# Patient Record
Sex: FEMALE | Race: WHITE | Employment: OTHER | URBAN - METROPOLITAN AREA
[De-identification: names, ages, dates, MRNs, and addresses within clinical notes are randomized per-mention and may not be internally consistent; named-entity substitution may affect disease eponyms.]

---

## 2020-10-06 ENCOUNTER — APPOINTMENT (OUTPATIENT)
Dept: GENERAL RADIOLOGY | Age: 70
DRG: 045 | End: 2020-10-06
Payer: MEDICAID

## 2020-10-06 ENCOUNTER — APPOINTMENT (OUTPATIENT)
Dept: CT IMAGING | Age: 70
DRG: 045 | End: 2020-10-06
Payer: MEDICAID

## 2020-10-06 ENCOUNTER — HOSPITAL ENCOUNTER (INPATIENT)
Age: 70
LOS: 2 days | Discharge: NURSING, REHAB OR LONG-TERM CARE | DRG: 045 | End: 2020-10-08
Attending: EMERGENCY MEDICINE | Admitting: STUDENT IN AN ORGANIZED HEALTH CARE EDUCATION/TRAINING PROGRAM
Payer: MEDICAID

## 2020-10-06 PROBLEM — I16.0 HYPERTENSIVE URGENCY: Status: ACTIVE | Noted: 2020-10-06

## 2020-10-06 PROBLEM — E87.6 HYPOKALEMIA: Status: ACTIVE | Noted: 2020-10-06

## 2020-10-06 PROBLEM — R29.810 FACIAL DROOP: Status: ACTIVE | Noted: 2020-10-06

## 2020-10-06 PROBLEM — R53.1 ACUTE RIGHT-SIDED WEAKNESS: Status: ACTIVE | Noted: 2020-10-06

## 2020-10-06 PROBLEM — G45.9 TIA (TRANSIENT ISCHEMIC ATTACK): Status: ACTIVE | Noted: 2020-10-06

## 2020-10-06 PROBLEM — E66.01 CLASS 3 SEVERE OBESITY DUE TO EXCESS CALORIES WITH BODY MASS INDEX (BMI) OF 45.0 TO 49.9 IN ADULT (HCC): Status: ACTIVE | Noted: 2020-10-06

## 2020-10-06 PROBLEM — R73.9 HYPERGLYCEMIA: Status: ACTIVE | Noted: 2020-10-06

## 2020-10-06 PROBLEM — I10 HYPERTENSION, UNCONTROLLED: Status: ACTIVE | Noted: 2020-10-06

## 2020-10-06 LAB
ALBUMIN SERPL-MCNC: 4.2 GM/DL (ref 3.4–5)
ALP BLD-CCNC: 112 IU/L (ref 40–128)
ALT SERPL-CCNC: 28 U/L (ref 10–40)
ANION GAP SERPL CALCULATED.3IONS-SCNC: 16 MMOL/L (ref 4–16)
AST SERPL-CCNC: 26 IU/L (ref 15–37)
BACTERIA: ABNORMAL /HPF
BASE EXCESS MIXED: 0.8 (ref 0–2.3)
BASOPHILS ABSOLUTE: 0.1 K/CU MM
BASOPHILS RELATIVE PERCENT: 1.1 % (ref 0–1)
BETA-HYDROXYBUTYRATE: 3.3 MG/DL (ref 0–3)
BILIRUB SERPL-MCNC: 1.1 MG/DL (ref 0–1)
BILIRUBIN URINE: NEGATIVE MG/DL
BLOOD, URINE: ABNORMAL
BUN BLDV-MCNC: 11 MG/DL (ref 6–23)
CALCIUM SERPL-MCNC: 9.5 MG/DL (ref 8.3–10.6)
CHLORIDE BLD-SCNC: 93 MMOL/L (ref 99–110)
CHP ED QC CHECK: YES
CLARITY: ABNORMAL
CO2: 23 MMOL/L (ref 21–32)
COLOR: YELLOW
COMMENT: ABNORMAL
CREAT SERPL-MCNC: 0.9 MG/DL (ref 0.6–1.1)
DIFFERENTIAL TYPE: ABNORMAL
EOSINOPHILS ABSOLUTE: 0.2 K/CU MM
EOSINOPHILS RELATIVE PERCENT: 2.7 % (ref 0–3)
GFR AFRICAN AMERICAN: >60 ML/MIN/1.73M2
GFR NON-AFRICAN AMERICAN: >60 ML/MIN/1.73M2
GLUCOSE BLD-MCNC: 340 MG/DL (ref 70–99)
GLUCOSE BLD-MCNC: 420 MG/DL
GLUCOSE BLD-MCNC: 420 MG/DL (ref 70–99)
GLUCOSE BLD-MCNC: 467 MG/DL (ref 70–99)
GLUCOSE, URINE: >500 MG/DL
HCO3 VENOUS: 26 MMOL/L (ref 19–25)
HCT VFR BLD CALC: 49.7 % (ref 37–47)
HEMOGLOBIN: 16.7 GM/DL (ref 12.5–16)
IMMATURE NEUTROPHIL %: 0.3 % (ref 0–0.43)
INR BLD: 1.19 INDEX
KETONES, URINE: NEGATIVE MG/DL
LEUKOCYTE ESTERASE, URINE: ABNORMAL
LYMPHOCYTES ABSOLUTE: 1.7 K/CU MM
LYMPHOCYTES RELATIVE PERCENT: 23.1 % (ref 24–44)
MAGNESIUM: 1.7 MG/DL (ref 1.8–2.4)
MCH RBC QN AUTO: 31.1 PG (ref 27–31)
MCHC RBC AUTO-ENTMCNC: 33.6 % (ref 32–36)
MCV RBC AUTO: 92.6 FL (ref 78–100)
MONOCYTES ABSOLUTE: 0.7 K/CU MM
MONOCYTES RELATIVE PERCENT: 9.2 % (ref 0–4)
MUCUS: ABNORMAL HPF
NITRITE URINE, QUANTITATIVE: NEGATIVE
NUCLEATED RBC %: 0 %
O2 SAT, VEN: 80 % (ref 50–70)
PCO2, VEN: 43 MMHG (ref 38–52)
PDW BLD-RTO: 13.2 % (ref 11.7–14.9)
PH VENOUS: 7.39 (ref 7.32–7.42)
PH, URINE: 6 (ref 5–8)
PLATELET # BLD: 211 K/CU MM (ref 140–440)
PMV BLD AUTO: 10.5 FL (ref 7.5–11.1)
PO2, VEN: 43 MMHG (ref 28–48)
POTASSIUM SERPL-SCNC: 3.2 MMOL/L (ref 3.5–5.1)
PROTEIN UA: 100 MG/DL
PROTHROMBIN TIME: 14.4 SECONDS (ref 11.7–14.5)
RBC # BLD: 5.37 M/CU MM (ref 4.2–5.4)
RBC URINE: 123 /HPF (ref 0–6)
SEGMENTED NEUTROPHILS ABSOLUTE COUNT: 4.6 K/CU MM
SEGMENTED NEUTROPHILS RELATIVE PERCENT: 63.6 % (ref 36–66)
SODIUM BLD-SCNC: 132 MMOL/L (ref 135–145)
SPECIFIC GRAVITY UA: 1.04 (ref 1–1.03)
SQUAMOUS EPITHELIAL: <1 /HPF
TOTAL IMMATURE NEUTOROPHIL: 0.02 K/CU MM
TOTAL NUCLEATED RBC: 0 K/CU MM
TOTAL PROTEIN: 7.7 GM/DL (ref 6.4–8.2)
TRICHOMONAS: ABNORMAL /HPF
TROPONIN T: <0.01 NG/ML
UROBILINOGEN, URINE: 1 MG/DL (ref 0.2–1)
WBC # BLD: 7.3 K/CU MM (ref 4–10.5)
WBC UA: 18 /HPF (ref 0–5)

## 2020-10-06 PROCEDURE — 83735 ASSAY OF MAGNESIUM: CPT

## 2020-10-06 PROCEDURE — 6370000000 HC RX 637 (ALT 250 FOR IP): Performed by: EMERGENCY MEDICINE

## 2020-10-06 PROCEDURE — 82962 GLUCOSE BLOOD TEST: CPT

## 2020-10-06 PROCEDURE — 6360000002 HC RX W HCPCS: Performed by: NURSE PRACTITIONER

## 2020-10-06 PROCEDURE — 2700000000 HC OXYGEN THERAPY PER DAY

## 2020-10-06 PROCEDURE — 85025 COMPLETE CBC W/AUTO DIFF WBC: CPT

## 2020-10-06 PROCEDURE — 6360000004 HC RX CONTRAST MEDICATION: Performed by: EMERGENCY MEDICINE

## 2020-10-06 PROCEDURE — 2580000003 HC RX 258: Performed by: EMERGENCY MEDICINE

## 2020-10-06 PROCEDURE — 82805 BLOOD GASES W/O2 SATURATION: CPT

## 2020-10-06 PROCEDURE — 2500000003 HC RX 250 WO HCPCS: Performed by: EMERGENCY MEDICINE

## 2020-10-06 PROCEDURE — 85610 PROTHROMBIN TIME: CPT

## 2020-10-06 PROCEDURE — 94761 N-INVAS EAR/PLS OXIMETRY MLT: CPT

## 2020-10-06 PROCEDURE — 36415 COLL VENOUS BLD VENIPUNCTURE: CPT

## 2020-10-06 PROCEDURE — 84484 ASSAY OF TROPONIN QUANT: CPT

## 2020-10-06 PROCEDURE — 83036 HEMOGLOBIN GLYCOSYLATED A1C: CPT

## 2020-10-06 PROCEDURE — 82010 KETONE BODYS QUAN: CPT

## 2020-10-06 PROCEDURE — 80053 COMPREHEN METABOLIC PANEL: CPT

## 2020-10-06 PROCEDURE — 2580000003 HC RX 258: Performed by: NURSE PRACTITIONER

## 2020-10-06 PROCEDURE — 93005 ELECTROCARDIOGRAM TRACING: CPT | Performed by: EMERGENCY MEDICINE

## 2020-10-06 PROCEDURE — 71045 X-RAY EXAM CHEST 1 VIEW: CPT

## 2020-10-06 PROCEDURE — 70496 CT ANGIOGRAPHY HEAD: CPT

## 2020-10-06 PROCEDURE — 2140000000 HC CCU INTERMEDIATE R&B

## 2020-10-06 PROCEDURE — 70450 CT HEAD/BRAIN W/O DYE: CPT

## 2020-10-06 PROCEDURE — 81001 URINALYSIS AUTO W/SCOPE: CPT

## 2020-10-06 PROCEDURE — 6370000000 HC RX 637 (ALT 250 FOR IP): Performed by: NURSE PRACTITIONER

## 2020-10-06 PROCEDURE — 99291 CRITICAL CARE FIRST HOUR: CPT

## 2020-10-06 RX ORDER — DEXTROSE MONOHYDRATE 25 G/50ML
12.5 INJECTION, SOLUTION INTRAVENOUS PRN
Status: DISCONTINUED | OUTPATIENT
Start: 2020-10-06 | End: 2020-10-08 | Stop reason: HOSPADM

## 2020-10-06 RX ORDER — NICOTINE POLACRILEX 4 MG
15 LOZENGE BUCCAL PRN
Status: DISCONTINUED | OUTPATIENT
Start: 2020-10-06 | End: 2020-10-08 | Stop reason: HOSPADM

## 2020-10-06 RX ORDER — 0.9 % SODIUM CHLORIDE 0.9 %
1000 INTRAVENOUS SOLUTION INTRAVENOUS ONCE
Status: COMPLETED | OUTPATIENT
Start: 2020-10-06 | End: 2020-10-06

## 2020-10-06 RX ORDER — OXYBUTYNIN CHLORIDE 5 MG/1
5 TABLET ORAL 3 TIMES DAILY
Status: DISCONTINUED | OUTPATIENT
Start: 2020-10-06 | End: 2020-10-08 | Stop reason: HOSPADM

## 2020-10-06 RX ORDER — ASPIRIN 81 MG/1
81 TABLET ORAL DAILY
Status: DISCONTINUED | OUTPATIENT
Start: 2020-10-07 | End: 2020-10-08 | Stop reason: HOSPADM

## 2020-10-06 RX ORDER — POTASSIUM CHLORIDE 7.45 MG/ML
10 INJECTION INTRAVENOUS PRN
Status: DISCONTINUED | OUTPATIENT
Start: 2020-10-06 | End: 2020-10-08 | Stop reason: HOSPADM

## 2020-10-06 RX ORDER — SODIUM CHLORIDE 0.9 % (FLUSH) 0.9 %
10 SYRINGE (ML) INJECTION
Status: COMPLETED | OUTPATIENT
Start: 2020-10-06 | End: 2020-10-06

## 2020-10-06 RX ORDER — LABETALOL HYDROCHLORIDE 5 MG/ML
10 INJECTION, SOLUTION INTRAVENOUS EVERY 10 MIN PRN
Status: DISCONTINUED | OUTPATIENT
Start: 2020-10-06 | End: 2020-10-08 | Stop reason: HOSPADM

## 2020-10-06 RX ORDER — MAGNESIUM SULFATE 1 G/100ML
1 INJECTION INTRAVENOUS ONCE
Status: COMPLETED | OUTPATIENT
Start: 2020-10-06 | End: 2020-10-07

## 2020-10-06 RX ORDER — POTASSIUM CHLORIDE 20 MEQ/1
40 TABLET, EXTENDED RELEASE ORAL PRN
Status: DISCONTINUED | OUTPATIENT
Start: 2020-10-06 | End: 2020-10-08 | Stop reason: HOSPADM

## 2020-10-06 RX ORDER — ATORVASTATIN CALCIUM 10 MG/1
10 TABLET, FILM COATED ORAL DAILY
Status: DISCONTINUED | OUTPATIENT
Start: 2020-10-07 | End: 2020-10-08 | Stop reason: HOSPADM

## 2020-10-06 RX ORDER — PANTOPRAZOLE SODIUM 40 MG/1
40 TABLET, DELAYED RELEASE ORAL
Status: DISCONTINUED | OUTPATIENT
Start: 2020-10-07 | End: 2020-10-08 | Stop reason: HOSPADM

## 2020-10-06 RX ORDER — POLYETHYLENE GLYCOL 3350 17 G/17G
17 POWDER, FOR SOLUTION ORAL DAILY PRN
Status: DISCONTINUED | OUTPATIENT
Start: 2020-10-06 | End: 2020-10-08 | Stop reason: HOSPADM

## 2020-10-06 RX ORDER — METOPROLOL TARTRATE 50 MG/1
50 TABLET, FILM COATED ORAL 2 TIMES DAILY
Status: DISCONTINUED | OUTPATIENT
Start: 2020-10-06 | End: 2020-10-08 | Stop reason: HOSPADM

## 2020-10-06 RX ORDER — SODIUM CHLORIDE 0.9 % (FLUSH) 0.9 %
10 SYRINGE (ML) INJECTION EVERY 12 HOURS SCHEDULED
Status: DISCONTINUED | OUTPATIENT
Start: 2020-10-06 | End: 2020-10-08 | Stop reason: HOSPADM

## 2020-10-06 RX ORDER — DEXTROSE MONOHYDRATE 50 MG/ML
100 INJECTION, SOLUTION INTRAVENOUS PRN
Status: DISCONTINUED | OUTPATIENT
Start: 2020-10-06 | End: 2020-10-08 | Stop reason: HOSPADM

## 2020-10-06 RX ORDER — PROMETHAZINE HYDROCHLORIDE 25 MG/1
12.5 TABLET ORAL EVERY 6 HOURS PRN
Status: DISCONTINUED | OUTPATIENT
Start: 2020-10-06 | End: 2020-10-08 | Stop reason: HOSPADM

## 2020-10-06 RX ORDER — SODIUM CHLORIDE 0.9 % (FLUSH) 0.9 %
10 SYRINGE (ML) INJECTION PRN
Status: DISCONTINUED | OUTPATIENT
Start: 2020-10-06 | End: 2020-10-08 | Stop reason: HOSPADM

## 2020-10-06 RX ORDER — ASPIRIN 300 MG/1
300 SUPPOSITORY RECTAL DAILY
Status: DISCONTINUED | OUTPATIENT
Start: 2020-10-07 | End: 2020-10-08 | Stop reason: HOSPADM

## 2020-10-06 RX ORDER — LISINOPRIL AND HYDROCHLOROTHIAZIDE 12.5; 1 MG/1; MG/1
2 TABLET ORAL DAILY
Status: DISCONTINUED | OUTPATIENT
Start: 2020-10-07 | End: 2020-10-08 | Stop reason: HOSPADM

## 2020-10-06 RX ORDER — ONDANSETRON 2 MG/ML
4 INJECTION INTRAMUSCULAR; INTRAVENOUS EVERY 6 HOURS PRN
Status: DISCONTINUED | OUTPATIENT
Start: 2020-10-06 | End: 2020-10-08 | Stop reason: HOSPADM

## 2020-10-06 RX ORDER — POTASSIUM CHLORIDE 20 MEQ/1
20 TABLET, EXTENDED RELEASE ORAL 2 TIMES DAILY
Status: DISCONTINUED | OUTPATIENT
Start: 2020-10-06 | End: 2020-10-08 | Stop reason: HOSPADM

## 2020-10-06 RX ORDER — METOPROLOL TARTRATE 50 MG/1
50 TABLET, FILM COATED ORAL ONCE
Status: COMPLETED | OUTPATIENT
Start: 2020-10-06 | End: 2020-10-06

## 2020-10-06 RX ORDER — LISINOPRIL AND HYDROCHLOROTHIAZIDE 20; 12.5 MG/1; MG/1
1 TABLET ORAL ONCE
Status: COMPLETED | OUTPATIENT
Start: 2020-10-06 | End: 2020-10-06

## 2020-10-06 RX ADMIN — DEXTROSE MONOHYDRATE 5 MG/HR: 50 INJECTION, SOLUTION INTRAVENOUS at 21:24

## 2020-10-06 RX ADMIN — METOPROLOL TARTRATE 5 MG: 1 INJECTION, SOLUTION INTRAVENOUS at 18:38

## 2020-10-06 RX ADMIN — LISINOPRIL AND HYDROCHLOROTHIAZIDE 1 TABLET: 12.5; 2 TABLET ORAL at 17:20

## 2020-10-06 RX ADMIN — SODIUM CHLORIDE 1000 ML: 9 INJECTION, SOLUTION INTRAVENOUS at 18:22

## 2020-10-06 RX ADMIN — IOPAMIDOL 75 ML: 755 INJECTION, SOLUTION INTRAVENOUS at 16:26

## 2020-10-06 RX ADMIN — SODIUM CHLORIDE, PRESERVATIVE FREE 10 ML: 5 INJECTION INTRAVENOUS at 22:41

## 2020-10-06 RX ADMIN — MAGNESIUM SULFATE HEPTAHYDRATE 1 G: 1 INJECTION, SOLUTION INTRAVENOUS at 22:41

## 2020-10-06 RX ADMIN — OXYBUTYNIN CHLORIDE 5 MG: 5 TABLET ORAL at 22:40

## 2020-10-06 RX ADMIN — SODIUM CHLORIDE, PRESERVATIVE FREE 10 ML: 5 INJECTION INTRAVENOUS at 16:31

## 2020-10-06 RX ADMIN — METOPROLOL TARTRATE 50 MG: 50 TABLET, FILM COATED ORAL at 17:00

## 2020-10-06 RX ADMIN — METOPROLOL TARTRATE 50 MG: 50 TABLET, FILM COATED ORAL at 22:40

## 2020-10-06 RX ADMIN — POTASSIUM CHLORIDE 20 MEQ: 1500 TABLET, EXTENDED RELEASE ORAL at 22:40

## 2020-10-06 ASSESSMENT — PAIN SCALES - GENERAL: PAINLEVEL_OUTOF10: 0

## 2020-10-06 NOTE — ED TRIAGE NOTES
Pt presented the to the ED via [facility] with stroke like sx with R sided weakness, R facial droop, and R eye gaze since 0400. Pt reports she fell approx 0200 and denies anticoagulants. Pt reports she hit her head. Medics report glucose of 514 and hypertensive. Pt is alert, oriented to self and , skin is pwd, RR e/u.

## 2020-10-06 NOTE — ED NOTES
Bed: H-01  Expected date:   Expected time:   Means of arrival:   Comments:  Stroke like kami Mims 89, RN  10/06/20 [id number]

## 2020-10-06 NOTE — ED NOTES
Bed: ED-16  Expected date:   Expected time:   Means of arrival:   Comments:  89 Myriam Turcios RN  10/06/20 8605

## 2020-10-06 NOTE — ED PROVIDER NOTES
Triage Chief Complaint:   Extremity Weakness (R arm and leg, left sided facial droop)    [place]:  Sinan Adames is a 79 y.o. female that presents to the emergency department stating around 2 AM she felt \"different\". States felt vision was different, dizziness and had a fall when she try to get up to go to the bathroom. States she felt normal before this. Patient denies any prior stroke or TIA. Denies being on any blood thinner. Denies any headache. Denies any tingling or numbness to her face arms or legs. Denies any weakness or dropping anything with her extremities. Denies any chest pain or pressure or shortness of breath. . Patient's last known well is reported as 2am, per patient . Patient has some mild loss of fluency and seems confused. She denies any chest pain or pressure. No shortness of breath. No fevers or chills. No urinary symptoms. Per chart review has a history of arthritis, asthma, hypertension, hyperlipidemia. States she has not taken any of her medications in the last day or so. ROS:  At least 10 systems reviewed and otherwise acutely negative except as in the [address] Past Medical History:   Diagnosis Date    Arthritis     Asthma     Hyperlipidemia     Hypertension      Past Surgical History:   Procedure Laterality Date    BLADDER SUSPENSION      COLONOSCOPY  3/23/15    polyps, int hem    DILATION AND CURETTAGE OF UTERUS      HYSTERECTOMY       No family history on file.   Social History     Socioeconomic History    Marital status: Single     Spouse name: Not on file    Number of children: Not on file    Years of education: Not on file    Highest education level: Not on file   Occupational History    Not on file   Social Needs    Financial resource strain: Not on file    Food insecurity     Worry: Not on file     Inability: Not on file    Transportation needs     Medical: Not on file     Non-medical: Not on file   Tobacco Use    Smoking status: Current Every Day Allergies    Nursing Notes Reviewed    Physical Exam:  ED Triage Vitals   Enc Vitals Group      BP       Pulse       Resp       Temp       Temp src       SpO2       Weight       Height       Head Circumference       Peak Flow       Pain Score       Pain Loc       Pain Edu? Excl. in [address]? GENERAL APPEARANCE: Awake and alert. Cooperative. Patient appears to have some possible mild loss of fluency and confusion. HEAD: Normocephalic. Atraumatic. EYES: EOM's grossly intact. Sclera anicteric. Patient with both eyes deviated to the right. Cannot move them past midline. ENT: Mucous membranes are moist. Tolerates saliva. No trismus. NECK: Supple. No meningismus. Trachea midline. HEART: RRR. Radial pulses 2+. LUNGS: Respirations unlabored. CTAB  ABDOMEN: Soft. Non-tender. No guarding or rebound. Strength is equal and normal in all 4 extremities. She states sensation is intact. EXTREMITIES: No acute deformities. SKIN: Warm and dry.   NEUROLOGICAL:   Sarai Kirby's NIH Stroke Scale at 6:08 PM is:  Level of Consciousness:  0 - alert; keenly responsive    LOC Questions:  0 - answers both questions correctly    LOC Commands:  0 - performs both tasks correctly    Best Gaze:  2 - forced deviation, or total gaze paresis not overcome by oculocephalic maneuver    Visual Fields:  1 - partial hemianopia    Facial Palsy:  1 - minor paralysis (flattened nasolabial fold, asymmetric on smiling)    Motor-Arm-Left:  0 - no drift, limb holds 90 (or 45) degrees for full 10 seconds    Motor-Leg-Left:  0 - no drift; leg holds 30 degree position for full 5 seconds    Motor-Arm-Right:  0 - no drift, limb holds 90 (or 45) degrees for full 10 seconds    Motor-Leg-Right:  0 - no drift; leg holds 30 degree position for full 5 seconds    Limb Ataxia:  0 - absent    Sensory:  1 - mild to moderate sensory loss; patient feels pinprick is less sharp or is dull on the affected side; there is a loss of superficial pain with pinprick 8.3 - 10.6 MG/DL    Alb 4.2 3.4 - 5.0 GM/DL    Total Protein 7.7 6.4 - 8.2 GM/DL    Total Bilirubin 1.1 (H) 0.0 - 1.0 MG/DL    ALT 28 10 - 40 U/L    AST 26 15 - 37 IU/L    Alkaline Phosphatase 112 40 - 128 IU/L    GFR Non-African American >60 >60 mL/min/1.73m2    GFR African American >60 >60 mL/min/1.73m2    Anion Gap 16 4 - 16   Troponin   Result Value Ref Range    Troponin T <0.010 <0.01 NG/ML   Protime-INR   Result Value Ref Range    Protime 14.4 11.7 - 14.5 SECONDS    INR 1.19 INDEX   Beta-Hydroxybutyrate   Result Value Ref Range    Beta-Hydroxybutyrate 3.3 (H) 0.0 - 3.0 MG/DL   Blood Gas, Venous   Result Value Ref Range    pH, [place] 7.39 7.32 - 7.42    pCO2, Devon 43 38 - 52 mmHG    pO2, [place] 43 28 - 48 mmHG    Base Exc, Mixed . 8 0 - 2.3    HCO3, Venous 26.0 (H) 19 - 25 MMOL/L    O2 Sat, [place] 80.0 (H) 50 - 70 %    Comment VBG    Magnesium   Result Value Ref Range    Magnesium 1.7 (L) 1.8 - 2.4 mg/dl   POCT Glucose   Result Value Ref Range    Glucose 420 mg/dL    QC OK? yES    POCT Glucose   Result Value Ref Range    POC Glucose 420 (H) 70 - 99 MG/DL   EKG 12 Lead   Result Value Ref Range    Ventricular Rate 110 BPM    Atrial Rate 90 BPM    QRS Duration 86 ms    Q-T Interval 296 ms    QTc Calculation (Bazett) 400 ms    R Axis -16 degrees    T Axis 94 degrees    Diagnosis       Undetermined rhythm  ST & T wave abnormality, consider inferior ischemia  Abnormal ECG  No previous ECGs available          Radiographs (if obtained):  [] The following radiograph was interpreted by myself in the absence of a radiologist:  [x] Radiologist's Report Reviewed:     XR CHEST PORTABLE (Final result)   Result time 10/06/20 16:50:57   Final result by Davon Manley MD (10/06/20 16:50:57)                 Impression:     No definite acute abnormality.  Hazy appearance to the left heart margin but   this may be due to positioning there is no evidence for effusion.              Narrative:     EXAMINATION:   ONE XRAY VIEW OF THE CHEST 10/6/2020 4:34 pm     COMPARISON:   None. HISTORY:   ORDERING SYSTEM PROVIDED HISTORY: CVA   TECHNOLOGIST PROVIDED HISTORY:   Reason for exam:->CVA   Reason for Exam: cva   Acuity: Unknown   Type of Exam: Unknown     FINDINGS:   Cardiac silhouette with the normal limits.  Costophrenic angles sharp   prominence interstitial markings in the lung bases are hazy appearance of the   right lung margin.  This may be due to inadequate positioning                      CTA HEAD NECK W CONTRAST (Final result)   Result time 10/06/20 17:07:52   Final result by Sanjuana Liu MD (10/06/20 17:07:52)                 Impression:     1. Motion limited evaluation. 2. No large vessel occlusion of the intracranial arteries. 3. Age-indeterminate occlusion of the proximal V2 segment left vertebral   artery with retrograde reconstitution of the V4 segment supplying the left   posterior inferior cerebellar artery. 4. Bilateral proximal internal carotid artery stenosis measuring 75% on the   right and 50% on the left. 5. Severe C5-6 spinal canal stenosis secondary to a posterior disc osteophyte   complex. Narrative:     EXAMINATION:   CTA OF THE HEAD AND NECK WITH CONTRAST 10/6/2020 4:25 pm:     TECHNIQUE:   CTA of the head and neck was performed with the administration of intravenous   contrast. Multiplanar reformatted images are provided for review.  MIP images   are provided for review. Stenosis of the internal carotid arteries measured   using NASCET criteria. Dose modulation, iterative reconstruction, and/or   weight based adjustment of the mA/kV was utilized to reduce the radiation   dose to as low as reasonably achievable. COMPARISON:   None.      HISTORY:   ORDERING SYSTEM PROVIDED HISTORY: viison changes, off balance, left facial   droop since 2am   TECHNOLOGIST PROVIDED HISTORY:   Reason for exam:->viison changes, off balance, left facial droop since 2am   Reason for Exam: viison changes, off balance, left facial droop since 2am   Acuity: Acute   Type of Exam: Initial   Additional signs and symptoms: NONE   Relevant Medical/Surgical History: 75ML ISOVUE 370     FINDINGS:     CTA NECK:     Motion limited evaluation. AORTIC ARCH/ARCH VESSELS: No dissection or arterial injury.  No significant   stenosis of the brachiocephalic or subclavian arteries. CAROTID ARTERIES: The common carotid arteries are normal in caliber. Atherosclerotic plaque involving the carotid bulbs causes 75% stenosis of the   proximal right and 50% stenosis of the proximal left internal carotid   arteries.  The external carotid arteries are patent.  The distal cervical   internal carotid arteries are not well evaluated due to motion artifact. VERTEBRAL ARTERIES: There is occlusion of the proximal V2 segment left   vertebral artery with no definite contrast in the remainder of the V2 and   proximal V3 segments.  The right vertebral artery appears normal in caliber. SOFT TISSUES: The lung apices are clear.  No cervical or superior mediastinal   lymphadenopathy.  The larynx and pharynx are unremarkable.  No acute   abnormality of the salivary and thyroid glands. BONES: There is no acute fracture or suspect osseous lesion.  There is severe   spinal canal stenosis at C5-6 secondary to a posterior disc osteophyte   complex. CTA HEAD:     ANTERIOR CIRCULATION: Mild calcification of the cavernous internal carotid   arteries without significant stenosis.  Anterior and middle cerebral arteries   appear normal in caliber.  The right anterior cerebral artery A1 segment is   hypoplastic.  No aneurysm. POSTERIOR CIRCULATION: No stenosis of the vertebral, basilar, or posterior   cerebral arteries the left vertebral artery fills in retrograde fashion from   the right vertebral artery supplying the left posterior-inferior cerebellar   artery. OTHER: No dural venous sinus thrombosis on this non-dedicated study. BRAIN: No mass effect or midline shift. No extra-axial fluid collection. The   gray-white differentiation is maintained.                       CT HEAD WO CONTRAST (Final result)   Result time 10/06/20 16:13:42   Final result by Jo Sierra MD (10/06/20 16:13:42)                 Impression:     No acute intracranial abnormality. Narrative:     EXAMINATION:   CT OF THE HEAD WITHOUT CONTRAST  10/6/2020 3:56 pm     TECHNIQUE:   CT of the head was performed without the administration of intravenous   contrast. Dose modulation, iterative reconstruction, and/or weight based   adjustment of the mA/kV was utilized to reduce the radiation dose to as low   as reasonably achievable. COMPARISON:   None. HISTORY:   ORDERING SYSTEM PROVIDED HISTORY: states dizziness, felt off balance, vision   changes since 2am , left facial droop? TECHNOLOGIST PROVIDED HISTORY:   Reason for exam:->states dizziness, felt off balance, vision changes since   2am , left facial droop? Has a \"code stroke\" or \"stroke alert\" been called? ->Yes   Reason for Exam: states dizziness, felt off balance, vision changes since 2am   , left facial droop? Acuity: Acute   Type of Exam: Initial   Additional signs and symptoms: NONE   Relevant Medical/Surgical History: NONE     FINDINGS:   BRAIN/VENTRICLES: There is no acute intracranial hemorrhage, mass effect or   midline shift.  No abnormal extra-axial fluid collection.  The gray-white   differentiation is maintained without evidence of an acute infarct.  There is   no evidence of hydrocephalus. ORBITS: The visualized portion of the orbits demonstrate no acute abnormality. SINUSES: The visualized paranasal sinuses and mastoid air cells demonstrate   no acute abnormality. SOFT TISSUES/SKULL:  No acute abnormality of the visualized skull or soft   tissues.                    EKG (if obtained): (All EKG's are interpreted by myself in the absence of a cardiologist)  The Ekg Apixiban (Eliquis®) within 48 hours**     Dabigatran (Pradaxa®) within 72 hours**     Enoxaparin (Lovenox®) within 24 hours**     Fondaparinux (Arixtra®) within 72 hours**     Rivaroxaban (Xarelto®) within 24 hours**     **For patients with normal renal function. Activity may be significantly   prolonged in the elderly or patients with renal dysfunction    *Remains thrombolytic eligible if BP/BG corrected while in treatment window      MDM: Patient is hypertensive. Tachycardic. Accu-Chek was over 500 with EMS. Patient denies that she is a diabetic. Patient's Accu-Chek here in the ER was 420. NIH of 6. CBC shows a white count of 7.3. Hemoglobin of 16.7. No left shift. CMP shows a glucose of 467. Normal CO2. Normal anion gap. Patient states she is not a known diabetic. . Troponin normal.  INR is 1. 19. Magnesium 1.7. Venous blood gas is normal with a venous pH of 7.39. Beta hydroxybutyrate is 3.3. No DKA. Hemoglobin a1c pending. CT head shows no acute findings. CTA head neck shows no large vessel occlusion. She does have some proximal internal carotid artery stenosis measuring 75% on the right and 50% on the left. She does have C5-C6 spinal canal stenosis secondary to a disc osteophyte. Does have what appears to be an occlusion of the proximal V2 segment of the left vertebral artery with retro-greater reconstitution of the V4 segment supplying the left posterior inferior cerebellar artery. Because patient does not have a large vessel occlusion, she does not need to be transferred to [facility]. Patient will be kept at [facility] for further eval and neurology. UA pending. CRITICAL CARE NOTE:  There was a high probability of clinically significant life-threatening deterioration of the patient's condition requiring my urgent intervention due to altered mental status, concern for stroke, hypertension, hyperglycemia.   Stroke alert, neurology eval with [facility], re-eval, chart review, admit was performed to address this. Total critical care time is at least  35 minutes. This includes vital sign monitoring, pulse oximetry monitoring, telemetry monitoring, clinical response to the IV medications, reviewing the nursing notes, consultation time, dictation/documentation time, and interpretation of the lab work. This time excludes time spent performing procedures and separately billable procedures and family discussion time. Clinical Impression:  1. Altered mental status, unspecified altered mental status type    2. Hyperglycemia    3. Essential hypertension    4. Facial droop    5.  Paresthesia      (Please note that portions of this note may have been completed with a voice recognition program. Efforts were made to edit the dictations but occasionally words are mis-transcribed.)    MD Carissa Deutsch MD  10/06/20 7854

## 2020-10-06 NOTE — PROGRESS NOTES
Medication History  [facility]    Patient Name: Connie Yung 1950     Medication history has been completed by: Mariah Castleman, CPhT    Source(s) of information: patient     Primary Care Physician: Jayro Paniagua MD     Pharmacy:     Allergies as of 10/06/2020    (No Known Allergies)        Prior to Admission medications    Medication Sig Start Date End Date Taking? Authorizing Provider     Medications removed from list (include reason, ex. noncompliance, medication cost, therapy complete etc.):   Complete medication list flagged for removal d/t patient states she does not these medications. Comments:  Complete medication list flagged for removal d/t patient states she does not these medications. Patient states she takes a OTC pain pill and a sleeping pill. Patient could not specify OTC's.     To my knowledge the above medication history is accurate as of 10/6/2020 5:26 PM.   Mariah Castleman, CPhT   10/6/2020 5:26 PM

## 2020-10-06 NOTE — H&P
History and Physical  [facility]hal[facility] Pollock [facility] [place]   Internal Medicine Hospitalist      Name:  Sinan Adames /Age/Sex: 1950  (69 y.o. female)   MRN & CSN:  [id number] & [id number] Admission Date/Time: 10/6/2020  3:44 PM   Location:  ED16/ED-16 PCP: Shane Thompson MD       Hospital Day: 1      Supervising Physician: Dr. Luz Kelly     Chief Complaint: Extremity Weakness (R arm and leg, left sided facial droop)     Assessment and Plan: Sinan Adames is a 79 y.o. morbidly obese female who presents with TIA (transient ischemic attack)      TIA, r/o CVA - symptoms: right-sided weakness, Facial droop, and confusion, Stroke alert called in ED.         - some symptoms appears resolved in ED         - denied h/o stroke or TIA           - NIH assessment-total score 3         - CT head/CXR results no acute abnormalities, initial trop negative, denied chest pain.          - CTA No large vessel occlusion of the intracranial arteries         - admit, telemetry monitoring         - consult Neurology for further evaluation and treatment         - NIHSS/Neuro, NV checks         - on ASA, Lipitor         - PT/OT eval and treat         - pending Echo, MRI Brain w/o contrast         - check lab works in [facility] Hypertensive crisis, 2/2 noncompliance to treatment  -  -200s in ED.         - allow permissive hypertension while r/o stroke          - prn Labetalol for SBP >220/DBP>120         - on home BP meds: Prizide, Lopressor         - closely monitor BP trends     Hypokalemia (3.2), hypomagnesemia (1.7), hyponatremia (132)        - bolus IVF given in ED         - on K sliding scale protocol        - Mg sulfate 1 g, once        - cont home dose Klor-con        - replete and re-check      Hyperglycemia - denied h/o DM, serum glucose 420         - Sliding scale         - hypoglycemia protocol         - monitor accucheck         - pending HgbA1C     Class 3 severe obesity due to excess calories with BMI of 45.0 to 49.9 in adult - current BMI 45.53         - Health maintenance: exercise, lifestyle modification, and reduced caloric intake            Chronic Illnesses:          - asthma - no exacerbation. - arthritis         - hyperlipidemia         - vitamin D deficiency         - chronic back pain      Current diagnosis and plan of management discussed with the patient at the time of admission in lay language who agree to the above plan and disposition of admission for further care. All concerns and questions addressed. Patient assessment and plan in conjunction with supervising physician - Dr. Kit Amado Effective Now    DVT Prophylaxis [x] Lovenox, []  Heparin, [] SCDs, [] Ambulation  [] Long term AC   GI Prophylaxis [x] PPI,  [] H2 Blocker,  [] Carafate,  [] Diet,  [] No GI prophylaxis, N/A: patient is not under significant medical stress, non-ICU or is receiving a diet/tube feeds   Code Status  Full CODE   Disposition Patient requires continued admission due to TIA (transient ischemic attack). Discharge Plan: Patient plans to return home upon discharge. MDM [] Low, [x] Moderate,[]  High  Patient's risk as above due to:      [x] One or more chronic illnesses with mild exacerbation progression      [] Two or more stable chronic illnesses      [] Undiagnosed new problem with uncertain prognosis      [] Elective major surgery      []Prescription drug management     History of Present Illness:     Principal Problem: TIA (transient ischemic attack)  Amaya Conde is a 79 y.o. morbidly obese female with past medical history of obesity, asthma, arthritis, hyperlipidemia, hypertension, vitamin D deficiency, and chronic back pain presented to the ED with complaints of right arm and leg weakness and left sided facial droop. Stroke alert called in ED. Patient has history of stroke or TIA, heart disease, and diabetes.  Patient reports that she felt different today with vision changes, dizziness, and had a fall when she tried to get up to go to the bathroom. Denied hitting his head or head injury. Denies headache, lightheadedness, and loss of consciousness. Denied being on any blood thinner. ED reports that patient had some mild loss of fluency and seems confused. But on examination, patient is awake, alert, oriented, and with clear speech. Patient denies chest pain, palpitation, fever, chills or diaphoresis, cough, and SOB or difficulty breathing. Denies any other symptoms including abdominal pain, nausea, vomiting, changes in bowels, dysuria, hematuria, and frequency or urgency. Full code confirmed. Upon interview, the patient provided the history as above. ED Course: Discussed case with ED physician prior to admission. ROS: Ten point ROS reviewed and negative, unless as noted above per HPI. Objective:   No intake or output data in the 24 hours ending 10/06/20 1920     Vitals: Temp/Oral 98.8  Vitals:    10/06/20 1816 10/06/20 1835 10/06/20 1900 10/06/20 1915   BP: (!) 173/136 (!) 184/117 (!) 175/116 (!) 175/106   Pulse: 112 103 92 86   Resp: 20 21 17 17   Temp:       TempSrc:       SpO2: 94% 92% 94% 95%   Weight:       Height:         Physical Exam: 10/06/20    GEN  -Awake, alert, appearing morbidly obese female, sitting upright in bed, cooperative, able to give adequate history. Appears given age. EYES -Pupils are equally round. No vision changes. No scleral erythema, discharge, or conjunctivitis. HENT -MM are moist. Oral pharynx without exudates, no evidence of thrush. NECK -Supple, no apparent thyromegaly or masses. RESP -LS CTA equal bilat, no wheezes, rales or rhonchi. Symmetric chest movement. No respiratory distress noted. C/V  -S1/S2 auscultated. RRR without appreciable M/R/G. No JVD or carotid bruits. Peripheral pulses equal bilaterally and palpable. Peripheral pulses equal bilaterally and palpable. No peripheral edema.    GI  -central obesity, abdomen is soft, round, non-distended, no significant tenderness. No masses or guarding. + BS in all quadrants. Rectal exam deferred.   -Bond catheter is not present. LYMPH  -No palpable cervical lymphadenopathy and no hepatosplenomegaly. No petechiae or ecchymoses. MS  -B/L extremities moderate muscles strength. Full movements. No gross joint deformities. No swelling, intact sensation symmetrical.   SKIN  -Normal coloration, warm, dry. No open wounds or ulcers. NEURO  -CN 2-12 appear grossly intact, normal speech, no lateralizing weakness. 1a  Level of consciousness: 0=alert; keenly responsive   1b. LOC questions:  0=Performs both tasks correctly   1c. LOC commands: 0=Performs both tasks correctly   2. Best Gaze: 1=Partial gaze palsy; gaze is abnormal in one or both eyes, but forced deviation or total gaze paresis is not present. 3. Visual: 1=partial hemianopia   4. Facial Palsy: 1=minor paralysis (flattened nasolabial fold, asymmetric on smiling)   5a. Motor left arm: 0=No drift, limb holds 90 (or 45) degrees for full 10 seconds   5b. Motor right arm: 0=No drift, limb holds 90 (or 45) degrees for full 10 seconds   6a. motor left le=No drift, limb holds 90 (or 45) degrees for full 10 seconds   6b  Motor right le=No drift, limb holds 90 (or 45) degrees for full 10 seconds   7. Limb Ataxia: 0=Absent   8. Sensory: 0=Normal; no sensory loss   9. Best Language:  0=No aphasia, normal   10. Dysarthria: 0=Normal   11. Extinction and Inattention: 0=No abnormality   12. Distal motor function: 0=Normal     Total:  3     PSYC  -Awake, alert, oriented x 4. Appropriate affect. Past Medical History:      Past Medical History:   Diagnosis Date    Arthritis     Asthma     Hyperlipidemia     Hypertension      Past Surgery History:  Patient  has a past surgical history that includes Hysterectomy; bladder suspension; Dilation and curettage of uterus; and Colonoscopy (3/23/15).   Social History:    FAM HX: Assessed: family history includes No Known Problems in her father and mother. Soc HX:   Social History     Socioeconomic History    Marital status: Single     Spouse name: Not on file    Number of children: Not on file    Years of education: Not on file    Highest education level: Not on file   Occupational History    Not on file   Social Needs    Financial resource strain: Not on file    Food insecurity     Worry: Not on file     Inability: Not on file    Transportation needs     Medical: Not on file     Non-medical: Not on file   Tobacco Use    Smoking status: Current Every Day Smoker     Packs/day: 1.00     Years: 20.00     Pack years: 20.00     Types: Cigarettes    Smokeless tobacco: Never Used   Substance and Sexual Activity    Alcohol use: No     Alcohol/week: 0.0 standard drinks    Drug use: No    Sexual activity: Not on file   Lifestyle    Physical activity     Days per week: Not on file     Minutes per session: Not on file    Stress: Not on file   Relationships    Social connections     Talks on phone: Not on file     Gets together: Not on file     Attends Gnosticist service: Not on file     Active member of club or organization: Not on file     Attends meetings of clubs or organizations: Not on file     Relationship status: Not on file    Intimate partner violence     Fear of current or ex partner: Not on file     Emotionally abused: Not on file     Physically abused: Not on file     Forced sexual activity: Not on file   Other Topics Concern    Not on file   Social History Narrative    Not on file     TOBACCO:   reports that she has been smoking cigarettes. She has a 20.00 pack-year smoking history. She has never used smokeless tobacco.  ETOH:   reports no history of alcohol use. Drugs:  reports no history of drug use.     Allergies: No Known Allergies  Medications:   Medications:    sodium chloride  1,000 mL Intravenous Once      Infusions:   PRN Meds:    Prior to Admission Meds:  Prior to Admission medications    Medication Sig Start Date End Date Taking? Authorizing Provider   oxybutynin (DITROPAN) 5 MG tablet Take 1 tablet by mouth 3 times daily 1/25/16   Luis Parish MD   metoprolol (LOPRESSOR) 50 MG tablet Take 1 tablet by mouth 2 times daily 1/25/16   Luis Parish MD   atorvastatin (LIPITOR) 10 MG tablet Take 1 tablet by mouth daily 1/25/16   Luis Parish MD   lisinopril-hydrochlorothiazide (PRINZIDE;ZESTORETIC) 20-25 MG per tablet Take 1 tablet by mouth daily 1/25/16   Luis Parish MD   ibuprofen (ADVIL;MOTRIN) 800 MG tablet Take 1 tablet by mouth every 8 hours as needed for Pain 1/25/16   Luis Parish MD   potassium chloride SA (K-DUR;KLOR-CON M) 20 MEQ tablet Take 1 tablet by mouth 2 times daily for 14 days. 6/16/14 6/30/14  Luis Parish MD   oxybutynin (DITROPAN) 5 MG tablet Take 1 tablet by mouth 3 times daily. 7/1/13 11/15/13  Luis Parish MD     Data:     Laboratory this visit:  Reviewed  Recent Labs     10/06/20  1549   WBC 7.3   HGB 16.7*   HCT 49.7*         Recent Labs     10/06/20  1549   *   K 3.2*   CL 93*   CO2 23   BUN 11   CREATININE 0.9     Recent Labs     10/06/20  1549   AST 26   ALT 28   BILITOT 1.1*   ALKPHOS 112     Recent Labs     10/06/20  1549   INR 1.19     No results for input(s): CKTOTAL, CKMB, CKMBINDEX in the last 72 hours. Invalid input(s): Brooke Smoker input(s): PRO-BNP    Radiology this visit:  Reviewed. Ct Head Wo Contrast    Result Date: 10/6/2020  EXAMINATION: CT OF THE HEAD WITHOUT CONTRAST  10/6/2020 3:56 pm TECHNIQUE: CT of the head was performed without the administration of intravenous contrast. Dose modulation, iterative reconstruction, and/or weight based adjustment of the mA/kV was utilized to reduce the radiation dose to as low as reasonably achievable. COMPARISON: None. HISTORY: ORDERING SYSTEM PROVIDED HISTORY: states dizziness, felt off balance, vision changes since 2am , left facial droop?  TECHNOLOGIST carotid arteries measured using NASCET criteria. Dose modulation, iterative reconstruction, and/or weight based adjustment of the mA/kV was utilized to reduce the radiation dose to as low as reasonably achievable. COMPARISON: None. HISTORY: ORDERING SYSTEM PROVIDED HISTORY: viison changes, off balance, left facial droop since 2am TECHNOLOGIST PROVIDED HISTORY: Reason for exam:->viison changes, off balance, left facial droop since 2am Reason for Exam: viison changes, off balance, left facial droop since 2am Acuity: Acute Type of Exam: Initial Additional signs and symptoms: NONE Relevant Medical/Surgical History: 75ML ISOVUE 370 FINDINGS: CTA NECK: Motion limited evaluation. AORTIC ARCH/ARCH VESSELS: No dissection or arterial injury. No significant stenosis of the brachiocephalic or subclavian arteries. CAROTID ARTERIES: The common carotid arteries are normal in caliber. Atherosclerotic plaque involving the carotid bulbs causes 75% stenosis of the proximal right and 50% stenosis of the proximal left internal carotid arteries. The external carotid arteries are patent. The distal cervical internal carotid arteries are not well evaluated due to motion artifact. VERTEBRAL ARTERIES: There is occlusion of the proximal V2 segment left vertebral artery with no definite contrast in the remainder of the V2 and proximal V3 segments. The right vertebral artery appears normal in caliber. SOFT TISSUES: The lung apices are clear. No cervical or superior mediastinal lymphadenopathy. The larynx and pharynx are unremarkable. No acute abnormality of the salivary and thyroid glands. BONES: There is no acute fracture or suspect osseous lesion. There is severe spinal canal stenosis at C5-6 secondary to a posterior disc osteophyte complex. CTA HEAD: ANTERIOR CIRCULATION: Mild calcification of the cavernous internal carotid arteries without significant stenosis. Anterior and middle cerebral arteries appear normal in caliber.   The right anterior cerebral artery A1 segment is hypoplastic. No aneurysm. POSTERIOR CIRCULATION: No stenosis of the vertebral, basilar, or posterior cerebral arteries the left vertebral artery fills in retrograde fashion from the right vertebral artery supplying the left posterior-inferior cerebellar artery. OTHER: No dural venous sinus thrombosis on this non-dedicated study. BRAIN: No mass effect or midline shift. No extra-axial fluid collection. The gray-white differentiation is maintained. 1. Motion limited evaluation. 2. No large vessel occlusion of the intracranial arteries. 3. Age-indeterminate occlusion of the proximal V2 segment left vertebral artery with retrograde reconstitution of the V4 segment supplying the left posterior inferior cerebellar artery. 4. Bilateral proximal internal carotid artery stenosis measuring 75% on the right and 50% on the left. 5. Severe C5-6 spinal canal stenosis secondary to a posterior disc osteophyte complex.      EKG this visit:   EKG: Undetermined rhythm, rate 90   ST & T wave abnormality, consider inferior ischemia   Abnormal ECG   No previous ECGs available    Current Treatment Team:  Treatment Team: Attending Provider: Raf De La Torre MD; Registered Nurse: Ban Santos RN; Registered Nurse: Rohit Schaefer RN; Consulting Physician: MD Katerina Rangel APRN-BC   [facility]  10/6/2020 7:20 PM      Electronically signed by MELANI Samuel CNP on 10/6/2020 at 7:20 PM

## 2020-10-07 ENCOUNTER — APPOINTMENT (OUTPATIENT)
Dept: MRI IMAGING | Age: 70
DRG: 045 | End: 2020-10-07
Payer: MEDICAID

## 2020-10-07 LAB
ANION GAP SERPL CALCULATED.3IONS-SCNC: 10 MMOL/L (ref 4–16)
BUN BLDV-MCNC: 10 MG/DL (ref 6–23)
CALCIUM SERPL-MCNC: 9.3 MG/DL (ref 8.3–10.6)
CHLORIDE BLD-SCNC: 100 MMOL/L (ref 99–110)
CHOLESTEROL: 189 MG/DL
CO2: 26 MMOL/L (ref 21–32)
CREAT SERPL-MCNC: 1 MG/DL (ref 0.6–1.1)
GFR AFRICAN AMERICAN: >60 ML/MIN/1.73M2
GFR NON-AFRICAN AMERICAN: 55 ML/MIN/1.73M2
GLUCOSE BLD-MCNC: 277 MG/DL (ref 70–99)
GLUCOSE BLD-MCNC: 304 MG/DL (ref 70–99)
GLUCOSE BLD-MCNC: 322 MG/DL (ref 70–99)
GLUCOSE BLD-MCNC: 339 MG/DL (ref 70–99)
GLUCOSE BLD-MCNC: 344 MG/DL (ref 70–99)
HCT VFR BLD CALC: 46 % (ref 37–47)
HDLC SERPL-MCNC: 37 MG/DL
HEMOGLOBIN: 15.5 GM/DL (ref 12.5–16)
LDL CHOLESTEROL DIRECT: 135 MG/DL
LV EF: 53 %
LVEF MODALITY: NORMAL
MAGNESIUM: 2 MG/DL (ref 1.8–2.4)
MCH RBC QN AUTO: 30.8 PG (ref 27–31)
MCHC RBC AUTO-ENTMCNC: 33.7 % (ref 32–36)
MCV RBC AUTO: 91.5 FL (ref 78–100)
PDW BLD-RTO: 13.3 % (ref 11.7–14.9)
PLATELET # BLD: 195 K/CU MM (ref 140–440)
PMV BLD AUTO: 10.4 FL (ref 7.5–11.1)
POTASSIUM SERPL-SCNC: 3.4 MMOL/L (ref 3.5–5.1)
RBC # BLD: 5.03 M/CU MM (ref 4.2–5.4)
SODIUM BLD-SCNC: 136 MMOL/L (ref 135–145)
TRIGL SERPL-MCNC: 179 MG/DL
WBC # BLD: 6.7 K/CU MM (ref 4–10.5)

## 2020-10-07 PROCEDURE — 80048 BASIC METABOLIC PNL TOTAL CA: CPT

## 2020-10-07 PROCEDURE — 97166 OT EVAL MOD COMPLEX 45 MIN: CPT

## 2020-10-07 PROCEDURE — 80061 LIPID PANEL: CPT

## 2020-10-07 PROCEDURE — 93306 TTE W/DOPPLER COMPLETE: CPT

## 2020-10-07 PROCEDURE — 93010 ELECTROCARDIOGRAM REPORT: CPT | Performed by: INTERNAL MEDICINE

## 2020-10-07 PROCEDURE — 97162 PT EVAL MOD COMPLEX 30 MIN: CPT

## 2020-10-07 PROCEDURE — 82962 GLUCOSE BLOOD TEST: CPT

## 2020-10-07 PROCEDURE — 2580000003 HC RX 258: Performed by: NURSE PRACTITIONER

## 2020-10-07 PROCEDURE — 92610 EVALUATE SWALLOWING FUNCTION: CPT | Performed by: SPEECH-LANGUAGE PATHOLOGIST

## 2020-10-07 PROCEDURE — 6360000002 HC RX W HCPCS: Performed by: NURSE PRACTITIONER

## 2020-10-07 PROCEDURE — 1200000000 HC SEMI PRIVATE

## 2020-10-07 PROCEDURE — 36415 COLL VENOUS BLD VENIPUNCTURE: CPT

## 2020-10-07 PROCEDURE — 97530 THERAPEUTIC ACTIVITIES: CPT

## 2020-10-07 PROCEDURE — 97116 GAIT TRAINING THERAPY: CPT

## 2020-10-07 PROCEDURE — 85027 COMPLETE CBC AUTOMATED: CPT

## 2020-10-07 PROCEDURE — 70551 MRI BRAIN STEM W/O DYE: CPT

## 2020-10-07 PROCEDURE — 99222 1ST HOSP IP/OBS MODERATE 55: CPT | Performed by: NURSE PRACTITIONER

## 2020-10-07 PROCEDURE — 6370000000 HC RX 637 (ALT 250 FOR IP): Performed by: NURSE PRACTITIONER

## 2020-10-07 PROCEDURE — 83036 HEMOGLOBIN GLYCOSYLATED A1C: CPT

## 2020-10-07 PROCEDURE — 6360000004 HC RX CONTRAST MEDICATION

## 2020-10-07 PROCEDURE — 83735 ASSAY OF MAGNESIUM: CPT

## 2020-10-07 PROCEDURE — 83721 ASSAY OF BLOOD LIPOPROTEIN: CPT

## 2020-10-07 RX ORDER — CLOPIDOGREL BISULFATE 75 MG/1
300 TABLET ORAL ONCE
Status: COMPLETED | OUTPATIENT
Start: 2020-10-07 | End: 2020-10-07

## 2020-10-07 RX ORDER — CLOPIDOGREL BISULFATE 75 MG/1
75 TABLET ORAL DAILY
Status: DISCONTINUED | OUTPATIENT
Start: 2020-10-08 | End: 2020-10-08 | Stop reason: HOSPADM

## 2020-10-07 RX ADMIN — ATORVASTATIN CALCIUM 10 MG: 10 TABLET, FILM COATED ORAL at 08:50

## 2020-10-07 RX ADMIN — OXYBUTYNIN CHLORIDE 5 MG: 5 TABLET ORAL at 08:49

## 2020-10-07 RX ADMIN — ENOXAPARIN SODIUM 40 MG: 40 INJECTION SUBCUTANEOUS at 08:50

## 2020-10-07 RX ADMIN — METOPROLOL TARTRATE 50 MG: 50 TABLET, FILM COATED ORAL at 08:49

## 2020-10-07 RX ADMIN — METOPROLOL TARTRATE 50 MG: 50 TABLET, FILM COATED ORAL at 21:48

## 2020-10-07 RX ADMIN — POTASSIUM CHLORIDE 20 MEQ: 1500 TABLET, EXTENDED RELEASE ORAL at 21:48

## 2020-10-07 RX ADMIN — OXYBUTYNIN CHLORIDE 5 MG: 5 TABLET ORAL at 16:08

## 2020-10-07 RX ADMIN — PERFLUTREN 3 ML: 6.52 INJECTION, SUSPENSION INTRAVENOUS at 08:59

## 2020-10-07 RX ADMIN — POTASSIUM CHLORIDE 20 MEQ: 1500 TABLET, EXTENDED RELEASE ORAL at 08:50

## 2020-10-07 RX ADMIN — PANTOPRAZOLE SODIUM 40 MG: 40 TABLET, DELAYED RELEASE ORAL at 08:50

## 2020-10-07 RX ADMIN — CLOPIDOGREL BISULFATE 300 MG: 75 TABLET ORAL at 16:08

## 2020-10-07 RX ADMIN — LISINOPRIL AND HYDROCHLOROTHIAZIDE 2 TABLET: 12.5; 1 TABLET ORAL at 08:49

## 2020-10-07 RX ADMIN — ASPIRIN 81 MG: 81 TABLET, COATED ORAL at 08:49

## 2020-10-07 RX ADMIN — OXYBUTYNIN CHLORIDE 5 MG: 5 TABLET ORAL at 21:48

## 2020-10-07 RX ADMIN — POTASSIUM CHLORIDE 40 MEQ: 1500 TABLET, EXTENDED RELEASE ORAL at 09:05

## 2020-10-07 RX ADMIN — SODIUM CHLORIDE, PRESERVATIVE FREE 10 ML: 5 INJECTION INTRAVENOUS at 21:49

## 2020-10-07 ASSESSMENT — PAIN SCALES - GENERAL
PAINLEVEL_OUTOF10: 0

## 2020-10-07 NOTE — PLAN OF CARE
Problem: Falls - Risk of:  Goal: Will remain free from falls  Description: Will remain free from falls  10/7/2020 1042 by Skinny Beebe  Outcome: Ongoing  10/7/2020 0929 by Emeka Sheppard RN  Outcome: Ongoing  10/7/2020 0928 by Emeka Sheppard RN  Outcome: Ongoing  Goal: Absence of physical injury  Description: Absence of physical injury  10/7/2020 1042 by Skinny Beebe  Outcome: Ongoing  10/7/2020 0929 by Emeka Sheppard RN  Outcome: Ongoing  10/7/2020 0928 by Emeka Sheppard RN  Outcome: Ongoing     Problem: Skin Integrity:  Goal: Will show no infection signs and symptoms  Description: Will show no infection signs and symptoms  10/7/2020 1042 by Skinny Beebe  Outcome: Ongoing  10/7/2020 0929 by Emeka Sheppard RN  Outcome: Ongoing  10/7/2020 0928 by Emeka Sheppard RN  Outcome: Ongoing  Goal: Absence of new skin breakdown  Description: Absence of new skin breakdown  10/7/2020 1042 by Skinny Beebe  Outcome: Ongoing  10/7/2020 0929 by Emeka Sheppard RN  Outcome: Ongoing  10/7/2020 0928 by Emeka Sheppard RN  Outcome: Ongoing     Problem: HEMODYNAMIC STATUS  Goal: Patient has stable vital signs and fluid balance  10/7/2020 1042 by Skinny Beebe  Outcome: Ongoing  10/7/2020 0929 by Emeka Sheppard RN  Outcome: Ongoing     Problem: ACTIVITY INTOLERANCE/IMPAIRED MOBILITY  Goal: Mobility/activity is maintained at optimum level for patient  10/7/2020 1042 by Skinny Beebe  Outcome: Ongoing  10/7/2020 0929 by Emeka Sheppard RN  Outcome: Ongoing     Problem: COMMUNICATION IMPAIRMENT  Goal: Ability to express needs and understand communication  10/7/2020 1042 by Skinny Beebe  Outcome: Ongoing  10/7/2020 0929 by Emeka Sheppard RN  Outcome: Ongoing

## 2020-10-07 NOTE — PROGRESS NOTES
[facility]    Hospitalist Progress Note      Name:  Kevon Castro /Age/Sex: 1950  (69 y.o. female)   MRN & CSN:  [id number] & [id number] Admission Date/Time: 10/6/2020  3:44 PM   Location:  [address] [place] [address] PCP: Sirena Allen MD         Hospital Day: 2    Assessment and Plan: Kevon Castro is a 79 y.o.  female  who presents with strokelike symptoms    Acute Stroke   With Abducens nerve palsy    Presented with falls and facial asymmetry   ECHO unremarkable, , A1c pending   MRI brain reported with acute infarct involving the dorsal ian  PT/OT - maintain fall precautions   Continue with ASA, Lipitor, started on Plavix  Neurology consulted    Hypertensive emergency    Likely from noncompliance to treatment  Resumed home Lopressor, lisinopril-hydrochlorothiazide    DM type II  -on insulin sliding scale, add Lantus, check A1c in a.m. Chronic conditions addressed    Hyperlipidemia  Morbid obesity -BMI 45.5  Vitamin D deficiency    Diet DIET GENERAL; Carb Control: 4 carb choices (60 gms)/meal; Low Sodium (2 GM)   DVT Prophylaxis [] Lovenox, []  Heparin, [] SCDs, []No VTE prophylaxis, patient ambulating   GI Prophylaxis [] PPI, [] H2 Blocker, [] No GI prophylaxis, patient is receiving diet/Tube Feeds   Code Status Full Code   Disposition Patient requires continued admission due to stroke   MDM [] Low, [] Moderate,[]  High     History of Present Illness: Subjective     Patient Seen & Examined at the bedside     Patient is resting in her recliner with no distress while on room air  States that she has been falling repeatedly at home, she feels that she has facial asymmetry but no weakness of any of her extremities    Ten point ROS reviewed negative, unless as noted above    Objective:        Intake/Output Summary (Last 24 hours) at 10/7/2020 1514  Last data filed at 10/7/2020 0546  Gross per 24 hour   Intake --   Output 400 ml   Net -400 ml      Vitals:   Vitals:    10/07/20 1130   BP: (!) 157/83   Pulse: 60 Resp: 18   Temp: 99.5 °F (37.5 °C)   SpO2:      Physical Exam:    GEN Awake female, resting in bed in no apparent distress. Appears given age. HENT poorly maintained dentures  RESP Clear to auscultation, no wheezes, rales or rhonchi. CARDIO/VASC -S1/S2 auscultated. Regular rate without appreciable murmurs, rubs, or gallops. Peripheral pulses equal bilaterally and palpable. No peripheral edema. GI Abdomen is soft without significant tenderness, masses, or guarding. Bowel sounds are normoactive. Rectal exam deferred.    NEURO right eye strabismus, facial asymmetry/left fold absent, speech is normal and no other neurologic impediments observed    Medications:   Medications:    potassium chloride  20 mEq Oral BID    oxybutynin  5 mg Oral TID    metoprolol tartrate  50 mg Oral BID    atorvastatin  10 mg Oral Daily    lisinopril-hydroCHLOROthiazide  2 tablet Oral Daily    insulin lispro  0-12 Units Subcutaneous TID WC    insulin lispro  0-6 Units Subcutaneous Nightly    sodium chloride flush  10 mL Intravenous 2 times per day    enoxaparin  40 mg Subcutaneous Daily    aspirin  81 mg Oral Daily    Or    aspirin  300 mg Rectal Daily    pantoprazole  40 mg Oral QAM AC      Infusions:    dextrose      niCARdipine 2.5 mg/hr (10/06/20 0344)     PRN Meds: glucose, 15 g, PRN  dextrose, 12.5 g, PRN  glucagon (rDNA), 1 mg, PRN  dextrose, 100 mL/hr, PRN  sodium chloride flush, 10 mL, PRN  polyethylene glycol, 17 g, Daily PRN  promethazine, 12.5 mg, Q6H PRN    Or  ondansetron, 4 mg, Q6H PRN  labetalol, 10 mg, Q10 Min PRN  potassium chloride, 40 mEq, PRN    Or  potassium alternative oral replacement, 40 mEq, PRN    Or  potassium chloride, 10 mEq, PRN          Electronically signed by Susan Nieto MD on 10/7/2020 at 3:14 PM

## 2020-10-07 NOTE — CARE COORDINATION
LSW attempted to speak with pt abuts discharge plans. Pt was not in room at time of visit. LSW will try again later.

## 2020-10-07 NOTE — PROGRESS NOTES
Physical Therapy  [facility] ACUTE CARE PHYSICAL THERAPY EVALUATION  Sarai Kirby, 1950, 3129/3129-A, 10/7/2020    History  [place]:  The primary encounter diagnosis was Altered mental status, unspecified altered mental status type. Diagnoses of Hyperglycemia, Essential hypertension, Facial droop, and Paresthesia were also pertinent to this visit. Patient  has a past medical history of Arthritis, Asthma, Hyperlipidemia, and Hypertension. Patient  has a past surgical history that includes Hysterectomy; bladder suspension; Dilation and curettage of uterus; and Colonoscopy (3/23/15). Subjective:  Patient states: \"Where am I? \"   Pain:  Pain in right thigh with WB activity. Did not quantify   Communication with other providers: co-eval with Reymundo DEMARCO   Restrictions: general precautions, falls, chair alarm, portable tele     Home Setup/Prior level of function  Social/Functional History  Lives With: Significant other  Type of Home: [address]: (Lives on 3rd floor. Utilizes elevator.)  Home Access: Stairs to enter with rails  Bathroom Shower/Tub: Tub/Shower unit(Pt reports she takes baths)  Bathroom Toilet: Handicap height  Bathroom Equipment: Grab bars in shower, Grab bars around toilet  Bathroom Accessibility: Accessible  Home Equipment: Rolling walker  ADL Assistance: [address]: Independent  Homemaking Responsibilities: Yes  Ambulation Assistance: Independent  Transfer Assistance: Independent  Active : No    Examination of body systems (includes body structures/functions, activity/participation limitations):  · Observation: Supine in bed upon arrival. Agreeable to work with therapy   · Vision:  Right eye with resting right lateral gaze and partially closed compared to left. Able to move to midline, up, and down but not past midline to the left.  Left eye with resting midline gaze with ability to move to the right, up, and down but not past midline to the left. WFL acuity. Dec peripheral vision more on the left compared to right. Relied on significant head turns to see objects due to dec tracking and field cuts   · Hearing:  [facility]   · Cognition: Oriented to person and time. Disoriented to place. Dec STM needing redirected and re-oriented often. Dec safety awareness, insight, command following, sequencing, and judgement. See OT/SLP note for further evaluation. Musculoskeletal  · ROM R/L:  WFL BLEs    · Strength R/L:  BLEs grossly 4+/5. Dec command following. Minimal strength deficits observed in function and endurance. · /81 sitting in recliner at end of session   · Neuro:  Visual impairments along with dec coordination to UEs with finger to nose and LEs observed during ambulation. Left facial droop. Intact sensation to BUE/LEs     Mobility/treatment:   · Rolling L/R:  NT   · Supine to sit:  Frantz at trunk with pt pulling from therapist hand/bed rail to assist self. · Transfers:   · Sit to stand: Frantz from EOB (2x) to RW. Cues for sequencing UEs from bed to RW but pt stated she \"has to pull from the walker\"   · Stand to sit: Frantz for eccentric control to recliner and EOB. Impulsive when she sits without reaching back or controlling knee/hip flexion. · Step pivot: Frantz for steadying and device management. Constant cues and assistance for sequencing full pivot turn prior to sitting. Visual impairments hindered getting her hips aligned with the seat   · Sitting balance:  SBA static with and without UE support, Frantz light dynamic without UE support. Posterior lean. Able to detect she was losing her balance but unable to self correct   · Standing balance:  Frantz at RW. Slightly retropulsive upon standing. Improved with time and minimal assist/tactile cues for fwd weight shift over LILLIAN. Tolerated ~30 seconds on first trial + 1 minute on second. · Gait: ~5ft with RW Frantz for steadying and device management. Inconsistent step width and length.  Inc time to advance

## 2020-10-07 NOTE — CONSULTS
Neurology Service Consult Note  [facility]  Patient Name: Noris Galeano  : 1950        Subjective:   Reason for consult: \"I fell several times yesterday\"  79 y.o. right-handed female with past medical history of arthritis, asthma, hyperlipidemia and hypertension resented to [facility] with complaints of falls 1 day prior to exam.  Patient reports over the weekend, 2 to 3 days prior to this exam patient started experiencing balance disturbance, states me that she was very unsteady, states she had to hold onto things around her, felt that she was weak in her right leg, states that she suffered from a fall in the morning, laid on the ground hit her head she has a right forehead abrasion from this, she states throughout the day she try to get up and do other activities like walk to the bathroom and she cannot make it without falling. She did not appreciate any lightheadedness during this. She did state that she felt again dizzy but it was an off balance, she felt as if she was moving not things around her moving rapidly. She appreciates a change in her vision and difficulty focusing. Patient is a little confused on her past medical history, she tells me that she has a history of stroke, she tells me that she believes she has suffered from a new stroke, she tells me that she has had her change in vision, and change in her ocular motor muscles in the past and this is not new, however I see no documentation from thorough chart review on this, and she tells me that she has not on aspirin 81 mg at home. She also tells me that she has the left facial droop and she states that that is new. She denies any difficulty speaking or any slurred speech. She states that she has not been dizzy prior to this exam.  He denies type 2 diabetes for me. She denies any chest pain or shortness of breath for me at the bedside.   She denies any nausea vomiting diarrhea, lightheadedness or room spinning dizziness that she sitting in the bed. No family at the bedside. Past Medical History:   Diagnosis Date    Arthritis     Asthma     Hyperlipidemia     Hypertension     :   Past Surgical History:   Procedure Laterality Date    BLADDER SUSPENSION      COLONOSCOPY  3/23/15    polyps, int hem    DILATION AND CURETTAGE OF UTERUS      HYSTERECTOMY       Medications:  Scheduled Meds:   perflutren lipid microspheres        potassium chloride  20 mEq Oral BID    oxybutynin  5 mg Oral TID    metoprolol tartrate  50 mg Oral BID    atorvastatin  10 mg Oral Daily    lisinopril-hydroCHLOROthiazide  2 tablet Oral Daily    insulin lispro  0-12 Units Subcutaneous TID WC    insulin lispro  0-6 Units Subcutaneous Nightly    sodium chloride flush  10 mL Intravenous 2 times per day    enoxaparin  40 mg Subcutaneous Daily    aspirin  81 mg Oral Daily    Or    aspirin  300 mg Rectal Daily    pantoprazole  40 mg Oral QAM AC     Continuous Infusions:   dextrose      niCARdipine 2.5 mg/hr (10/06/20 2234)     PRN Meds:.glucose, dextrose, glucagon (rDNA), dextrose, sodium chloride flush, polyethylene glycol, promethazine **OR** ondansetron, labetalol, potassium chloride **OR** potassium alternative oral replacement **OR** potassium chloride    No Known Allergies  Social History     Socioeconomic History    Marital status: Single     Spouse name: Not on file    Number of children: Not on file    Years of education: Not on file    Highest education level: Not on file   Occupational History    Not on file   Social Needs    Financial resource strain: Not on file    Food insecurity     Worry: Not on file     Inability: Not on file    Transportation needs     Medical: Not on file     Non-medical: Not on file   Tobacco Use    Smoking status: Current Every Day Smoker     Packs/day: 1.00     Years: 20.00     Pack years: 20.00     Types: Cigarettes    Smokeless tobacco: Never Used   Substance and Sexual Activity    Alcohol use:  No Alcohol/week: 0.0 standard drinks    Drug use: No    Sexual activity: Not on file   Lifestyle    Physical activity     Days per week: Not on file     Minutes per session: Not on file    Stress: Not on file   Relationships    Social connections     Talks on phone: Not on file     Gets together: Not on file     Attends [employer] service: Not on file     Active member of club or organization: Not on file     Attends meetings of clubs or organizations: Not on file     Relationship status: Not on file    Intimate partner violence     Fear of current or ex partner: Not on file     Emotionally abused: Not on file     Physically abused: Not on file     Forced sexual activity: Not on file   Other Topics Concern    Not on file   Social History Narrative    Not on file      Family History   Problem Relation Age of Onset    No Known Problems Mother     No Known Problems Father        Review of Symptoms:    14-point system review completed. All of which are negative except as mentioned above. Physical Exam:           Gen: A&O x 4, NAD, cooperative, obese  HEENT: NC/AT, PERRL, mmm, no carotid bruits, neck supple, no meningeal signs; Fundoscopic: no disc edema appreciated  Heart: Regular  Lungs: No distress  Ext: no edema, no calf tenderness b/l  Psych: normal mood and affect  Skin: no rashes or lesions    NEUROLOGIC EXAM:    Mental Status: A&O to self, location, month and year, NAD, speech clear, language fluent, repetition and naming intact, follows commands appropriately    Cranial Nerve Exam:   CN II-XII: Pupils equal reactive bilaterally to light, she has an OLINDA where the left eye is fixed midline the right eye deviates to the right lateral, sensation V1-V3 intact b/l, muscles of facial expression symmetric; hearing intact to conversational tone, palate elevates symmetrically, shoulder elevation symmetric and tongue protrudes midline with movement side to side.     Motor Exam:       She has antigravity x4 I do see some mild weakness in the right lower extremity    Deep Tendon Reflexes: 1/4 biceps, triceps, brachioradialis, trace: Patellar, and achilles b/l; flexor plantar responses b/l    Sensation: Intact light touch/temp UE's/LE's b/l--> all decreased in stocking distribution BLE    Coordination/Cerebellum:       Tremors--none      Rapidly alternating movements: no dysdiadochokinesia b/l                Heel-to-Shin: no dysmetria b/l      Finger-to-Nose: no dysmetria b/l    Gait and stance:      Gait: deferred for safety during my exam       LABS:     Recent Labs     10/06/20  1549 10/06/20  1600 10/07/20  0436   WBC 7.3  --  6.7   *  --  136   K 3.2*  --  3.4*   CL 93*  --  100   CO2 23  --  26   BUN 11  --  10   CREATININE 0.9  --  1.0   GLUCOSE 467* 420 322*   INR 1.19  --   --      Results for Art Kauffman (MRN [id number]) as of 10/7/2020 12:37   Ref. Range 10/7/2020 04:36   Cholesterol Latest Ref Range: <200 MG/   HDL Cholesterol Latest Ref Range: >40 MG/DL 37 (L)   LDL Direct Latest Ref Range: <100 MG/ (H)   Triglycerides Latest Ref Range: <150 MG/ (H)     Results for Art Kauffman (MRN [id number]) as of 10/7/2020 12:37   Ref. Range 5/9/2016 08:37   Hemoglobin A1C Latest Ref Range: 4.2 - 6.3 % 6.4 (H)       IMAGING:      CT Head non acute    CTA head and neck:  1. Motion limited evaluation. 2. No large vessel occlusion of the intracranial arteries. 3. Age-indeterminate occlusion of the proximal V2 segment left vertebral    artery with retrograde reconstitution of the V4 segment supplying the left    posterior inferior cerebellar artery. 4. Bilateral proximal internal carotid artery stenosis measuring 75% on the    right and 50% on the left.     5. Severe C5-6 spinal canal stenosis secondary to a posterior disc osteophyte    complex.             MRI brain pending   ECHO pending       ASSESSMENT/PLAN:     3 79year old female with acute multiple falls, reported right leg weakness and visual disturbance, will evaluate for new acute stroke v. Remote infarction superimposed on hx of HLD, HTN, and DM2. PN on exam. OLINDA on exam. Plan of care as follows:  1. Neuro Exam:  1. OLINDA   2. RLE weakness   3. Left nasolabial fold paralysis   2. Neurodiagnostics:  1. MRI brain pending  2. CTA head and neck shows vascular concern such as bilateral carotid stenosis   3. CT head non acute  4. ECHO pending   3. Medications:  1. ASA 81mg daily  2. Statin daily   4. PT/OT/ST:  1. Per their recommendations   5. Follow up:  1. Pending completion of neurodiagnostics          Thank you for allowing us to participate in the care of your patient. If there are any questions regarding evaluation please feel free to contact us. MELANI Cedeno - CNP, 10/7/2020    Attending Note:  I have rounded on this patient with Mary Mcgarry CNP. I have reviewed the chart and we have discussed this case in detail. The patient was seen and examined by myself. Pertinent labs and imaging have been personally reviewed. Our findings and impressions were discussed with the patient. I concur with the Nurse Practioner's assessment and plan.     Trenna Shone, DO 10/8/2020 1:38 PM

## 2020-10-07 NOTE — CONSULTS
[facility] THERAPY EVALUATION    Sarai Kirby, 1950, 3129/3129-A, 10/7/2020    Discharge Recommendation: Inpatient Rehabilitation      History:  [place]:  The primary encounter diagnosis was Altered mental status, unspecified altered mental status type. Diagnoses of Hyperglycemia, Essential hypertension, Facial droop, and Paresthesia were also pertinent to this visit. Subjective:  Patient states: \"I woke up and cannot seem to find my cat! \"  Pain: Pt reports pain in LLE while ambulating. Communication with other providers: PT, RN  Restrictions: General Precautions, Fall Risk    Home Setup/Prior level of function:  Social/Functional History  Lives With: Spouse  Type of Home: Apartment  Home Layout: (Lives on 3rd floor. Utilizes elevator.)  Home Access: Stairs to enter with rails  Bathroom Shower/Tub: Tub/Shower unit(Pt reports she takes baths)  Bathroom Toilet: Handicap height  Bathroom Equipment: Grab bars in shower, Grab bars around toilet  Bathroom Accessibility: Accessible  Home Equipment: Rolling walker  ADL Assistance: [facility]: Independent  Homemaking Responsibilities: Yes  Ambulation Assistance: Independent  Transfer Assistance: Independent  Active : No    Examination:  · Observation: Supine in bed upon arrival. Pt appears to have facial droop. · Vision: Impaired  · Pt demo decreased ability to track L through midline. · Pt turns head entirely to L to see objects on L side. · Likely L visual field cut. · Pt closed R eye to assist with focus. · Pt R eye appears less open than L eye. · Pt demo decreased accuracy with finger to nose assessment on B hands. · Hearing: WFL  · Vitals: /81 (112) in seated. Body Systems and functions:  · ROM: WFL   · Strength: 4/5 BUE shoulder flexion with symmetrical  strength. · Sensation: WFL  · Tone: Normal  · Coordination: Decreased coordination finger to nose assessment.  Pt able to oppose digits on B hands, however very slow and deliberate. · Perception: WNL    Activities of Daily Living (ADLs):  · Feeding: SBA (anticipate assist to locate utensils/food items d/t decreased vision). · Grooming: Jayleen (anticipate pt may need assist with setup d/t visual deficits and assist for balancing if completed in standing). · UB bathing: SBA  · LB bathing: CGA-Jayleen (recommend pt complete in seated d/t decreased balance and dizziness). · UB dressing: SBA  · LB dressing: CGA-Jayleen (pt sat EOB and donned socks with increased time and Jayleen for balancing in seated, VC to continue through task). · Toileting: ModA (anticipate assist with toileting transfers, LB clothing manipulation, and thorough chapin care while in standing). Cognitive and Psychosocial Functioning:  · Overall cognitive status: Pt was oriented to self and time. Intermittently disoriented to location and questioned where her cat was throughout session. Pt unable to identify why she was in the hospital and does not recall fall leading up to admission. · Affect: Normal     Balance:   · Sitting: SBA-Jayleen (pt demo intermittent posterior lean, sitting balance ranged from SBA-Jayleen). · Standing: CGA (stood with RW, impulsively sat after approx 20 seconds, demo fair + balance with RW). Functional Mobility:  · Bed Mobility: Jayleen (supine to seated bed mobility with assist for trunk support and to pull self up utilizing BUE). · Transfers: Jayleen (STS from EOB with RW, VC for sequencing throughout. Performed x2. Impulsively sat at EOB during trial one). · Ambulation: Jayleen (pt took approx 6 steps towards chair with RW, slow pace throughout and slightly decreased initiation of LLE. Pt reports pain in LLE while ambulating). AM-PAC 6 click short form for inpatient daily activity:   How much help from another person does the patient currently need. ..  Unable  Dep A Lot  Max A A Lot   Mod A A Little  Min A A Little   CGA  SBA None Mod I  Indep  Sup   1. Putting on and taking off regular lower body clothing? [] 1    [] 2   [] 2   [x] 3   [] 3   [] 4      2. Bathing (including washing, rinsing, drying)? [] 1   [] 2   [] 2 [x] 3 [] 3 [] 4   3. Toileting, which includes using toilet, bedpan, or urinal? [] 1    [] 2   [x] 2   [] 3   [] 3   [] 4     4. Putting on and taking off regular upper body clothing? [] 1   [] 2   [] 2   [] 3   [x] 3    [] 4      5. Taking care of personal grooming such as brushing teeth? [] 1   [] 2    [] 2 [x] 3    [] 3   [] 4      6. Eating meals? [] 1   [] 2   [] 2   [] 3   [x] 3   [] 4      Raw Score:  17     [24=0% impaired(CH), 23=1-19%(CI), 20-22=20-39%(CJ), 15-19=40-59%(CK), 10-14=60-79%(CL), 7-9=80-99%(CM), 6=100%(CN)]     Treatment:  Therapeutic Activity Training:   Therapeutic activity training was instructed today. Cues were given for safety, sequence, UE/LE placement, awareness, and balance. Activities performed today included bed mobility training, sup-sit, sit-stand, ambulation. Safety Measures: Gait belt used, Left in chair, Alarm in place    Assessment:  Assessment  Performance deficits / Impairments: Decreased functional mobility , Decreased safe awareness, Decreased balance, Decreased high-level IADLs, Decreased ADL status, Decreased strength, Decreased cognition, Decreased posture, Decreased vision/visual deficit, Decreased fine motor control  Treatment Diagnosis: TIA  Prognosis: Good  Decision Making: Medium Complexity  REQUIRES OT FOLLOW UP: Yes  Discharge Recommendations: [facility]     Pt is a 79year old F admitted for TIA. Pt reports that prior to admission she was independent in ADLs, IADLs, and functional mobility. This date, pt demo confusion, as well as visual impairment, decreased strength, balance, and activity tolerance impacting ADL status. Pt is currently functioning below baseline and would benefit from skilled OT services at [facility].      Plan:  Plan  Times per week: 3+  Times per day: Daily      Goals:  1. Pt will complete all aspects of bed mobility for EOB/OOB ADLs with CGA. 2. Pt will complete UB/LB bathing with SBA. 3. Pt will complete all aspects of LB dressing with SBA. 4. Pt will complete all functional transfers to and from bed, chair, toilet, shower chair with SBA and RW.  5. Pt will ambulate HH distance to bathroom for toileting with CGA and RW. 6. Pt will complete all aspects of toileting task with CGA. 7. Pt will complete oral hygiene/grooming routine in standing at sink with SBA demo good dynamic standing balance for approx 8 minutes. 8. Pt will complete ther ex/ther act with focus on UB strengthening. Time:   Time in: 1044  Time out: 1120  Timed treatment minutes: 24  Total time: 36      Electronically signed by:       Cleophas Lanes, OTR/L, [address] [place] [phone]

## 2020-10-07 NOTE — ED NOTES
Assisted Cipriano Wallace RN with full linen change, pt BM. Depends changed. Pt in gown. Pulled up in bed and in a position of comfort. No further needs voiced.       Scott Washington RN  10/06/20 2023

## 2020-10-07 NOTE — PROGRESS NOTES
None (Room air)  Communication Observation: Functional  Follows Directions: Simple  Dentition: Some missing teeth  Patient Positioning: Upright in chair  Baseline Vocal Quality: Normal  Volitional Cough: Strong  Volitional Swallow: (intact)  Prior Dysphagia History: denies  Consistencies Administered: Reg solid; Thin - straw; Thin - cup;Dysphagia Pureed (Dysphagia I)     Vision/Hearing  Vision  Vision: Impaired  Hearing  Hearing: Within functional limits    Oral Motor Deficits  Oral/Motor  Oral Motor: Exceptions to [facility]  Labial Symmetry: Abnormal symmetry left  Labial Strength: Reduced  Labial Coordination: Reduced  Lingual Coordination: Reduced    Oral Phase Dysfunction  Oral Phase  Oral Phase: Exceptions  Oral Phase Dysfunction  Impaired Mastication: Reg Solid  Spillage Left: Thin - cup; Thin - straw     Indicators of Pharyngeal Phase Dysfunction   Pharyngeal Phase  Pharyngeal Phase: WNL    Prognosis  Prognosis  Prognosis for safe diet advancement: good  Barriers to reach goals: cognitive deficits  Individuals consulted  Consulted and agree with results and recommendations: Patient;RN    Education  Patient Education: results and recommendations  Patient Education Response: Verbalizes understanding;Needs reinforcement  Safety Devices in place: Yes  Type of devices: Left in chair; All fall risk precautions in place       Therapy Time  SLP Individual Minutes  Time In: 1130  Time Out: 1200  Minutes: Adela Betancourt[address] [place] [address]  10/7/2020 12:02 PM

## 2020-10-07 NOTE — CARE COORDINATION
Spoke with patient and her daughter Linneaaleksandra Hernandez in room regarding discharge planning . Patient is from home with her boyfriend Katerina Jeff and reported using a walker for mobility. Patient stated that she normally is able to perform her ADL's independently. Patient has PCP and insurance to assist with RX coverage. CM discussed OT eval today recommending ARU. CM answered questions for patient and her daughter regarding ARU. PT eval pending . Linnea Hernandez stated that they will discuss ARU and will need f/u from Case Management for decision on discharge plan .

## 2020-10-08 ENCOUNTER — HOSPITAL ENCOUNTER (INPATIENT)
Age: 70
LOS: 13 days | Discharge: HOME HEALTH | DRG: 045 | End: 2020-10-21
Attending: PHYSICAL MEDICINE & REHABILITATION | Admitting: PHYSICAL MEDICINE & REHABILITATION
Payer: MEDICAID

## 2020-10-08 VITALS
WEIGHT: 257 LBS | BODY MASS INDEX: 45.54 KG/M2 | OXYGEN SATURATION: 97 % | RESPIRATION RATE: 21 BRPM | HEART RATE: 64 BPM | TEMPERATURE: 98.6 F | DIASTOLIC BLOOD PRESSURE: 73 MMHG | HEIGHT: 63 IN | SYSTOLIC BLOOD PRESSURE: 139 MMHG

## 2020-10-08 PROBLEM — I63.9 ACUTE CVA (CEREBROVASCULAR ACCIDENT) (HCC): Status: ACTIVE | Noted: 2020-10-08

## 2020-10-08 LAB
GLUCOSE BLD-MCNC: 256 MG/DL (ref 70–99)
GLUCOSE BLD-MCNC: 260 MG/DL (ref 70–99)
GLUCOSE BLD-MCNC: 328 MG/DL (ref 70–99)
GLUCOSE BLD-MCNC: 347 MG/DL (ref 70–99)
SARS-COV-2, NAAT: NOT DETECTED
SOURCE: NORMAL

## 2020-10-08 PROCEDURE — 6370000000 HC RX 637 (ALT 250 FOR IP): Performed by: INTERNAL MEDICINE

## 2020-10-08 PROCEDURE — 99233 SBSQ HOSP IP/OBS HIGH 50: CPT | Performed by: NURSE PRACTITIONER

## 2020-10-08 PROCEDURE — 6370000000 HC RX 637 (ALT 250 FOR IP): Performed by: NURSE PRACTITIONER

## 2020-10-08 PROCEDURE — 97535 SELF CARE MNGMENT TRAINING: CPT

## 2020-10-08 PROCEDURE — 2580000003 HC RX 258: Performed by: NURSE PRACTITIONER

## 2020-10-08 PROCEDURE — 97530 THERAPEUTIC ACTIVITIES: CPT

## 2020-10-08 PROCEDURE — 99223 1ST HOSP IP/OBS HIGH 75: CPT | Performed by: PHYSICAL MEDICINE & REHABILITATION

## 2020-10-08 PROCEDURE — 97116 GAIT TRAINING THERAPY: CPT

## 2020-10-08 PROCEDURE — 2580000003 HC RX 258: Performed by: INTERNAL MEDICINE

## 2020-10-08 PROCEDURE — 82962 GLUCOSE BLOOD TEST: CPT

## 2020-10-08 PROCEDURE — 6360000002 HC RX W HCPCS: Performed by: NURSE PRACTITIONER

## 2020-10-08 PROCEDURE — U0002 COVID-19 LAB TEST NON-CDC: HCPCS

## 2020-10-08 PROCEDURE — 1280000000 HC REHAB R&B

## 2020-10-08 RX ORDER — METOPROLOL TARTRATE 50 MG/1
50 TABLET, FILM COATED ORAL 2 TIMES DAILY
Status: CANCELLED | OUTPATIENT
Start: 2020-10-08

## 2020-10-08 RX ORDER — OXYBUTYNIN CHLORIDE 5 MG/1
5 TABLET ORAL 3 TIMES DAILY
Status: CANCELLED | OUTPATIENT
Start: 2020-10-08

## 2020-10-08 RX ORDER — LISINOPRIL AND HYDROCHLOROTHIAZIDE 12.5; 1 MG/1; MG/1
2 TABLET ORAL DAILY
Status: CANCELLED | OUTPATIENT
Start: 2020-10-09

## 2020-10-08 RX ORDER — LABETALOL 20 MG/4 ML (5 MG/ML) INTRAVENOUS SYRINGE
10 EVERY 10 MIN PRN
Status: DISCONTINUED | OUTPATIENT
Start: 2020-10-08 | End: 2020-10-08

## 2020-10-08 RX ORDER — NICOTINE POLACRILEX 4 MG
15 LOZENGE BUCCAL PRN
Status: CANCELLED | OUTPATIENT
Start: 2020-10-08

## 2020-10-08 RX ORDER — LABETALOL 20 MG/4 ML (5 MG/ML) INTRAVENOUS SYRINGE
10 EVERY 10 MIN PRN
Status: CANCELLED | OUTPATIENT
Start: 2020-10-08

## 2020-10-08 RX ORDER — ONDANSETRON 2 MG/ML
4 INJECTION INTRAMUSCULAR; INTRAVENOUS EVERY 6 HOURS PRN
Status: CANCELLED | OUTPATIENT
Start: 2020-10-08

## 2020-10-08 RX ORDER — DEXTROSE MONOHYDRATE 50 MG/ML
100 INJECTION, SOLUTION INTRAVENOUS PRN
Status: DISCONTINUED | OUTPATIENT
Start: 2020-10-08 | End: 2020-10-21 | Stop reason: HOSPADM

## 2020-10-08 RX ORDER — POTASSIUM CHLORIDE 20 MEQ/1
20 TABLET, EXTENDED RELEASE ORAL 2 TIMES DAILY
Status: CANCELLED | OUTPATIENT
Start: 2020-10-08

## 2020-10-08 RX ORDER — POLYETHYLENE GLYCOL 3350 17 G/17G
17 POWDER, FOR SOLUTION ORAL DAILY PRN
Status: DISCONTINUED | OUTPATIENT
Start: 2020-10-08 | End: 2020-10-21 | Stop reason: HOSPADM

## 2020-10-08 RX ORDER — ONDANSETRON 2 MG/ML
4 INJECTION INTRAMUSCULAR; INTRAVENOUS EVERY 6 HOURS PRN
Status: DISCONTINUED | OUTPATIENT
Start: 2020-10-08 | End: 2020-10-20

## 2020-10-08 RX ORDER — POTASSIUM CHLORIDE 20 MEQ/1
40 TABLET, EXTENDED RELEASE ORAL PRN
Status: DISCONTINUED | OUTPATIENT
Start: 2020-10-08 | End: 2020-10-08

## 2020-10-08 RX ORDER — SODIUM CHLORIDE 0.9 % (FLUSH) 0.9 %
10 SYRINGE (ML) INJECTION EVERY 12 HOURS SCHEDULED
Status: CANCELLED | OUTPATIENT
Start: 2020-10-08

## 2020-10-08 RX ORDER — OXYBUTYNIN CHLORIDE 5 MG/1
5 TABLET ORAL 3 TIMES DAILY
Status: DISCONTINUED | OUTPATIENT
Start: 2020-10-08 | End: 2020-10-21 | Stop reason: HOSPADM

## 2020-10-08 RX ORDER — POLYETHYLENE GLYCOL 3350 17 G/17G
17 POWDER, FOR SOLUTION ORAL DAILY PRN
Status: DISCONTINUED | OUTPATIENT
Start: 2020-10-08 | End: 2020-10-08

## 2020-10-08 RX ORDER — SODIUM CHLORIDE 0.9 % (FLUSH) 0.9 %
10 SYRINGE (ML) INJECTION EVERY 12 HOURS SCHEDULED
Status: DISCONTINUED | OUTPATIENT
Start: 2020-10-08 | End: 2020-10-21 | Stop reason: HOSPADM

## 2020-10-08 RX ORDER — SODIUM CHLORIDE 0.9 % (FLUSH) 0.9 %
10 SYRINGE (ML) INJECTION PRN
Status: CANCELLED | OUTPATIENT
Start: 2020-10-08

## 2020-10-08 RX ORDER — POTASSIUM CHLORIDE 20 MEQ/1
20 TABLET, EXTENDED RELEASE ORAL 2 TIMES DAILY
Status: DISCONTINUED | OUTPATIENT
Start: 2020-10-08 | End: 2020-10-20

## 2020-10-08 RX ORDER — NICOTINE POLACRILEX 4 MG
15 LOZENGE BUCCAL PRN
Status: DISCONTINUED | OUTPATIENT
Start: 2020-10-08 | End: 2020-10-21 | Stop reason: HOSPADM

## 2020-10-08 RX ORDER — PANTOPRAZOLE SODIUM 40 MG/1
40 TABLET, DELAYED RELEASE ORAL
Status: DISCONTINUED | OUTPATIENT
Start: 2020-10-09 | End: 2020-10-21 | Stop reason: HOSPADM

## 2020-10-08 RX ORDER — SODIUM CHLORIDE 0.9 % (FLUSH) 0.9 %
10 SYRINGE (ML) INJECTION PRN
Status: DISCONTINUED | OUTPATIENT
Start: 2020-10-08 | End: 2020-10-21 | Stop reason: HOSPADM

## 2020-10-08 RX ORDER — DEXTROSE MONOHYDRATE 25 G/50ML
12.5 INJECTION, SOLUTION INTRAVENOUS PRN
Status: DISCONTINUED | OUTPATIENT
Start: 2020-10-08 | End: 2020-10-21 | Stop reason: HOSPADM

## 2020-10-08 RX ORDER — METOPROLOL TARTRATE 50 MG/1
50 TABLET, FILM COATED ORAL 2 TIMES DAILY
Status: DISCONTINUED | OUTPATIENT
Start: 2020-10-08 | End: 2020-10-21 | Stop reason: HOSPADM

## 2020-10-08 RX ORDER — ATORVASTATIN CALCIUM 10 MG/1
10 TABLET, FILM COATED ORAL DAILY
Status: CANCELLED | OUTPATIENT
Start: 2020-10-09

## 2020-10-08 RX ORDER — POLYETHYLENE GLYCOL 3350 17 G/17G
17 POWDER, FOR SOLUTION ORAL DAILY PRN
Status: CANCELLED | OUTPATIENT
Start: 2020-10-08

## 2020-10-08 RX ORDER — DEXTROSE MONOHYDRATE 50 MG/ML
100 INJECTION, SOLUTION INTRAVENOUS PRN
Status: CANCELLED | OUTPATIENT
Start: 2020-10-08

## 2020-10-08 RX ORDER — PROMETHAZINE HYDROCHLORIDE 25 MG/1
12.5 TABLET ORAL EVERY 6 HOURS PRN
Status: DISCONTINUED | OUTPATIENT
Start: 2020-10-08 | End: 2020-10-20

## 2020-10-08 RX ORDER — CLOPIDOGREL BISULFATE 75 MG/1
75 TABLET ORAL DAILY
Status: DISCONTINUED | OUTPATIENT
Start: 2020-10-09 | End: 2020-10-21 | Stop reason: HOSPADM

## 2020-10-08 RX ORDER — LISINOPRIL AND HYDROCHLOROTHIAZIDE 12.5; 1 MG/1; MG/1
2 TABLET ORAL DAILY
Status: DISCONTINUED | OUTPATIENT
Start: 2020-10-09 | End: 2020-10-20

## 2020-10-08 RX ORDER — PROMETHAZINE HYDROCHLORIDE 25 MG/1
12.5 TABLET ORAL EVERY 6 HOURS PRN
Status: CANCELLED | OUTPATIENT
Start: 2020-10-08

## 2020-10-08 RX ORDER — ACETAMINOPHEN 325 MG/1
650 TABLET ORAL EVERY 4 HOURS PRN
Status: DISCONTINUED | OUTPATIENT
Start: 2020-10-08 | End: 2020-10-21 | Stop reason: HOSPADM

## 2020-10-08 RX ORDER — PANTOPRAZOLE SODIUM 40 MG/1
40 TABLET, DELAYED RELEASE ORAL
Status: CANCELLED | OUTPATIENT
Start: 2020-10-09

## 2020-10-08 RX ORDER — POTASSIUM CHLORIDE 7.45 MG/ML
10 INJECTION INTRAVENOUS PRN
Status: CANCELLED | OUTPATIENT
Start: 2020-10-08

## 2020-10-08 RX ORDER — DEXTROSE MONOHYDRATE 25 G/50ML
12.5 INJECTION, SOLUTION INTRAVENOUS PRN
Status: CANCELLED | OUTPATIENT
Start: 2020-10-08

## 2020-10-08 RX ORDER — ATORVASTATIN CALCIUM 10 MG/1
10 TABLET, FILM COATED ORAL DAILY
Status: DISCONTINUED | OUTPATIENT
Start: 2020-10-09 | End: 2020-10-21 | Stop reason: HOSPADM

## 2020-10-08 RX ORDER — POTASSIUM CHLORIDE 7.45 MG/ML
10 INJECTION INTRAVENOUS PRN
Status: DISCONTINUED | OUTPATIENT
Start: 2020-10-08 | End: 2020-10-08

## 2020-10-08 RX ORDER — CLOPIDOGREL BISULFATE 75 MG/1
75 TABLET ORAL DAILY
Status: CANCELLED | OUTPATIENT
Start: 2020-10-09 | End: 2020-11-08

## 2020-10-08 RX ORDER — POTASSIUM CHLORIDE 20 MEQ/1
40 TABLET, EXTENDED RELEASE ORAL PRN
Status: CANCELLED | OUTPATIENT
Start: 2020-10-08

## 2020-10-08 RX ADMIN — CLOPIDOGREL BISULFATE 75 MG: 75 TABLET ORAL at 10:47

## 2020-10-08 RX ADMIN — OXYBUTYNIN CHLORIDE 5 MG: 5 TABLET ORAL at 21:56

## 2020-10-08 RX ADMIN — POTASSIUM CHLORIDE 20 MEQ: 1500 TABLET, EXTENDED RELEASE ORAL at 10:47

## 2020-10-08 RX ADMIN — METOPROLOL TARTRATE 50 MG: 50 TABLET, FILM COATED ORAL at 21:56

## 2020-10-08 RX ADMIN — OXYBUTYNIN CHLORIDE 5 MG: 5 TABLET ORAL at 10:47

## 2020-10-08 RX ADMIN — POTASSIUM CHLORIDE 20 MEQ: 20 TABLET, EXTENDED RELEASE ORAL at 21:56

## 2020-10-08 RX ADMIN — ATORVASTATIN CALCIUM 10 MG: 10 TABLET, FILM COATED ORAL at 10:47

## 2020-10-08 RX ADMIN — METOPROLOL TARTRATE 50 MG: 50 TABLET, FILM COATED ORAL at 10:47

## 2020-10-08 RX ADMIN — ASPIRIN 81 MG: 81 TABLET, COATED ORAL at 10:47

## 2020-10-08 RX ADMIN — SODIUM CHLORIDE, PRESERVATIVE FREE 10 ML: 5 INJECTION INTRAVENOUS at 10:47

## 2020-10-08 RX ADMIN — LISINOPRIL AND HYDROCHLOROTHIAZIDE 2 TABLET: 12.5; 1 TABLET ORAL at 10:47

## 2020-10-08 RX ADMIN — OXYBUTYNIN CHLORIDE 5 MG: 5 TABLET ORAL at 13:25

## 2020-10-08 RX ADMIN — ENOXAPARIN SODIUM 40 MG: 40 INJECTION SUBCUTANEOUS at 10:47

## 2020-10-08 RX ADMIN — PANTOPRAZOLE SODIUM 40 MG: 40 TABLET, DELAYED RELEASE ORAL at 08:18

## 2020-10-08 RX ADMIN — SODIUM CHLORIDE, PRESERVATIVE FREE 10 ML: 5 INJECTION INTRAVENOUS at 22:00

## 2020-10-08 ASSESSMENT — PAIN SCALES - GENERAL
PAINLEVEL_OUTOF10: 0

## 2020-10-08 NOTE — PLAN OF CARE
Problem: Falls - Risk of:  Goal: Will remain free from falls  Description: Will remain free from falls  Outcome: Completed  Goal: Absence of physical injury  Description: Absence of physical injury  Outcome: Completed     Problem: Skin Integrity:  Goal: Will show no infection signs and symptoms  Description: Will show no infection signs and symptoms  Outcome: Completed  Goal: Absence of new skin breakdown  Description: Absence of new skin breakdown  Outcome: Completed     Problem: HEMODYNAMIC STATUS  Goal: Patient has stable vital signs and fluid balance  Outcome: Completed     Problem: ACTIVITY INTOLERANCE/IMPAIRED MOBILITY  Goal: Mobility/activity is maintained at optimum level for patient  Outcome: Completed     Problem: COMMUNICATION IMPAIRMENT  Goal: Ability to express needs and understand communication  Outcome: Completed

## 2020-10-08 NOTE — PROGRESS NOTES
Neurology Service Progress Note   [facility]  Patient Name: Jeff Rivers  : 1950        Subjective:   Patient seen and examined  No acute changes overnight  Denies CP and SOB  Examined the OLINDA and there is right lateral nystagmus  We reviewed her MRI   We reviewed medications moving forward and stroke prevention including risk factors   Answered all questions  No fever, neck or back pain  Hemodynamically stable   No family at the bedside     Past Medical History:   Diagnosis Date    Arthritis     Asthma     Hyperlipidemia     Hypertension     :   Past Surgical History:   Procedure Laterality Date    BLADDER SUSPENSION      COLONOSCOPY  3/23/15    polyps, int hem    DILATION AND CURETTAGE OF UTERUS      HYSTERECTOMY       Medications:  Scheduled Meds:   clopidogrel  75 mg Oral Daily    potassium chloride  20 mEq Oral BID    oxybutynin  5 mg Oral TID    metoprolol tartrate  50 mg Oral BID    atorvastatin  10 mg Oral Daily    lisinopril-hydroCHLOROthiazide  2 tablet Oral Daily    insulin lispro  0-12 Units Subcutaneous TID WC    insulin lispro  0-6 Units Subcutaneous Nightly    sodium chloride flush  10 mL Intravenous 2 times per day    enoxaparin  40 mg Subcutaneous Daily    aspirin  81 mg Oral Daily    Or    aspirin  300 mg Rectal Daily    pantoprazole  40 mg Oral QAM AC     Continuous Infusions:   dextrose      niCARdipine 2.5 mg/hr (10/06/20 2239)     PRN Meds:.glucose, dextrose, glucagon (rDNA), dextrose, sodium chloride flush, polyethylene glycol, promethazine **OR** ondansetron, labetalol, potassium chloride **OR** potassium alternative oral replacement **OR** potassium chloride    No Known Allergies  Social History     Socioeconomic History    Marital status: Single     Spouse name: Not on file    Number of children: Not on file    Years of education: Not on file    Highest education level: Not on file   Occupational History    Not on file   Social Needs    Financial resource reactive bilaterally to light, she has an OLINDA where the left eye is fixed midline the right eye deviates to the right lateral with a lateral beating nystagmus, sensation V1-V3 intact b/l, muscles of facial expression symmetric; hearing intact to conversational tone, palate elevates symmetrically, shoulder elevation symmetric and tongue protrudes midline with movement side to side. Motor Exam:       She has antigravity x4 I do see some mild weakness in the right lower extremity    Deep Tendon Reflexes: 1/4 biceps, triceps, brachioradialis, trace: Patellar, and achilles b/l; flexor plantar responses b/l    Sensation: Intact light touch/temp UE's/LE's b/l--> all decreased in stocking distribution BLE    Coordination/Cerebellum:       Tremors--none    Gait and stance:      Gait: deferred for safety during my exam       LABS:     Recent Labs     10/06/20  1549 10/06/20  1600 10/07/20  0436   WBC 7.3  --  6.7   *  --  136   K 3.2*  --  3.4*   CL 93*  --  100   CO2 23  --  26   BUN 11  --  10   CREATININE 0.9  --  1.0   GLUCOSE 467* 420 322*   INR 1.19  --   --      Results for Sisi García (MRN [id number]) as of 10/7/2020 12:37   Ref. Range 10/7/2020 04:36   Cholesterol Latest Ref Range: <200 MG/   HDL Cholesterol Latest Ref Range: >40 MG/DL 37 (L)   LDL Direct Latest Ref Range: <100 MG/ (H)   Triglycerides Latest Ref Range: <150 MG/ (H)     Results for Sisi García (MRN [id number]) as of 10/7/2020 12:37   Ref. Range 5/9/2016 08:37   Hemoglobin A1C Latest Ref Range: 4.2 - 6.3 % 6.4 (H)       IMAGING:      CT Head non acute    CTA head and neck:  1. Motion limited evaluation. 2. No large vessel occlusion of the intracranial arteries. 3. Age-indeterminate occlusion of the proximal V2 segment left vertebral    artery with retrograde reconstitution of the V4 segment supplying the left    posterior inferior cerebellar artery.     4. Bilateral proximal internal carotid artery stenosis measuring 75% on the    right and 50% on the left. 5. Severe C5-6 spinal canal stenosis secondary to a posterior disc osteophyte    complex.             MRI brain:  1. Extensive patient motion limits evaluation. 2. There is an acute infarct involving the dorsal ian.  No mass effect or    midline shift. 3. Partial loss of the normal signal void in the proximal left V4 segment,    which can be seen with slow flow versus occlusion. 4. Otherwise, no acute intracranial abnormality. 5. Global parenchymal volume loss with chronic microvascular ischemic changes. These results were sent to the [facility] [address] ([address] [place] [address]) on    10/7/2020 at 3:26 pm to be communicated to the referring/covering health care    provider/office. ECHO pending   Conclusions      Summary   Technically difficult examination; definity contrast used. Left ventricular systolic function is normal.   Ejection fraction is visually estimated at 50-55%. No significant valvular disease noted. No evidence of any pericardial effusion. ASSESSMENT/PLAN:     3 79year old female with acute multiple falls, reported right leg weakness and visual disturbance secondary to acute L-pontine infarction superimposed on hx of HLD, HTN, and DM2. PN on exam. OLINDA on exam. Plan of care as follows:  1. Neuro Exam:  1. OLINDA   2. RLE weakness   3. Left nasolabial fold paralysis   2. Neurodiagnostics:  1. MRI brain as above   2. CTA head and neck shows vascular concern such as bilateral carotid stenosis   3. CT head non acute  4. ECHO pending   3. Medications:  1. ASA 81mg daily  2. Plavix 75mg x30 days then d/c   3. Statin daily   4. PT/OT/ST:  1. Per their recommendations   5. Follow up:  1. Carotid stenosis needs evaluated by CT surgery outpatient   2. Our office in 4-6 weeks         Thank you for allowing us to participate in the care of your patient. If there are any questions regarding evaluation please feel free to contact us. Myesha Oorzco, APRN - CNP, 10/8/2020

## 2020-10-08 NOTE — PLAN OF CARE
ARU Interdisciplinary Plan of Care [facility] Dr. Deshpande S Francis   [address], [address]  [phone]  Fax: [phone]    : 1950  Acct #: [de-identified]  MRN: [id number]   PHYSICIAN:  Bryce Paula MD  Primary Active Problems:   Active Hospital Problems    Diagnosis Date Noted    Dysarthria due to acute stroke ([facility] [address] [I63.9, R47.1]     Dysphagia due to recent cerebral infarction [I69.391]     Uncontrolled type 2 diabetes mellitus with hyperglycemia ([facility] [address] [E11.65]     Morbid obesity with BMI of 45.0-49.9, adult (HCC) [E66.01, Z68.42]     Acute CVA (cerebrovascular accident) ([facility] [address] [facility] [I63.9] 10/08/2020    Hemiparesis of right dominant side as late effect of cerebral infarction [facility]) [I69.351] 10/06/2020    Uncontrolled hypertension [I10] 10/06/2020       Rehabilitation Diagnosis:     CVA (cerebral vascular accident) ([facility] [address] [I63.9]  Acute CVA (cerebrovascular accident) ([facility] [address] [I63.9]       ADMIT DATE:10/8/2020   CARE PLAN     NURSING:  Sarai Kirby while on this unit will:      Bowel and Bladder   [x] Be continent of bowel and bladder      [x] Have an adequate number of bowel movements   [x] Urinate with no urinary retention >300ml in bladder   [] Bladder Scan: (details)   [] Complete bladder protocol with mckoy removal   [] Initiate Bladder Program to toilet every ___ hours   [] Initiate Bowel Program to toilet every ___hours   [] Bladder training    [] Bowel training  Pulmonary   [x] Maintain O2 SATs at 92% or greater  Pain Management   [x] Have pain managed while on ARU        [x] Be pain free by discharge    [x] Medication Management and Education  Maintenance of Skin Integrity/Wound Management   [x] Have no skin breakdown while on ARU   [x] Have improved skin integrity via wound measurements   [x] Have no signs/symptoms of infection via infection protection and monitoring at the          wound site  Fall Prevention   [x] Be free from injury during hospitalization via fall prevention measures     [] Disease management and Education  Precautions   [] Weight Bearing Precautions   [] Swallowing Precautions   [] Monitoring of Risks of Complications   [x] DVT Prophylaxis    [x] Fluid/electrolyte/Nutrition Management    [x] Complete education with patient/family with understanding demonstrated for          in-room safety with transfers to bed, toilet, wheelchair, shower as well as                bathroom activities and hygiene. [x] Adjustment   [x] Other:   Nursing interventions may include bowel/bladder training, education for medical assistive devices, medication education, O2 saturation management, energy conservation, stress management techniques, fall prevention, alarms protocol, seating and positioning, skin/wound care, pressure relief instruction,dressing changes,  infection protection, DVT prophylaxis, and/or assistance with in room safety with transfers to bed, toilet, wheelchair, shower as well as bathroom activities and hygiene. Patient/caregiver education for:   [] Disease/sustained injury/management      [] Medication Use   [] Surgical intervention   [] Safety/Precautions   [] Body mechanics and or joint protection   [] Health maintenance         PHYSICAL THERAPY:  Goals:                  Short term goals  Time Frame for Short term goals: 10 tx days:  Short term goal 1: Pt will complete rolling L/R and sup<->sit Ind. Short term goal 2: Pt will complete OOB transfers using RW Mod Ind. Short term goal 3: Pt will ambulate 150 ft using RW with SBA-Sup. Short term goal 4: Pt will ascend/descend 4-5 steps using bilat rails with SBA-Sup. Short term goal 5: Pt will ascend/descend curb step and ambulate over uneven surfaces using RW with SBA-Sup. Short term goal 6: Pt will complete object retrieval in standing with 1UE support using adaptive equipment prn Mod Ind.                These goals were reviewed with this patient at the time of assessment and Sandra Pay is in agreement. Plan of Care: Pt to be seen 5 days per week for a minimum of 60 minutes for 10 days. Current Treatment Recommendations: Strengthening, Functional Mobility Training, Home Exercise Program, Equipment Evaluation, Education, & procurement, Transfer Training, Gait Training, Safety Education & Training, Balance Training, Endurance Training, Stair training, Patient/Caregiver Education & Training, Cognitive/Perceptual Training community reintegration,animal assisted therapy, and concurrent/group therapy. OCCUPATIONAL THERAPY:  Goals:             Short term goals  Time Frame for Short term goals: STG=LTG :  Long term goals  Time Frame for Long term goals : 10-12 days or until d/c  Long term goal 1: Pt will increase BUE coordination/fine motor skills in order to complete feeding/grooming/oral care task c MOD I by d/c  Long term goal 2: Pt will complete total body bathing c MOD I by d/c  Long term goal 3: Pt will complete UB dressing c MOD I and retrieve items from closet by d/c  Long term goal 4: Pt will complete LB dressing c MOD I and retrieve items from closet by d/c  Long term goal 5: Pt will don/doff footwear c MOD I by d/c  Long term goals 6: Pt will complete toileting c MOD I by d/c  Long term goal 7: Pt will complete functional transfer (toilet, tub, shower) c MOD I by d/c. Long term goal 8: Pt will perform therex/therax to facilitate increased strength/ax tolerance (c emphasis on dynamic standing balance/tolerance >10 mins) c MOD I in order to complete ADLs. :    These goals were reviewed with this patient at the time of assessment and Sandra Pay is in agreement    Plan of Care:  Pt to be seen 5 days per week for a minimum of 60 minutes for 10-12 days.           Plan  Times per day: Daily  Current Treatment Recommendations: Neuromuscular Re-education         cognitive training, home management, energy conservation training, community reintegration, splint fabrication, patient/caregiver education and training, animal assisted therapy, and concurrent and/or group therapy. SPEECH THERAPY: (If ordered)  Plan of Care and Goals:   LTG                         Short-term Goals  Timeframe for Short-term Goals: 3xs weekly for 2 weeks  Goal 1: Pt will recall 3 items following a 3-5 min delay when provided with min cues  Goal 2: Pt will recall learned safety protocols during a functional task with 90% accuracy  Goal 3: Pt will complete medication and finance mgmt tasks for independence at home                      Timeframe for Short-term Goals: 3xs weekly for 2 weeks     LTG:                           Treatments may include speech/language/communication therapy, cognitive training, animal assisted therapy, group therapy, education, and/or dysphagia therapy based on the above goals. Co-treats where appropriate with PT or OT to facilitate patient goals in functional tasks. These goals were reviewed with this patient at the time of assessment and Osorio Greenberg is in agreement. CASE MANAGEMENT:  Goals:   Assist patient/family with discharge planning, patient/family counseling, assistance in obtaining recommended equipment and other services, and coordination with insurance during ARU stay. Patient Goals: Return to maximum level of independent function.       Nutrition goal:Patient will tolerate carb controlled diet with meal intake at least 75%        PT IRF-KRISTYN scores and goals for initial assessment:   Roll Left and Right  Assistance Needed: Supervision or touching assistance  Comment: required SBA, no use of bed features  CARE Score: 4  Discharge Goal: Independent  Sit to Lying  Assistance Needed: Supervision or touching assistance  Comment: required SBA, no use of bed features  CARE Score: 4  Discharge Goal: Independent  Sit to Stand  Assistance Needed: Supervision or touching assistance  Comment: required CGA using RW  CARE Score: 4  Discharge Goal: Independent  Chair/Bed-to-Chair Transfer  Assistance Needed: Supervision or touching assistance  Comment: required CGA  CARE Score: 4  Discharge Goal: Independent  Toilet Transfer  Assistance Needed: Supervision or touching assistance  Comment: pt did on/off with supervision  CARE Score: 4  Discharge Goal: Independent  Car Transfer  Assistance Needed: Supervision or touching assistance  Comment: required CGA  CARE Score: 4  Discharge Goal: Independent  Walk 10 Feet? Walk 10 Feet?: Yes  1 Step  1 Step?: Yes  Picking Up Object  Assistance Needed: Partial/moderate assistance  Comment: pt required Min A with LUE support on RW (task completed without use of reacher)  CARE Score: 3  Discharge Goal: Independent  Wheelchair Ability  Uses a Wheelchair and/or Scooter?: No               1 Step (Curb)  Comment: pt declined to attempt due to increased fatigue and fear of falling; stated \"Can we just do this tomorrow. When I'm tired I don't do well. \"  Reason if not Attempted: Not attempted due to medical condition or safety concerns  CARE Score: 88  Discharge Goal: Supervision or touching assistance   4 Steps  Assistance Needed: Partial/moderate assistance  Comment: required Min A using bilat rails with step-to pattern (reported increased fatigue upon ascent/descent of 5 steps)  CARE Score: 3  Discharge Goal: Supervision or touching assistance   12 Steps  Reason if not Attempted: Not applicable  CARE Score: 9  Discharge Goal: Not Applicable    OT IRF-KRISTYN scores and goals for initial assessment:    Eating  Assistance Needed: Setup or clean-up assistance  CARE Score: 5  Discharge Goal: Independent  Oral Hygiene  Assistance Needed: Partial/moderate assistance  Comment: Pt demo poor standing balance and required Min A to maintain upright posture.   CARE Score: 3  Discharge Goal: Independent  Toileting Hygiene  Assistance Needed: Supervision or touching assistance  Comment: pt did own hygiene  CARE Score: 4  Discharge Goal: Independent  Shower/Bathe Self  Assistance Needed: Partial/moderate assistance  CARE Score: 3  Discharge Goal: Independent  Upper Body Dressing  Assistance Needed: Setup or clean-up assistance  CARE Score: 5  Discharge Goal: Independent  Lower Body Dressing  Assistance Needed: Partial/moderate assistance  Comment: Min A to maintain upright posture. CARE Score: 3  Discharge Goal: Independent  Putting On/Taking Off Footwear  Assistance Needed: Setup or clean-up assistance  CARE Score: 5  Discharge Goal: Independent    Activities Prior to Admit:   Homemaking Responsibilities: Yes  Active : No  Mode of Transportation: Car  Occupation: On disability  Leisure & Hobbies: Pt enjoys staying home to watch tv         Intensity of [address] will be seen a minimum of 3 hours of therapy per day/a minimum of 5 out of 7 days per week. [] In this rare instance due to the nature of this patient's medical involvement, this patient will be seen 15 hours per week (900 minutes within a 7 day period). Treatments may include therapeutic exercises, gait training, neuromuscular re-ed, transfer training, community reintegration, bed mobility, w/c mobility and training, self care, home mgmt, cognitive training, energy conservation,dysphagia tx, speech/language/communication therapy, group therapy, and patient/family education. In addition, dietician/nutritionist may monitor calorie count as well as intake and collaboratively work with SLP on dietary upgrades. Neuropsychology/Psychology may evaluate and provide necessary support. Group therapy as appropriate to facilitate improved endurance, STR, COORD, function, safety, transfers, awareness and insight into deficits, problem solving, memory, and social interaction and engagement.     Medical issues being managed closely and that require 24 hour availability of a physician:   [] Swallowing Precautions

## 2020-10-08 NOTE — CARE COORDINATION
ARU pre-cert pending with [facility] at this time. Will continue to follow for determination. 1340:  Received approval for admission to ARU. Spoke with patient and notified her of approval to ARU. Explained again to patient the required 3 hours of therapy a day. Also explained the average length of stay is 11 days, could be longer or shorter depending on recommendations of therapy and Dr. Barbara Abreu. Patient expresses her understanding and states she's still agreeable to admit to ARU. Patient requested her daughter be contacted Angel Haney). Maryellen Johnson of approval to ARU. Per Josefina Rodriguez the plan is for patient to discharge home with her from ARU. Dong Nuñez Notified Dr. Burke Mustafa of approval and asked for an order for a Rapid COVID test.  Per Dr. Burke Mustafa order placed and patient is medically ready for discharge to ARU today. Left VM for Raheem Aguilar. Patient meets criteria and is approved to come to ARU. Patient able to admit once negative COVID noted and after ARU Medical Director and  sign the pre-admission screen (PAS).

## 2020-10-08 NOTE — PROGRESS NOTES
Occupational Therapy  Occupational Therapy Treatment Note    Date:  10/8/2020   Room:  3129/3129-    Hanane Trivedi :   1950   MRN: [id number] Admission Date: 10/6/2020     Diagnosis:  The primary encounter diagnosis was Altered mental status, unspecified altered mental status type. Diagnoses of Hyperglycemia, Essential hypertension, Facial droop, and Paresthesia were also pertinent to this visit. Restrictions/Precautions:                      Communication with other providers:  PT.    Subjective:  Patient states:  \"I am going downstairs for rehab soon\"  Pain:   Location, Type, Intensity (0/10 to 10/10):  No c/o    Objective:    Orientation: WFL  Observation: Pt received in bed. Laying \"upside down\" in bed  Objective Measures:  VSS    Treatment, including education:  Therapeutic Activity Training:   Therapeutic activity training was instructed today. Cues were given for safety, sequence, UE/LE placement, visual cues, and balance. Activities performed today included bed mobility training, sup-sit, sit-stand, SPT. Bed mobility:  Supine to sit: ModA for trunk lift and sequencing    Transfers:   Sit to stand: Jayleen from EOB and low commode c touching and verbal cues for hand placement  Stand to sit: Jayleen to low commode and chair c touching and verbal cues for hand placement    Standing; up to Jayleen while sinkside for hand wash    Gait: Jayleen for steadying c RW to/from bathroom    Self Care Training:   Self care training was performed today. Cues were given for safety, sequence, UE/LE placement, visual cues, and balance. Activities performed today included dressing, toileting, hand hygiene, and grooming.     Grooming: steadying assistance (Jayleen) and touching cues for item location (soap, faucet)    Dressing: manages socks in seated via \"figure 4\" ; needs steadying assistance for depends hike    Toileting:manages pericare in seated, steadying assistance for while hiking brief (Jayleen)     Assessment /

## 2020-10-08 NOTE — DISCHARGE SUMMARY
Kofi, [address] [facility] Medication Instructions WEB:[id number]    Printed on:10/08/20 2758   Medication Information                      atorvastatin (LIPITOR) 10 MG tablet  Take 1 tablet by mouth daily             ibuprofen (ADVIL;MOTRIN) 800 MG tablet  Take 1 tablet by mouth every 8 hours as needed for Pain             lisinopril-hydrochlorothiazide (PRINZIDE;ZESTORETIC) 20-25 MG per tablet  Take 1 tablet by mouth daily             metoprolol (LOPRESSOR) 50 MG tablet  Take 1 tablet by mouth 2 times daily             oxybutynin (DITROPAN) 5 MG tablet  Take 1 tablet by mouth 3 times daily             potassium chloride SA (K-DUR;KLOR-CON M) 20 MEQ tablet  Take 1 tablet by mouth 2 times daily for 14 days. Subjective _patient is resting in bed with no distress-sleeping the opposite way in the bed > feet toward the head of the bed -patient stated she just wanted to sleep this way  Has some blurring of vision, facial asymmetry but no weakness or tingling or numbness of any of her extremities    Objective Findings at Discharge:   /77   Pulse 66   Temp 98 °F (36.7 °C) (Oral)   Resp 20   Ht 5' 3\" (1.6 m)   Wt 257 lb (116.6 kg)   SpO2 96%   BMI 45.53 kg/m²            PHYSICAL EXAM   GEN Awake female, resting in bed in no apparent distress. Appears given age. Right eye strabismus  RESP Clear to auscultation, no wheezes, rales or rhonchi. CARDIO/VAS - S1/S2 auscultated. Regular rate without appreciable murmurs, rubs, or gallops. Peripheral pulses equal bilaterally and palpable. No peripheral edema. GI Abdomen is soft without significant tenderness, masses, or guarding. Bowel sounds are normoactive  MSK No gross joint deformities. Spontaneous movement of all extremities  SKIN Normal coloration, warm, dry.   NEURO right eye strabismus, facial asymmetry/left foods absent, speech is normal and no other neurologic impediments observed    BMP/CBC  Recent Labs     10/06/20  9841 10/07/20  0436   * 136   K 3.2* 3.4*   CL 93* 100   CO2 23 26   BUN 11 10   CREATININE 0.9 1.0   WBC 7.3 6.7   HCT 49.7* 46.0    195         Discharge Time of 36 minutes    Electronically signed by Jesus Gray MD on 10/8/2020 at 2:40 PM

## 2020-10-08 NOTE — PROGRESS NOTES
Physical Therapy    Physical Therapy Treatment Note  Name: Judy Mcclelland MRN: [id number] :   1950   Date:  10/8/2020   Admission Date: 10/6/2020 Room:  [address] [place] [address]   Restrictions/Precautions:        general precautions, falls, chair alarm, portable tele   Communication with other providers:  OT   Subjective:  Patient states:  \"I'm worn out from just that little bit\"   Pain:   Location, Type, Intensity (0/10 to 10/10): Denies   Objective:    Observation:    Supine in bed (bkwds with head at foot of the bed). Pleasant and agreeable to work with therapy. Oriented to person, place, time. Still presenting with visual impairments using increased head movements to compensate . Slightly impulsive. Treatment, including education/measures:  Supine to sit: modA for trunk boost. Difficulty sequencing as well as visually seeing the bed rail to pull from. Multiple VCs   Scooting: SBA fwd to EOB  Sitting balance: at EOB and toilet static and dynamic while managing chapin care and donning socks SBA for safety (inc time due to visual impairments)  Sit to stand: CGA from EOB, Frantz from low toilet seat. Hand over hand guidance for use of grab bar in the bathroom for greater stability. Tendency to pull from RW  Stand to sit: Frantz to recliner and low toilet. Cues for hand sequencing back to seat surface to inc support/control with descend as well as improve hip alignment on the seat  Step pivot: Frantz for steadying with additional assistance sequencing RW during pivot turn. Cues for sequencing full turn prior to initiating sit (2x)  Ambulation: 10ft x 2 with RW CGA to Frantz for steadying. Dec pace, difficulty sequencing lateral weight shift, slow advancement of limb during swing, downward gaze, fwd posture with walker outside LILLIAN. Multiple cues for body positioning to improve support on walker  Standing balance: CGA to Frantz at sink performing hand hygiene tasks.  Inc time to manage the faucet and soap dispenser due to visual impairments. Slight LOB posterior and laterally to the right. Educated pt on POC, role of PT, discharge plan, DME use   Assessment / Impression:    Patient's tolerance of treatment: good   Adverse Reaction: no   Significant change in status and impact:  no  Barriers to improvement: activity tolerance,strength ,balance, visual impairments, coordination, cognition, safety awareness   Plan for Next Session:    Activity tolerance ,strength, balance, gait, transfers, bed mobility   Time in:  1410  Time out:  1436  Timed treatment minutes:  26  Total treatment time:  26    Previously filed items:  Social/Functional History  Lives With: Significant other  Type of Home: Apartment  Home Layout: (Lives on 3rd floor.  Utilizes elevator.)  Home Access: Stairs to enter with rails  Bathroom Shower/Tub: Tub/Shower unit(Pt reports she takes baths)  Bathroom Toilet: Handicap height  Bathroom Equipment: Grab bars in shower, Grab bars around toilet  Bathroom Accessibility: Accessible  Home Equipment: Rolling walker  ADL Assistance: [address] [place] [address] Avenue: Independent  Homemaking Responsibilities: Yes  Ambulation Assistance: Independent  Transfer Assistance: Independent  Active : No  Short term goals  Time Frame for Short term goals: 1 week  Short term goal 1: Pt will perform sit><supine SBA  Short term goal 2: Pt will transfer to bed/recliner CGA  Short term goal 3: Pt will ambulate 50ft with RW CGA  Short term goal 4: Pt will perform light dynamic standing activity with single UE support CGA x 3 minutes  Short term goal 5: Pt will perform dynamic sitting activity, no UE support SBA       Electronically signed by:    MAHNAZ [id number]  10/8/2020, 4:31 PM

## 2020-10-09 LAB
GLUCOSE BLD-MCNC: 273 MG/DL (ref 70–99)
GLUCOSE BLD-MCNC: 315 MG/DL (ref 70–99)
GLUCOSE BLD-MCNC: 332 MG/DL (ref 70–99)
GLUCOSE BLD-MCNC: 353 MG/DL (ref 70–99)

## 2020-10-09 PROCEDURE — 97163 PT EVAL HIGH COMPLEX 45 MIN: CPT

## 2020-10-09 PROCEDURE — 97116 GAIT TRAINING THERAPY: CPT

## 2020-10-09 PROCEDURE — 6360000002 HC RX W HCPCS: Performed by: INTERNAL MEDICINE

## 2020-10-09 PROCEDURE — 6370000000 HC RX 637 (ALT 250 FOR IP): Performed by: INTERNAL MEDICINE

## 2020-10-09 PROCEDURE — 97535 SELF CARE MNGMENT TRAINING: CPT

## 2020-10-09 PROCEDURE — 94150 VITAL CAPACITY TEST: CPT

## 2020-10-09 PROCEDURE — 99232 SBSQ HOSP IP/OBS MODERATE 35: CPT | Performed by: PHYSICAL MEDICINE & REHABILITATION

## 2020-10-09 PROCEDURE — 1280000000 HC REHAB R&B

## 2020-10-09 PROCEDURE — 94761 N-INVAS EAR/PLS OXIMETRY MLT: CPT

## 2020-10-09 PROCEDURE — 82962 GLUCOSE BLOOD TEST: CPT

## 2020-10-09 PROCEDURE — 97166 OT EVAL MOD COMPLEX 45 MIN: CPT

## 2020-10-09 PROCEDURE — 97530 THERAPEUTIC ACTIVITIES: CPT

## 2020-10-09 RX ADMIN — LISINOPRIL AND HYDROCHLOROTHIAZIDE 2 TABLET: 12.5; 1 TABLET ORAL at 10:55

## 2020-10-09 RX ADMIN — OXYBUTYNIN CHLORIDE 5 MG: 5 TABLET ORAL at 21:33

## 2020-10-09 RX ADMIN — ENOXAPARIN SODIUM 30 MG: 30 INJECTION SUBCUTANEOUS at 10:55

## 2020-10-09 RX ADMIN — ATORVASTATIN CALCIUM 10 MG: 10 TABLET, FILM COATED ORAL at 21:33

## 2020-10-09 RX ADMIN — OXYBUTYNIN CHLORIDE 5 MG: 5 TABLET ORAL at 10:55

## 2020-10-09 RX ADMIN — INSULIN LISPRO 6 UNITS: 100 INJECTION, SOLUTION INTRAVENOUS; SUBCUTANEOUS at 09:04

## 2020-10-09 RX ADMIN — INSULIN LISPRO 8 UNITS: 100 INJECTION, SOLUTION INTRAVENOUS; SUBCUTANEOUS at 12:19

## 2020-10-09 RX ADMIN — PANTOPRAZOLE SODIUM 40 MG: 40 TABLET, DELAYED RELEASE ORAL at 06:30

## 2020-10-09 RX ADMIN — METOPROLOL TARTRATE 50 MG: 50 TABLET, FILM COATED ORAL at 10:55

## 2020-10-09 RX ADMIN — POTASSIUM CHLORIDE 20 MEQ: 20 TABLET, EXTENDED RELEASE ORAL at 10:55

## 2020-10-09 RX ADMIN — CLOPIDOGREL BISULFATE 75 MG: 75 TABLET ORAL at 10:55

## 2020-10-09 RX ADMIN — INSULIN LISPRO 8 UNITS: 100 INJECTION, SOLUTION INTRAVENOUS; SUBCUTANEOUS at 18:09

## 2020-10-09 RX ADMIN — POTASSIUM CHLORIDE 20 MEQ: 20 TABLET, EXTENDED RELEASE ORAL at 21:33

## 2020-10-09 RX ADMIN — METOPROLOL TARTRATE 50 MG: 50 TABLET, FILM COATED ORAL at 21:33

## 2020-10-09 RX ADMIN — OXYBUTYNIN CHLORIDE 5 MG: 5 TABLET ORAL at 15:12

## 2020-10-09 ASSESSMENT — PAIN SCALES - GENERAL
PAINLEVEL_OUTOF10: 0
PAINLEVEL_OUTOF10: 0

## 2020-10-09 NOTE — PROGRESS NOTES
Physical Therapy    [facility] ARU PHYSICAL THERAPY EVALUATION    Chart Review:  Past Medical History:   Diagnosis Date    Arthritis     [facility] [address]     Diabetes mellitus ([facility] [address] [facility]     Hyperlipidemia     Hypertension      Past Surgical History:   Procedure Laterality Date    BLADDER SUSPENSION      COLONOSCOPY  3/23/15    polyps, int hem    DILATION AND CURETTAGE OF UTERUS      HYSTERECTOMY       Fall History: 5x times or more this year with some requiring hospitalization but no significant injuries  Social History:  Social/Functional History  Lives With: Significant other(Duane)  Type of Home: Apartment  Home Layout: (3rd floor c elevator)  Home Access: Level entry  Bathroom Shower/Tub: Tub/Shower unit  Bathroom Toilet: Handicap height  Bathroom Equipment: Grab bars in shower, Grab bars around toilet  Bathroom Accessibility: Walker accessible  Home Equipment: Rolling walker  ADL Assistance: Independent  Homemaking Assistance: Independent  Homemaking Responsibilities: Yes  Meal Prep Responsibility: No  Laundry Responsibility: No  Cleaning Responsibility: No  Bill Paying/Finance Responsibility: Primary(Pt shares responsibility c significant other)  Shopping Responsibility: No  Dependent Care Responsibility: Secondary(Pt has cat)  Health Care Management: Primary  Ambulation Assistance: Independent(Mod Ind using RW (household distances usually at baseline))  Transfer Assistance: Independent  Active : No  Patient's  Info: Pt's significant other Duane  Mode of Transportation: Car  Occupation: On disability  Leisure & Hobbies: Pt enjoys staying home to watch tv  Additional Comments: Pt reports, falling 5x times or more this year with some requiring hospitalization but no significant injuries; sleeps in regular, flat bed; R hand dominant    Restrictions:  Restrictions/Precautions  Restrictions/Precautions: General Precautions, Fall Risk(R eye deficit (double vision/decreased ability to focus), mild RLE weakness)       Subjective: Pt seated in recliner, alert and cooperative.     Pain Level: 0/10       Objective:  Orientation  Overall Orientation Status: Within Functional Limits  Orientation Level: Oriented X4        Vision  Vision: Impaired  Vision Exceptions: Wears glasses for reading(acute vision change reported by pt (decreased focus on R eye and with diplopia))  Hearing  Hearing: Within functional limits    ROM:      AROM RLE (degrees)  RLE AROM: WFL     AROM LLE (degrees)  LLE AROM : WFL                 Strength:    Strength RLE  Comment: grossly 4/5  Strength LLE  Comment: grossly 5/5              Bed Mobility:   Lying to Sitting on Side of Bed  Assistance Needed: Supervision or touching assistance  Comment: required SBA, no use of bed features  CARE Score: 4  Discharge Goal: Independent  Roll Left and Right  Assistance Needed: Supervision or touching assistance  Comment: required SBA, no use of bed features  CARE Score: 4  Discharge Goal: Independent  Sit to Lying  Assistance Needed: Supervision or touching assistance  Comment: required SBA, no use of bed features  CARE Score: 4  Discharge Goal: Independent    Transfers:    Sit to Stand  Assistance Needed: Supervision or touching assistance  Comment: required CGA using RW  CARE Score: 4  Discharge Goal: Independent  Chair/Bed-to-Chair Transfer  Assistance Needed: Supervision or touching assistance  Comment: required CGA  CARE Score: 4  Discharge Goal: Independent  Toilet Transfer  Assistance Needed: Partial/moderate assistance  Comment: required Min A  CARE Score: 3  Car Transfer  Assistance Needed: Supervision or touching assistance  Comment: required CGA  CARE Score: 4  Discharge Goal: Independent    Ambulation:   Device used PTA: RW   Walking Ability  Does the Patient Walk?: Yes     Walk 10 Feet  Assistance Needed: Supervision or touching assistance  Comment: required CGA using RW  CARE Score: 4  Discharge Ability  Uses a Wheelchair and/or Scooter?: No                Balance:        Object: Picking Up Object  Assistance Needed: Partial/moderate assistance  Comment: pt required Min A with LUE support on RW (task completed without use of reacher)  CARE Score: 3  Discharge Goal: Independent    Assessment:   The patient is a 79year old female admitted onto ARU after hospitalization for vision change, fall at home, and dizziness. Pt with Hx of HTN and DM. Imaging revealed acute infarct involving the dorsal ian. Pt with Hx of multiple falls even prior to this hospitalization indicating that her body tends to fall usually to the R side for no reason but confirmed Hx of vertigo. Pt was able to verbalize her needs and was able to follow simple instructions. Pt expressed vision deficit on the R eye since CVA and will have to learn compensatory strategies if impairment persists to be able to safely navigate pathway during ambulation. Pt is expected to make progress towards established PT goals provided her neurologic status remains stable and based on her current physical deficits, ability to follow commands, pre-existing skills related to use of walker, and motivation to regain independence.      Body structures, Functions, Activity limitations: Decreased functional mobility , Decreased strength, Decreased endurance, Decreased vision/visual deficit, Decreased posture, Decreased coordination, Decreased balance, Decreased cognition     Prognosis: Good  Decision Making: High Complexity  Clinical Presentation: unstable/unpredictable characteristics      Patient education:   ARU schedule, ARU expectations for participation, plan of care  Treatment Initiated:  Functional mobility training, gait training, patient education  Barriers to Improvement: fatigue considering pt's sedentary lifestyle, vision deficit   Discharge Recommendations:  [facility] PT   Equipment Recommendations:  Has RW    Goals:  Patient Goals   Patient goals : to go home  Short term goals  Time Frame for Short term goals: 10 tx days:  Short term goal 1: Pt will complete rolling L/R and sup<->sit Ind. Short term goal 2: Pt will complete OOB transfers using RW Mod Ind. Short term goal 3: Pt will ambulate 150 ft using RW with SBA-Sup. Short term goal 4: Pt will ascend/descend 4-5 steps using bilat rails with SBA-Sup. Short term goal 5: Pt will ascend/descend curb step and ambulate over uneven surfaces using RW with SBA-Sup. Short term goal 6: Pt will complete object retrieval in standing with 1UE support using adaptive equipment prn Mod Ind.      Plan:    REQUIRES PT FOLLOW UP: Yes  Pt will be seen at least 60 minutes per day for a minimum of 5 days per week, plus group therapy as appropriate  Plan  Times per day: Daily  Current Treatment Recommendations: Strengthening, Functional Mobility Training, Home Exercise Program, Equipment Evaluation, Education, & procurement, Transfer Training, Gait Training, Safety Education & Training, Balance Training, Endurance Training, Stair training, Patient/Caregiver Education & Training, Cognitive/Perceptual Training    PT Individual Minutes  Time In: 5545  Time Out: 0820  Minutes: 90        Timed Code Treatment Minutes: 75 Minutes    Number of Minutes/Billable Intervention  PT Evaluation 15   Gait Training 45   Therapeutic Exercise    Neuro Re-Ed    Therapeutic Activity 30   Wheelchair Propulsion    Group    Other:    TOTAL 90       Electronically signed by:    Kadeem Nolan PT   10/9/2020, 15:44

## 2020-10-09 NOTE — PROGRESS NOTES
Patient skin assessment was done by myself and RN Abdirahman Upton. Pt has scattered bruising, excoriation under bilateral breast and groin, and blanchable redness on her coccyx.

## 2020-10-09 NOTE — H&P
Noris Galeano    : 1950  Acct #: [de-identified]  MRN: [id number]              History and physical      Admitting diagnosis: Acute CVA (dorsal left ian)    Comorbid diagnoses impacting rehabilitation: Right hemiparesis, gait disturbance, essential hypertension, uncontrolled diabetes with hyperglycemia, morbid obesity (BMI 45.5), disconjugate gaze. Chief complaint: \"My eyes are not right\". History of present illness: Patient is a 68-year-old right-hand-dominant female with a history of hypertension, diabetes and morbid obesity who developed sudden onset of double vision and right arm and leg weakness in the early morning hours of 10/6/2020. Around 2 AM she suffered a fall when trying to get out of bed because she could not control her right leg. Later that morning when symptoms persisted and she had difficulty seeing she came to our ED. The patient demonstrated left facial droop and right arm and leg weakness at that time. Brain imaging showed a lesion in the dorsal ian (ischemia). No evidence of aspiration or infection was noted. There is no history of trauma or seizure activity. She was started on antiplatelet therapy and had speech therapy evaluate her swallowing. She has had persistent distortion of vision, limb weakness and poor coordination and is unsafe trying her own toileting, hygiene and ambulation. She is not safe to return home and requires inpatient rehabilitation at this time. Review of systems: Trouble seeing, mildly slurred speech and clumsiness. No chills, cough or incontinence. No pain. She denied having any contact with any infectious persons recently. The remainder of their review of systems was negative except as mentioned in the history of present illness. Social History: The patient stays with a significant other in a level entry 1 floor apartment on the third floor (elevator access).   They have a tub shower combination for bathing and handicapped high commode. There are grab bars in the bathroom. She typically ambulates without assistive device but has access to a rolling walker. She is typically independent with her own medication ministration, personal hygiene and light home upkeep. She takes care of a cat. Her significant other does the driving. She reports that she quit smoking about 4 years ago. Her smoking use included cigarettes. She started smoking about 50 years ago. She has a 20.00 pack-year smoking history. She has never used smokeless tobacco. She reports that she does not drink alcohol or use drugs. Prior (baseline) level of function: Independent with mobility and self-care. Current level of function: 25% physical assistance needed for mobility and self-care. Allergies:  Patient has no known allergies.     Past Medical History:   Past Medical History:   Diagnosis Date    Arthritis     [facility]bral [facility] occlusion [facility] ([facility] [address]     Diabetes mellitus ([facility] [address]     Hyperlipidemia     Hypertension         Past Surgical History:     Past Surgical History:   Procedure Laterality Date    BLADDER SUSPENSION      COLONOSCOPY  3/23/15    polyps, int hem    DILATION AND CURETTAGE OF UTERUS      HYSTERECTOMY         Current Medications:     Current Facility-Administered Medications:     [START ON 10/9/2020] enoxaparin (LOVENOX) injection 30 mg, 30 mg, Subcutaneous, Daily, Kaylene Arevalo MD    promethazine (PHENERGAN) tablet 12.5 mg, 12.5 mg, Oral, Q6H PRN **OR** ondansetron (ZOFRAN) injection 4 mg, 4 mg, Intravenous, Q6H PRN, Kaylene Arevalo MD    sodium chloride flush 0.9 % injection 10 mL, 10 mL, Intravenous, 2 times per day, Kaylene Arevalo MD, 10 mL at 10/08/20 2200    sodium chloride flush 0.9 % injection 10 mL, 10 mL, Intravenous, PRN, Lillie Mac, MD Zannie Cowden  [START ON 10/9/2020] atorvastatin (LIPITOR) tablet 10 mg, 10 mg, Oral, Daily, Lillie Mac, MD Zannie Cowden  [START ON 10/9/2020] clopidogrel (PLAVIX) tablet 75 mg, 75 mg, Oral, Daily, Jalen Ashford MD    dextrose 5 % solution, 100 mL/hr, Intravenous, PRN, Jalen Ashford MD    dextrose 50 % IV solution, 12.5 g, Intravenous, PRN, Jalen Ashford MD    glucagon (rDNA) injection 1 mg, 1 mg, Intramuscular, PRN, Jalen Ashford MD    glucose (GLUTOSE) 40 % oral gel 15 g, 15 g, Oral, PRN, Jalen Ashford MD  Denisse Gtz  [START ON 10/9/2020] insulin lispro (HUMALOG) injection vial 0-12 Units, 0-12 Units, Subcutaneous, TID WC, Jose Rafael Bal MD    insulin lispro (HUMALOG) injection vial 0-6 Units, 0-6 Units, Subcutaneous, Nightly, Jalen Ashford MD, 3 Units at 10/08/20 2159    [START ON 10/9/2020] lisinopril-hydroCHLOROthiazide (PRINZIDE;ZESTORETIC) 10-12.5 MG per tablet 2 tablet, 2 tablet, Oral, Daily, Jalen Ashford MD    metoprolol tartrate (LOPRESSOR) tablet 50 mg, 50 mg, Oral, BID, Jalen Ashford MD, 50 mg at 10/08/20 2156    oxybutynin (DITROPAN) tablet 5 mg, 5 mg, Oral, TID, Jalen Ashford MD, 5 mg at 10/08/20 2156    [START ON 10/9/2020] pantoprazole (PROTONIX) tablet 40 mg, 40 mg, Oral, QAM AC, Jose Rafael Bal MD    potassium chloride (KLOR-CON M) extended release tablet 20 mEq, 20 mEq, Oral, BID, Jalen Ashford MD, 20 mEq at 10/08/20 2156    acetaminophen (TYLENOL) tablet 650 mg, 650 mg, Oral, Q4H PRN, LIZBET Cherry MD    polyethylene glycol (GLYCOLAX) packet 17 g, 17 g, Oral, Daily PRN, LIZBET Cherry MD    Family History:   Family History   Problem Relation Age of Onset    No Known Problems Mother     No Known Problems Father        Exam:    Blood pressure 137/69, pulse 69, temperature 99.6 °F (37.6 °C), temperature source Oral, resp. rate 17, height 5' 2\" (1.575 m), weight 239 lb (108.4 kg), SpO2 90 %. General: Patient was observed sitting up in a bedside chair. She was alert and talkative. In good spirits. Follows one-step commands. HEENT: Left facial droop. Right eye would not track to the right. No signs of trauma to her head or neck.   No adenopathy or JVD. Cervical spine rotation was free through about 70 degrees bilaterally. Pulmonary: Slightly labored under her girth. No rales or rhonchi. Distant breath sounds. Cardiac: Regular rate and rhythm. Abdomen: Patient's abdomen was soft and nondistended. Bowel sounds were present throughout. There was no rebound, guarding or masses noted. Spinal exam: Skin overlying her spine was pink and moist but intact. Lumbar lordosis is exaggerated. Truncal rotation was compromised by her generalized weakness. Upper extremities: The patient has weaker  on the right than the left but no clonus, tremor or atrophy. Sensation is blunted in the fingertips bilaterally. No deep tendon reflexes. No subluxation. Poor coordination of the right upper limb. Lower extremities: 4+/5 ankle dorsiflexion strength and with strength testing across the right knee. No significant peripheral edema. Heels clear. No signs of DVT. Wide base of support during standing. Sitting balance was fair. Standing balance was poor. Lab Results   Component Value Date    WBC 6.7 10/07/2020    HGB 15.5 10/07/2020    HCT 46.0 10/07/2020    MCV 91.5 10/07/2020     10/07/2020     Lab Results   Component Value Date    INR 1.19 10/06/2020    PROTIME 14.4 10/06/2020     Lab Results   Component Value Date    CREATININE 1.0 10/07/2020    BUN 10 10/07/2020     10/07/2020    K 3.4 (L) 10/07/2020     10/07/2020    CO2 26 10/07/2020     Lab Results   Component Value Date    ALT 28 10/06/2020    AST 26 10/06/2020    ALKPHOS 112 10/06/2020    BILITOT 1.1 (H) 10/06/2020         Impression: 75-year-old female with hypertension, diabetes and morbid obesity who suffered a pontine infarction yielding left facial droop, right-sided weakness and distorted gait. Strengths for the patient: Independent habits prior to admission, accessible home and supportive significant other.     Limitations/barriers for the patient: Her obesity, her distorted vision and her diabetes. Recommendation: Acute inpatient rehabilitation with occupational and physical therapy 180 minutes 5 out of every 7 days. Will address basic and  advancing mobility with self-care instruction and adaptive equipment training. Caregiver education will be offered. Expected length of stay  prior to a supervised level of function for discharge home with a walker and [facility] OT/PT is 2 weeks. Additional recommendation:    1. Acute pontine infarction with gait disturbance: The patient requires daily occupational physical therapy with speech-language pathology. She requires antiplatelet therapy with Plavix and a statin (Lipitor). You must maximize blood sugar and blood pressure control. She requires pulmonary hygiene, nutritional support and bowel and bladder retraining. Visual exercises to try to reestablish a full visual field. DVT prophylaxis. 2. DVT prophylaxis: Lovenox 30 mg subcu daily. I must monitor her hemoglobin and platelet count periodically while on this medication. Weightbearing activities are pursued daily. GI prophylaxis offered. 3. Uncontrolled diabetes with hyperglycemia (type II): Patient requires a Humalog sliding scale with each meal and blood sugars are checked at mealtime and bedtime. Does not appear she was taking any medications for blood sugar previously at home. Her hemoglobin A1c from 10/7/2020 is still pending. 4. Hypertension: Patient requires lisinopril, hydrochlorothiazide and Lopressor for blood pressure regulation. Target systolic blood pressure is 120-140. Vital signs are checked at rest and with activity. I personally performed a history and physical on this patient within 24 hours of admission to the rehab unit. I have reviewed the preadmission screening and concur with its findings without change.  A detailed plan of care will be established by hospital day 4 and I attest the patient is appropriate for inpatient rehabilitation at this time. I have compared the patient's current functional status noted during my history and physical with that of the preadmission screen and I have found no significant differences.

## 2020-10-09 NOTE — PROGRESS NOTES
to person however demo delay when prompted for time and situation.)        Vision  Vision: Impaired  Vision Exceptions: Wears glasses for reading  Vision - Basic Assessment  Prior Vision: Wears glasses only for reading  Visual History: No significant visual history  Patient Visual Report: Blurring of vision when changing focal distance, Unable to keep objects in focus, Diplopia  Visual Field Cut: Left(Pt demo difficulty crossing midline, appears to present with L visual field cut, however will cont to assess when possible.)  Oculo Motor Control: Impaired  Impairments: ROM, Scanning, Tracking  Hearing  Hearing: Within functional limits    ROM:      LUE AROM (degrees)  LUE AROM : WFL     Left Hand AROM (degrees)  Left Hand AROM: WFL     RUE AROM (degrees)  RUE AROM : WFL     Right Hand AROM (degrees)  Right Hand AROM: WFL    Strength:    LUE Strength  Gross LUE Strength: Exceptions to [facility] [place]  L Hand General: 4+/5  LUE Strength Comment: Decreased BUE strength 4+/5  RUE Strength  Gross RUE Strength: Exceptions to [facility] [place]  R Hand General: 4+/5  RUE Strength Comment: Decreased BUE strength 4+/5    Quality of Movement: Tone RUE  RUE Tone: Normotonic  Tone LUE  LUE Tone: Normotonic  Coordination  Movements Are Fluid And Coordinated: No  Coordination and Movement description: Fine motor impairments       Sensation:    Sensation  Overall Sensation Status: WFL(Pt denies)     ADLs:  Eating: Eating  Assistance Needed: Setup or clean-up assistance  CARE Score: 5  Discharge Goal: Independent       Oral Hygiene: Oral Hygiene  Assistance Needed: Partial/moderate assistance  Comment: Pt demo poor standing balance and required Min A to maintain upright posture.   CARE Score: 3  Discharge Goal: Independent    UB/LB Bathing: Shower/Bathe Self  Assistance Needed: Partial/moderate assistance  CARE Score: 3  Discharge Goal: Independent    UB Dressing: Upper Body Dressing  Assistance Needed: Setup or clean-up assistance  CARE Score: 5  Discharge Decreased ADL status, Decreased strength, Decreased cognition, Decreased vision/visual deficit, Decreased fine motor control, Decreased coordination    The patient is a 79year old female admitted onto ARU after hospitalization for for TIA. Pt reports prior to admission pt was independent in ADLs, IADLs, and functional mobility. Today pt demo decreased BUE strength, coordination, visual deficits, decreased balance, and functional mobility, requiring Min A-Mod A for all ADLs. Pt is expected to make progress towards established OT goals based on current physical deficits, Ind PLOF, and motivation to regain independence. The QI, MMT, and ROM standardized assessments were used this date to determine the above performance deficits, which compromise pt's ability to safely complete ADLs/IADLs/mobility. Pt will benefit from ARU OT services to increase functional performance, safety, and achieve the highest level of independence with ADLs. Decision Making: Medium Complexity  Clinical Presentation:  Evolving c changing characteristics  History:  See \"Social/Functional\" sections for complete Occupational Profile. Pt's co-morbidities include DM, hypertension,  which all impact function and ability to progress through plan of care. Assistance/Modification:  Use of DME, providing verbal cues, providing physical assistance for mobility and ADL's, providing set-up of supplies during ADL's  Patient education:   ARU procotol, Role of O.T., O.T. plan of care,   []   Patient goal was established and reviewed in Rehabtracker with patient and/or family this date. Treatment Initiated:  Patient education, ADL re-training,   Barriers to Improvement:  Visual deficits  REQUIRES OT FOLLOW UP: Yes  Discharge Recommendations:  Anticipating home c family and C OT  Equipment Recommendations:  TBD    Goals:  Patient Goals   Patient goals : Pt reports, \"I want to get back home to my cat and my man and be strong\".   Short term goals  Time Frame for Short term goals: STG=LTG  Long term goals  Time Frame for Long term goals : 10-12 days or until d/c  Long term goal 1: Pt will increase BUE coordination/fine motor skills in order to complete feeding/grooming/oral care task c MOD I by d/c  Long term goal 2: Pt will complete total body bathing c MOD I by d/c  Long term goal 3: Pt will complete UB dressing c MOD I and retrieve items from closet by d/c  Long term goal 4: Pt will complete LB dressing c MOD I and retrieve items from closet by d/c  Long term goal 5: Pt will don/doff footwear c MOD I by d/c  Long term goals 6: Pt will complete toileting c MOD I by d/c  Long term goal 7: Pt will complete functional transfer (toilet, tub, shower) c MOD I by d/c. Long term goal 8: Pt will perform therex/therax to facilitate increased strength/ax tolerance (c emphasis on dynamic standing balance/tolerance >10 mins) c MOD I in order to complete ADLs.     Plan:    Pt will be seen at least 60 minutes per day for a minimum of 5 days per week, plus group therapy as appropriate  Current Treatment Recommendations: Neuromuscular Re-education    OT Individual Minutes  Time In: 0845  Time Out: 1020  Minutes: 95                Number of Minutes/Billable Intervention  OT Evaluation 30   Therapeutic Exercise    ADL Self-care 65   Neuro Re-Ed    Therapeutic Activity    Group    Other:    TOTAL 95     Electronically signed by:    PRICE Ritchie/CHAO  License # [id number]    10/9/2020, 11:04 AM

## 2020-10-09 NOTE — PROGRESS NOTES
Comprehensive Nutrition Assessment    Type and Reason for Visit:  Initial, Consult(diet ed dysphagia)    Nutrition Recommendations/Plan:   Continue current diet-carb controlled with low sodium     Nutrition Assessment:  Rehab admit with hx falls, CVA. Able to tolerate general diet consistency per recent speeach therapy eval. Remains on carb controlled low sodium diet. Patient reports good appetite, likes meals and is aware of diet. Low nutrition risk at this time, will continue to reinforce diet during stay. Malnutrition Assessment:  Malnutrition Status:  Insufficient data(limited wt hx and po data but eatign well at this time)    Context:  Acute Illness       Estimated Daily Nutrient Needs:  Energy (kcal):  5781-8839; Weight Used for Energy Requirements:  Current     Protein (g):  60-70 (1.2-1.4 g/kg); Weight Used for Protein Requirements:  Ideal        Fluid (ml/day):  1800 (1ml/farhan);  Weight Used for Fluid Requirements:  Current      Nutrition Related Findings:  sitting up eating lunch, missing some teeth but able to eat meal, elevated glucose on admit with new HbA1c pending      Wounds:  (red excoriated)       Current Nutrition Therapies:    DIET GENERAL; Carb Control: 4 carb choices (60 gms)/meal; Low Sodium (2 GM)    Anthropometric Measures:  · Height: 5' 2\" (157.5 cm)  · Current Body Weight: 238 lb 15.7 oz (108.4 kg)   · Admission Body Weight: 238 lb 15.7 oz (108.4 kg)    · Usual Body Weight: (limited wt hx, 300# in 2016)     · Ideal Body Weight: 110 lbs; % Ideal Body Weight 217.3 %   · BMI: 43.7  · Adjusted Body Weight:  ; No Adjustment   · BMI Categories: Obese Class 3 (BMI 40.0 or greater)       Nutrition Diagnosis:   · Altered nutrition-related lab values related to endocrine dysfuntion as evidenced by lab values      Nutrition Interventions:   Food and/or Nutrient Delivery:  Continue Current Diet  Nutrition Education/Counseling:  Education initiated   Coordination of Nutrition Care:  Continued Inpatient Monitoring    Goals:  Patient will tolerate carb controlled diet with meal intake at least 75%       Nutrition Monitoring and Evaluation:   Food/Nutrient Intake Outcomes:  Food and Nutrient Intake, Diet Advancement/Tolerance  Physical Signs/Symptoms Outcomes:  Weight, Skin, Biochemical Data, Chewing or Swallowing     Discharge Planning:    Continue current diet     Electronically signed by Jun Hodgson RD, LD on 10/9/20 at 1:39 PM EDT    Contact: [phone]

## 2020-10-10 LAB
GLUCOSE BLD-MCNC: 221 MG/DL (ref 70–99)
GLUCOSE BLD-MCNC: 298 MG/DL (ref 70–99)
GLUCOSE BLD-MCNC: 302 MG/DL (ref 70–99)
GLUCOSE BLD-MCNC: 321 MG/DL (ref 70–99)

## 2020-10-10 PROCEDURE — 97535 SELF CARE MNGMENT TRAINING: CPT

## 2020-10-10 PROCEDURE — 82962 GLUCOSE BLOOD TEST: CPT

## 2020-10-10 PROCEDURE — 94664 DEMO&/EVAL PT USE INHALER: CPT

## 2020-10-10 PROCEDURE — 6370000000 HC RX 637 (ALT 250 FOR IP): Performed by: PHYSICAL MEDICINE & REHABILITATION

## 2020-10-10 PROCEDURE — 6370000000 HC RX 637 (ALT 250 FOR IP): Performed by: INTERNAL MEDICINE

## 2020-10-10 PROCEDURE — 6360000002 HC RX W HCPCS: Performed by: INTERNAL MEDICINE

## 2020-10-10 PROCEDURE — 97530 THERAPEUTIC ACTIVITIES: CPT

## 2020-10-10 PROCEDURE — 94150 VITAL CAPACITY TEST: CPT

## 2020-10-10 PROCEDURE — 97110 THERAPEUTIC EXERCISES: CPT

## 2020-10-10 PROCEDURE — 94761 N-INVAS EAR/PLS OXIMETRY MLT: CPT

## 2020-10-10 PROCEDURE — 1280000000 HC REHAB R&B

## 2020-10-10 PROCEDURE — 2580000003 HC RX 258: Performed by: INTERNAL MEDICINE

## 2020-10-10 PROCEDURE — 97116 GAIT TRAINING THERAPY: CPT

## 2020-10-10 PROCEDURE — 97112 NEUROMUSCULAR REEDUCATION: CPT

## 2020-10-10 PROCEDURE — 92523 SPEECH SOUND LANG COMPREHEN: CPT

## 2020-10-10 RX ADMIN — ACETAMINOPHEN 650 MG: 325 TABLET ORAL at 09:12

## 2020-10-10 RX ADMIN — POTASSIUM CHLORIDE 20 MEQ: 20 TABLET, EXTENDED RELEASE ORAL at 21:32

## 2020-10-10 RX ADMIN — ATORVASTATIN CALCIUM 10 MG: 10 TABLET, FILM COATED ORAL at 21:32

## 2020-10-10 RX ADMIN — ACETAMINOPHEN 650 MG: 325 TABLET ORAL at 17:24

## 2020-10-10 RX ADMIN — METOPROLOL TARTRATE 50 MG: 50 TABLET, FILM COATED ORAL at 09:12

## 2020-10-10 RX ADMIN — PANTOPRAZOLE SODIUM 40 MG: 40 TABLET, DELAYED RELEASE ORAL at 05:43

## 2020-10-10 RX ADMIN — OXYBUTYNIN CHLORIDE 5 MG: 5 TABLET ORAL at 21:32

## 2020-10-10 RX ADMIN — INSULIN LISPRO 6 UNITS: 100 INJECTION, SOLUTION INTRAVENOUS; SUBCUTANEOUS at 17:24

## 2020-10-10 RX ADMIN — METOPROLOL TARTRATE 50 MG: 50 TABLET, FILM COATED ORAL at 21:32

## 2020-10-10 RX ADMIN — INSULIN LISPRO 8 UNITS: 100 INJECTION, SOLUTION INTRAVENOUS; SUBCUTANEOUS at 09:15

## 2020-10-10 RX ADMIN — CLOPIDOGREL BISULFATE 75 MG: 75 TABLET ORAL at 09:12

## 2020-10-10 RX ADMIN — INSULIN LISPRO 8 UNITS: 100 INJECTION, SOLUTION INTRAVENOUS; SUBCUTANEOUS at 12:50

## 2020-10-10 RX ADMIN — OXYBUTYNIN CHLORIDE 5 MG: 5 TABLET ORAL at 09:12

## 2020-10-10 RX ADMIN — OXYBUTYNIN CHLORIDE 5 MG: 5 TABLET ORAL at 14:17

## 2020-10-10 RX ADMIN — ENOXAPARIN SODIUM 30 MG: 30 INJECTION SUBCUTANEOUS at 09:12

## 2020-10-10 RX ADMIN — SODIUM CHLORIDE, PRESERVATIVE FREE 10 ML: 5 INJECTION INTRAVENOUS at 21:33

## 2020-10-10 RX ADMIN — POTASSIUM CHLORIDE 20 MEQ: 20 TABLET, EXTENDED RELEASE ORAL at 09:12

## 2020-10-10 RX ADMIN — LISINOPRIL AND HYDROCHLOROTHIAZIDE 2 TABLET: 12.5; 1 TABLET ORAL at 09:12

## 2020-10-10 ASSESSMENT — PAIN DESCRIPTION - PROGRESSION
CLINICAL_PROGRESSION: GRADUALLY WORSENING

## 2020-10-10 ASSESSMENT — PAIN DESCRIPTION - ONSET: ONSET: ON-GOING

## 2020-10-10 ASSESSMENT — PAIN DESCRIPTION - DESCRIPTORS: DESCRIPTORS: CONSTANT;ACHING

## 2020-10-10 ASSESSMENT — PAIN DESCRIPTION - LOCATION: LOCATION: MOUTH;JAW

## 2020-10-10 ASSESSMENT — PAIN DESCRIPTION - ORIENTATION: ORIENTATION: RIGHT;LOWER

## 2020-10-10 ASSESSMENT — PAIN SCALES - GENERAL
PAINLEVEL_OUTOF10: 9
PAINLEVEL_OUTOF10: 0
PAINLEVEL_OUTOF10: 2

## 2020-10-10 ASSESSMENT — PAIN DESCRIPTION - FREQUENCY: FREQUENCY: CONTINUOUS

## 2020-10-10 ASSESSMENT — PAIN - FUNCTIONAL ASSESSMENT: PAIN_FUNCTIONAL_ASSESSMENT: PREVENTS OR INTERFERES SOME ACTIVE ACTIVITIES AND ADLS

## 2020-10-10 ASSESSMENT — PAIN DESCRIPTION - PAIN TYPE: TYPE: ACUTE PAIN

## 2020-10-10 NOTE — PROGRESS NOTES
Physical Therapy  [x] daily progress note       [] discharge       Patient Name:  Jacob Dukes   :  1950 MRN: [id number]  Room:  [address] [place] [address] Date of Admission: 10/8/2020  Rehabilitation Diagnosis:   CVA (cerebral vascular accident) [facility]) [I63.9]  Acute CVA (cerebrovascular accident) ([facility] [address] [I63.9]       Date 10/10/2020       Day of ARU Week:  3   Time IN/OUT 5421-1909   Individual Tx Minutes 75   Group Tx Minutes    Co-Treat Minutes    Concurrent Tx Minutes    TOTAL Tx Time Mins 75   Variance Time    Variance Time []   Refusal due to:     []   Medical hold/reason:    []   Illness   []   Off Unit for test/procedure  []   Extra time needed to complete task  []   Therapeutic need  []   Other (specify):   Restrictions Restrictions/Precautions  Restrictions/Precautions: General Precautions, Fall Risk(R eye deficit (double vision/decreased ability to focus), mild RLE weakness)      Communication with other providers: [x]   OK to see per nursing:     []   Spoke with team member regarding:      Subjective observations and cognitive status: Pt sitting in recliner agreeable to therapy. Pain level/location:    denies/10       Location:    Discharge recommendations  Anticipated discharge date:  TBD  Destination: []home alone   []home alone with assist PRN     [x] home w/ family      [] Continuous supervision  []SNF    [] Assisted living     [] Other:   Continued therapy: []HHC PT  []OUTPATIENT  PT   [] No Further PT  Equipment needs: TBD     Bed Mobility:           [x]   Pt received out of bed     Transfers:    Sit--> Stand:  CGA-SBA  Stand --> Sit:   CGA-SBA  Chair-->Bed/Bed --> Chair:   CGA-SBA  Toilet Transfer (if applicable): SBA  Other:    Assistive device required for transfer:   FWW    Gait:    Distance:  76', 107', 100', 15'  Assistance:  Min A  Device:  FWW  Gait Quality:  Inconsistent gait speed with mod cues to stay within AD and forward gaze.        Additional Therapeutic activities/exercises completed this date:     [x]   Nu-step:  Time:   15 min     Level: 3      []   Rebounder:    []  Seated     []  Standing        [x]   Balance training: Standing at sink and toilet for hygiene with SBA. Standing reaching ipslaeral and contralateral with throwing bean bags with SBA-CGA with single UE support         []   Postural training    []   Supine ther ex (reps/sets):     []   Seated ther ex (reps/sets):     []   Standing ther ex (reps/sets):     []   Picking up object from floor (standing):                   []   Reacher used   []   Other:   []   Other:    Comments:      Patient/Caregiver Education and Training:   [x]   Bed Mobility/Transfer technique/safety  [x]   Gait technique/sequencing  [x]   Proper use of assistive device  []   Advanced mobility safety and technique  []   Reinforced patient's precautions/mobility while maintaining precautions  [x]   Postural awareness  []   Family training  []   Progress was updated and reviewed in Rehabtracker with patient and/or family this date.     Treatment Plan for Next Session: advance gait uneven surfaces/outdoor, stairs      Assessment:      Treatment/Activity Tolerance:   [x] Tolerated treatment with no adverse effects    [] Patient limited by fatigue  [] Patient limited by pain   [] Patient limited by medical complications:    [] Adverse reaction to Tx:   [] Significant change in status    Safety:       []  bed alarm set    [x]  chair alarm set    []  Pt refused alarms                []  Telesitter activated      [x]  Gait belt used during tx session      []other:         Number of Minutes/Billable Intervention  Gait Training 30   Therapeutic Exercise 15   Neuro Re-Ed 15   Therapeutic Activity 15   Wheelchair Propulsion    Group    Other:    TOTAL 75         Social History  Social/Functional History  Lives With: Significant other(Duane)  Type of Home: Apartment  Home Layout: (3rd floor c elevator)  Home Access: Level entry  Bathroom Shower/Tub: Tub/Shower unit  [place] appropriate for 60 minutes.   Treatment to include Current Treatment Recommendations: Strengthening, Functional Mobility Training, Home Exercise Program, Equipment Evaluation, Education, & procurement, Transfer Training, Gait Training, Safety Education & Training, Balance Training, Endurance Training, Stair training, Patient/Caregiver Education & Training, Cognitive/Perceptual Training    Electronically signed by   Hilario Marquez, MADY [id number]  10/10/2020, 2:40 PM

## 2020-10-10 NOTE — PROGRESS NOTES
Physical Rehabilitation: OCCUPATIONAL THERAPY     [x] daily progress note       [] discharge       Patient Name:  Mechelle Alex   :  1950 MRN: [id number]  Room:  [address] [place] [address] Date of Admission: 10/8/2020  Rehabilitation Diagnosis:   CVA (cerebral vascular accident) [facility]) [I63.9]  Acute CVA (cerebrovascular accident) ([facility] [address] [I63.9]       Date 10/10/2020       Day of ARU Week:  3   Time IN/OUT 1030 - 1145   Individual Tx Minutes 76   Group Tx Minutes x   Co-Treat Minutes x   Concurrent Tx Minutes x   TOTAL Tx Time Mins 75   Variance Time x   Variance Time []   Refusal due to:     []   Medical hold/reason:    []   Illness   []   Off Unit for test/procedure  []   Extra time needed to complete task  []   Therapeutic need  []   Other (specify):   Restrictions Restrictions/Precautions  Restrictions/Precautions: General Precautions, Fall Risk(R eye deficit (double vision/decreased ability to focus), mild RLE weakness)      Communication with other providers: [x]   OK to see per nursing:     []   Spoke with team member regarding:      Subjective observations and cognitive status: Pt impulsive at times requiring moderate verbal cuing to lock breaks before sit to stand transfers    Pain level/location:   0 /10       Location:    Discharge recommendations  Anticipated discharge date:  TBD  Destination: []home alone   []home alone w assist prn [] home w/ family    [] Continuous supervision       []SNF            [] Assisted living     [] Other:   Continued therapy: []HHC OT  []OUTPATIENT  OT   [] No Further OT  Equipment needs: TBD   (HIT F2 to transition between stars)      Grooming:   Min A  Oral Hygiene:  Min A while standing for occasional upright posture      Bathing:   Min A for BLE and thoroughness       Dressing:      Upper Body Dressing:  CGA  Lower Body Dressing:  Min A for upright posture in stance           Bed Mobility:           []   Pt received out of bed   Rolling R/L:  SBA  Supine --> Sit: SBA    Transfers:    Sit--> Stand:   CGA - Max (Constant) verbal cuing to lock breaks before standing with multiple sit to stand transfers   Stand --> Sit:   CGA    Assistive device required for transfer:   RW      Functional Mobility:    Assistance:  SUP ~ 20 feet in room  Device:   []   Rolling Walker     []   Standard Chente Raymond [x]   Wheelchair        []   [place]       []   4-Wheeled Chente Raymond         []   Cardiac Chente Raymond       []   Other:          Additional Therapeutic activities/exercises completed this date:     [x]   ADL Training   []   Balance/Postural training     []   Bed/Transfer Training   []   Endurance Training   []   Neuromuscular Re-ed   []   Nu-step:  Time:        Level:         #Steps:       []   Rebounder:    []  Seated     []  Standing        []   Supine Ther Ex (reps/sets):     []   Seated Ther Ex (reps/sets):     []   Standing Ther Ex (reps/sets):     []   Other:      Comments:      Patient/Caregiver Education and Training:   []   [employer] Equipment Use  []   Bed Mobility/Transfer Technique/Safety  [x]   Energy Conservation Tips  []   Family training  []   Postural Awareness  []   Safety During Functional Activities  []   Reinforced Patient's Precautions   []   Progress was updated and reviewed in Rehabtracker with patient and/or family this  date.      Treatment Plan for Next Session: Continue POC          Treatment/Activity Tolerance:   [x] Tolerated treatment with no adverse effects    [] Patient limited by fatigue  [] Patient limited by pain   [] Patient limited by medical complications:    [] Adverse reaction to Tx:   [] Significant change in status    Safety:       []  bed alarm set    [x]  chair alarm set    []  Pt refused alarms                []  Telesitter activated      []  Gait belt used during tx session      []other:       Number of Minutes/Billable Intervention  Therapeutic Exercise    ADL Self-care 75   Neuro Re-Ed    Therapeutic Activity    Group    Other:    TOTAL 75       Social History  Social/Functional History  Lives With: Significant other(Duane)  Type of Home: Apartment  Home Layout: (3rd floor c elevator)  Home Access: Level entry  Bathroom Shower/Tub: Tub/Shower unit  Bathroom Toilet: Handicap height  Bathroom Equipment: Grab bars in shower, Grab bars around toilet  Bathroom Accessibility: Walker accessible  Home Equipment: Rolling walker  ADL Assistance: Independent  Homemaking Assistance: Independent  Homemaking Responsibilities: Yes  Meal Prep Responsibility: No  Laundry Responsibility: No  Cleaning Responsibility: No  Bill Paying/Finance Responsibility: Primary(Pt shares responsibility c significant other)  Shopping Responsibility: No  Dependent Care Responsibility: Secondary(Pt has cat)  Health Care Management: Primary  Ambulation Assistance: Independent(Mod Ind using RW (household distances usually at baseline))  Transfer Assistance: Independent  Active : No  Patient's  Info: Pt's significant other Duane  Mode of Transportation: Car  Occupation: On disability  Leisure & Hobbies: Pt enjoys staying home to watch tv  Additional Comments: Pt reports, falling 5x times or more this year with some requiring hospitalization but no significant injuries; sleeps in regular, flat bed; R hand dominant    Objective                                                                                    Goals:  (Update in navigator)  Short term goals  Time Frame for Short term goals: STG=LTG:  Long term goals  Time Frame for Long term goals : 10-12 days or until d/c  Long term goal 1: Pt will increase BUE coordination/fine motor skills in order to complete feeding/grooming/oral care task c MOD I by d/c  Long term goal 2: Pt will complete total body bathing c MOD I by d/c  Long term goal 3: Pt will complete UB dressing c MOD I and retrieve items from closet by d/c  Long term goal 4: Pt will complete LB dressing c MOD I and retrieve items from closet by d/c  Long term goal 5: Pt will don/doff footwear c MOD I by d/c  Long term goals 6: Pt will complete toileting c MOD I by d/c  Long term goal 7: Pt will complete functional transfer (toilet, tub, shower) c MOD I by d/c. Long term goal 8: Pt will perform therex/therax to facilitate increased strength/ax tolerance (c emphasis on dynamic standing balance/tolerance >10 mins) c MOD I in order to complete ADLs. :        Plan of Care                                                                              Times per week: 5 days per week for a minimum of 60 minutes/day plus group as appropriate for 60 minutes.   Treatment to include Plan  Times per day: Daily  Current Treatment Recommendations: Neuromuscular Re-education    Electronically signed by   Salty Braden  10/10/2020, 10:38 AM

## 2020-10-10 NOTE — PROGRESS NOTES
Pablito Berrios    : 1950  Acct #: [de-identified]  MRN: [id number]              PM&R Progress Note      Admitting diagnosis:  Acute CVA (dorsal left ian)     Comorbid diagnoses impacting rehabilitation: Right hemiparesis, gait disturbance, essential hypertension, uncontrolled diabetes with hyperglycemia, morbid obesity (BMI 45.5), disconjugate gaze.     Chief complaint: Did not sleep well. No new chills or cough. Prior (baseline) level of function: Independent. Current level of function:         Current  IRF-KRISTYN and Goals:      Prior Functioning: Everyday Activities  Self Care: Independent  Indoor Mobility (Ambulation): Independent  Stairs: Needed some help  Functional Cognition: Needed some help  Prior Device Use: Walker    Eating  Assistance Needed: Setup or clean-up assistance  CARE Score: 5  Discharge Goal: Independent  Oral Hygiene  Assistance Needed: Partial/moderate assistance  Comment: Pt demo poor standing balance and required Min A to maintain upright posture. CARE Score: 3  Discharge Goal: Independent  Toileting Hygiene  Assistance Needed: Partial/moderate assistance  Comment: required Min A  CARE Score: 3  Discharge Goal: Independent  Shower/Bathe Self  Assistance Needed: Partial/moderate assistance  CARE Score: 3  Discharge Goal: Independent  Upper Body Dressing  Assistance Needed: Setup or clean-up assistance  CARE Score: 5  Discharge Goal: Independent  Lower Body Dressing  Assistance Needed: Partial/moderate assistance  Comment: Min A to maintain upright posture.   CARE Score: 3  Discharge Goal: Independent  Putting On/Taking Off Footwear  Assistance Needed: Setup or clean-up assistance  CARE Score: 5  Discharge Goal: Independent    Roll Left and Right  Assistance Needed: Supervision or touching assistance  Comment: required SBA, no use of bed features  CARE Score: 4  Discharge Goal: Independent  Sit to Lying  Assistance Needed: Supervision or touching assistance  Comment: required SBA, 10/07/2020     10/07/2020     Lab Results   Component Value Date    INR 1.19 10/06/2020    PROTIME 14.4 10/06/2020     Lab Results   Component Value Date    CREATININE 1.0 10/07/2020    BUN 10 10/07/2020     10/07/2020    K 3.4 (L) 10/07/2020     10/07/2020    CO2 26 10/07/2020     Lab Results   Component Value Date    ALT 28 10/06/2020    AST 26 10/06/2020    ALKPHOS 112 10/06/2020    BILITOT 1.1 (H) 10/06/2020       Expected length of stay  prior to a supervised level of function for discharge home with a walker and C OT/PT is 2 weeks. Recommendations:    1. Acute pontine infarction with gait disturbance: Developing the routine for her daily occupational physical therapy with speech-language pathology. Treating her with antiplatelet therapy with Plavix and a statin (Lipitor). Working to control her blood sugar and blood pressure. Tolerating the pulmonary hygiene, nutritional support and bowel and bladder retraining. Visual exercises to try to reestablish a full visual field. DVT prophylaxis. Addressing pivot transfers today. 2. DVT prophylaxis: Lovenox 30 mg subcu daily. I must monitor her hemoglobin and platelet count periodically while on this medication. Weightbearing activities are pursued daily. GI prophylaxis offered. No signs of acute blood loss. 3. Uncontrolled diabetes with hyperglycemia (type II): Patient requires a Humalog sliding scale with each meal and blood sugars are checked at mealtime and bedtime. Does not appear she was taking any medications for blood sugar previously at home. Her hemoglobin A1c from 10/7/2020 is still pending. 4. Hypertension: Patient requires lisinopril, hydrochlorothiazide and Lopressor for blood pressure regulation. Target systolic blood pressure is 120-140. Vital signs are checked at rest and with activity.

## 2020-10-10 NOTE — PROGRESS NOTES
FOR COMMUNITY REINTEGRATION    MILD DEMENTIA 26-34   IADL DEFICITS; CLEARLY OBJECTIVE EVIDENCE OF MEMORY AND OTHER COGNITIVE DECLINE; MED MGMT AND COMMUNITY REINTEGRATION SUPPORT NEEDED    MODERATE TO   SEVERE DEMENTIA 0-25   MODERATE (UPPER END OF RANGE)--PERVASIVE FX DEFICITS (IADLS) BUT ADLS GENERALLY INTACT   MARKED DEFICITS IN MEMORY AND EXECUTIVE FX; BEHAVIORAL AND PSYCH SYMPTOMS ARE COMMON      Dementia Impairment Scale:  Mild Cognitive Impairment  38  Mild Dementia  28  Moderate Dementia  18      BCAT score for Sarai Kirby: 32/50 indicating moderate cognitive impairment/ mild dementia. Strengths:    Ability to put names to objects  Ability to concentrate and focus  Determine how objects are similar to one another  Understand and express speech  Understand visual processes and relationships  Weaknesses:    \"Command and control\" cognitive activities  Awareness of self, time, place, and situation  Ability to immediately recall a word list of 4 items: banana/justice/Felicita/bridge. Pt able to complete immediate recall 3/4 and delayed recall 2/4  Recall previously presented words over a time delay and recall elements of a story and previously presented pictures/story  Story specifics included:  Alonso Borders / borrowed / $9 / from her brother / Nathaly Quiroz / last week. / She couldn't pay him back / because she bought /a delicious / ice cream cone / at the circus instead. Pts immediate response was 3/11 and delayed response: 3/11  Strategies that improved performance included:  Repetition, practical application, spaced retrieval and choice cues    Swallowing was screened- pt accepted trials of thin liquids by cup/straw sips and regular solid. She demonstrated prolonged mastication with adequate oral clearance. Pharyngeal swallow appeared intact characterized by timely swallow initiation and 0 overt s/s of aspiration with all PO intake.        Recommend pt receive direct speech therapy to target memory strategies and recall in functional tasks to improve overall safety. DATE ONSET: this admission     Date of Eval: 10/10/2020   Evaluating Therapist: SHAGGY Mcrae    Pain:  Pain Assessment  Pain Assessment: 0-10  Pain Level: 9  Patient's Stated Pain Goal: No pain  Pain Type: Acute pain  Pain Location: Mouth, Jaw  Pain Orientation: Right, Lower  Pain Descriptors: Constant, Aching  Pain Frequency: Continuous  Pain Onset: On-going  Clinical Progression: Gradually worsening  Functional Pain Assessment: Prevents or interferes some active activities and ADLs(States it's difficult to eat)    Assessment:  Cognitive Diagnosis: moderate cognitive deficits        Recommendations:  Requires SLP Intervention: Yes  Duration/Frequency of Treatment: 3xs weekly/ 2 weeks  D/C Recommendations: To be determined       Plan:   Goals:  Short-term Goals  Timeframe for Short-term Goals: 3xs weekly for 2 weeks  Goal 1: Pt will recall 3 items following a 3-5 min delay when provided with min cues  Goal 2: Pt will recall learned safety protocols during a functional task with 90% accuracy  Goal 3: Pt will complete medication and finance mgmt tasks for independence at home   Patient/family involved in developing goals and treatment plan:  Sarai Kirby     Subjective:    General  Chart Reviewed: Yes  Patient assessed for rehabilitation services?: Yes  Family / Caregiver Present: No  Subjective  Subjective: Pt was pleasant and cooprative throughout assessment     Vision  Vision: Impaired  Hearing  Hearing: Within functional limits           Objective:     Oral/Motor  Oral Motor: Exceptions to [facility]  Labial Symmetry: Abnormal symmetry right  Labial Strength: Reduced  Labial Coordination: Reduced  Lingual Coordination: Reduced              Expression  Primary Mode of Expression: Verbal    Verbal Expression  Verbal Expression: Within functional limits    Written Expression  Written Expression: Within Functional Limits    Motor Speech  Motor Speech: Exceptions to [facility]  Intelligibility: Mild    Pragmatics/Social Functioning  Pragmatics: Within functional limits    Cognition:      Orientation  Overall Orientation Status: Impaired  Orientation Level: Oriented X4  Attention  Attention: Exceptions to [facility]  Alternating Attention: Mild  Divided Attention: Mild  Memory  Memory: Exceptions to [facility]  Long-term Memory: Mild  Short-term Memory: Mild  Working Memory: Mild  Problem Solving  Problem Solving: Exceptions to [facility]  Complex Functional Tasks: Moderate  Managing Finances: To be assessed in therapy  Performing Discharge Planning: To be assessed in therapy  Simple Functional Tasks: Moderate  Verbal Reasoning Skills: Mild  Numeric Reasoning  Numeric Reasoning: Exceptions to [facility]   Calculations: Moderate  Money Management: Moderate  Time: Moderate  Abstract Reasoning  Abstract Reasoning: Exceptions to [facility]  Convergent Thinking: Mild  Safety/Judgement  Safety/Judgement: Exceptions to [facility]  Complex Functional Tasks: Mild  Insight: To be assessed in therapy    Additional Assessments:             Prognosis:  Speech Therapy Prognosis  Prognosis: Fair  Prognosis Considerations: Previous Level of Function  Individuals consulted  Consulted and agree with results and recommendations: Patient    Education:  Patient Education: results and recommendations  Patient Education Response: Verbalizes understanding;Needs reinforcement  Safety Devices in place: Yes  Type of devices: Bed alarm in place; Left in bed    Therapy Time:   Individual Concurrent Group Co-treatment   Time In 0850         Time Out 0920         Minutes [address] MS, CCC-SLP, 10/10/2020

## 2020-10-11 PROBLEM — I69.351 HEMIPARESIS OF RIGHT DOMINANT SIDE AS LATE EFFECT OF CEREBRAL INFARCTION (HCC): Status: ACTIVE | Noted: 2020-10-06

## 2020-10-11 LAB
GLUCOSE BLD-MCNC: 267 MG/DL (ref 70–99)
GLUCOSE BLD-MCNC: 273 MG/DL (ref 70–99)
GLUCOSE BLD-MCNC: 294 MG/DL (ref 70–99)
GLUCOSE BLD-MCNC: 296 MG/DL (ref 70–99)

## 2020-10-11 PROCEDURE — 82962 GLUCOSE BLOOD TEST: CPT

## 2020-10-11 PROCEDURE — 6360000002 HC RX W HCPCS: Performed by: INTERNAL MEDICINE

## 2020-10-11 PROCEDURE — 94150 VITAL CAPACITY TEST: CPT

## 2020-10-11 PROCEDURE — 6370000000 HC RX 637 (ALT 250 FOR IP): Performed by: INTERNAL MEDICINE

## 2020-10-11 PROCEDURE — 6370000000 HC RX 637 (ALT 250 FOR IP): Performed by: PHYSICAL MEDICINE & REHABILITATION

## 2020-10-11 PROCEDURE — 94761 N-INVAS EAR/PLS OXIMETRY MLT: CPT

## 2020-10-11 PROCEDURE — 1280000000 HC REHAB R&B

## 2020-10-11 RX ADMIN — OXYBUTYNIN CHLORIDE 5 MG: 5 TABLET ORAL at 13:47

## 2020-10-11 RX ADMIN — POTASSIUM CHLORIDE 20 MEQ: 20 TABLET, EXTENDED RELEASE ORAL at 08:25

## 2020-10-11 RX ADMIN — METOPROLOL TARTRATE 50 MG: 50 TABLET, FILM COATED ORAL at 08:25

## 2020-10-11 RX ADMIN — ACETAMINOPHEN 650 MG: 325 TABLET ORAL at 17:01

## 2020-10-11 RX ADMIN — INSULIN LISPRO 6 UNITS: 100 INJECTION, SOLUTION INTRAVENOUS; SUBCUTANEOUS at 08:25

## 2020-10-11 RX ADMIN — INSULIN LISPRO 6 UNITS: 100 INJECTION, SOLUTION INTRAVENOUS; SUBCUTANEOUS at 17:01

## 2020-10-11 RX ADMIN — ENOXAPARIN SODIUM 30 MG: 30 INJECTION SUBCUTANEOUS at 08:25

## 2020-10-11 RX ADMIN — METOPROLOL TARTRATE 50 MG: 50 TABLET, FILM COATED ORAL at 20:50

## 2020-10-11 RX ADMIN — POTASSIUM CHLORIDE 20 MEQ: 20 TABLET, EXTENDED RELEASE ORAL at 20:50

## 2020-10-11 RX ADMIN — PANTOPRAZOLE SODIUM 40 MG: 40 TABLET, DELAYED RELEASE ORAL at 05:19

## 2020-10-11 RX ADMIN — LISINOPRIL AND HYDROCHLOROTHIAZIDE 2 TABLET: 12.5; 1 TABLET ORAL at 08:25

## 2020-10-11 RX ADMIN — OXYBUTYNIN CHLORIDE 5 MG: 5 TABLET ORAL at 08:25

## 2020-10-11 RX ADMIN — CLOPIDOGREL BISULFATE 75 MG: 75 TABLET ORAL at 08:25

## 2020-10-11 RX ADMIN — OXYBUTYNIN CHLORIDE 5 MG: 5 TABLET ORAL at 20:50

## 2020-10-11 RX ADMIN — INSULIN LISPRO 6 UNITS: 100 INJECTION, SOLUTION INTRAVENOUS; SUBCUTANEOUS at 12:25

## 2020-10-11 RX ADMIN — ACETAMINOPHEN 650 MG: 325 TABLET ORAL at 12:30

## 2020-10-11 RX ADMIN — ATORVASTATIN CALCIUM 10 MG: 10 TABLET, FILM COATED ORAL at 20:50

## 2020-10-11 ASSESSMENT — PAIN SCALES - GENERAL
PAINLEVEL_OUTOF10: 0
PAINLEVEL_OUTOF10: 3
PAINLEVEL_OUTOF10: 3
PAINLEVEL_OUTOF10: 0

## 2020-10-11 ASSESSMENT — PAIN DESCRIPTION - PROGRESSION
CLINICAL_PROGRESSION: GRADUALLY WORSENING

## 2020-10-11 NOTE — PLAN OF CARE
Problem: Falls - Risk of:  Goal: Will remain free from falls  Description: Will remain free from falls  10/11/2020 0023 by Yousif Macias RN  Outcome: Ongoing  10/10/2020 1208 by Kaylene Clark LPN  Outcome: Ongoing  Goal: Absence of physical injury  Description: Absence of physical injury  10/11/2020 0023 by Yousif Macias RN  Outcome: Ongoing  10/10/2020 1208 by Kaylene Clark LPN  Outcome: Ongoing     Problem: Skin Integrity:  Goal: Will show no infection signs and symptoms  Description: Will show no infection signs and symptoms  10/11/2020 0023 by Yousif Macias RN  Outcome: Ongoing  10/10/2020 1208 by Kaylene Clark LPN  Outcome: Ongoing  Goal: Absence of new skin breakdown  Description: Absence of new skin breakdown  10/11/2020 0023 by Yousif Macias RN  Outcome: Ongoing  10/10/2020 1208 by Kaylene Clark LPN  Outcome: Ongoing     Problem: HEMODYNAMIC STATUS  Goal: Patient has stable vital signs and fluid balance  Outcome: Ongoing     Problem: ACTIVITY INTOLERANCE/IMPAIRED MOBILITY  Goal: Mobility/activity is maintained at optimum level for patient  Outcome: Ongoing     Problem: COMMUNICATION IMPAIRMENT  Goal: Ability to express needs and understand communication  Outcome: Ongoing     Problem: Infection:  Goal: Will remain free from infection  Description: Will remain free from infection  Outcome: Ongoing     Problem: Safety:  Goal: Free from accidental physical injury  Description: Free from accidental physical injury  Outcome: Ongoing  Goal: Free from intentional harm  Description: Free from intentional harm  Outcome: Ongoing     Problem: Daily Care:  Goal: Daily care needs are met  Description: Daily care needs are met  Outcome: Ongoing     Problem: Pain:  Goal: Patient's pain/discomfort is manageable  Description: Patient's pain/discomfort is manageable  Outcome: Ongoing     Problem: Skin Integrity:  Goal: Skin integrity will stabilize  Description: Skin integrity will stabilize  Outcome: Ongoing     Problem: Discharge Planning:  Goal: Patients continuum of care needs are met  Description: Patients continuum of care needs are met  Outcome: Ongoing

## 2020-10-11 NOTE — PATIENT CARE CONFERENCE
ACUTE REHAB TEAM CONFERENCE SUMMARY   [address] [facility]    NAME: Denisse Alonso  : 1950 ADMIT DATE: 10/8/2020    Rehab Admitting Dx:CVA (cerebral vascular accident) [facility]) [I63.9]  Acute CVA (cerebrovascular accident) ([facility] [address] [I63.9]  Patient Comorbid Conditions: Active Hospital Problems    Diagnosis Date Noted    Dysarthria due to acute stroke ([facility] [address] [I63.9, R47.1]     Dysphagia due to recent cerebral infarction [I69.391]     Uncontrolled type 2 diabetes mellitus with hyperglycemia ([facility] [address] [E11.65]     Morbid obesity with BMI of 45.0-49.9, adult (HCC) [E66.01, Z68.42]     Acute CVA (cerebrovascular accident) ([facility] [address] [I63.9] 10/08/2020    Hemiparesis of right dominant side as late effect of cerebral infarction [facility]) [I69.351] 10/06/2020    Uncontrolled hypertension [I10] 10/06/2020     Date: 10/13/2020    CASE MANAGEMENT  Current issues/needs regarding patient and family discharge status:   Patient plans d/c apartment with supportive significant other. Valarie MARTINEZ    PHYSICAL THERAPY   Short term goals  Time Frame for Short term goals: 10 tx days:  Short term goal 1: Pt will complete rolling L/R and sup<->sit Ind. SUP->SIT MOD I; ROLLING AND SIT->SUP SBA ON PT EVAL   Short term goal 2: Pt will complete OOB transfers using RW Mod Ind. SIT-STAND SBA WITH RW  Short term goal 3: Pt will ambulate 150 ft using RW with SBA-Sup. 80 FT, RW, CGA-SBA  Short term goal 4: Pt will ascend/descend 4-5 steps using bilat rails with SBA-Sup. 5, RAILS, MIN A ON PT EVAL   Short term goal 5: Pt will ascend/descend curb step and ambulate over uneven surfaces using RW with SBA-Sup. CURB, MIN A; CARPET CGA USING RW  Short term goal 6: Pt will complete object retrieval in standing with 1UE support using adaptive equipment prn Mod Ind. Impairments/deficits, barriers:     Body structures, Functions, Activity limitations: Decreased functional mobility , Decreased strength, Decreased endurance, Decreased vision/visual deficit, Decreased posture, Decreased coordination, Decreased balance, Decreased cognition     Prognosis: Good  Decision Making: High Complexity  Clinical Presentation: unstable/unpredictable characteristics  Equipment needed at discharge: has RW       PT IRF-KRISTYN scores and goals since initial assessment:   Roll Left and Right  Assistance Needed: Supervision or touching assistance  Comment: required SBA, no use of bed features  CARE Score: 4  Discharge Goal: Independent  Sit to Lying  Assistance Needed: Supervision or touching assistance  Comment: required SBA, no use of bed features  CARE Score: 4  Discharge Goal: Independent  Lying to Sitting on Side of Bed  Assistance Needed: Supervision or touching assistance  Comment: required SBA, no use of bed features  CARE Score: 4  Discharge Goal: Independent  Sit to Stand  Assistance Needed: Supervision or touching assistance  Comment: required CGA using RW  CARE Score: 4  Discharge Goal: Independent  Chair/Bed-to-Chair Transfer  Assistance Needed: Supervision or touching assistance  Comment: required CGA  CARE Score: 4  Discharge Goal: Independent  Toilet Transfer  Assistance Needed: Supervision or touching assistance  Comment: pt did on/off with supervision  CARE Score: 4  Discharge Goal: Independent  Car Transfer  Assistance Needed: Supervision or touching assistance  Comment: required CGA  CARE Score: 4  Discharge Goal: Independent  Walk 10 Feet? Walk 10 Feet?: Yes  1 Step  1 Step?: Yes  Picking Up Object  Assistance Needed: Partial/moderate assistance  Comment: pt required Min A with LUE support on RW (task completed without use of reacher)  CARE Score: 3  Discharge Goal: Independent  Wheelchair Ability  Uses a Wheelchair and/or Scooter?: No              1 Step (Curb)  Comment: pt declined to attempt due to increased fatigue and fear of falling; stated \"Can we just do this tomorrow. When I'm tired I don't do well. \"  Reason if not Attempted: Not attempted due to medical condition or safety concerns  CARE Score: 88  Discharge Goal: Supervision or touching assistance   4 Steps  Assistance Needed: Partial/moderate assistance  Comment: required Min A using bilat rails with step-to pattern (reported increased fatigue upon ascent/descent of 5 steps)  CARE Score: 3  Discharge Goal: Supervision or touching assistance   12 Steps  Reason if not Attempted: Not applicable  CARE Score: 9  Discharge Goal: Not Applicable    Fall Risk: []  Yes  []  No    OCCUPATIONAL THERAPY   Short term goals  Time Frame for Short term goals: STG=LTG :   Long term goals  Time Frame for Long term goals : 10-12 days or until d/c  Long term goal 1: Pt will increase BUE coordination/fine motor skills in order to complete feeding/grooming/oral care task c MOD I by d/c MOD I  Long term goal 2: Pt will complete total body bathing c MOD I by d/c SBA  Long term goal 3: Pt will complete UB dressing c MOD I and retrieve items from closet by d/c SETUP  Long term goal 4: Pt will complete LB dressing c MOD I and retrieve items from closet by d/c  SBA  Long term goal 5: Pt will don/doff footwear c MOD I by d/c SETUP  Long term goals 6: Pt will complete toileting c MOD I by d/c CGA  Long term goal 7: Pt will complete functional transfer (toilet, tub, shower) c MOD I by d/c. SBA  Long term goal 8: Pt will perform therex/therax to facilitate increased strength/ax tolerance (c emphasis on dynamic standing balance/tolerance >10 mins) c MOD I in order to complete ADLs. : ONGOING                                      OT IRF-KRISTYN scores and goals since initial assessment:    Eating  Assistance Needed: Setup or clean-up assistance  CARE Score: 5  Discharge Goal: Independent  Oral Hygiene  Assistance Needed: Partial/moderate assistance  Comment: Pt demo poor standing balance and required Min A to maintain upright posture.   CARE Score: 3  Discharge Goal: Independent  Toileting Hygiene  Assistance Needed: Supervision or touching assistance  Comment: pt did own hygiene  CARE Score: 4  Discharge Goal: Independent  Shower/Bathe Self  Assistance Needed: Partial/moderate assistance  CARE Score: 3  Discharge Goal: Independent  Upper Body Dressing  Assistance Needed: Setup or clean-up assistance  CARE Score: 5  Discharge Goal: Independent  Lower Body Dressing  Assistance Needed: Partial/moderate assistance  Comment: Min A to maintain upright posture. CARE Score: 3  Discharge Goal: Independent  Putting On/Taking Off Footwear  Assistance Needed: Setup or clean-up assistance  CARE Score: 5  Discharge Goal: Independent      Impairments/deficits, barriers:  Fatigue, L visual field cut  Assessment  Performance deficits / Impairments: Decreased functional mobility , Decreased safe awareness, Decreased balance, Decreased high-level IADLs, Decreased ADL status, Decreased strength, Decreased cognition, Decreased vision/visual deficit, Decreased fine motor control, Decreased coordination  Decision Making: Medium Complexity  REQUIRES OT FOLLOW UP: Yes  Equipment needed at discharge:TTB      COGNITIVE FUNCTION/SPEECH THERAPY (AS INDICATED)  LTG                         Short-term Goals  Timeframe for Short-term Goals: 3xs weekly for 2 weeks  Goal 1: Pt will recall 3 items following a 3-5 min delay when provided with min cues Partially met, continue with ongoing cues needed. Goal 2: Pt will recall learned safety protocols during a functional task with 90% accuracy Partially met,continue with ongoing cues needed. Needs structure. Goal 3: Pt will complete medication and finance mgmt tasks for independence at home  Boyfriend manages finances. Pt manages her own meds and would benefit from medication assist due to vision. Unable to see print. Pt with cognitive memory deficits. BCAT score for Sarai Kirby: 32/50 indicating moderate cognitive impairment/ mild dementia.                       Ongoing impairments or deficits or barriers: mod cog impairments/mild dementia  Areas where progress has been made:   Strategies that improve performance:     Nursing Current Medical Status:   [x] Is continent of bowel and bladder     [] Is incontinent of bowel and bladder    [x] Has had an adequate number of bowel movements   [x] Urinates with no urinary retention >300ml in bladder   [] Targeting bladder protocol with mckoy removal   [x] Maintaining O2 SATs at 92% or greater   [x] Has pain managed while on ARU         [x] Has had no skin breakdown while on ARU   [] Has improved skin integrity via wound measurements   [] Has no signs/symptoms of infection at the wound site   [] Pressure wounds Stage/Location:    [] Arrived on unit with pressure wound  [x] Has been free from injury during hospitalization   [] Has experienced a fall during hospitalization  [x] Ongoing education with patient/family with understanding demonstrated for:  [] Receives IV Fluids  [] Other:        NUTRITION  Weight: 239 lb (108.4 kg) / Body mass index is 43.71 kg/m². Current diet: DIET GENERAL; Carb Control: 4 carb choices (60 gms)/meal; Low Sodium (2 GM)  Intake: % at meals, usually content      Medical improvements/barriers:  Diabetes fluctuating    Team goals for next treatment period/Intervention for current barriers:   [x] Pt will increase activity tolerance for daily tasks. [] Pt will improve bed mobility with reduced assist.  [x] Pt will improve safety in fx tasks with reduced cues/assist  [x] Pt will improve transfers with reduced assist  [x] Pt will improve toileting with reduced assist  [x] Pt will improve ADL's with use of adaptive equipment with reduced assist  [] Pt will improve pain mgmt for maximum participation in tx program  [] Pt will improve communication to get basic needs met on unit  [] Pt will improve swallowing for safe diet advancement with use of strategies  []  Plan for discharge to home.      Patient Strengths: Motivated, Cooperative and Pleasant    Justification for Continued Stay  Based on my medical assessment of the patient and review of information from the interdisciplinary team as part of this weekly team conference, the patient continues to meet the following criteria for IRF level of care:   The patient requires active and ongoing intervention of multiple therapy disciplines   The patient requires and intensive rehabilitation therapy program   The patient requires continued physician supervision by a rehabilitation physician   The patient requires 24 hours rehab nursing care   The patient requires an intensive and coordinated interdisciplinary team approach to the delivery of rehabilitative care. Assessment/Plan   [x]  The patient is making good progression towards their long term goals and is actively participating in and has a reasonable expectation to continue to benefit from the intensive rehabilitation therapy program   []  The estimated discharge date has been changed from initial team conference due to:   []  The estimated discharge destination has been changed from initial team conference due to:         Ongoing tx following discharge: [x]HHC OT PT  NSG  []OUTPATIENT     [] No Further Treatment     [] Family/Caregiver Training  []  Transitional Living Arrangement   [] Home Assessment (date  )     [] Family Conference   []  Therapeutic Pass       []  Other: (specify)    Estimated Discharge Date: 10/21/2020    Estimated Discharge Destination: []home alone   []home alone with assist prn  []Continuous supervision [x]Return home with spouse/family   [] Assisted living  []SNF     Team members participating in today's conference.     [x] Miles Cortez, Medical Director  [x] Blesisng Loera,   [x] Sierra Mahan, Nurse Mgr     [x]  Liz Torres, PT  []  Alayna Ernst, PT   [x] Negro Estrella, OT  [] Rachel Goldstein, OT   [x] Magdalena Mejia, SLP     [x]  Soledad Rodriguez, KATELYN   [] Lesley العراقي,

## 2020-10-12 LAB
GLUCOSE BLD-MCNC: 245 MG/DL (ref 70–99)
GLUCOSE BLD-MCNC: 303 MG/DL (ref 70–99)
GLUCOSE BLD-MCNC: 337 MG/DL (ref 70–99)
GLUCOSE BLD-MCNC: 387 MG/DL (ref 70–99)

## 2020-10-12 PROCEDURE — 1280000000 HC REHAB R&B

## 2020-10-12 PROCEDURE — 6370000000 HC RX 637 (ALT 250 FOR IP): Performed by: INTERNAL MEDICINE

## 2020-10-12 PROCEDURE — 6370000000 HC RX 637 (ALT 250 FOR IP): Performed by: PHYSICAL MEDICINE & REHABILITATION

## 2020-10-12 PROCEDURE — 94150 VITAL CAPACITY TEST: CPT

## 2020-10-12 PROCEDURE — 97530 THERAPEUTIC ACTIVITIES: CPT

## 2020-10-12 PROCEDURE — 94761 N-INVAS EAR/PLS OXIMETRY MLT: CPT

## 2020-10-12 PROCEDURE — 97535 SELF CARE MNGMENT TRAINING: CPT

## 2020-10-12 PROCEDURE — 82962 GLUCOSE BLOOD TEST: CPT

## 2020-10-12 PROCEDURE — 97110 THERAPEUTIC EXERCISES: CPT

## 2020-10-12 PROCEDURE — 99232 SBSQ HOSP IP/OBS MODERATE 35: CPT | Performed by: PHYSICAL MEDICINE & REHABILITATION

## 2020-10-12 PROCEDURE — 97116 GAIT TRAINING THERAPY: CPT

## 2020-10-12 PROCEDURE — 6360000002 HC RX W HCPCS: Performed by: INTERNAL MEDICINE

## 2020-10-12 RX ORDER — INSULIN GLARGINE 100 [IU]/ML
10 INJECTION, SOLUTION SUBCUTANEOUS NIGHTLY
Status: DISCONTINUED | OUTPATIENT
Start: 2020-10-12 | End: 2020-10-13

## 2020-10-12 RX ADMIN — ENOXAPARIN SODIUM 30 MG: 30 INJECTION SUBCUTANEOUS at 08:44

## 2020-10-12 RX ADMIN — PANTOPRAZOLE SODIUM 40 MG: 40 TABLET, DELAYED RELEASE ORAL at 05:18

## 2020-10-12 RX ADMIN — POTASSIUM CHLORIDE 20 MEQ: 20 TABLET, EXTENDED RELEASE ORAL at 08:44

## 2020-10-12 RX ADMIN — ATORVASTATIN CALCIUM 10 MG: 10 TABLET, FILM COATED ORAL at 20:33

## 2020-10-12 RX ADMIN — INSULIN LISPRO 10 UNITS: 100 INJECTION, SOLUTION INTRAVENOUS; SUBCUTANEOUS at 17:35

## 2020-10-12 RX ADMIN — INSULIN LISPRO 4 UNITS: 100 INJECTION, SOLUTION INTRAVENOUS; SUBCUTANEOUS at 20:29

## 2020-10-12 RX ADMIN — OXYBUTYNIN CHLORIDE 5 MG: 5 TABLET ORAL at 08:44

## 2020-10-12 RX ADMIN — POTASSIUM CHLORIDE 20 MEQ: 20 TABLET, EXTENDED RELEASE ORAL at 20:34

## 2020-10-12 RX ADMIN — CLOPIDOGREL BISULFATE 75 MG: 75 TABLET ORAL at 08:44

## 2020-10-12 RX ADMIN — METOPROLOL TARTRATE 50 MG: 50 TABLET, FILM COATED ORAL at 20:38

## 2020-10-12 RX ADMIN — INSULIN LISPRO 8 UNITS: 100 INJECTION, SOLUTION INTRAVENOUS; SUBCUTANEOUS at 12:12

## 2020-10-12 RX ADMIN — LISINOPRIL AND HYDROCHLOROTHIAZIDE 2 TABLET: 12.5; 1 TABLET ORAL at 08:45

## 2020-10-12 RX ADMIN — INSULIN LISPRO 4 UNITS: 100 INJECTION, SOLUTION INTRAVENOUS; SUBCUTANEOUS at 08:46

## 2020-10-12 RX ADMIN — METOPROLOL TARTRATE 50 MG: 50 TABLET, FILM COATED ORAL at 08:45

## 2020-10-12 RX ADMIN — OXYBUTYNIN CHLORIDE 5 MG: 5 TABLET ORAL at 20:33

## 2020-10-12 RX ADMIN — OXYBUTYNIN CHLORIDE 5 MG: 5 TABLET ORAL at 15:48

## 2020-10-12 RX ADMIN — INSULIN GLARGINE 10 UNITS: 100 INJECTION, SOLUTION SUBCUTANEOUS at 20:29

## 2020-10-12 ASSESSMENT — PAIN SCALES - GENERAL
PAINLEVEL_OUTOF10: 0
PAINLEVEL_OUTOF10: 0

## 2020-10-12 NOTE — PROGRESS NOTES
Physical Therapy    [x] daily progress note       [] discharge       Patient Name:  Pablito Berrios   :  1950 MRN: [id number]  Room:  [address] [place] [address] Date of Admission: 10/8/2020  Rehabilitation Diagnosis:   CVA (cerebral vascular accident) [facility]) [I63.9]  Acute CVA (cerebrovascular accident) ([facility] [address] [I63.9]       Date 10/12/2020       Day of ARU Week:  5   Time IN/OUT 1525/1630   Individual Tx Minutes 72   Group Tx Minutes    Co-Treat Minutes    Concurrent Tx Minutes    TOTAL Tx Time Mins 65   Variance Time +5   Variance Time []   Refusal due to:     []   Medical hold/reason:    []   Illness   []   Off Unit for test/procedure  [x]   Extra time needed to complete task  []   Therapeutic need  []   Other (specify):   Restrictions Restrictions/Precautions  Restrictions/Precautions: General Precautions, Fall Risk(R eye deficit (double vision/decreased ability to focus), mild RLE weakness)      Interdisciplinary communication [x]   Cleared for therapy per nursing     []   RN notified about issues during session  []   RN updated on pt performance  []   Spoke with   []   Spoke with OT  []   Spoke with MD  []   Other:    Subjective observations and cognitive status: Pt seated in recliner, talking to her sister on the phone and required redirection to fully participate and focus in PT activities.      Pain level/location: None reported at rest and with activities    Discharge recommendations  Anticipated discharge date:  TBD  Destination: []home alone   []home alone with assist PRN     [x] home w/ family      [] Continuous supervision  []SNF    [] Assisted living     [] Other:  Continued therapy: [x]HHC PT  []OUTPATIENT  PT   [] No Further PT  []SNF PT  Caregiver training recommended: [x]Yes  [] No   Equipment needs: has RW      Bed Mobility:           [x]   Pt received out of bed     Transfers:    Sit--> Stand:  CGA (initiates self-correction on proper hand placement to effectively shift weight forward)   Stand --> Sit:   CGA (inconsistent hand placement requiring verbal prompts)  Toilet Transfer (if applicable): CGA using grab bars    Assistive device required for transfer:   RW    Gait:    Distance:  15 ft with 2 turns + 10 ft with turns x 2 trials (all submax)  Assistance:  Min A  Device:  RW  Gait Quality:  Step-to, forward bent posture with decreased forward gaze, slow tello   Pt tends to manage RW like a SW requiring VCs on AD management for energy conservation. Curb   Height:  4\" x 1 trial on ascent/descent   Assistance:  Min A  Device:  RW  Pt unable to attempt more trials due to dizziness     Uneven Surfaces:       Assistance:   CGA  Device:    RW  Surfaces Completed:   []  Green mat with bean bags beneath      []  Throw rugs       []  Ramp       []  Outdoor pavements        []  Grass             []  Loose gravel        [x]  Other:  carpet (22 ft total distance)       Additional Therapeutic activities/exercises completed this date:     []   Nu-step:  Time:        Level:         #Steps:       []   Rebounder:    []  Seated     []  Standing        [x]   Balance training: hand washing activity completed in unsupported stance with CGA         []   Postural training    []   Supine ther ex (reps/sets):     [x]   Seated ther ex (reps/sets): resisted R PF, DF, knee flexion with blue t-band x 20 reps, LAQ x 5 sec hold and R hip flexion with 3# ankle weight x 10 reps x 2 sets     []   Standing ther ex (reps/sets):     []   Picking up object from floor (standing):                   []   Reacher used   [x]   Other:  Toileting activity completed with CGA    []   Other:    Comments:      Patient/Caregiver Education and Training:   []   Role of PT  []   Education about Dx  []   Use of call light for assist   []   HEP provided and explained   [x]   Treatment plan reviewed  []   Home safety  []   Wheelchair mobility/management   []   Body mechanics  []   Bed Mobility/Transfer technique  []   Gait technique/sequencing  [] Proper use of assistive device/adaptive equipment  [x]   Advanced mobility safety and technique  []   Reinforced patient's precautions/mobility while maintaining precautions  []   Postural awareness  []   Family/caregiver training  []   Progress was updated and reviewed in Rehabtracker with patient and/or family this date. []   Other:    Treatment Plan for Next Session: increase ambulation distance, advanced gait training       Assessment: Pt declined gait training in the hallway following completion of advanced gait training due to increased fatigue. She continues to manage the RW like a SW during gait training over level surface that it is uncertain if this is a compensatory strategy she is performing for her vision deficit versus habit. Pt required sequential and visual cues to safely complete advanced gait training today.      Treatment/Activity Tolerance:   [] Tolerated treatment with no adverse effects    [x] Patient limited by fatigue  [] Patient limited by pain   [] Patient limited by medical complications:    [] Adverse reaction to Tx:   [] Significant change in status    Barrier/s to progress/learning:   []   None  []   Cognition  []   Hearing deficit  []   Pre-morbid mental/psychological status   []   Motivation   []   Communication  []   Anxiety  []   Vision deficit  []   Attention  [x]   Other: intermittent dizziness, easily distracted       Safety:       []  bed alarm set    [x]  chair alarm set    []  Pt refused alarms                []  Telesitter activated      [x]  Gait belt used during tx session      []other:         Number of Minutes/Billable Intervention  Gait Training 25   Therapeutic Exercise 20   Neuro Re-Ed    Therapeutic Activity 20   Wheelchair Propulsion    Group    Other:    TOTAL 65         Social History  Social/Functional History  Lives With: Significant other(Duane)  Type of Home: Apartment  Home Layout: (3rd floor c elevator)  Home Access: Level entry  Bathroom Shower/Tub: Tub/Shower unit  Bathroom Toilet: Handicap height  Bathroom Equipment: Grab bars in shower, Grab bars around toilet  Bathroom Accessibility: Walker accessible  Home Equipment: Rolling walker  ADL Assistance: Independent  Homemaking Assistance: Independent  Homemaking Responsibilities: Yes  Meal Prep Responsibility: No  Laundry Responsibility: No  Cleaning Responsibility: No  Bill Paying/Finance Responsibility: Primary(Pt shares responsibility c significant other)  Shopping Responsibility: No  Dependent Care Responsibility: Secondary(Pt has cat)  Health Care Management: Primary  Ambulation Assistance: Independent(Mod Ind using RW (household distances usually at baseline))  Transfer Assistance: Independent  Active : No  Patient's  Info: Pt's significant other Duane  Mode of Transportation: Car  Occupation: On disability  Leisure & Hobbies: Pt enjoys staying home to watch tv  Additional Comments: Pt reports, falling 5x times or more this year with some requiring hospitalization but no significant injuries; sleeps in regular, flat bed; R hand dominant    Objective                                                                                    Goals:  (Update in navigator)  Short term goals  Time Frame for Short term goals: 10 tx days:  Short term goal 1: Pt will complete rolling L/R and sup<->sit Ind. Short term goal 2: Pt will complete OOB transfers using RW Mod Ind. Short term goal 3: Pt will ambulate 150 ft using RW with SBA-Sup. Short term goal 4: Pt will ascend/descend 4-5 steps using bilat rails with SBA-Sup. Short term goal 5: Pt will ascend/descend curb step and ambulate over uneven surfaces using RW with SBA-Sup.   Short term goal 6: Pt will complete object retrieval in standing with 1UE support using adaptive equipment prn Mod Ind.:   :        Plan of Care                                                                              Times per week: 5 days per week for a minimum of 60 minutes/day plus group as appropriate for 60 minutes.   Treatment to include Current Treatment Recommendations: Strengthening, Functional Mobility Training, Home Exercise Program, Equipment Evaluation, Education, & procurement, Transfer Training, Gait Training, Safety Education & Training, Balance Training, Endurance Training, Stair training, Patient/Caregiver Education & Training, Cognitive/Perceptual Training    Electronically signed by   Carlos Eduardo Almonte, PT   10/12/2020, 3:22 PM

## 2020-10-12 NOTE — PROGRESS NOTES
Occupational Therapy  Physical Rehabilitation: OCCUPATIONAL THERAPY     [x] daily progress note       [] discharge       Patient Name:  Judith Pelayo   :  1950 MRN: [id number]  Room:  [address] [place] [address] Date of Admission: 10/8/2020  Rehabilitation Diagnosis:   CVA (cerebral vascular accident) [facility]) [I63.9]  Acute CVA (cerebrovascular accident) ([facility] [address] [I63.9]       Date 10/12/2020       Day of ARU Week:  5   Time IN/OUT 5350-1730+[id number]   Individual Tx Minutes 60+55   Group Tx Minutes    Co-Treat Minutes    Concurrent Tx Minutes    TOTAL Tx Time Mins 115   Variance Time -5   Variance Time [x]   Refusal due to: fatigue    []   Medical hold/reason:    []   Illness   []   Off Unit for test/procedure  []   Extra time needed to complete task  []   Therapeutic need  []   Other (specify):   Restrictions Restrictions/Precautions  Restrictions/Precautions: General Precautions, Fall Risk(R eye deficit (double vision/decreased ability to focus), mild RLE weakness)      Communication with other providers: [x]   OK to see per nursing:     []   Spoke with team member regarding:      Subjective observations and cognitive status: Pt in supine position and sleeping upon arrival. Pt awoken and agreeable to OT session. Pt reports, \"My man still hasn't come and visited me yet. I don't know why. \" Pt requested to perform ADL session this morning and OTR prepared required items for bathing at the sink.     Pain level/location:    /10       Location:    Discharge recommendations  Anticipated discharge date:  TBD  Destination: []home alone   []home alone w assist prn [x] home w/ family    [] Continuous supervision       []SNF            [] Assisted living     [] Other:   Continued therapy: [x]HHC OT  []OUTPATIENT  OT   [] No Further OT  Equipment needs: Judith Pelayo requires the assistance of a tub transfer bench to successfully and safely complete bathing activities necessary due to reduced balance, endurance, need for ADL assist, and LE weakness and reduced ROM. These tasks cannot be safely completed without this device and would place Sarai Kirby at greater risk of falls. (HIT F2 to transition between stars)    Eating/Feeding:     MOD I per pt report. Extremity Used:        [x]    R UE []    L UE         Dentures: []   N/A    []    Partial []    Full  Adaptive Equipment Used:         Grooming:   MOD I  Oral Hygiene:  MOD I       Bathing:   Pt completed sink-side washing c SBA    Dressing:      Upper Body Dressing:  setup  Lower Body Dressing:  SBA  Footwear: setup    Toileting:  [facility]       Toilet Transfers:   CGA  Device Used:    [x]   Standard Toilet         [x]   Grab Bars           []  Bedside Commode       []   Elevated Toilet          []   Other:        Bed Mobility:           [x]   Pt received out of bed   Supine --> Sit:  SBA  Sit --> Supine:  SBA    Transfers:    Sit--> Stand:  SBA  Stand --> Sit:   CGA  Stand-Pivot:   CGA  Other:    Assistive device required for transfer:   FWW      Functional Mobility:    Assistance:  Pt ambulated to/from bedroom to bathroom. Device:   [x]   Rolling Walker     []   Standard Radha Bile []   Wheelchair        []   [employer]       []   4-Wheeled Radha Bile         []   Cardiac Radha Bile       []   Other:        Additional Therapeutic activities/exercises completed this date:     [x]   ADL Training   []   Balance/Postural training     []   Bed/Transfer Training   []   Endurance Training   []   Neuromuscular Re-ed   []   Nu-step:  Time:        Level:         #Steps:       []   Rebounder:    []  Seated     []  Standing        []   Supine Ther Ex (reps/sets):     [x]   Seated Ther Ex (reps/sets):  Pt completed card matching/identification to increase visual scanning and problem solving skills. Pt completed task seated in w/c with modifications on card activity to decrease dizziness and overstimulation of the eyes.    [x]   Standing Ther Ex (reps/sets):  P.M. session: pt tolerated 1.18 min, 1.04 min, and .53 seconds of dynamic standing in order to complete card matching/sorting ax to increase visual scanning to L side. Pt required visual cues/anchoring to assist with visual scanning in order to fully scan board and attend to L side. Pt demo poor occular motor skills in order to scan to L environment. Pt requires compensation strategy of turning head to scan environment. [x]   Other:      Comments:      Patient/Caregiver Education and Training:   []   [employer] Equipment Use  []   Bed Mobility/Transfer Technique/Safety  []   Energy Conservation Tips  []   Family training  []   Postural Awareness  []   Safety During Functional Activities  []   Reinforced Patient's Precautions   []   Progress was updated and reviewed in Rehabtracker with patient and/or family this         date. Treatment Plan for Next Session: Increase dynamic standing balance. Assessment:  Pt is progressing well towards goals. Pt reports improved vision and no longer c/o diplopia however continues to presents c L visual field cut. Pt requires compensation strategies to fully scan environment. Pt continues to demo increased motivation for independence which shows good potential to continue therapy sessions. P.M. session: Pt demo decreased activity tolerance during dynamic standing activity requiring immediate rest breaks. Pt c/o dizziness when visually scanning card sorting board this session, and continues to demo poor visual scanning to L side.       Treatment/Activity Tolerance:   [x] Tolerated treatment with no adverse effects    [] Patient limited by fatigue  [] Patient limited by pain   [] Patient limited by medical complications:    [] Adverse reaction to Tx:   [] Significant change in status    Safety:       []  bed alarm set    [x]  chair alarm set    []  Pt refused alarms                []  Telesitter activated      [x]  Gait belt used during tx session      []other:       Number of Minutes/Billable Intervention  Therapeutic Exercise ADL Self-care 60   Neuro Re-Ed    Therapeutic Activity 55   Group    Other:    TOTAL 115       Social History  Social/Functional History  Lives With: Significant other(Duane)  Type of Home: Apartment  Home Layout: (3rd floor c elevator)  Home Access: Level entry  Bathroom Shower/Tub: Tub/Shower unit  Bathroom Toilet: Handicap height  Bathroom Equipment: Grab bars in shower, Grab bars around toilet  Bathroom Accessibility: Walker accessible  Home Equipment: Rolling walker  ADL Assistance: Independent  Homemaking Assistance: Independent  Homemaking Responsibilities: Yes  Meal Prep Responsibility: No  Laundry Responsibility: No  Cleaning Responsibility: No  Bill Paying/Finance Responsibility: Primary(Pt shares responsibility c significant other)  Shopping Responsibility: No  Dependent Care Responsibility: Secondary(Pt has cat)  Health Care Management: Primary  Ambulation Assistance: Independent(Mod Ind using RW (household distances usually at baseline))  Transfer Assistance: Independent  Active : No  Patient's  Info: Pt's significant other Duane  Mode of Transportation: Car  Occupation: On disability  Leisure & Hobbies: Pt enjoys staying home to watch tv  Additional Comments: Pt reports, falling 5x times or more this year with some requiring hospitalization but no significant injuries; sleeps in regular, flat bed; R hand dominant    Objective                                                                                    Goals:  (Update in navigator)  Short term goals  Time Frame for Short term goals: STG=LTG:  Long term goals  Time Frame for Long term goals : 10-12 days or until d/c  Long term goal 1: Pt will increase BUE coordination/fine motor skills in order to complete feeding/grooming/oral care task c MOD I by d/c  Long term goal 2: Pt will complete total body bathing c MOD I by d/c  Long term goal 3: Pt will complete UB dressing c MOD I and retrieve items from closet by d/c  Long term goal 4: Pt

## 2020-10-13 LAB
GLUCOSE BLD-MCNC: 234 MG/DL (ref 70–99)
GLUCOSE BLD-MCNC: 251 MG/DL (ref 70–99)
GLUCOSE BLD-MCNC: 254 MG/DL (ref 70–99)
GLUCOSE BLD-MCNC: 351 MG/DL (ref 70–99)

## 2020-10-13 PROCEDURE — 97530 THERAPEUTIC ACTIVITIES: CPT

## 2020-10-13 PROCEDURE — 97110 THERAPEUTIC EXERCISES: CPT

## 2020-10-13 PROCEDURE — 6360000002 HC RX W HCPCS: Performed by: INTERNAL MEDICINE

## 2020-10-13 PROCEDURE — 6370000000 HC RX 637 (ALT 250 FOR IP): Performed by: INTERNAL MEDICINE

## 2020-10-13 PROCEDURE — 97130 THER IVNTJ EA ADDL 15 MIN: CPT

## 2020-10-13 PROCEDURE — 82962 GLUCOSE BLOOD TEST: CPT

## 2020-10-13 PROCEDURE — 97116 GAIT TRAINING THERAPY: CPT

## 2020-10-13 PROCEDURE — 97129 THER IVNTJ 1ST 15 MIN: CPT

## 2020-10-13 PROCEDURE — 94761 N-INVAS EAR/PLS OXIMETRY MLT: CPT

## 2020-10-13 PROCEDURE — 99233 SBSQ HOSP IP/OBS HIGH 50: CPT | Performed by: PHYSICAL MEDICINE & REHABILITATION

## 2020-10-13 PROCEDURE — 6370000000 HC RX 637 (ALT 250 FOR IP): Performed by: PHYSICAL MEDICINE & REHABILITATION

## 2020-10-13 PROCEDURE — 94150 VITAL CAPACITY TEST: CPT

## 2020-10-13 PROCEDURE — 1280000000 HC REHAB R&B

## 2020-10-13 RX ORDER — INSULIN GLARGINE 100 [IU]/ML
20 INJECTION, SOLUTION SUBCUTANEOUS NIGHTLY
Status: DISCONTINUED | OUTPATIENT
Start: 2020-10-13 | End: 2020-10-14

## 2020-10-13 RX ADMIN — OXYBUTYNIN CHLORIDE 5 MG: 5 TABLET ORAL at 17:56

## 2020-10-13 RX ADMIN — CLOPIDOGREL BISULFATE 75 MG: 75 TABLET ORAL at 09:26

## 2020-10-13 RX ADMIN — ATORVASTATIN CALCIUM 10 MG: 10 TABLET, FILM COATED ORAL at 22:05

## 2020-10-13 RX ADMIN — INSULIN LISPRO 6 UNITS: 100 INJECTION, SOLUTION INTRAVENOUS; SUBCUTANEOUS at 13:15

## 2020-10-13 RX ADMIN — INSULIN LISPRO 6 UNITS: 100 INJECTION, SOLUTION INTRAVENOUS; SUBCUTANEOUS at 09:26

## 2020-10-13 RX ADMIN — POTASSIUM CHLORIDE 20 MEQ: 20 TABLET, EXTENDED RELEASE ORAL at 09:26

## 2020-10-13 RX ADMIN — PANTOPRAZOLE SODIUM 40 MG: 40 TABLET, DELAYED RELEASE ORAL at 05:45

## 2020-10-13 RX ADMIN — METOPROLOL TARTRATE 50 MG: 50 TABLET, FILM COATED ORAL at 22:05

## 2020-10-13 RX ADMIN — POTASSIUM CHLORIDE 20 MEQ: 20 TABLET, EXTENDED RELEASE ORAL at 22:05

## 2020-10-13 RX ADMIN — INSULIN GLARGINE 20 UNITS: 100 INJECTION, SOLUTION SUBCUTANEOUS at 22:10

## 2020-10-13 RX ADMIN — OXYBUTYNIN CHLORIDE 5 MG: 5 TABLET ORAL at 09:26

## 2020-10-13 RX ADMIN — INSULIN LISPRO 4 UNITS: 100 INJECTION, SOLUTION INTRAVENOUS; SUBCUTANEOUS at 22:09

## 2020-10-13 RX ADMIN — INSULIN LISPRO 6 UNITS: 100 INJECTION, SOLUTION INTRAVENOUS; SUBCUTANEOUS at 17:57

## 2020-10-13 RX ADMIN — LISINOPRIL AND HYDROCHLOROTHIAZIDE 2 TABLET: 12.5; 1 TABLET ORAL at 09:27

## 2020-10-13 RX ADMIN — METOPROLOL TARTRATE 50 MG: 50 TABLET, FILM COATED ORAL at 09:26

## 2020-10-13 RX ADMIN — OXYBUTYNIN CHLORIDE 5 MG: 5 TABLET ORAL at 22:05

## 2020-10-13 RX ADMIN — ENOXAPARIN SODIUM 30 MG: 30 INJECTION SUBCUTANEOUS at 09:27

## 2020-10-13 ASSESSMENT — PAIN SCALES - GENERAL: PAINLEVEL_OUTOF10: 0

## 2020-10-13 NOTE — FLOWSHEET NOTE
[x] daily progress note       [] discharge       Patient Name:  Leslie Chauhan   :  1950 MRN: [id number]  Room:  [address] [place], [place] [address] Date of Admission: 10/8/2020  Rehabilitation Diagnosis:   CVA (cerebral vascular accident) [facility]) [I63.9]  Acute CVA (cerebrovascular accident) ([facility] [address] [I63.9]       Date 10/13/2020       Day of ARU Week:  6   Time IN/-940   Individual Tx Minutes 60   TOTAL Tx Time Mins 60   Restrictions Restrictions/Precautions  Restrictions/Precautions: General Precautions, Fall Risk(R eye deficit (double vision/decreased ability to focus), mild RLE weakness)      Communication with other providers: [x]   OK to see per nursing:     [x]   Spoke with RN Alicia Police) that patient reports having a canker sore. Subjective observations and cognitive status: Pt seen in semi-ortiz's position in bed at beginning of treatment. Agreeable to therapy. Pt requested to wash up first prior to leaving the room. Pt did require redirection at times to stay on task. Pain level/location:   unrated (canker sore)         Discharge recommendations  Anticipated discharge date:  TBD  Destination: []?home alone   []?home alone with assist PRN     [x]? home w/ family      []? Continuous supervision  []? SNF    []? Assisted living     []? Other:  Continued therapy: [x]? [facility]  []? OUTPATIENT  PT   []? No Further PT  []? SNF PT  Caregiver training recommended: [x]? Yes  []? No   Equipment needs: has RW      Bed Mobility:             Scooting: Mod I with bed rail   Lying --> Sit:  Mod I with bed rail     Transfers:    Sit--> Stand:  SBA  Stand --> Sit:  SBA  Stand pivot transfers: SBA   Toilet Transfer: SBA   Assistive device required for transfer:  RW  Pt requires max vcs to lock w/c and for proper hand placement. Gait:    Distance:  15'+10'+80'   Assistance:  CGA-SBA  Device:  RW  Gait Quality:  Reciprocal pattern; pt kept RW on ground the entire time; however, required max vcs to stay closer to walker.  Pt presented with unsteadiness when not close to walker, but had no LOB. Additional Therapeutic activities/exercises completed this date:     []   Nu-step:  Time:        Level:         #Steps:       []   Rebounder:    []  Seated     []  Standing        [x]   Balance training: pt stood at sink and performed cleaning of chapin-area, buttocks, and BLEs with SBA for balance. Pt stood and pulled pants up with SBA for balance. []   Postural training    []   Supine ther ex (reps/sets):     [x]   Seated ther ex (reps/sets): LAQs x 20 reps each LE for strengthening. []   Standing ther ex (reps/sets):     []   Picking up object from floor (standing):                   []   Reacher used   [x]   Other: Trunk strengthening: pt performed dynamic reaching while performing seated ADLs (Bathing and dressing). []   Other:    Patient/Caregiver Education and Training:   [x]   Bed Mobility/Transfer technique/safety  [x]   Gait technique/sequencing  [x]   Proper use of assistive device  []   Advanced mobility safety and technique  []   Reinforced patient's precautions/mobility while maintaining precautions  []   Postural awareness  []   Family training  [x]   Progress was updated in Rehabtracker this date. Treatment Plan for Next Session: gait; transfers; advanced gait; stair training; exercises     Assessment:    Treatment/Activity Tolerance:   [x] Tolerated treatment with no adverse effects    [] Patient limited by fatigue  [] Patient limited by pain   [] Patient limited by medical complications:    [] Adverse reaction to Tx:   [] Significant change in status    Safety:       []  bed alarm set    [x]  chair alarm set    []  Pt refused alarms                []  Telesitter activated      [x]  Gait belt used during tx session      [x]other: pt left up in recliner with call light at end of treatment.           Number of Minutes/Billable Intervention  Gait Training 15   Therapeutic Exercise    Neuro Re-Ed    Therapeutic Activity 45 Wheelchair Propulsion    Group    Other:    TOTAL 60         Social History  Social/Functional History  Lives With: Significant other(Duane)  Type of Home: Apartment  Home Layout: (3rd floor c elevator)  Home Access: Level entry  Bathroom Shower/Tub: Tub/Shower unit  Bathroom Toilet: Handicap height  Bathroom Equipment: Grab bars in shower, Grab bars around toilet  Bathroom Accessibility: Walker accessible  Home Equipment: Rolling walker  ADL Assistance: Independent  Homemaking Assistance: Independent  Homemaking Responsibilities: Yes  Meal Prep Responsibility: No  Laundry Responsibility: No  Cleaning Responsibility: No  Bill Paying/Finance Responsibility: Primary(Pt shares responsibility c significant other)  Shopping Responsibility: No  Dependent Care Responsibility: Secondary(Pt has cat)  Health Care Management: Primary  Ambulation Assistance: Independent(Mod Ind using RW (household distances usually at baseline))  Transfer Assistance: Independent  Active : No  Patient's  Info: Pt's significant other Duane  Mode of Transportation: Car  Occupation: On disability  Leisure & Hobbies: Pt enjoys staying home to watch tv  Additional Comments: Pt reports, falling 5x times or more this year with some requiring hospitalization but no significant injuries; sleeps in regular, flat bed; R hand dominant    Objective                                                                                    Goals:  (Update in navigator)  Short term goals  Time Frame for Short term goals: 10 tx days:  Short term goal 1: Pt will complete rolling L/R and sup<->sit Ind. Short term goal 2: Pt will complete OOB transfers using RW Mod Ind. Short term goal 3: Pt will ambulate 150 ft using RW with SBA-Sup. Short term goal 4: Pt will ascend/descend 4-5 steps using bilat rails with SBA-Sup. Short term goal 5: Pt will ascend/descend curb step and ambulate over uneven surfaces using RW with SBA-Sup.   Short term goal 6: Pt will

## 2020-10-13 NOTE — PROGRESS NOTES
[facility] UNIT  SPEECH/LANGUAGE PATHOLOGY      [x] Daily           [] Discharge    Patient: Kaleb Farrell      :1950  LNJ:[id number]  Rehab Dx/Hx: CVA (cerebral vascular accident) ([facility] [address] [I63.9]  Acute CVA (cerebrovascular accident) ([facility] [address] [I63.9]   No Known Allergies  Precautions:  Restrictions/Precautions: General Precautions, Fall Risk(R eye deficit (double vision/decreased ability to focus), mild RLE weakness)          Home Situation/IADL:   Social/Functional History  Lives With: Significant other(Duane)  Type of Home: Apartment  Home Layout: (3rd floor c elevator)  Home Access: Level entry  Bathroom Shower/Tub: Tub/Shower unit  Bathroom Toilet: Handicap height  Bathroom Equipment: Grab bars in shower, Grab bars around toilet  Bathroom Accessibility: Walker accessible  Home Equipment: Rolling walker  ADL Assistance: Independent  Homemaking Assistance: Independent  Homemaking Responsibilities: Yes  Meal Prep Responsibility: No  Laundry Responsibility: No  Cleaning Responsibility: No  Bill Paying/Finance Responsibility: Primary(Pt shares responsibility c significant other)  Shopping Responsibility: No  Dependent Care Responsibility: Secondary(Pt has cat)  Health Care Management: Primary  Ambulation Assistance: Independent(Mod Ind using RW (household distances usually at baseline))  Transfer Assistance: Independent  Active : No  Patient's  Info: Pt's significant other Duane  Mode of Transportation: Car  Occupation: On disability  Leisure & Hobbies: Pt enjoys staying home to watch tv  Additional Comments: Pt reports, falling 5x times or more this year with some requiring hospitalization but no significant injuries; sleeps in regular, flat bed; R hand dominant      Date of Admit: 10/8/2020  Room #: 1013/1013-A     ST Number of Minutes/Billable Intervention  Cog/Memory Deficits  30   Aphasia/Language     Dysarthria/Speech     Apraxia/Speech Dysphagia/Swallowing     Group     Other    TOTAL Minutes Billed  30    Variance          Date: 10/13/2020  Day of ARU Week:  6       SLP Individual Minutes  Time In: 1130  Time Out: 1200  Minutes: 30     Variance/Reason:  [] Refusal due to   [] Medical hold/reason  [] Illness   [] Off Unit for test/procedure  [] Extra time needed to complete task  [] Other (specify)    Activity completed: Pt seen for cognition for med mgmt tasks and safety review. Pain: denies  Current Diet: DIET GENERAL; Carb Control: 4 carb choices (60 gms)/meal; Low Sodium (2 GM)  Subjective: alert and cooperative; motivated      Goals and POC: Co-treats where appropriate with PT or OT to facilitate patient goals in functional tasks. LTG                           Short-term Goals  Timeframe for Short-term Goals: 3xs weekly for 2 weeks  Goal 1: Pt will recall 3 items following a 3-5 min delay when provided with min cues Min cues for recall of 3 items within completed tasks with 50% acc  Goal 2: Pt will recall learned safety protocols during a functional task with 90% accuracy Mod cues for verbal review 50% acc  Goal 3: Pt will complete medication and finance mgmt tasks for independence at home  Medication management tasks were completed with pt asked to read and interpret simulated pill bottles. Pt was asked to assign times for meds and give rationale. Pt was also asked to recall names on med bottles which were of famous actors/singers. Interpretation of 8 bottles was 4/8 with difficulty reading. When this therapist read bottles pt 50% acc with decision making for specified time frame. Pt would benefit from having a pill box set up post discharge due to visual deficits and cognition. S/o manages finances so goal discharged. Recall of names 3/8. Reviewed personal meds with pt familiar with a few but new ones after review showed no carryover for rationale (ex: diabetes, blood pressure, cholesterol, blood thinner). Alarm placed: []bed [x]chair   []other:   []LIZBET activated        Barriers to progress:   [] Fatigue        [x] Cognitive Deficits   [x] Memory Deficits   [] Reduced Attn   [] Self Limiting Behaviors    [] Reduced insight/awareness     [x] Visual Deficits   [] Premorbid Conditions  [] Impulsivity     [] Other      Education/Interventions used this date:     [x]   Progress was updated and reviewed in Rehabtracker with patient and/or family this         date. [x] Attending Care Conference for pt this date. See Team Patient Care Conference Note for updates.     Interventions used this date:  [] Speech/Language Treatment    [] Instruction in HEP    Group   [] Dysphagia Treatment   [x] Cognitive Skill Alberto    Other:         Assessment / Impression                                                          Treatment/Activity Tolerance:   [x] Tolerated Treatment well:     [] Patient limited by fatigue/pain:       [] Patient limited by medical complications:    [] Adverse Reaction to Tx:   [] Significant change in status:      Electronically Signed by  Gama Louis MA, CCC-SLP    10/13/2020  4:25 PM

## 2020-10-13 NOTE — PROGRESS NOTES
Kyla Cabot    : 1950  Acct #: [de-identified]  MRN: [id number]              PM&R Progress Note      Admitting diagnosis:  Acute CVA (dorsal left ian)     Comorbid diagnoses impacting rehabilitation: Right hemiparesis, gait disturbance, essential hypertension, uncontrolled diabetes with hyperglycemia, morbid obesity (BMI 45.5), disconjugate gaze. Chief complaint: Tired after morning therapies. Fair appetite. Prior (baseline) level of function: Independent. Current level of function:         Current  IRF-KRISTYN and Goals:   Hearing, Speech, and Vision  Expression of Ideas and Wants: Without difficulty  Understanding Verbal and Non-Verbal Content: Understands  Prior Functioning: Everyday Activities  Self Care: Independent  Indoor Mobility (Ambulation): Independent  Stairs: Needed some help  Functional Cognition: Needed some help  Prior Device Use: Walker    Eating  Assistance Needed: Setup or clean-up assistance  CARE Score: 5  Discharge Goal: Independent  Oral Hygiene  Assistance Needed: Partial/moderate assistance  Comment: Pt demo poor standing balance and required Min A to maintain upright posture. CARE Score: 3  Discharge Goal: Independent  Toileting Hygiene  Assistance Needed: Supervision or touching assistance  Comment: pt did own hygiene  CARE Score: 4  Discharge Goal: Independent  Shower/Bathe Self  Assistance Needed: Partial/moderate assistance  CARE Score: 3  Discharge Goal: Independent  Upper Body Dressing  Assistance Needed: Setup or clean-up assistance  CARE Score: 5  Discharge Goal: Independent  Lower Body Dressing  Assistance Needed: Partial/moderate assistance  Comment: Min A to maintain upright posture.   CARE Score: 3  Discharge Goal: Independent  Putting On/Taking Off Footwear  Assistance Needed: Setup or clean-up assistance  CARE Score: 5  Discharge Goal: Independent    Roll Left and Right  Assistance Needed: Supervision or touching assistance  Comment: required SBA, no use of bed features  CARE Score: 4  Discharge Goal: Independent  Sit to Lying  Assistance Needed: Supervision or touching assistance  Comment: required SBA, no use of bed features  CARE Score: 4  Discharge Goal: Independent  Sit to Stand  Assistance Needed: Supervision or touching assistance  Comment: required CGA using RW  CARE Score: 4  Discharge Goal: Independent  Chair/Bed-to-Chair Transfer  Assistance Needed: Supervision or touching assistance  Comment: required CGA  CARE Score: 4  Discharge Goal: Independent  Toilet Transfer  Assistance Needed: Supervision or touching assistance  Comment: pt did on/off with supervision  CARE Score: 4  Discharge Goal: Independent  Car Transfer  Assistance Needed: Supervision or touching assistance  Comment: required CGA  CARE Score: 4  Discharge Goal: Independent   Walk 10 Feet? Walk 10 Feet?: Yes  1 Step  1 Step?: Yes  Picking Up Object  Assistance Needed: Partial/moderate assistance  Comment: pt required Min A with LUE support on RW (task completed without use of reacher)  CARE Score: 3  Discharge Goal: Independent  Wheelchair Ability  Uses a Wheelchair and/or Scooter?: No                  I      Exam:    Blood pressure 137/71, pulse 72, temperature 97.4 °F (36.3 °C), temperature source Oral, resp. rate 18, height 5' 2\" (1.575 m), weight 239 lb (108.4 kg), SpO2 92 %. General: Up in a bedside chair with legs elevated. In no distress. Poor attention. HEENT: MMM. Neck supple. Mild dysarthria. Pulmonary: Shallow but clearing. Cardiac: RRR. Abdomen: Patient's abdomen is soft and nondistended. Bowel sounds were present throughout. There was no rebound, guarding or masses noted. Upper extremities: No new bruising or swelling. Lower extremities: No signs of DVT. Give way with MMT. Sitting balance was fair. Standing balance was poor.     Lab Results   Component Value Date    WBC 6.7 10/07/2020    HGB 15.5 10/07/2020    HCT 46.0 10/07/2020    MCV 91.5 10/07/2020     10/07/2020     Lab Results   Component Value Date    INR 1.19 10/06/2020    PROTIME 14.4 10/06/2020     Lab Results   Component Value Date    CREATININE 1.0 10/07/2020    BUN 10 10/07/2020     10/07/2020    K 3.4 (L) 10/07/2020     10/07/2020    CO2 26 10/07/2020     Lab Results   Component Value Date    ALT 28 10/06/2020    AST 26 10/06/2020    ALKPHOS 112 10/06/2020    BILITOT 1.1 (H) 10/06/2020       Expected length of stay  prior to a supervised level of function for discharge home with a walker and C OT/PT is 2 weeks. Recommendations:    1. Acute pontine infarction with gait disturbance:  Verbal cues required to get her to engage in the daily occupational physical therapy with speech-language pathology.     She seems to tolerate the antiplatelet therapy with Plavix and a statin (Lipitor).    Working to control her blood sugar and blood pressure. Tolerating the pulmonary hygiene, nutritional support and bowel and bladder retraining.  Visual exercises to try to reestablish a full visual field.  DVT prophylaxis. Addressing pivot transfers and wheelchair propulsion today. 2. DVT prophylaxis: Lovenox 30 mg subcu daily.  I must monitor her hemoglobin and platelet count periodically while on this medication.  Weightbearing activities are going to be gradual to improve daily.  GI prophylaxis offered. No new bruising or swelling. 3. Uncontrolled diabetes with hyperglycemia (type II): Patient requires a Humalog sliding scale with each meal and blood sugars are checked at mealtime and bedtime.  Does not appear she was taking any medications for blood sugar previously at home.  Her hemoglobin A1c from 10/7/2020 is still pending.   4. Hypertension: Patient requires lisinopril, hydrochlorothiazide and Lopressor for blood pressure regulation.  Target systolic blood pressure is 120-140.  Vital signs are checked at rest and with activity.

## 2020-10-13 NOTE — PROGRESS NOTES
Occupational Therapy  Physical Rehabilitation: OCCUPATIONAL THERAPY     [x] daily progress note       [] discharge       Patient Name:  Azucena Rand   :  1950 MRN: [id number]  Room:  [address] [place] [address] Date of Admission: 10/8/2020  Rehabilitation Diagnosis:   CVA (cerebral vascular accident) [facility]) [I63.9]  Acute CVA (cerebrovascular accident) ([facility] [address] [I63.9]       Date 10/13/2020       Day of ARU Week:  6   Time IN/OUT [id number]   Individual Tx Minutes 40+50   Group Tx Minutes    Co-Treat Minutes    Concurrent Tx Minutes    TOTAL Tx Time Mins 90   Variance Time    Variance Time []   Refusal due to:     []   Medical hold/reason:    []   Illness   []   Off Unit for test/procedure  []   Extra time needed to complete task  []   Therapeutic need  []   Other (specify):   Restrictions Restrictions/Precautions: General Precautions, Fall Risk(R eye deficit (double vision/decreased ability to focus), mild RLE weakness)         Communication with other providers:  [x]   OK to see per nursing:     []   Spoke with team member regarding:      Subjective observations and cognitive status: Pt seated in bedside chair upon arrival. Pt pleasant and cooperative, agreeable to OT session this morning and afternoon. Pain level/location:    /10       Location: denied   Discharge recommendations  Anticipated discharge date:  10/21  Destination: []home alone   []home alone w assist prn   [x] home w/ family    [] Continuous supervision       []SNF    [] Assisted living     [] Other:   Continued therapy: [x]HHC OT  []OUTPATIENT  OT   [] No Further OT  Equipment needs: Azucena Rand requires the assistance of a tub transfer bench to successfully and safely complete bathing activities necessary due to reduced balance, endurance, need for ADL assist, and LE weakness and reduced ROM. These tasks cannot be safely completed without this device and would place Sarai Kirby at greater risk of falls.          Bed Mobility: [x]   Pt received out of bed     Transfers:    Sit--> Stand:  SBA  Stand --> Sit:   SBA  Stand-Pivot:     Other:    Assistive device required for transfer:   FWW      Functional Mobility:    Assistance:  Pt completed w/c propulsion training to increase BUE strengthening as well as attention to environment to address visual deficits. Pt demo poor BUE coordination and often ran into walls and objects on L side requiring v/c for redirection. Pt propelled ~164 ft   Pt ambulated ~91 ft c FWW and w/c follow for safety    Device:   []   Rolling Walker     []   Standard Ronnald Real [x]   Wheelchair        []   [employer]       []   4-Wheeled Ronnald Real         []   Cardiac Walker       []   Other:      Additional Therapeutic activities/exercises completed this date:     []   ADL Training   [x]   Balance/Postural training     []   Bed/Transfer Training   []   Endurance Training   []   Neuromuscular Re-ed   []   Nu-step:  Time:        Level:         #Steps:       []   Rebounder:    []  Seated     []  Standing        []   Supine Ther Ex (reps/sets):     [x]   Seated Ther Ex (reps/sets):  Pt completed 10 reps/3 sets shld flexion exercises 90* c 3# dowel blessing. 10 reps/3 sets overhead shld flexion exercises  3# dowel blessing c2 RB Pt instructed to maintain upright posture. [x]   Standing Ther Ex (reps/sets):  Pt tolerated 4 mins 17 sec of dynamic standing activity. Pt completed clothespin ax in standing to increase standing tolerance and  strength. [x]   Other:eye hand coordination and visual tracking: Pt completed card and dice matching activity to increase visual scanning and problem solving skills. Pt demo organizing, retrieving, and returning cards c 4 errors when identifying cards near L lower quadrant. Pt requires v/c to fully scan table. Pt completed Frisbee toss in seated position and maintaining upright posture while  4/5, 4/5, 5/5 c target directly in front of pt. Attempt 2: target R side 5/5, 3/5, 4/5.  Attempt 3: target on L side 2/5, 4/5, 4/5     Comments:      Patient/Caregiver Education and Training:   []   [employer] Equipment Use  []   Bed Mobility/Transfer Technique/Safety  []   Energy Conservation Tips  []   Family training  []   Postural Awareness  []   Safety During Functional Activities  []   Reinforced Patient's Precautions   []   Progress was updated and reviewed in Rehabtracker with patient and/or family this         date. Treatment Plan for Next Session: increase dynamic standing tolerance, visual scanning/increase occular motor movements/strengthening, and BUE strengthening. Assessment:  Pt continues to demo poor visually scanning of environment, resulting in unsafe functional mobility. Pt requires v/c and tactile cues to attend to L side. Pt demo poor visual scanning past midline when scanning to L and poor occular motor control. Pt denies vision deficits, however reports \"my eyes get tired\".         Treatment/Activity Tolerance:   [x] Tolerated treatment with no adverse effects    [] Patient limited by fatigue  [] Patient limited by pain   [] Patient limited by medical complications:    [] Adverse reaction to Tx:   [] Significant change in status    Safety:       []  bed alarm set    [x]  chair alarm set    []  Pt refused alarms                []  Telesitter activated      [x]  Gait belt used during tx session      []other:       Number of Minutes/Billable Intervention  Therapeutic Exercise 50   ADL Self-care    Neuro Re-Ed    Therapeutic Activity 40   Group    Other:    TOTAL 90       Social History  Social/Functional History  Lives With: Significant other(Duaen)  Type of Home: Apartment  Home Layout: (3rd floor c elevator)  Home Access: Level entry  Bathroom Shower/Tub: Tub/Shower unit  Bathroom Toilet: Handicap height  Bathroom Equipment: Grab bars in shower, Grab bars around toilet  Bathroom Accessibility: Walker accessible  Home Equipment: Rolling walker  ADL Assistance: [address] [place] [address] Avenue: Times per week: 5 days per week for a minimum of 60 minutes/day plus group as appropriate for 60 minutes.   Treatment to include Plan  Times per day: Daily  Current Treatment Recommendations: Neuromuscular Re-education    Electronically signed by   NISHANT Morejon OTR/L  License # [id number]    10/13/2020, 2:57 PM

## 2020-10-13 NOTE — PROGRESS NOTES
Patient reviewed at today's care conference. Patient plans d/c home with significant other. Patient's performance is inconsistent. [de-identified]' RW cga. Transfers SBA. 5 stairs min A, ADLs sba-cga. Visual deficits. Patient will need med mgt support per SLP. TTB and caregiver training recommended. Venancio Campos pt/ot/RN for med mgt & ongoing diabetic mgt. Discharge 10/21. Possible that patient is illiterate per OT & SLP observations. Case mgt met with patient in room. Satisfied with 10/21 discharge. Whiteboard updated. Patient will need a RW as hers is borrowed. Agreeable to TTB if covered by insurance. Agreeable to Venancio Campos.

## 2020-10-14 LAB
GLUCOSE BLD-MCNC: 154 MG/DL (ref 70–99)
GLUCOSE BLD-MCNC: 233 MG/DL (ref 70–99)
GLUCOSE BLD-MCNC: 329 MG/DL (ref 70–99)
GLUCOSE BLD-MCNC: 347 MG/DL (ref 70–99)

## 2020-10-14 PROCEDURE — 94761 N-INVAS EAR/PLS OXIMETRY MLT: CPT

## 2020-10-14 PROCEDURE — 97130 THER IVNTJ EA ADDL 15 MIN: CPT

## 2020-10-14 PROCEDURE — 99232 SBSQ HOSP IP/OBS MODERATE 35: CPT | Performed by: PHYSICAL MEDICINE & REHABILITATION

## 2020-10-14 PROCEDURE — 6370000000 HC RX 637 (ALT 250 FOR IP): Performed by: PHYSICAL MEDICINE & REHABILITATION

## 2020-10-14 PROCEDURE — 97110 THERAPEUTIC EXERCISES: CPT

## 2020-10-14 PROCEDURE — 97116 GAIT TRAINING THERAPY: CPT

## 2020-10-14 PROCEDURE — 82962 GLUCOSE BLOOD TEST: CPT

## 2020-10-14 PROCEDURE — 97129 THER IVNTJ 1ST 15 MIN: CPT

## 2020-10-14 PROCEDURE — 6360000002 HC RX W HCPCS: Performed by: INTERNAL MEDICINE

## 2020-10-14 PROCEDURE — 97530 THERAPEUTIC ACTIVITIES: CPT

## 2020-10-14 PROCEDURE — 6370000000 HC RX 637 (ALT 250 FOR IP): Performed by: INTERNAL MEDICINE

## 2020-10-14 PROCEDURE — 1280000000 HC REHAB R&B

## 2020-10-14 PROCEDURE — 94150 VITAL CAPACITY TEST: CPT

## 2020-10-14 RX ORDER — INSULIN GLARGINE 100 [IU]/ML
30 INJECTION, SOLUTION SUBCUTANEOUS NIGHTLY
Status: DISCONTINUED | OUTPATIENT
Start: 2020-10-14 | End: 2020-10-15

## 2020-10-14 RX ADMIN — POTASSIUM CHLORIDE 20 MEQ: 20 TABLET, EXTENDED RELEASE ORAL at 10:26

## 2020-10-14 RX ADMIN — METOPROLOL TARTRATE 50 MG: 50 TABLET, FILM COATED ORAL at 21:41

## 2020-10-14 RX ADMIN — ENOXAPARIN SODIUM 30 MG: 30 INJECTION SUBCUTANEOUS at 10:27

## 2020-10-14 RX ADMIN — POTASSIUM CHLORIDE 20 MEQ: 20 TABLET, EXTENDED RELEASE ORAL at 21:40

## 2020-10-14 RX ADMIN — OXYBUTYNIN CHLORIDE 5 MG: 5 TABLET ORAL at 21:40

## 2020-10-14 RX ADMIN — OXYBUTYNIN CHLORIDE 5 MG: 5 TABLET ORAL at 10:26

## 2020-10-14 RX ADMIN — INSULIN LISPRO 4 UNITS: 100 INJECTION, SOLUTION INTRAVENOUS; SUBCUTANEOUS at 08:02

## 2020-10-14 RX ADMIN — LISINOPRIL AND HYDROCHLOROTHIAZIDE 2 TABLET: 12.5; 1 TABLET ORAL at 10:25

## 2020-10-14 RX ADMIN — INSULIN LISPRO 1 UNITS: 100 INJECTION, SOLUTION INTRAVENOUS; SUBCUTANEOUS at 21:41

## 2020-10-14 RX ADMIN — METOPROLOL TARTRATE 50 MG: 50 TABLET, FILM COATED ORAL at 10:27

## 2020-10-14 RX ADMIN — ATORVASTATIN CALCIUM 10 MG: 10 TABLET, FILM COATED ORAL at 21:40

## 2020-10-14 RX ADMIN — CLOPIDOGREL BISULFATE 75 MG: 75 TABLET ORAL at 10:26

## 2020-10-14 RX ADMIN — INSULIN LISPRO 8 UNITS: 100 INJECTION, SOLUTION INTRAVENOUS; SUBCUTANEOUS at 12:33

## 2020-10-14 RX ADMIN — PANTOPRAZOLE SODIUM 40 MG: 40 TABLET, DELAYED RELEASE ORAL at 05:41

## 2020-10-14 RX ADMIN — INSULIN GLARGINE 30 UNITS: 100 INJECTION, SOLUTION SUBCUTANEOUS at 21:43

## 2020-10-14 RX ADMIN — OXYBUTYNIN CHLORIDE 5 MG: 5 TABLET ORAL at 14:22

## 2020-10-14 RX ADMIN — INSULIN LISPRO 8 UNITS: 100 INJECTION, SOLUTION INTRAVENOUS; SUBCUTANEOUS at 18:15

## 2020-10-14 ASSESSMENT — PAIN SCALES - GENERAL
PAINLEVEL_OUTOF10: 0
PAINLEVEL_OUTOF10: 0

## 2020-10-14 NOTE — PROGRESS NOTES
SBA   Stand --> Sit:     Stand-Pivot: Additional Therapeutic activities/exercises completed this date:     []   ADL Training   []   Balance/Postural training     []   Bed/Transfer Training   []   Endurance Training   []   Neuromuscular Re-ed   []   Nu-step:  Time:        Level:         #Steps:       []   Rebounder:    []  Seated     []  Standing        []   Supine Ther Ex (reps/sets):     [x]   Seated Ther Ex (reps/sets):   Patient instructed in red theraband and 2# dowel all planes and all joints seated edge of recliner to increase BUE strength and endurance and core strength and endurance for facilitation of ADL tasks and mobility tasks    []   Standing Ther Ex (reps/sets):     [x]   Other: patient instructed in visual scanning activity with playing cards, beads, and visual scanning room for objects with safety education dilemmas for fall precautions       Comments:      Patient/Caregiver Education and Training:   []   Adaptive Equipment Use  [x]   Bed Mobility/Transfer Technique/Safety  [x]   Energy Conservation Tips  []   Family training  [x]   Postural Awareness  [x]   Safety During Functional Activities  []   Reinforced Patient's Precautions   []   Progress was updated and reviewed in Rehabtracker with patient and/or family this         date.     Treatment Plan for Next Session: POC to continue as tolerated      Assessment:        Treatment/Activity Tolerance:   [x] Tolerated treatment with no adverse effects    [] Patient limited by fatigue  [] Patient limited by pain   [] Patient limited by medical complications:    [] Adverse reaction to Tx:   [] Significant change in status    Safety:       []  bed alarm set    [x]  chair alarm set    []  Pt refused alarms                []  Telesitter activated      [x]  Gait belt used during tx session      []other:       Number of Minutes/Billable Intervention  Therapeutic Exercise 30   ADL Self-care    Neuro Re-Ed    Therapeutic Activity 30   Group    Other: TOTAL 60       Social History  Social/Functional History  Lives With: Significant other(Duane)  Type of Home: Apartment  Home Layout: (3rd floor c elevator)  Home Access: Level entry  Bathroom Shower/Tub: Tub/Shower unit  Bathroom Toilet: Handicap height  Bathroom Equipment: Grab bars in shower, Grab bars around toilet  Bathroom Accessibility: Walker accessible  Home Equipment: Rolling walker  ADL Assistance: Independent  Homemaking Assistance: Independent  Homemaking Responsibilities: Yes  Meal Prep Responsibility: No  Laundry Responsibility: No  Cleaning Responsibility: No  Bill Paying/Finance Responsibility: Primary(Pt shares responsibility c significant other)  Shopping Responsibility: No  Dependent Care Responsibility: Secondary(Pt has cat)  Health Care Management: Primary  Ambulation Assistance: Independent(Mod Ind using RW (household distances usually at baseline))  Transfer Assistance: Independent  Active : No  Patient's  Info: Pt's significant other Duane  Mode of Transportation: Car  Occupation: On disability  Leisure & Hobbies: Pt enjoys staying home to watch tv  Additional Comments: Pt reports, falling 5x times or more this year with some requiring hospitalization but no significant injuries; sleeps in regular, flat bed; R hand dominant    Objective                                                                                    Goals:  (Update in navigator)  Short term goals  Time Frame for Short term goals: STG=LTG:  Long term goals  Time Frame for Long term goals : 10-12 days or until d/c  Long term goal 1: Pt will increase BUE coordination/fine motor skills in order to complete feeding/grooming/oral care task c MOD I by d/c  Long term goal 2: Pt will complete total body bathing c MOD I by d/c  Long term goal 3: Pt will complete UB dressing c MOD I and retrieve items from closet by d/c  Long term goal 4: Pt will complete LB dressing c MOD I and retrieve items from closet by d/c  Long

## 2020-10-14 NOTE — PLAN OF CARE
Problem: Falls - Risk of:  Goal: Will remain free from falls  Description: Will remain free from falls  Outcome: Ongoing  Goal: Absence of physical injury  Description: Absence of physical injury  Outcome: Ongoing     Problem: Skin Integrity:  Goal: Will show no infection signs and symptoms  Description: Will show no infection signs and symptoms  Outcome: Ongoing  Goal: Absence of new skin breakdown  Description: Absence of new skin breakdown  Outcome: Ongoing     Problem: HEMODYNAMIC STATUS  Goal: Patient has stable vital signs and fluid balance  Outcome: Ongoing     Problem: ACTIVITY INTOLERANCE/IMPAIRED MOBILITY  Goal: Mobility/activity is maintained at optimum level for patient  Outcome: Ongoing     Problem: COMMUNICATION IMPAIRMENT  Goal: Ability to express needs and understand communication  Outcome: Ongoing     Problem: Infection:  Goal: Will remain free from infection  Description: Will remain free from infection  Outcome: Ongoing     Problem: Safety:  Goal: Free from accidental physical injury  Description: Free from accidental physical injury  Outcome: Ongoing  Goal: Free from intentional harm  Description: Free from intentional harm  Outcome: Ongoing     Problem: Daily Care:  Goal: Daily care needs are met  Description: Daily care needs are met  Outcome: Ongoing     Problem: Pain:  Goal: Patient's pain/discomfort is manageable  Description: Patient's pain/discomfort is manageable  Outcome: Ongoing     Problem: Skin Integrity:  Goal: Skin integrity will stabilize  Description: Skin integrity will stabilize  Outcome: Ongoing     Problem: Discharge Planning:  Goal: Patients continuum of care needs are met  Description: Patients continuum of care needs are met  Outcome: Ongoing

## 2020-10-14 NOTE — PROGRESS NOTES
[facility] UNIT  SPEECH/LANGUAGE PATHOLOGY      [x] Daily           [] Discharge    Patient: Osorio Greenberg      :1950  NJE:[id number]  Rehab Dx/Hx: CVA (cerebral vascular accident) ([facility] [address] [I63.9]  Acute CVA (cerebrovascular accident) ([facility] [address] [I63.9]   No Known Allergies  Precautions: ; Restrictions/Precautions: General Precautions, Fall Risk(R eye deficit (double vision/decreased ability to focus), mild RLE weakness)          Home Situation/IADL:   Social/Functional History  Lives With: Significant other(Duane)  Type of Home: Apartment  Home Layout: (3rd floor c elevator)  Home Access: Level entry  Bathroom Shower/Tub: Tub/Shower unit  Bathroom Toilet: Handicap height  Bathroom Equipment: Grab bars in shower, Grab bars around toilet  Bathroom Accessibility: Walker accessible  Home Equipment: Rolling walker  ADL Assistance: Independent  Homemaking Assistance: Independent  Homemaking Responsibilities: Yes  Meal Prep Responsibility: No  Laundry Responsibility: No  Cleaning Responsibility: No  Bill Paying/Finance Responsibility: Primary(Pt shares responsibility c significant other)  Shopping Responsibility: No  Dependent Care Responsibility: Secondary(Pt has cat)  Health Care Management: Primary  Ambulation Assistance: Independent(Mod Ind using RW (household distances usually at baseline))  Transfer Assistance: Independent  Active : No  Patient's  Info: Pt's significant other Duane  Mode of Transportation: Car  Occupation: On disability  Leisure & Hobbies: Pt enjoys staying home to watch tv  Additional Comments: Pt reports, falling 5x times or more this year with some requiring hospitalization but no significant injuries; sleeps in regular, flat bed; R hand dominant      Date of Admit: 10/8/2020  Room #: 1013/1013-A     ST Number of Minutes/Billable Intervention  Cog/Memory Deficits 30    Aphasia/Language     Dysarthria/Speech     Apraxia/Speech Dysphagia/Swallowing     Group     Other    TOTAL Minutes Billed  30    Variance          Date: 10/14/2020  Day of ARU Week:  7       SLP Individual Minutes  Time In: 1000  Time Out: 0404  Minutes: 30     Variance/Reason:  [] Refusal due to   [] Medical hold/reason  [] Illness   [] Off Unit for test/procedure  [] Extra time needed to complete task  [] Other (specify)    Activity completed: Pt seen for recall of learned tasks and safety/problem solving via cognitive demonstration. Pain: denies  Current Diet: DIET GENERAL; Carb Control: 4 carb choices (60 gms)/meal; Low Sodium (2 GM)  Subjective: alert and cooperative; likely learning disability but capable of new learning      Goals and POC: Co-treats where appropriate with PT or OT to facilitate patient goals in functional tasks. LTG                           Short-term Goals  Timeframe for Short-term Goals: 3xs weekly for 2 weeks  Goal 1: Pt will recall 3 items following a 3-5 min delay when provided with min cues    Pt reviewed meds with NSG. Beginning to learn names and rationale. Will likely need assist post discharge. Goal 2: Pt will recall learned safety protocols during a functional task with 90% accuracy  Safety and problem solving were targeted with pt  asked to identify and correct problems demonstrated by this therapist regarding: walker safety for obtaining items, transfers (sit to stand/stand to sit), managing situations for items too high/low, tight spaces, energy conservation. Blanchie Bevels Blanchie Bevels Blanchie Bevels Pt required moderate cues for given situations for problem solving with 50% acc . Pt able to id errors consistently but unable to come up with solution without cues. Will see for carryover in fx tomorrow. Goal 3: Pt will complete medication mgmt tasks for independence at home Pt reviewed meds with NSG. Beginning to learn names and rationale. Will likely need assist post discharge due to vision issues.                                        Alarm placed: []bed [x]chair   []other:   [] activated        Barriers to progress:   [] Fatigue        [x] Cognitive Deficits   [x] Memory Deficits   [] Reduced Attn   [] Self Limiting Behaviors    [] Reduced insight/awareness     [x] Visual Deficits   [] Premorbid Conditions  [] Impulsivity     [] Other      Education/Interventions used this date: Discussed fall prevention strategies for carryover/memory recall. Continue to target in fx. [x]   Progress was updated and reviewed in Rehabtracker with patient and/or family this         date. [] Attending Care Conference for pt this date. See Team Patient Care Conference Note for updates.     Interventions used this date:  [] Speech/Language Treatment    [] Instruction in HEP    Group   [] Dysphagia Treatment   [x] Cognitive Skill Alberto    Other:         Assessment / Impression                                                          Treatment/Activity Tolerance:   [x] Tolerated Treatment well:     [] Patient limited by fatigue/pain:       [] Patient limited by medical complications:    [] Adverse Reaction to Tx:   [] Significant change in status:      Electronically Signed by  Verónica Donovan, [facility], [address] [place] [address]    10/14/2020  2:54 PM

## 2020-10-14 NOTE — PROGRESS NOTES
SBA, no use of bed features  CARE Score: 4  Discharge Goal: Independent  Sit to Lying  Assistance Needed: Supervision or touching assistance  Comment: required SBA, no use of bed features  CARE Score: 4  Discharge Goal: Independent  Sit to Stand  Assistance Needed: Supervision or touching assistance  Comment: required CGA using RW  CARE Score: 4  Discharge Goal: Independent  Chair/Bed-to-Chair Transfer  Assistance Needed: Supervision or touching assistance  Comment: required CGA  CARE Score: 4  Discharge Goal: Independent  Toilet Transfer  Assistance Needed: Supervision or touching assistance  Comment: 3(deemed usual performance per team huddle)  CARE Score: 4  Discharge Goal: Independent  Car Transfer  Assistance Needed: Supervision or touching assistance  Comment: required CGA  CARE Score: 4  Discharge Goal: Independent   Walk 10 Feet? Walk 10 Feet?: Yes  1 Step  1 Step?: Yes  Picking Up Object  Assistance Needed: Partial/moderate assistance  Comment: pt required Min A with LUE support on RW (task completed without use of reacher)  CARE Score: 3  Discharge Goal: Independent  Wheelchair Ability  Uses a Wheelchair and/or Scooter?: No                  I      Exam:    Blood pressure 130/67, pulse 76, temperature 99.2 °F (37.3 °C), temperature source Oral, resp. rate 16, height 5' 2\" (1.575 m), weight 239 lb (108.4 kg), SpO2 91 %. General: Lying back in bed. In no distress. Easily distracted. HEENT: Fair eye contact. Decreased attention to the periphery. Pulmonary: No rales or rhonchi. Cardiac: RRR. Abdomen: Patient's abdomen is soft and nondistended. Bowel sounds were present throughout. There was no rebound, guarding or masses noted. Upper extremities: Able to bring both hands together in midline. Weak  bilaterally. Lower extremities: No new bruising or swelling. Sitting balance was fair+. Standing balance was poor.     Lab Results   Component Value Date    WBC 6.7 10/07/2020    HGB 15.5 10/07/2020    HCT 46.0 10/07/2020    MCV 91.5 10/07/2020     10/07/2020     Lab Results   Component Value Date    INR 1.19 10/06/2020    PROTIME 14.4 10/06/2020     Lab Results   Component Value Date    CREATININE 1.0 10/07/2020    BUN 10 10/07/2020     10/07/2020    K 3.4 (L) 10/07/2020     10/07/2020    CO2 26 10/07/2020     Lab Results   Component Value Date    ALT 28 10/06/2020    AST 26 10/06/2020    ALKPHOS 112 10/06/2020    BILITOT 1.1 (H) 10/06/2020       Expected length of stay  prior to a supervised level of function for discharge home with a walker and [address] OT/PT is 10/21/2020. Recommendations:    1. Acute pontine infarction with gait disturbance:   Some unsafe behaviors with losses of balance noted during the daily occupational physical therapy with speech-language pathology.     She seems to tolerate the antiplatelet therapy with Plavix and a statin (Lipitor).    Working to control her blood sugar and blood pressure.  Tolerating the pulmonary hygiene, nutritional support and bowel and bladder retraining.  Visual exercises to try to reestablish a full visual field.  DVT prophylaxis.  Addressing pivot transfers and wheelchair propulsion again today. 2. DVT prophylaxis: Lovenox 30 mg subcu daily.  I must monitor her hemoglobin and platelet count periodically while on this medication.  Weightbearing activities are going to be gradual to improve daily.  GI prophylaxis offered.  No clinical signs of blood loss. 3. Uncontrolled diabetes with hyperglycemia (type II): Patient requires a Humalog sliding scale with each meal and blood sugars are checked at mealtime and bedtime.   Initiated some Lantus last night but will increase it further to 20 units due to persistent high blood sugars.  Does not appear she was taking any medications for blood sugar previously at home.  Her hemoglobin A1c from 10/7/2020 was 11.9.    4. Hypertension: Patient requires lisinopril, hydrochlorothiazide and

## 2020-10-15 LAB
ESTIMATED AVERAGE GLUCOSE: 295 MG/DL
GLUCOSE BLD-MCNC: 223 MG/DL (ref 70–99)
GLUCOSE BLD-MCNC: 256 MG/DL (ref 70–99)
GLUCOSE BLD-MCNC: 266 MG/DL (ref 70–99)
GLUCOSE BLD-MCNC: 287 MG/DL (ref 70–99)
GLUCOSE BLD-MCNC: 319 MG/DL (ref 70–99)
HBA1C MFR BLD: 11.9 % (ref 4.2–6.3)

## 2020-10-15 PROCEDURE — 6360000002 HC RX W HCPCS: Performed by: INTERNAL MEDICINE

## 2020-10-15 PROCEDURE — 97130 THER IVNTJ EA ADDL 15 MIN: CPT

## 2020-10-15 PROCEDURE — 82962 GLUCOSE BLOOD TEST: CPT

## 2020-10-15 PROCEDURE — 94761 N-INVAS EAR/PLS OXIMETRY MLT: CPT

## 2020-10-15 PROCEDURE — 6370000000 HC RX 637 (ALT 250 FOR IP): Performed by: PHYSICAL MEDICINE & REHABILITATION

## 2020-10-15 PROCEDURE — 97530 THERAPEUTIC ACTIVITIES: CPT

## 2020-10-15 PROCEDURE — 97129 THER IVNTJ 1ST 15 MIN: CPT

## 2020-10-15 PROCEDURE — 97116 GAIT TRAINING THERAPY: CPT

## 2020-10-15 PROCEDURE — 94150 VITAL CAPACITY TEST: CPT

## 2020-10-15 PROCEDURE — 97110 THERAPEUTIC EXERCISES: CPT

## 2020-10-15 PROCEDURE — 1280000000 HC REHAB R&B

## 2020-10-15 PROCEDURE — 97535 SELF CARE MNGMENT TRAINING: CPT

## 2020-10-15 PROCEDURE — 6370000000 HC RX 637 (ALT 250 FOR IP): Performed by: INTERNAL MEDICINE

## 2020-10-15 PROCEDURE — 99232 SBSQ HOSP IP/OBS MODERATE 35: CPT | Performed by: PHYSICAL MEDICINE & REHABILITATION

## 2020-10-15 RX ORDER — INSULIN GLARGINE 100 [IU]/ML
40 INJECTION, SOLUTION SUBCUTANEOUS NIGHTLY
Status: DISCONTINUED | OUTPATIENT
Start: 2020-10-15 | End: 2020-10-19

## 2020-10-15 RX ADMIN — OXYBUTYNIN CHLORIDE 5 MG: 5 TABLET ORAL at 20:55

## 2020-10-15 RX ADMIN — INSULIN GLARGINE 40 UNITS: 100 INJECTION, SOLUTION SUBCUTANEOUS at 21:00

## 2020-10-15 RX ADMIN — PANTOPRAZOLE SODIUM 40 MG: 40 TABLET, DELAYED RELEASE ORAL at 06:40

## 2020-10-15 RX ADMIN — ATORVASTATIN CALCIUM 10 MG: 10 TABLET, FILM COATED ORAL at 20:55

## 2020-10-15 RX ADMIN — ENOXAPARIN SODIUM 30 MG: 30 INJECTION SUBCUTANEOUS at 08:22

## 2020-10-15 RX ADMIN — OXYBUTYNIN CHLORIDE 5 MG: 5 TABLET ORAL at 08:23

## 2020-10-15 RX ADMIN — LISINOPRIL AND HYDROCHLOROTHIAZIDE 2 TABLET: 12.5; 1 TABLET ORAL at 08:23

## 2020-10-15 RX ADMIN — METOPROLOL TARTRATE 50 MG: 50 TABLET, FILM COATED ORAL at 08:23

## 2020-10-15 RX ADMIN — POTASSIUM CHLORIDE 20 MEQ: 20 TABLET, EXTENDED RELEASE ORAL at 20:55

## 2020-10-15 RX ADMIN — INSULIN LISPRO 4 UNITS: 100 INJECTION, SOLUTION INTRAVENOUS; SUBCUTANEOUS at 20:59

## 2020-10-15 RX ADMIN — CLOPIDOGREL BISULFATE 75 MG: 75 TABLET ORAL at 08:23

## 2020-10-15 RX ADMIN — POTASSIUM CHLORIDE 20 MEQ: 20 TABLET, EXTENDED RELEASE ORAL at 08:23

## 2020-10-15 RX ADMIN — INSULIN LISPRO 6 UNITS: 100 INJECTION, SOLUTION INTRAVENOUS; SUBCUTANEOUS at 12:18

## 2020-10-15 RX ADMIN — INSULIN LISPRO 6 UNITS: 100 INJECTION, SOLUTION INTRAVENOUS; SUBCUTANEOUS at 18:27

## 2020-10-15 RX ADMIN — OXYBUTYNIN CHLORIDE 5 MG: 5 TABLET ORAL at 15:54

## 2020-10-15 RX ADMIN — METOPROLOL TARTRATE 50 MG: 50 TABLET, FILM COATED ORAL at 20:55

## 2020-10-15 RX ADMIN — INSULIN LISPRO 6 UNITS: 100 INJECTION, SOLUTION INTRAVENOUS; SUBCUTANEOUS at 08:23

## 2020-10-15 ASSESSMENT — PAIN SCALES - GENERAL: PAINLEVEL_OUTOF10: 0

## 2020-10-15 NOTE — FLOWSHEET NOTE
[x] daily progress note       [] discharge       Patient Name:  Hayes Thomas   :  1950 MRN: [id number]  Room:  [address] [place] [address] Date of Admission: 10/8/2020  Rehabilitation Diagnosis:   CVA (cerebral vascular accident) [facility]) [I63.9]  Acute CVA (cerebrovascular accident) ([facility] [address] [I63.9]       Date 10/15/2020       Day of ARU Week:  1   Time IN/OUT 8726-1887  3637-5988   Individual Tx Minutes 60+30   Group Tx Minutes    Co-Treat Minutes    Concurrent Tx Minutes    TOTAL Tx Time Mins 90   Variance Time    Variance Time []   Refusal due to:     []   Medical hold/reason:    []   Illness   []   Off Unit for test/procedure  []   Extra time needed to complete task  []   Therapeutic need  []   Other (specify):   Restrictions Restrictions/Precautions  Restrictions/Precautions: General Precautions, Fall Risk(R eye deficit (double vision/decreased ability to focus), mild RLE weakness)      Communication with other providers: [x]   OK to see per nursing:     []   Spoke with team member regarding:      Subjective observations and cognitive status: Pt sitting up in recliner. Awake, pleasant and able to  verbally recall gait safety techniques    Pt with c/o increased dizziness during gait training. B/p = 187/77 mmhg, HR=62. Nurse Edwin notified     Pain level/location:  0  /10       Location:    Discharge recommendations  Anticipated discharge date:  10/21,2020  Destination: []home alone   []home alone with assist PRN     [x] home w/ family      [] Continuous supervision  []SNF    [] Assisted living     [] Other:   Continued therapy: [x]HHC PT  []OUTPATIENT  PT   [] No Further PT  Equipment needs: CALVIN Kirby requires the assistance of a front-wheeled walker to successfully ambulate from room to room at home to allow completion of daily living tasks such as: bathing, toileting, dressing and grooming.   A wheeled walker is necessary due to the patient's unsteady gait, upper body weakness, inability to  a standard walker. This patient can ambulate only by pushing a walker instead of using a lesser assistive device such as a cane or crutch.            Bed Mobility:           [x]   Pt received out of bed     Transfers:    Sit--> Stand:  SBA  Stand --> Sit:   SBA + vc for reaching back to chair or commode  Chair-->Bed/Bed --> Chair:   SBA  Toilet Transfer (if applicable): SBA  Assistive device required for transfer:   FWW    Gait:    Distance:  20 ft x 2, 69 ft   Assistance:  SBA to CGA d/t 1 lob while turning  Device:  FWW  Gait Quality:  Slow pace inconsistent step    Wheelchair Propulsion:  Distance:  73 ft  Assistance: SBA  Extremities Used:   B ue  Type:    [x]  Manual        []  Electric    Stairs   # Completed:  5  Assistance:  CGA  Supportive Device:  B rails        Additional Therapeutic activities/exercises completed this date:     [x]   Nu-step:  Time:  10 min      Level:   2      #Steps:  416     []   Rebounder:    []  Seated     []  Standing        []   Balance training         []   Postural training    []   Supine ther ex (reps/sets):     [x]   Seated ther ex (reps/sets): laq, hip flex, hip abd/add, pf/df x 15 reps each with  75% vc for recall on 1set but 25% on 2nd set   []   Standing ther ex (reps/sets):     []   Picking up object from floor (standing):                   []   Reacher used   []   Other:   []   Other:    Comments:      Patient/Caregiver Education and Training:   []   Bed Mobility/Transfer technique/safety  []   Gait technique/sequencing  []   Proper use of assistive device  []   Advanced mobility safety and technique  []   Reinforced patient's precautions/mobility while maintaining precautions  []   Postural awareness  []   Family training  []   Progress was updated and reviewed in Rehabtracker with patient and/or family this date.     Treatment Plan for Next Session: Transfer training, gait training, stairs      Assessment:  Pt with increased b/p during 1st session and still c/o slight dizziness during 2nd session. Pt amanda stair training and nustep during 2nd session.     Treatment/Activity Tolerance:   [] Tolerated treatment with no adverse effects    [] Patient limited by fatigue  [] Patient limited by pain   [x] Patient limited by medical complications: increased dizziness   [] Adverse reaction to Tx:   [] Significant change in status    Safety:       []  bed alarm set    [x]  chair alarm set    []  Pt refused alarms                []  Telesitter activated      [x]  Gait belt used during tx session      []other:         Number of Minutes/Billable Intervention  Gait Training 30   Therapeutic Exercise 15+15   Neuro Re-Ed    Therapeutic Activity 15   Wheelchair Propulsion 15   Group    Other:    TOTAL 60         Social History  Social/Functional History  Lives With: Significant other(Duane)  Type of Home: Apartment  Home Layout: (3rd floor c elevator)  Home Access: Level entry  Bathroom Shower/Tub: Tub/Shower unit  Bathroom Toilet: Handicap height  Bathroom Equipment: Grab bars in shower, Grab bars around toilet  Bathroom Accessibility: Walker accessible  Home Equipment: Rolling walker  ADL Assistance: Independent  Homemaking Assistance: Independent  Homemaking Responsibilities: Yes  Meal Prep Responsibility: No  Laundry Responsibility: No  Cleaning Responsibility: No  Bill Paying/Finance Responsibility: Primary(Pt shares responsibility c significant other)  Shopping Responsibility: No  Dependent Care Responsibility: Secondary(Pt has cat)  Health Care Management: Primary  Ambulation Assistance: Independent(Mod Ind using RW (household distances usually at baseline))  Transfer Assistance: Independent  Active : No  Patient's  Info: Pt's significant other Duane  Mode of Transportation: Car  Occupation: On disability  Leisure & Hobbies: Pt enjoys staying home to watch tv  Additional Comments: Pt reports, falling 5x times or more this year with some requiring hospitalization but no significant injuries; sleeps in regular, flat bed; R hand dominant    Objective                                                                                    Goals:  (Update in navigator)  Short term goals  Time Frame for Short term goals: 10 tx days:  Short term goal 1: Pt will complete rolling L/R and sup<->sit Ind. Short term goal 2: Pt will complete OOB transfers using RW Mod Ind. Short term goal 3: Pt will ambulate 150 ft using RW with SBA-Sup. Short term goal 4: Pt will ascend/descend 4-5 steps using bilat rails with SBA-Sup. Short term goal 5: Pt will ascend/descend curb step and ambulate over uneven surfaces using RW with SBA-Sup. Short term goal 6: Pt will complete object retrieval in standing with 1UE support using adaptive equipment prn Mod Ind.:   :        Plan of Care                                                                              Times per week: 5 days per week for a minimum of 60 minutes/day plus group as appropriate for 60 minutes.   Treatment to include Current Treatment Recommendations: Strengthening, Functional Mobility Training, Home Exercise Program, Equipment Evaluation, Education, & procurement, Transfer Training, Gait Training, Safety Education & Training, Balance Training, Endurance Training, Stair training, Patient/Caregiver Education & Training, Cognitive/Perceptual Training    Electronically signed by   Dalton Kaur  10/15/2020, 8:11 AM

## 2020-10-15 NOTE — PROGRESS NOTES
Physical Rehabilitation: OCCUPATIONAL THERAPY     [x] daily progress note       [] discharge       Patient Name:  Judy Mcclelland   :  1950 MRN: [id number]  Room:  [address] [place] [address] Date of Admission: 10/8/2020  Rehabilitation Diagnosis:   CVA (cerebral vascular accident) [facility]) [I63.9]  Acute CVA (cerebrovascular accident) ([facility] [address] [I63.9]       Date 10/15/2020       Day of ARU Week:  1   Time IN/OUT 1005/1035   Individual Tx Minutes 30   Group Tx Minutes    Co-Treat Minutes    Concurrent Tx Minutes    TOTAL Tx Time Mins 30   Variance Time    Variance Time []   Refusal due to:     []   Medical hold/reason:    []   Illness   []   Off Unit for test/procedure  []   Extra time needed to complete task  []   Therapeutic need  []   Other (specify):   Restrictions Restrictions/Precautions: General Precautions, Fall Risk(R eye deficit (double vision/decreased ability to focus), mild RLE weakness)         Communication with other providers: [x]   OK to see per nursing:     []   Spoke with team member regarding:      Subjective observations and cognitive status: Patient in recliner handed off from [address] [place] [address] dheeraj Alatorre and agreeable to tx session      Pain level/location:    /10       Location: per patient no pain noted    Discharge recommendations  Anticipated discharge date:  10/21  Destination: []home alone   []home alone w assist prn   [] home w/ family    [] Continuous supervision       []SNF    [] Assisted living     [] Other:   Continued therapy: [x]HHC OT  []OUTPATIENT  OT   [] No Further OT  Equipment needs: Judy Mcclelland requires the assistance of a tub transfer bench to successfully and safely complete bathing activities necessary due to reduced balance, endurance, need for ADL assist, and LE weakness and reduced ROM.  These tasks cannot be safely completed without this device and would place Sarai Kirby at greater risk of falls         Bed Mobility:           [x]   Pt received out of bed       Transfers:    Sit--> status    Safety:       []  bed alarm set    [x]  chair alarm set    []  Pt refused alarms                []  Telesitter activated      [x]  Gait belt used during tx session      []other:       Number of Minutes/Billable Intervention  Therapeutic Exercise    ADL Self-care 15   Neuro Re-Ed    Therapeutic Activity 15   Group    Other:    TOTAL 30       Social History  Social/Functional History  Lives With: Significant other(Duane)  Type of Home: Apartment  Home Layout: (3rd floor c elevator)  Home Access: Level entry  Bathroom Shower/Tub: Tub/Shower unit  Bathroom Toilet: Handicap height  Bathroom Equipment: Grab bars in shower, Grab bars around toilet  Bathroom Accessibility: Walker accessible  Home Equipment: Rolling walker  ADL Assistance: Independent  Homemaking Assistance: Independent  Homemaking Responsibilities: Yes  Meal Prep Responsibility: No  Laundry Responsibility: No  Cleaning Responsibility: No  Bill Paying/Finance Responsibility: Primary(Pt shares responsibility c significant other)  Shopping Responsibility: No  Dependent Care Responsibility: Secondary(Pt has cat)  Health Care Management: Primary  Ambulation Assistance: Independent(Mod Ind using RW (household distances usually at baseline))  Transfer Assistance: Independent  Active : No  Patient's  Info: Pt's significant other Duane  Mode of Transportation: Car  Occupation: On disability  Leisure & Hobbies: Pt enjoys staying home to watch tv  Additional Comments: Pt reports, falling 5x times or more this year with some requiring hospitalization but no significant injuries; sleeps in regular, flat bed; R hand dominant    Objective                                                                                    Goals:  (Update in navigator)  Short term goals  Time Frame for Short term goals: STG=LTG:  Long term goals  Time Frame for Long term goals : 10-12 days or until d/c  Long term goal 1: Pt will increase BUE coordination/fine motor skills

## 2020-10-15 NOTE — FLOWSHEET NOTE
Pt ambulating with Therapy - Felt light headed.  Therapy took BP.   187/77 HR62 - Pt returned to room to rest.

## 2020-10-15 NOTE — PLAN OF CARE
Problem: Falls - Risk of:  Goal: Will remain free from falls  Description: Will remain free from falls  10/14/2020 2236 by Yossi Mcclellan LPN  Outcome: Ongoing  10/14/2020 1043 by Zakia Whyte RN  Outcome: Ongoing  Goal: Absence of physical injury  Description: Absence of physical injury  10/14/2020 2236 by Yossi Mcclellna LPN  Outcome: Ongoing  10/14/2020 1043 by Zakia Whyte RN  Outcome: Ongoing     Problem: Skin Integrity:  Goal: Will show no infection signs and symptoms  Description: Will show no infection signs and symptoms  10/14/2020 2236 by Yossi Mcclellan LPN  Outcome: Ongoing  10/14/2020 1043 by Zakia Whyte RN  Outcome: Ongoing  Goal: Absence of new skin breakdown  Description: Absence of new skin breakdown  10/14/2020 2236 by Yossi Mcclellan LPN  Outcome: Ongoing  10/14/2020 1043 by Zakia Whyte RN  Outcome: Ongoing     Problem: HEMODYNAMIC STATUS  Goal: Patient has stable vital signs and fluid balance  10/14/2020 2236 by Yossi Mcclellan LPN  Outcome: Ongoing  10/14/2020 1043 by Zakia Whyte RN  Outcome: Ongoing     Problem: ACTIVITY INTOLERANCE/IMPAIRED MOBILITY  Goal: Mobility/activity is maintained at optimum level for patient  10/14/2020 2236 by Yossi Mcclellan LPN  Outcome: Ongoing  10/14/2020 1043 by Zakia Whyte RN  Outcome: Ongoing     Problem: COMMUNICATION IMPAIRMENT  Goal: Ability to express needs and understand communication  10/14/2020 2236 by Yossi Mcclellan LPN  Outcome: Ongoing  10/14/2020 1043 by Zakia Whyte RN  Outcome: Ongoing     Problem: Infection:  Goal: Will remain free from infection  Description: Will remain free from infection  10/14/2020 2236 by Yossi Mcclellan LPN  Outcome: Ongoing  10/14/2020 1043 by Zakia Whyte RN  Outcome: Ongoing     Problem: Safety:  Goal: Free from accidental physical injury  Description: Free from accidental physical injury  10/14/2020 2236 by Yossi Mcclellan LPN  Outcome: Ongoing  10/14/2020 1043 by Zakia Whyte RN  Outcome: Ongoing  Goal: Free from intentional harm  Description: Free from intentional harm  10/14/2020 2236 by Yossi Mcclellan LPN  Outcome: Ongoing  10/14/2020 1043 by Zakia Whyte RN  Outcome: Ongoing     Problem: Daily Care:  Goal: Daily care needs are met  Description: Daily care needs are met  10/14/2020 2236 by Yossi Mcclellan LPN  Outcome: Ongoing  10/14/2020 1043 by Zakia Whyte RN  Outcome: Ongoing     Problem: Pain:  Goal: Patient's pain/discomfort is manageable  Description: Patient's pain/discomfort is manageable  10/14/2020 2236 by Yossi Mcclellan LPN  Outcome: Ongoing  10/14/2020 1043 by Zakia Whyte RN  Outcome: Ongoing     Problem: Skin Integrity:  Goal: Skin integrity will stabilize  Description: Skin integrity will stabilize  10/14/2020 2236 by Ysosi Mcclellan LPN  Outcome: Ongoing  10/14/2020 1043 by Zakia Whyte RN  Outcome: Ongoing     Problem: Discharge Planning:  Goal: Patients continuum of care needs are met  Description: Patients continuum of care needs are met  10/14/2020 2236 by Yossi Mcclellan LPN  Outcome: Ongoing  10/14/2020 1043 by Zakia Whyte RN  Outcome: Ongoing

## 2020-10-15 NOTE — PROGRESS NOTES
Pablito Berrios    : 1950  Acct #: [de-identified]  MRN: [id number]              PM&R Progress Note      Admitting diagnosis: Acute CVA (dorsal left ian)     Comorbid diagnoses impacting rehabilitation: Right hemiparesis, gait disturbance, essential hypertension, uncontrolled diabetes with hyperglycemia, morbid obesity (BMI 45.5), disconjugate gaze. Chief complaint: Feels a little more secure on her feet. No new pain. Prior (baseline) level of function: Independent. Current level of function:         Current  IRF-KRISTYN and Goals:   Hearing, Speech, and Vision  Expression of Ideas and Wants: Without difficulty  Understanding Verbal and Non-Verbal Content: Understands  Prior Functioning: Everyday Activities  Self Care: Independent  Indoor Mobility (Ambulation): Independent  Stairs: Needed some help  Functional Cognition: Needed some help  Prior Device Use: Walker    Eating  Assistance Needed: Setup or clean-up assistance  CARE Score: 5  Discharge Goal: Independent  Oral Hygiene  Assistance Needed: Partial/moderate assistance  Comment: Pt demo poor standing balance and required Min A to maintain upright posture. CARE Score: 3  Discharge Goal: Independent  Toileting Hygiene  Assistance Needed: Supervision or touching assistance  Comment: 3(deemed usual performance per team huddle)  CARE Score: 4  Discharge Goal: Independent  Shower/Bathe Self  Assistance Needed: Partial/moderate assistance  CARE Score: 3  Discharge Goal: Independent  Upper Body Dressing  Assistance Needed: Setup or clean-up assistance  CARE Score: 5  Discharge Goal: Independent  Lower Body Dressing  Assistance Needed: Partial/moderate assistance  Comment: Min A to maintain upright posture.   CARE Score: 3  Discharge Goal: Independent  Putting On/Taking Off Footwear  Assistance Needed: Setup or clean-up assistance  CARE Score: 5  Discharge Goal: Independent    Roll Left and Right  Assistance Needed: Supervision or touching assistance  Comment: required SBA, no use of bed features  CARE Score: 4  Discharge Goal: Independent  Sit to Lying  Assistance Needed: Supervision or touching assistance  Comment: required SBA, no use of bed features  CARE Score: 4  Discharge Goal: Independent  Sit to Stand  Assistance Needed: Supervision or touching assistance  Comment: required CGA using RW  CARE Score: 4  Discharge Goal: Independent  Chair/Bed-to-Chair Transfer  Assistance Needed: Supervision or touching assistance  Comment: required CGA  CARE Score: 4  Discharge Goal: Independent  Toilet Transfer  Assistance Needed: Supervision or touching assistance  Comment: 3(deemed usual performance per team huddle)  CARE Score: 4  Discharge Goal: Independent  Car Transfer  Assistance Needed: Supervision or touching assistance  Comment: required CGA  CARE Score: 4  Discharge Goal: Independent   Walk 10 Feet? Walk 10 Feet?: Yes  1 Step  1 Step?: Yes  Picking Up Object  Assistance Needed: Partial/moderate assistance  Comment: pt required Min A with LUE support on RW (task completed without use of reacher)  CARE Score: 3  Discharge Goal: Independent  Wheelchair Ability  Uses a Wheelchair and/or Scooter?: No                  I      Exam:    Blood pressure 118/81, pulse 66, temperature 98.6 °F (37 °C), temperature source Oral, resp. rate 17, height 5' 2\" (1.575 m), weight 239 lb (108.4 kg), SpO2 93 %. General: Up in a bedside chair with legs elevated. Fair attention. HEENT: MMM. Neck supple. Pulmonary: Unlabored and without coughing. Cardiac: Regular rate and rhythm. Abdomen: Patient's abdomen is soft and nondistended. Bowel sounds were present throughout. There was no rebound, guarding or masses noted. Upper extremities: Bimanual tasks are done slowly. Discoordinated. Lower extremities: No signs of DVT. Sitting balance was good. Standing balance was poor.     Lab Results   Component Value Date    WBC 6.7 10/07/2020    HGB 15.5 for blood pressure regulation.  Target systolic blood pressure is 120-140.  Vital signs are checked at rest and with activity.

## 2020-10-15 NOTE — PROGRESS NOTES
Education/Counseling:  Education initiated   Coordination of Nutrition Care:  Continued Inpatient Monitoring    Goals:  Patient will tolerate carb controlled diet with meal intake at least 75%       Nutrition Monitoring and Evaluation:   Behavioral-Environmental Outcomes:  Readiness for Change   Food/Nutrient Intake Outcomes:  Food and Nutrient Intake  Physical Signs/Symptoms Outcomes:  Chewing or Swallowing, Biochemical Data, Skin, Weight     Discharge Planning:    Continue current diet     Electronically signed by Livan Washington RD, LD on 10/15/20 at 3:12 PM EDT    Contact: [phone]

## 2020-10-15 NOTE — PROGRESS NOTES
Ongoing SW/CM Assessment/Plan of Care Note     See SW/CM flowsheets for goals and other objective data.    Patient/Family discharge goal (s):  Goal #1: Psychosocial needs assessed  Goal #2: Home Care arranged or issues addressed  Goal #3: Communication facilitated    PT Recommendation:  Recommendation for Discharge: PT: Intensive therapy program    OT Recommendation:  Recommendations for Discharge: OT: Home, Home therapy, Less intensive rehab    SLP Recommendation:       Disposition:  Planned Discharge Destination: Home/apartment with Services/Support([facility] and resumption of [facility] aide and delivered meals. )    Progress note:     Writer aware pt has been accepted to IRP.      Per report received in OFT[facility], IRP coordinator has started auth and auth is pending at this time.    SW following.       [facility] UNIT  SPEECH/LANGUAGE PATHOLOGY      [x] Daily           [] Discharge    Patient: Amaya Conde      :1950  FLY:[id number]  Rehab Dx/Hx: CVA (cerebral vascular accident) ([facility] [address] [I63.9]  Acute CVA (cerebrovascular accident) ([facility] [address] [I63.9]   No Known Allergies  Precautions:  Restrictions/Precautions: General Precautions, Fall Risk(R eye deficit (double vision/decreased ability to focus), mild RLE weakness)          Home Situation/IADL:   Social/Functional History  Lives With: Significant other(Duane)  Type of Home: Apartment  Home Layout: (3rd floor c elevator)  Home Access: Level entry  Bathroom Shower/Tub: Tub/Shower unit  Bathroom Toilet: Handicap height  Bathroom Equipment: Grab bars in shower, Grab bars around toilet  Bathroom Accessibility: Walker accessible  Home Equipment: Rolling walker  ADL Assistance: Independent  Homemaking Assistance: Independent  Homemaking Responsibilities: Yes  Meal Prep Responsibility: No  Laundry Responsibility: No  Cleaning Responsibility: No  Bill Paying/Finance Responsibility: Primary(Pt shares responsibility c significant other)  Shopping Responsibility: No  Dependent Care Responsibility: Secondary(Pt has cat)  Health Care Management: Primary  Ambulation Assistance: Independent(Mod Ind using RW (household distances usually at baseline))  Transfer Assistance: Independent  Active : No  Patient's  Info: Pt's significant other Duane  Mode of Transportation: Car  Occupation: On disability  Leisure & Hobbies: Pt enjoys staying home to watch tv  Additional Comments: Pt reports, falling 5x times or more this year with some requiring hospitalization but no significant injuries; sleeps in regular, flat bed; R hand dominant      Date of Admit: 10/8/2020  Room #: 1013/1013-A     ST Number of Minutes/Billable Intervention  Cog/Memory Deficits 32    Aphasia/Language     Dysarthria/Speech     Apraxia/Speech Dysphagia/Swallowing     Group     Other    TOTAL Minutes Billed  32    Variance          Date: 10/15/2020  Day of ARU Week:  1       SLP Individual Minutes  Time In: 0932  Time Out: 1004  Minutes: 32     Variance/Reason:  [] Refusal due to   [] Medical hold/reason  [] Illness   [] Off Unit for test/procedure  [] Extra time needed to complete task  [] Other (specify)    Activity completed: Pt seen for fx carryover of learned sequences for transfers and walker safety. Pain: denies; c/o dizziness  Current Diet: DIET GENERAL; Carb Control: 4 carb choices (60 gms)/meal; Low Sodium (2 GM)  Subjective: Pt alert and cooperative; gets easily distracted and requires redirect. Pt impulsive. Responds to cues. Goals and POC: Co-treats where appropriate with PT or OT to facilitate patient goals in functional tasks. LTG                           Short-term Goals  Timeframe for Short-term Goals: 3xs weekly for 2 weeks  Goal 1: Pt will recall 3 items following a 3-5 min delay when provided with min cues  Tx targeted specific tasks of staying close to walker, slowing down, and reaching back. Pt needed mod to min cues. Gets easily distracted when talking and speeds up rate and loses focus. Pt responds well to cues. Able to verbalize 2/3 tasks throughout session. Continued to miss one that differed each time requested to recall. R visual deficits also impacting performance with impulsivity. Goal 2: Pt will recall learned safety protocols during a functional task with 90% accuracy Mod to min cues 67% acc. Spaced retrieval utilized to target 3 processes along with visual and tactile cues. 2/3 recall  Goal 3: Pt will complete medication mgmt tasks for independence at home  Pt will need cues and assist. Carryover is limited. Pt unable to recall reviewed meds.                                      Alarm placed: []bed [x]chair   []other:   [] activated        Barriers to progress:   [] Fatigue        [x] Cognitive Deficits   [x] Memory Deficits   [x] Reduced Attn   [] Self Limiting Behaviors    [] Reduced insight/awareness     [] Visual Deficits   [] Premorbid Conditions  [x] Impulsivity     [] Other      Education/Interventions used this date: safety review via spaced retrieval.     [x]   Progress was updated and reviewed in [facility] with patient and/or family this         date. [] Attending Care Conference for pt this date. See Team Patient Care Conference Note for updates.     Interventions used this date:  [] Speech/Language Treatment    [] Instruction in HEP    Group   [] Dysphagia Treatment   [x] Cognitive Skill Alberto    Other:         Assessment / Impression                                                          Treatment/Activity Tolerance:   [x] Tolerated Treatment well:     [] Patient limited by fatigue/pain:       [] Patient limited by medical complications:    [] Adverse Reaction to Tx:   [] Significant change in status:      Electronically Signed by  Delano Garcia Alfredo Alley    10/15/2020  10:07 AM

## 2020-10-16 LAB
GLUCOSE BLD-MCNC: 186 MG/DL (ref 70–99)
GLUCOSE BLD-MCNC: 202 MG/DL (ref 70–99)
GLUCOSE BLD-MCNC: 236 MG/DL (ref 70–99)
GLUCOSE BLD-MCNC: 276 MG/DL (ref 70–99)

## 2020-10-16 PROCEDURE — 94761 N-INVAS EAR/PLS OXIMETRY MLT: CPT

## 2020-10-16 PROCEDURE — 1280000000 HC REHAB R&B

## 2020-10-16 PROCEDURE — 97110 THERAPEUTIC EXERCISES: CPT

## 2020-10-16 PROCEDURE — 6370000000 HC RX 637 (ALT 250 FOR IP): Performed by: INTERNAL MEDICINE

## 2020-10-16 PROCEDURE — 94150 VITAL CAPACITY TEST: CPT

## 2020-10-16 PROCEDURE — 97535 SELF CARE MNGMENT TRAINING: CPT

## 2020-10-16 PROCEDURE — 82962 GLUCOSE BLOOD TEST: CPT

## 2020-10-16 PROCEDURE — 6360000002 HC RX W HCPCS: Performed by: INTERNAL MEDICINE

## 2020-10-16 PROCEDURE — 97530 THERAPEUTIC ACTIVITIES: CPT

## 2020-10-16 PROCEDURE — 99232 SBSQ HOSP IP/OBS MODERATE 35: CPT | Performed by: PHYSICAL MEDICINE & REHABILITATION

## 2020-10-16 PROCEDURE — 97116 GAIT TRAINING THERAPY: CPT

## 2020-10-16 PROCEDURE — 6370000000 HC RX 637 (ALT 250 FOR IP): Performed by: PHYSICAL MEDICINE & REHABILITATION

## 2020-10-16 RX ADMIN — INSULIN GLARGINE 40 UNITS: 100 INJECTION, SOLUTION SUBCUTANEOUS at 21:29

## 2020-10-16 RX ADMIN — METOPROLOL TARTRATE 50 MG: 50 TABLET, FILM COATED ORAL at 21:28

## 2020-10-16 RX ADMIN — METOPROLOL TARTRATE 50 MG: 50 TABLET, FILM COATED ORAL at 08:34

## 2020-10-16 RX ADMIN — INSULIN LISPRO 4 UNITS: 100 INJECTION, SOLUTION INTRAVENOUS; SUBCUTANEOUS at 08:36

## 2020-10-16 RX ADMIN — LISINOPRIL AND HYDROCHLOROTHIAZIDE 2 TABLET: 12.5; 1 TABLET ORAL at 08:34

## 2020-10-16 RX ADMIN — POTASSIUM CHLORIDE 20 MEQ: 20 TABLET, EXTENDED RELEASE ORAL at 21:28

## 2020-10-16 RX ADMIN — OXYBUTYNIN CHLORIDE 5 MG: 5 TABLET ORAL at 14:48

## 2020-10-16 RX ADMIN — OXYBUTYNIN CHLORIDE 5 MG: 5 TABLET ORAL at 08:35

## 2020-10-16 RX ADMIN — CLOPIDOGREL BISULFATE 75 MG: 75 TABLET ORAL at 08:35

## 2020-10-16 RX ADMIN — INSULIN LISPRO 6 UNITS: 100 INJECTION, SOLUTION INTRAVENOUS; SUBCUTANEOUS at 12:50

## 2020-10-16 RX ADMIN — OXYBUTYNIN CHLORIDE 5 MG: 5 TABLET ORAL at 21:28

## 2020-10-16 RX ADMIN — PANTOPRAZOLE SODIUM 40 MG: 40 TABLET, DELAYED RELEASE ORAL at 05:36

## 2020-10-16 RX ADMIN — ENOXAPARIN SODIUM 30 MG: 30 INJECTION SUBCUTANEOUS at 08:35

## 2020-10-16 RX ADMIN — POTASSIUM CHLORIDE 20 MEQ: 20 TABLET, EXTENDED RELEASE ORAL at 08:35

## 2020-10-16 RX ADMIN — INSULIN LISPRO 1 UNITS: 100 INJECTION, SOLUTION INTRAVENOUS; SUBCUTANEOUS at 21:29

## 2020-10-16 RX ADMIN — ATORVASTATIN CALCIUM 10 MG: 10 TABLET, FILM COATED ORAL at 21:31

## 2020-10-16 RX ADMIN — INSULIN LISPRO 4 UNITS: 100 INJECTION, SOLUTION INTRAVENOUS; SUBCUTANEOUS at 17:58

## 2020-10-16 ASSESSMENT — PAIN SCALES - GENERAL: PAINLEVEL_OUTOF10: 0

## 2020-10-16 NOTE — PROGRESS NOTES
Physical Therapy    [x] daily progress note       [] discharge       Patient Name:  Nerissa Bingham   :  1950 MRN: [id number]  Room:  [address] [place] [address] Date of Admission: 10/8/2020  Rehabilitation Diagnosis:   CVA (cerebral vascular accident) [facility]) [I63.9]  Acute CVA (cerebrovascular accident) ([facility] [address] [I63.9]       Date 10/16/2020       Day of ARU Week:  2   Time IN/OUT 1000/1100   Individual Tx Minutes 60   Group Tx Minutes    Co-Treat Minutes    Concurrent Tx Minutes    TOTAL Tx Time Mins 60   Variance Time    Variance Time []   Refusal due to:     []   Medical hold/reason:    []   Illness   []   Off Unit for test/procedure  []   Extra time needed to complete task  []   Therapeutic need  []   Other (specify):   Restrictions Restrictions/Precautions  Restrictions/Precautions: General Precautions, Fall Risk(R eye deficit (double vision/decreased ability to focus), mild RLE weakness)      Interdisciplinary communication [x]   Cleared for therapy per nursing     []   RN notified about issues during session  []   RN updated on pt performance  []   Spoke with   []   Spoke with OT  []   Spoke with MD  []   Other:    Subjective observations and cognitive status: Pt resting in recliner, alert, pleasant mood. \"When my head spins I can't do no more. I don't want to pass out. \"      Pain level/location: None reported at rest and with activities   Discharge recommendations  Anticipated discharge date:  10/21/2020  Destination: []home alone   []home alone with assist PRN     [x] home w/ family      [x] Continuous supervision  []SNF    [] Assisted living     [] Other:  Continued therapy: [x]HHC PT  []OUTPATIENT  PT   [] No Further PT  []SNF PT  Caregiver training recommended: [x]Yes  [] No   Equipment needs: CALVIN Kirby requires the assistance of a front-wheeled walker to successfully ambulate from room to room at home to allow completion of daily living tasks such as: bathing, toileting, dressing and grooming. A wheeled walker is necessary due to the patient's unsteady gait, upper body weakness, inability to  a standard walker. This patient can ambulate only by pushing a walker instead of using a lesser assistive device such as a cane or crutch. Bed Mobility:           [x]   Pt received out of bed     Transfers:    Sit--> Stand: Mod Ind   Stand --> Sit:   SBA-Sup  Assistive device required for transfer:   RW    Gait:    Distance:  20 ft with 2 turns x 2 trials + 50 ft (max; limited by fatigue)   Assistance:  SBA (usual)->CGA   Device:  RW  Gait Quality: starts with slow tello and then unpredictably increases as distance progressed, step-to and then gradually progresses to step-through    Additional Therapeutic activities/exercises completed this date:     []   Nu-step:  Time:        Level:         #Steps:       []   Rebounder:    []  Seated     []  Standing        []   Balance training         []   Postural training    []   Supine ther ex (reps/sets):     [x]   Seated ther ex (reps/sets): resisted R/L ankle PF/DF, knee flexion x 5 sec hold x 20 reps using blue t-band, LAQ x 5 sec hold x 10 reps x 1 set with 3# ankle weight, R/L hip and knee flexion with 3# ankle weight x 10 reps x 1 set    []   Standing ther ex (reps/sets):     []   Picking up object from floor (standing):                   []   Reacher used   []   Other:  Toileting activity completed with    [x]   Other: Vital signs assessment prior to ambulation: ZP=705/60, HR=57    Comments:      Patient/Caregiver Education and Training:   [x]   Role of PT  []   Education about Dx  []   Use of call light for assist   []   HEP provided and explained   [x]   Treatment plan reviewed  []   Home safety  []   Wheelchair mobility/management   []   Body mechanics  []   Bed Mobility/Transfer technique  [x]   Gait technique/sequencing  []   Proper use of assistive device/adaptive equipment  []   Stair training/Advanced mobility safety and technique  [] Therapeutic Exercise 15   Neuro Re-Ed    Therapeutic Activity 15   Wheelchair Propulsion    Group    Other:    TOTAL 60         Social History  Social/Functional History  Lives With: Significant other(Duane)  Type of Home: Apartment  Home Layout: (3rd floor c elevator)  Home Access: Level entry  Bathroom Shower/Tub: Tub/Shower unit  Bathroom Toilet: Handicap height  Bathroom Equipment: Grab bars in shower, Grab bars around toilet  Bathroom Accessibility: Walker accessible  Home Equipment: Rolling walker  ADL Assistance: Independent  Homemaking Assistance: Independent  Homemaking Responsibilities: Yes  Meal Prep Responsibility: No  Laundry Responsibility: No  Cleaning Responsibility: No  Bill Paying/Finance Responsibility: Primary(Pt shares responsibility c significant other)  Shopping Responsibility: No  Dependent Care Responsibility: Secondary(Pt has cat)  Health Care Management: Primary  Ambulation Assistance: Independent(Mod Ind using RW (household distances usually at baseline))  Transfer Assistance: Independent  Active : No  Patient's  Info: Pt's significant other Duane  Mode of Transportation: Car  Occupation: On disability  Leisure & Hobbies: Pt enjoys staying home to watch tv  Additional Comments: Pt reports, falling 5x times or more this year with some requiring hospitalization but no significant injuries; sleeps in regular, flat bed; R hand dominant    Objective                                                                                    Goals:  (Update in navigator)  Short term goals  Time Frame for Short term goals: 10 tx days:  Short term goal 1: Pt will complete rolling L/R and sup<->sit Ind. Short term goal 2: Pt will complete OOB transfers using RW Mod Ind. Short term goal 3: Pt will ambulate 150 ft using RW with SBA-Sup. Short term goal 4: Pt will ascend/descend 4-5 steps using bilat rails with SBA-Sup.   Short term goal 5: Pt will ascend/descend curb step and ambulate over uneven surfaces using RW with SBA-Sup. Short term goal 6: Pt will complete object retrieval in standing with 1UE support using adaptive equipment prn Mod Ind.:   :        Plan of Care                                                                              Times per week: 5 days per week for a minimum of 60 minutes/day plus group as appropriate for 60 minutes.   Treatment to include Current Treatment Recommendations: Strengthening, Functional Mobility Training, Home Exercise Program, Equipment Evaluation, Education, & procurement, Transfer Training, Gait Training, Safety Education & Training, Balance Training, Endurance Training, Stair training, Patient/Caregiver Education & Training, Cognitive/Perceptual Training    Electronically signed by   Luma Encarnacion PT   10/16/2020, 10:07 AM

## 2020-10-16 NOTE — FLOWSHEET NOTE
[x] daily progress note       [] discharge       Patient Name:  Hayes Thomas   :  1950 MRN: [id number]  Room:  [address] [place] [address] Date of Admission: 10/8/2020  Rehabilitation Diagnosis:   CVA (cerebral vascular accident) [facility]) [I63.9]  Acute CVA (cerebrovascular accident) ([facility] [address] [I63.9]       Date 10/16/2020       Day of ARU Week:  2   Time IN/OUT 2623-6082   Individual Tx Minutes 60   TOTAL Tx Time Mins 60   Restrictions Restrictions/Precautions  Restrictions/Precautions: General Precautions, Fall Risk(R eye deficit (double vision/decreased ability to focus), mild RLE weakness)      Communication with other providers: [x]   OK to see per nursing:     []   Spoke with team member regarding:      Subjective observations and cognitive status: Pt seen sitting up on EOB at beginning of treatment. Agreeable to therapy. Pt required mod encouragement throughout treatment to participate in out of bed activities. Pain level/location: 0/10        Discharge recommendations  Anticipated discharge date:  10/21/2020  Destination: []?home alone   []?home alone with assist PRN     [x]? home w/ family      [x]? Continuous supervision  []? SNF    []? Assisted living     []? Other:  Continued therapy: [x]? Venancio Campos PT  []? OUTPATIENT  PT   []? No Further PT  []? SNF PT  Caregiver training recommended: [x]? Yes  []? No   Equipment needs: CALVIN Kirby requires the assistance of a front-wheeled walker to successfully ambulate from room to room at home to allow completion of daily living tasks such as: bathing, toileting, dressing and grooming. A wheeled walker is necessary due to the patient's unsteady gait, upper body weakness, inability to  a standard walker. This patient can ambulate only by pushing a walker instead of using a lesser assistive device such as a cane or crutch.        [x]   Pt received out of bed     Transfers:    Sit--> Stand:  SBA  Stand --> Sit:   SBA  Stand pivot transfer:   SBA  Toilet Transfer: History  Lives With: Significant other(Duane)  Type of Home: Apartment  Home Layout: (3rd floor c elevator)  Home Access: Level entry  Bathroom Shower/Tub: Tub/Shower unit  Bathroom Toilet: Handicap height  Bathroom Equipment: Grab bars in shower, Grab bars around toilet  Bathroom Accessibility: Walker accessible  Home Equipment: Rolling walker  ADL Assistance: Independent  Homemaking Assistance: Independent  Homemaking Responsibilities: Yes  Meal Prep Responsibility: No  Laundry Responsibility: No  Cleaning Responsibility: No  Bill Paying/Finance Responsibility: Primary(Pt shares responsibility c significant other)  Shopping Responsibility: No  Dependent Care Responsibility: Secondary(Pt has cat)  Health Care Management: Primary  Ambulation Assistance: Independent(Mod Ind using RW (household distances usually at baseline))  Transfer Assistance: Independent  Active : No  Patient's  Info: Pt's significant other Duane  Mode of Transportation: Car  Occupation: On disability  Leisure & Hobbies: Pt enjoys staying home to watch tv  Additional Comments: Pt reports, falling 5x times or more this year with some requiring hospitalization but no significant injuries; sleeps in regular, flat bed; R hand dominant    Objective                                                                                    Goals:  (Update in navigator)  Short term goals  Time Frame for Short term goals: 10 tx days:  Short term goal 1: Pt will complete rolling L/R and sup<->sit Ind. Short term goal 2: Pt will complete OOB transfers using RW Mod Ind. Short term goal 3: Pt will ambulate 150 ft using RW with SBA-Sup. Short term goal 4: Pt will ascend/descend 4-5 steps using bilat rails with SBA-Sup. Short term goal 5: Pt will ascend/descend curb step and ambulate over uneven surfaces using RW with SBA-Sup.   Short term goal 6: Pt will complete object retrieval in standing with 1UE support using adaptive equipment prn Mod Ind.:   : Plan of Care                                                                              Times per week: 5 days per week for a minimum of 60 minutes/day plus group as appropriate for 60 minutes.   Treatment to include Current Treatment Recommendations: Strengthening, Functional Mobility Training, Home Exercise Program, Equipment Evaluation, Education, & procurement, Transfer Training, Gait Training, Safety Education & Training, Balance Training, Endurance Training, Stair training, Patient/Caregiver Education & Training, Cognitive/Perceptual Training    Electronically signed by   Edilma Llamas [id number]  10/16/2020, 1:49 PM

## 2020-10-16 NOTE — PROGRESS NOTES
Physical Rehabilitation: OCCUPATIONAL THERAPY     [x] daily progress note       [] discharge       Patient Name:  Heriberto Adorno   :  1950 MRN: [id number]  Room:  [address] [place], [place] [address] Date of Admission: 10/8/2020  Rehabilitation Diagnosis:   CVA (cerebral vascular accident) [facility]) [I63.9]  Acute CVA (cerebrovascular accident) ([facility] [address] [I63.9]       Date 10/16/2020       Day of ARU Week:  2   Time IN//910   Individual Tx Minutes 60   Group Tx Minutes    Co-Treat Minutes    Concurrent Tx Minutes    TOTAL Tx Time Mins 60   Variance Time    Variance Time []   Refusal due to:     []   Medical hold/reason:    []   Illness   []   Off Unit for test/procedure  []   Extra time needed to complete task  []   Therapeutic need  []   Other (specify):   Restrictions Restrictions/Precautions  Restrictions/Precautions: General Precautions, Fall Risk(R eye deficit (double vision/decreased ability to focus), mild RLE weakness)      Communication with other providers: [x]   OK to see per nursing:     []   Spoke with team member regarding:      Subjective observations and cognitive status: Patient seated EOB pleasant and agreeable to ADL session this date      Pain level/location:    /10       Location: no pain per patient    Discharge recommendations  Anticipated discharge date:  10/21  Destination: []?home alone   []?home alone w assist prn   []? home w/ family    []? Continuous supervision       []? SNF    []? Assisted living     []? Other:   Continued therapy: [x]? [facility] AT [facility] OT  []? OUTPATIENT  OT   []? No Further OT  Equipment needs: Leanne Guerra the assistance of a tub transfer bench to successfully and safely complete bathing activities necessary due to reduced balance, endurance, need for ADL assist, and LE weakness and reduced ROM.  These tasks cannot be safely completed without this device and would place Sarai Kirby at greater risk of falls   (HIT F2 to transition between stars)    Eating/Feeding:    Mod I Extremity Used:        []    R UE []    L UE         Dentures: []   N/A    []    Partial []    Full  Adaptive Equipment Used:        Grooming: Mod I   Oral Hygiene: Mod I       Bathing:   SBA        Dressing:      Upper Body Dressing: Mod I   Lower Body Dressing:  Sup when standing to manage pants fully, vc for posture to reduce fall risk   Footwear: Mod I     Toileting:   Sup vc for posture managing pants in stand        Toilet Transfers:   Sup with directional change c RW proper hand feet and body placement during turn   Device Used:    []   Standard Toilet         []   Ky Jelly           []  Bedside Commode       []   Elevated Toilet          []   Other:        Tub/Shower Transfer:   SBA vc for RW use with directional change   Device Used:    [x]   Shower Bench      []   Shower Chair      []   Tub Transfer Bench           []   Bathtub    []   Shower         [x]   Other:         Bed Mobility:           [x]   Pt received out of bed       Transfers:    Sit--> Stand:   Mod I   Stand --> Sit:   Sup  Stand-Pivot:   Sup  Other:  Patient requires 1/2 vc for hand and body  Placement with RW    Assistive device required for transfer:   RW       Functional Mobility:  Throughout room and bathroom during ADL   Assistance:  SBA  Device:   [x]   Rolling Walker     []   Standard Walker []   Wheelchair        []   Biomonde       []   4-Wheeled [place]ow [place]         []   Cardiac Willow [place]       []   Other:        Homemaking Tasks:  SBA  Patient gathers and retrieves clothing from closet, uses RW to transport and hangs clothing in bathroom to prepare for shower   Patient c 2 episodes of lightheadedness/dizziness during preparation for shower, patient requires 2 seated rest breaks     Additional Therapeutic activities/exercises completed this date:     [x]   ADL Training   [x]   Balance/Postural training     [x]   Bed/Transfer Training   [x]   Endurance Training   []   Neuromuscular Re-ed   []   Nu-step:  Time:        Level:         #Steps: []   Rebounder:    []  Seated     []  Standing        []   Supine Ther Ex (reps/sets):     []   Seated Ther Ex (reps/sets):     []   Standing Ther Ex (reps/sets):     []   Other:      Comments:   Patient requires frequent rest breaks and extra time to fully complete shower ADL     Patient/Caregiver Education and Training:   [x]   Adaptive Equipment Use  [x]   Bed Mobility/Transfer Technique/Safety  [x]   Energy Conservation Tips  []   Family training  [x]   Postural Awareness  [x]   Safety During Functional Activities  []   Reinforced Patient's Precautions   []   Progress was updated and reviewed in Rehabtracker with patient and/or family this         date.      Treatment Plan for Next Session: POC to continue as tolerated; TTB training     Assessment:        Treatment/Activity Tolerance:   [x] Tolerated treatment with no adverse effects    [] Patient limited by fatigue  [] Patient limited by pain   [] Patient limited by medical complications:    [] Adverse reaction to Tx:   [] Significant change in status    Safety:       []  bed alarm set    [x]  chair alarm set    []  Pt refused alarms                []  Telesitter activated      [x]  Gait belt used during tx session      []other:       Number of Minutes/Billable Intervention  Therapeutic Exercise    ADL Self-care 45   Neuro Re-Ed    Therapeutic Activity 15   Group    Other:    TOTAL 60     Billing corrected by PRICE Reid/L 10/30/2020  Social History  Social/Functional History  Lives With: Significant other(Duane)  Type of Home: Apartment  Home Layout: (3rd floor c elevator)  Home Access: Level entry  Bathroom Shower/Tub: Tub/Shower unit  Bathroom Toilet: Handicap height  Bathroom Equipment: Grab bars in shower, Grab bars around toilet  Bathroom Accessibility: Walker accessible  Home Equipment: Rolling walker  ADL Assistance: Independent  Homemaking Assistance: Independent  Homemaking Responsibilities: Yes  Meal Prep Responsibility: No  Laundry Responsibility: No  Cleaning Responsibility: No  Bill Paying/Finance Responsibility: Primary(Pt shares responsibility c significant other)  Shopping Responsibility: No  Dependent Care Responsibility: Secondary(Pt has cat)  Health Care Management: Primary  Ambulation Assistance: Independent(Mod Ind using RW (household distances usually at baseline))  Transfer Assistance: Independent  Active : No  Patient's  Info: Pt's significant other Duane  Mode of Transportation: Car  Occupation: On disability  Leisure & Hobbies: Pt enjoys staying home to watch tv  Additional Comments: Pt reports, falling 5x times or more this year with some requiring hospitalization but no significant injuries; sleeps in regular, flat bed; R hand dominant    Objective                                                                                    Goals:  (Update in navigator)  Short term goals  Time Frame for Short term goals: STG=LTG:  Long term goals  Time Frame for Long term goals : 10-12 days or until d/c  Long term goal 1: Pt will increase BUE coordination/fine motor skills in order to complete feeding/grooming/oral care task c MOD I by d/c  Long term goal 2: Pt will complete total body bathing c MOD I by d/c  Long term goal 3: Pt will complete UB dressing c MOD I and retrieve items from closet by d/c  Long term goal 4: Pt will complete LB dressing c MOD I and retrieve items from closet by d/c  Long term goal 5: Pt will don/doff footwear c MOD I by d/c  Long term goals 6: Pt will complete toileting c MOD I by d/c  Long term goal 7: Pt will complete functional transfer (toilet, tub, shower) c MOD I by d/c. Long term goal 8: Pt will perform therex/therax to facilitate increased strength/ax tolerance (c emphasis on dynamic standing balance/tolerance >10 mins) c MOD I in order to complete ADLs. :        Plan of Care                                                                              Times per week: 5 days per week for a minimum of 60 minutes/day plus group as appropriate for 60 minutes.   Treatment to include Plan  Times per day: Daily  Current Treatment Recommendations: Neuromuscular Re-education    Electronically signed by   DIALLO Swan  10/16/2020, 7:46 AM

## 2020-10-16 NOTE — PROGRESS NOTES
Case mgt alerted by Brittany Camejo LPN that patient is requiring more diabetic care and she has no supplies at home. Spoke with Mushtaq Cantu RN on need to start education asap. Included that patient may have low reading level, so pamphlets won't be effective education tools. Case mgt left VM for patient's sig other inviting for caregiver training Monday 10/19. Requested return call with preferred time.

## 2020-10-16 NOTE — PROGRESS NOTES
Cam Dalal    : 1950  Acct #: [de-identified]  MRN: [id number]              PM&R Progress Note      Admitting diagnosis: Acute CVA (dorsal left ian)     Comorbid diagnoses impacting rehabilitation: Right hemiparesis, gait disturbance, essential hypertension, uncontrolled diabetes with hyperglycemia, morbid obesity (BMI 45.5), disconjugate gaze.     Chief complaint: Not sleeping well. Fair appetite. Prior (baseline) level of function: Independent. Current level of function:         Current  IRF-KRISTYN and Goals:   Hearing, Speech, and Vision  Expression of Ideas and Wants: Without difficulty  Understanding Verbal and Non-Verbal Content: Understands  Prior Functioning: Everyday Activities  Self Care: Independent  Indoor Mobility (Ambulation): Independent  Stairs: Needed some help  Functional Cognition: Needed some help  Prior Device Use: Walker    Eating  Assistance Needed: Setup or clean-up assistance  CARE Score: 5  Discharge Goal: Independent  Oral Hygiene  Assistance Needed: Partial/moderate assistance  Comment: Pt demo poor standing balance and required Min A to maintain upright posture. CARE Score: 3  Discharge Goal: Independent  Toileting Hygiene  Assistance Needed: Supervision or touching assistance  Comment: 3(deemed usual performance per team huddle)  CARE Score: 4  Discharge Goal: Independent  Shower/Bathe Self  Assistance Needed: Partial/moderate assistance  CARE Score: 3  Discharge Goal: Independent  Upper Body Dressing  Assistance Needed: Setup or clean-up assistance  CARE Score: 5  Discharge Goal: Independent  Lower Body Dressing  Assistance Needed: Partial/moderate assistance  Comment: Min A to maintain upright posture.   CARE Score: 3  Discharge Goal: Independent  Putting On/Taking Off Footwear  Assistance Needed: Setup or clean-up assistance  CARE Score: 5  Discharge Goal: Independent    Roll Left and Right  Assistance Needed: Supervision or touching assistance  Comment: required SBA, no use of bed features  CARE Score: 4  Discharge Goal: Independent  Sit to Lying  Assistance Needed: Supervision or touching assistance  Comment: required SBA, no use of bed features  CARE Score: 4  Discharge Goal: Independent  Sit to Stand  Assistance Needed: Supervision or touching assistance  Comment: required CGA using RW  CARE Score: 4  Discharge Goal: Independent  Chair/Bed-to-Chair Transfer  Assistance Needed: Supervision or touching assistance  Comment: required CGA  CARE Score: 4  Discharge Goal: Independent  Toilet Transfer  Assistance Needed: Supervision or touching assistance  Comment: 3(deemed usual performance per team huddle)  CARE Score: 4  Discharge Goal: Independent  Car Transfer  Assistance Needed: Supervision or touching assistance  Comment: required CGA  CARE Score: 4  Discharge Goal: Independent   Walk 10 Feet? Walk 10 Feet?: Yes  1 Step  1 Step?: Yes  Picking Up Object  Assistance Needed: Partial/moderate assistance  Comment: pt required Min A with LUE support on RW (task completed without use of reacher)  CARE Score: 3  Discharge Goal: Independent  Wheelchair Ability  Uses a Wheelchair and/or Scooter?: No                  I      Exam:    Blood pressure (!) 111/42, pulse 75, temperature 98.5 °F (36.9 °C), temperature source Oral, resp. rate 16, height 5' 2\" (1.575 m), weight 239 lb (108.4 kg), SpO2 91 %. General: Laying back in bed. Resting. In no distress. HEENT: Mild dysarthric speech. Neck supple. Pulmonary: No rales or rhonchi. Cardiac: RRR. Abdomen: Patient's abdomen is soft and nondistended. Bowel sounds were present throughout. There was no rebound, guarding or masses noted. Upper extremities: No new bruising or swelling. Clumsy bilaterally. Lower extremities: Coarse movements at the knees and ankles without signs of DVT. Sitting balance was good. Standing balance was poor.     Lab Results   Component Value Date    WBC 6.7 10/07/2020    HGB 15.5 10/07/2020 regulation.  Target systolic blood pressure is 120-140.  Vital signs are checked at rest and with activity.

## 2020-10-16 NOTE — PROGRESS NOTES
Therapy Coordinator Note  10/15/2020    30 minutes missed in scheduling error. Will make up minutes due to scheduling error. [address].  [place], [place], [address] [facility] [place] 10/16/2020  2:44 PM

## 2020-10-17 LAB
GLUCOSE BLD-MCNC: 140 MG/DL (ref 70–99)
GLUCOSE BLD-MCNC: 175 MG/DL (ref 70–99)
GLUCOSE BLD-MCNC: 220 MG/DL (ref 70–99)
GLUCOSE BLD-MCNC: 224 MG/DL (ref 70–99)

## 2020-10-17 PROCEDURE — 6370000000 HC RX 637 (ALT 250 FOR IP): Performed by: INTERNAL MEDICINE

## 2020-10-17 PROCEDURE — 1280000000 HC REHAB R&B

## 2020-10-17 PROCEDURE — 94150 VITAL CAPACITY TEST: CPT

## 2020-10-17 PROCEDURE — 82962 GLUCOSE BLOOD TEST: CPT

## 2020-10-17 PROCEDURE — 94761 N-INVAS EAR/PLS OXIMETRY MLT: CPT

## 2020-10-17 PROCEDURE — 6370000000 HC RX 637 (ALT 250 FOR IP): Performed by: PHYSICAL MEDICINE & REHABILITATION

## 2020-10-17 PROCEDURE — 97530 THERAPEUTIC ACTIVITIES: CPT

## 2020-10-17 PROCEDURE — 6360000002 HC RX W HCPCS: Performed by: INTERNAL MEDICINE

## 2020-10-17 RX ADMIN — INSULIN LISPRO 1 UNITS: 100 INJECTION, SOLUTION INTRAVENOUS; SUBCUTANEOUS at 20:53

## 2020-10-17 RX ADMIN — INSULIN LISPRO 4 UNITS: 100 INJECTION, SOLUTION INTRAVENOUS; SUBCUTANEOUS at 09:20

## 2020-10-17 RX ADMIN — PANTOPRAZOLE SODIUM 40 MG: 40 TABLET, DELAYED RELEASE ORAL at 05:25

## 2020-10-17 RX ADMIN — OXYBUTYNIN CHLORIDE 5 MG: 5 TABLET ORAL at 09:17

## 2020-10-17 RX ADMIN — METOPROLOL TARTRATE 50 MG: 50 TABLET, FILM COATED ORAL at 09:17

## 2020-10-17 RX ADMIN — POTASSIUM CHLORIDE 20 MEQ: 20 TABLET, EXTENDED RELEASE ORAL at 20:52

## 2020-10-17 RX ADMIN — INSULIN LISPRO 4 UNITS: 100 INJECTION, SOLUTION INTRAVENOUS; SUBCUTANEOUS at 12:53

## 2020-10-17 RX ADMIN — INSULIN GLARGINE 40 UNITS: 100 INJECTION, SOLUTION SUBCUTANEOUS at 20:53

## 2020-10-17 RX ADMIN — ENOXAPARIN SODIUM 30 MG: 30 INJECTION SUBCUTANEOUS at 09:17

## 2020-10-17 RX ADMIN — OXYBUTYNIN CHLORIDE 5 MG: 5 TABLET ORAL at 20:52

## 2020-10-17 RX ADMIN — METOPROLOL TARTRATE 50 MG: 50 TABLET, FILM COATED ORAL at 20:52

## 2020-10-17 RX ADMIN — OXYBUTYNIN CHLORIDE 5 MG: 5 TABLET ORAL at 14:36

## 2020-10-17 RX ADMIN — ATORVASTATIN CALCIUM 10 MG: 10 TABLET, FILM COATED ORAL at 20:52

## 2020-10-17 RX ADMIN — LISINOPRIL AND HYDROCHLOROTHIAZIDE 2 TABLET: 12.5; 1 TABLET ORAL at 09:16

## 2020-10-17 RX ADMIN — CLOPIDOGREL BISULFATE 75 MG: 75 TABLET ORAL at 09:17

## 2020-10-17 RX ADMIN — POTASSIUM CHLORIDE 20 MEQ: 20 TABLET, EXTENDED RELEASE ORAL at 09:17

## 2020-10-17 ASSESSMENT — PAIN SCALES - GENERAL
PAINLEVEL_OUTOF10: 0

## 2020-10-17 NOTE — PROGRESS NOTES
Occupational Therapy  Physical Rehabilitation: OCCUPATIONAL THERAPY     [x] daily progress note       [] discharge       Patient Name:  Arline Jones   :  1950 MRN: [id number]  Room:  [address] [place] [address] Date of Admission: 10/8/2020  Rehabilitation Diagnosis:   CVA (cerebral vascular accident) [facility]) [I63.9]  Acute CVA (cerebrovascular accident) ([facility] [address] [I63.9]       Date 10/17/2020       Day of ARU Week:  3   Time IN//1015   Individual Tx Minutes 30   Group Tx Minutes    Co-Treat Minutes    Concurrent Tx Minutes    TOTAL Tx Time Mins 30   Variance Time    Variance Time []   Refusal due to:     []   Medical hold/reason:    []   Illness   []   Off Unit for test/procedure  []   Extra time needed to complete task  []   Therapeutic need  []   Other (specify):   Restrictions Restrictions/Precautions: General Precautions, Fall Risk(R eye deficit (double vision/decreased ability to focus), mild RLE weakness)         Communication with other providers: [x]   OK to see per nursing:     []   Spoke with team member regarding:      Subjective observations and cognitive status: Pt pleasant and agreeable to therapy this date. Pain level/location:    /10       Location: no pain indicated per patient   Discharge recommendations  Anticipated discharge date:  10/21  Destination: []home alone   []home alone w assist prn   [] home w/ family    [] Continuous supervision       []SNF    [] Assisted living     [] Other:   Continued therapy: [x]HHC OT  []OUTPATIENT  OT   [] No Further OT  Equipment needs: Nimo Fish the assistance of a tub transfer bench to successfully and safely complete bathing activities necessary due to reduced balance, endurance, need for ADL assist, and LE weakness and reduced ROM.  These tasks cannot be safely completed without this device and would place Sarai Kirby at greater risk of falls       Bed Mobility:           [x]   Pt received out of bed   Rolling R/L:  N/A  Scooting: n/a  Supine --> Sit:  N/A  Sit --> Supine:  N/A     Transfers:   Sit--> Stand:  N/A  Stand --> Sit:   N/A  Stand-Pivot:   n/a  Other:    Assistive device required for transfer:   N/A      Functional Mobility:    Assistance:  N/A   Device:   []   Rolling Walker     []   Standard Walker []   Wheelchair        []   [employer]       []   4-Wheeled Amber Yile         []   Cardiac Walker       []   Other:              Additional Therapeutic activities/exercises completed this date:     []   ADL Training   []   Balance/Postural training     []   Bed/Transfer Training   []   Endurance Training   []   Neuromuscular Re-ed   []   Nu-step:  Time:        Level:         #Steps:       []   Rebounder:    []  Seated     []  Standing        []   Supine Ther Ex (reps/sets):     []   Seated Ther Ex (reps/sets):     []   Standing Ther Ex (reps/sets):     [x]   Other: Therapeutic activity focused on [address], visual accuracy and sequencing. Pt able to follow 2 step direction and sequencing pattern c SUP but requiring min A to find correct color utilizing bead sequencing patterns. Pt demo'd [address] c 100% accuracy. Comments:      Patient/Caregiver Education and Training:   []   [employer] Equipment Use  []   Bed Mobility/Transfer Technique/Safety  [x]   Energy Conservation Tips  []   Family training  []   Postural Awareness  []   Safety During Functional Activities  []   Reinforced Patient's Precautions   []   Progress was updated and reviewed in Rehabtracker with patient and/or family this         date.     Treatment Plan for Next Session: POC continued as tolerated      Assessment:        Treatment/Activity Tolerance:   [x] Tolerated treatment with no adverse effects    [] Patient limited by fatigue  [] Patient limited by pain   [] Patient limited by medical complications:    [] Adverse reaction to Tx:   [] Significant change in status    Safety:       []  bed alarm set    [x]  chair alarm set    []  Pt refused alarms                []  Telesitter activated      []  Gait belt used during tx session      []other:       Number of Minutes/Billable Intervention  Therapeutic Exercise    ADL Self-care    Neuro Re-Ed    Therapeutic Activity 30   Group    Other:    TOTAL 30       Social History  Social/Functional History  Lives With: Significant other(Duane)  Type of Home: Apartment  Home Layout: (3rd floor c elevator)  Home Access: Level entry  Bathroom Shower/Tub: Tub/Shower unit  Bathroom Toilet: Handicap height  Bathroom Equipment: Grab bars in shower, Grab bars around toilet  Bathroom Accessibility: Walker accessible  Home Equipment: Rolling walker  ADL Assistance: Independent  Homemaking Assistance: Independent  Homemaking Responsibilities: Yes  Meal Prep Responsibility: No  Laundry Responsibility: No  Cleaning Responsibility: No  Bill Paying/Finance Responsibility: Primary(Pt shares responsibility c significant other)  Shopping Responsibility: No  Dependent Care Responsibility: Secondary(Pt has cat)  Health Care Management: Primary  Ambulation Assistance: Independent(Mod Ind using RW (household distances usually at baseline))  Transfer Assistance: Independent  Active : No  Patient's  Info: Pt's significant other Duane  Mode of Transportation: Car  Occupation: On disability  Leisure & Hobbies: Pt enjoys staying home to watch tv  Additional Comments: Pt reports, falling 5x times or more this year with some requiring hospitalization but no significant injuries; sleeps in regular, flat bed; R hand dominant    Objective                                                                                    Goals:  (Update in navigator)  Short term goals  Time Frame for Short term goals: STG=LTG:  Long term goals  Time Frame for Long term goals : 10-12 days or until d/c  Long term goal 1: Pt will increase BUE coordination/fine motor skills in order to complete feeding/grooming/oral care task c MOD I by d/c  Long term goal 2: Pt will complete total body bathing c MOD I by d/c  Long term goal 3: Pt will complete UB dressing c MOD I and retrieve items from closet by d/c  Long term goal 4: Pt will complete LB dressing c MOD I and retrieve items from closet by d/c  Long term goal 5: Pt will don/doff footwear c MOD I by d/c  Long term goals 6: Pt will complete toileting c MOD I by d/c  Long term goal 7: Pt will complete functional transfer (toilet, tub, shower) c MOD I by d/c. Long term goal 8: Pt will perform therex/therax to facilitate increased strength/ax tolerance (c emphasis on dynamic standing balance/tolerance >10 mins) c MOD I in order to complete ADLs. :        Plan of Care                                                                              Times per week: 5 days per week for a minimum of 60 minutes/day plus group as appropriate for 60 minutes.   Treatment to include Plan  Times per day: Daily  Current Treatment Recommendations: Neuromuscular Re-education    Electronically signed by   DIALLO Nair  10/17/2020, 8:11 AM

## 2020-10-17 NOTE — PROGRESS NOTES
Connie Yung    : 1950  Acct #: [de-identified]  MRN: [id number]              PM&R Progress Note      Admitting diagnosis: Acute CVA (dorsal left ian)     Comorbid diagnoses impacting rehabilitation: Right hemiparesis, gait disturbance, essential hypertension, uncontrolled diabetes with hyperglycemia, morbid obesity (BMI 45.5), disconjugate gaze.     Chief complaint: Transient nausea without emesis. No new chills or cough. Prior (baseline) level of function: Independent. Current level of function:         Current  IRF-KRISTYN and Goals:   Hearing, Speech, and Vision  Expression of Ideas and Wants: Without difficulty  Understanding Verbal and Non-Verbal Content: Understands  Prior Functioning: Everyday Activities  Self Care: Independent  Indoor Mobility (Ambulation): Independent  Stairs: Needed some help  Functional Cognition: Needed some help  Prior Device Use: Walker    Eating  Assistance Needed: Setup or clean-up assistance  CARE Score: 5  Discharge Goal: Independent  Oral Hygiene  Assistance Needed: Partial/moderate assistance  Comment: Pt demo poor standing balance and required Min A to maintain upright posture. CARE Score: 3  Discharge Goal: Independent  Toileting Hygiene  Assistance Needed: Supervision or touching assistance  Comment: Pt did own hygiene/changed depends  CARE Score: 4  Discharge Goal: Independent  Shower/Bathe Self  Assistance Needed: Partial/moderate assistance  CARE Score: 3  Discharge Goal: Independent  Upper Body Dressing  Assistance Needed: Setup or clean-up assistance  CARE Score: 5  Discharge Goal: Independent  Lower Body Dressing  Assistance Needed: Partial/moderate assistance  Comment: Min A to maintain upright posture.   CARE Score: 3  Discharge Goal: Independent  Putting On/Taking Off Footwear  Assistance Needed: Setup or clean-up assistance  CARE Score: 5  Discharge Goal: Independent    Roll Left and Right  Assistance Needed: Supervision or touching assistance  Comment: required SBA, no use of bed features  CARE Score: 4  Discharge Goal: Independent  Sit to Lying  Assistance Needed: Supervision or touching assistance  Comment: required SBA, no use of bed features  CARE Score: 4  Discharge Goal: Independent  Sit to Stand  Assistance Needed: Supervision or touching assistance  Comment: required CGA using RW  CARE Score: 4  Discharge Goal: Independent  Chair/Bed-to-Chair Transfer  Assistance Needed: Supervision or touching assistance  Comment: required CGA  CARE Score: 4  Discharge Goal: Independent  Toilet Transfer  Assistance Needed: Supervision or touching assistance  Comment: 3(deemed usual performance per team huddle)  CARE Score: 4  Discharge Goal: Independent  Car Transfer  Assistance Needed: Supervision or touching assistance  Comment: required CGA  CARE Score: 4  Discharge Goal: Independent   Walk 10 Feet? Walk 10 Feet?: Yes  1 Step  1 Step?: Yes  Picking Up Object  Assistance Needed: Partial/moderate assistance  Comment: pt required Min A with LUE support on RW (task completed without use of reacher)  CARE Score: 3  Discharge Goal: Independent  Wheelchair Ability  Uses a Wheelchair and/or Scooter?: No                  I      Exam:    Blood pressure (!) 145/73, pulse 78, temperature 96.5 °F (35.8 °C), temperature source Oral, resp. rate 17, height 5' 2\" (1.575 m), weight 239 lb (108.4 kg), SpO2 91 %. General: Up in a wheelchair. Trying to propel it with her legs. HEENT: MMM. Full visual field. Pulmonary: Clear and unlabored. Cardiac: Regular rate and rhythm. Abdomen: Patient's abdomen is soft and nondistended. Bowel sounds were present throughout. There was no rebound, guarding or masses noted. Upper extremities: Clumsy hand movements on the wheelchair. Increased tone bilaterally. Lower extremities: Give way with MMT. Sitting balance was good. Standing balance was poor.     Lab Results   Component Value Date    WBC 6.7 10/07/2020    HGB 15.5 10/07/2020    HCT 46.0 10/07/2020    MCV 91.5 10/07/2020     10/07/2020     Lab Results   Component Value Date    INR 1.19 10/06/2020    PROTIME 14.4 10/06/2020     Lab Results   Component Value Date    CREATININE 1.0 10/07/2020    BUN 10 10/07/2020     10/07/2020    K 3.4 (L) 10/07/2020     10/07/2020    CO2 26 10/07/2020     Lab Results   Component Value Date    ALT 28 10/06/2020    AST 26 10/06/2020    ALKPHOS 112 10/06/2020    BILITOT 1.1 (H) 10/06/2020       Expected length of stay  prior to a supervised level of function for discharge home with a walker and Venancio Campos OT/PT is 2020. Recommendations:    1. Acute pontine infarction with gait disturbance:    With verbal cues she is engaging regularly in the daily occupational physical therapy with speech-language pathology.      She seems to tolerate the antiplatelet therapy with Plavix and a statin (Lipitor).    Working to control her blood sugar and blood pressure.  Tolerating the pulmonary hygiene, nutritional support and bowel and bladder retraining.  Visual exercises to try to reestablish a full visual field.  DVT prophylaxis.  Standing up into a walker taking a few steps in the room. 2. DVT prophylaxis: Lovenox 30 mg subcu daily.  I must monitor her hemoglobin and platelet count periodically while on this medication.  Weightbearing activities are going to be gradual to improve daily.  GI prophylaxis offered.  No new bruising or swelling.    3. Uncontrolled diabetes with hyperglycemia (type II): Patient requires scheduled at bedtime Lantus and a Humalog sliding scale with each meal. Blood sugars are checked at mealtime and bedtime.   Blood sugar has finally come down below 200. Continuing Lantus to 40 units nightly.   Does not appear she was taking any medications for blood sugar previously at home.  Her hemoglobin A1c from 10/7/2020 was 11. 9.    4.  Hypertension: Patient requires lisinopril, hydrochlorothiazide and Lopressor for blood pressure regulation.  Target systolic blood pressure is 120-140.  Vital signs are checked at rest and with activity.

## 2020-10-18 LAB
GLUCOSE BLD-MCNC: 189 MG/DL (ref 70–99)
GLUCOSE BLD-MCNC: 196 MG/DL (ref 70–99)
GLUCOSE BLD-MCNC: 221 MG/DL (ref 70–99)
GLUCOSE BLD-MCNC: 223 MG/DL (ref 70–99)

## 2020-10-18 PROCEDURE — 82962 GLUCOSE BLOOD TEST: CPT

## 2020-10-18 PROCEDURE — 1280000000 HC REHAB R&B

## 2020-10-18 PROCEDURE — 6370000000 HC RX 637 (ALT 250 FOR IP): Performed by: PHYSICAL MEDICINE & REHABILITATION

## 2020-10-18 PROCEDURE — 6360000002 HC RX W HCPCS: Performed by: INTERNAL MEDICINE

## 2020-10-18 PROCEDURE — 94761 N-INVAS EAR/PLS OXIMETRY MLT: CPT

## 2020-10-18 PROCEDURE — 6370000000 HC RX 637 (ALT 250 FOR IP): Performed by: INTERNAL MEDICINE

## 2020-10-18 PROCEDURE — 94150 VITAL CAPACITY TEST: CPT

## 2020-10-18 RX ADMIN — PANTOPRAZOLE SODIUM 40 MG: 40 TABLET, DELAYED RELEASE ORAL at 05:49

## 2020-10-18 RX ADMIN — INSULIN LISPRO 2 UNITS: 100 INJECTION, SOLUTION INTRAVENOUS; SUBCUTANEOUS at 21:41

## 2020-10-18 RX ADMIN — METOPROLOL TARTRATE 50 MG: 50 TABLET, FILM COATED ORAL at 21:10

## 2020-10-18 RX ADMIN — LISINOPRIL AND HYDROCHLOROTHIAZIDE 2 TABLET: 12.5; 1 TABLET ORAL at 08:42

## 2020-10-18 RX ADMIN — INSULIN LISPRO 1 UNITS: 100 INJECTION, SOLUTION INTRAVENOUS; SUBCUTANEOUS at 08:46

## 2020-10-18 RX ADMIN — OXYBUTYNIN CHLORIDE 5 MG: 5 TABLET ORAL at 08:42

## 2020-10-18 RX ADMIN — INSULIN LISPRO 2 UNITS: 100 INJECTION, SOLUTION INTRAVENOUS; SUBCUTANEOUS at 17:56

## 2020-10-18 RX ADMIN — ENOXAPARIN SODIUM 30 MG: 30 INJECTION SUBCUTANEOUS at 08:42

## 2020-10-18 RX ADMIN — OXYBUTYNIN CHLORIDE 5 MG: 5 TABLET ORAL at 21:10

## 2020-10-18 RX ADMIN — POTASSIUM CHLORIDE 20 MEQ: 20 TABLET, EXTENDED RELEASE ORAL at 21:10

## 2020-10-18 RX ADMIN — INSULIN GLARGINE 40 UNITS: 100 INJECTION, SOLUTION SUBCUTANEOUS at 22:41

## 2020-10-18 RX ADMIN — POTASSIUM CHLORIDE 20 MEQ: 20 TABLET, EXTENDED RELEASE ORAL at 08:42

## 2020-10-18 RX ADMIN — OXYBUTYNIN CHLORIDE 5 MG: 5 TABLET ORAL at 13:30

## 2020-10-18 RX ADMIN — ATORVASTATIN CALCIUM 10 MG: 10 TABLET, FILM COATED ORAL at 21:10

## 2020-10-18 RX ADMIN — CLOPIDOGREL BISULFATE 75 MG: 75 TABLET ORAL at 08:42

## 2020-10-18 RX ADMIN — METOPROLOL TARTRATE 50 MG: 50 TABLET, FILM COATED ORAL at 08:42

## 2020-10-18 RX ADMIN — INSULIN LISPRO 4 UNITS: 100 INJECTION, SOLUTION INTRAVENOUS; SUBCUTANEOUS at 12:58

## 2020-10-18 ASSESSMENT — PAIN SCALES - GENERAL
PAINLEVEL_OUTOF10: 0

## 2020-10-19 LAB
GLUCOSE BLD-MCNC: 149 MG/DL (ref 70–99)
GLUCOSE BLD-MCNC: 188 MG/DL (ref 70–99)
GLUCOSE BLD-MCNC: 210 MG/DL (ref 70–99)
GLUCOSE BLD-MCNC: 271 MG/DL (ref 70–99)

## 2020-10-19 PROCEDURE — 94761 N-INVAS EAR/PLS OXIMETRY MLT: CPT

## 2020-10-19 PROCEDURE — 97130 THER IVNTJ EA ADDL 15 MIN: CPT

## 2020-10-19 PROCEDURE — 99232 SBSQ HOSP IP/OBS MODERATE 35: CPT | Performed by: PHYSICAL MEDICINE & REHABILITATION

## 2020-10-19 PROCEDURE — 94150 VITAL CAPACITY TEST: CPT

## 2020-10-19 PROCEDURE — 97530 THERAPEUTIC ACTIVITIES: CPT

## 2020-10-19 PROCEDURE — 6370000000 HC RX 637 (ALT 250 FOR IP): Performed by: INTERNAL MEDICINE

## 2020-10-19 PROCEDURE — 97129 THER IVNTJ 1ST 15 MIN: CPT

## 2020-10-19 PROCEDURE — 1280000000 HC REHAB R&B

## 2020-10-19 PROCEDURE — 97535 SELF CARE MNGMENT TRAINING: CPT

## 2020-10-19 PROCEDURE — 82962 GLUCOSE BLOOD TEST: CPT

## 2020-10-19 PROCEDURE — 6370000000 HC RX 637 (ALT 250 FOR IP): Performed by: PHYSICAL MEDICINE & REHABILITATION

## 2020-10-19 PROCEDURE — 6360000002 HC RX W HCPCS: Performed by: INTERNAL MEDICINE

## 2020-10-19 PROCEDURE — 97116 GAIT TRAINING THERAPY: CPT

## 2020-10-19 RX ORDER — INSULIN GLARGINE 100 [IU]/ML
50 INJECTION, SOLUTION SUBCUTANEOUS NIGHTLY
Status: DISCONTINUED | OUTPATIENT
Start: 2020-10-19 | End: 2020-10-21 | Stop reason: HOSPADM

## 2020-10-19 RX ADMIN — ATORVASTATIN CALCIUM 10 MG: 10 TABLET, FILM COATED ORAL at 20:47

## 2020-10-19 RX ADMIN — PANTOPRAZOLE SODIUM 40 MG: 40 TABLET, DELAYED RELEASE ORAL at 05:44

## 2020-10-19 RX ADMIN — INSULIN LISPRO 2 UNITS: 100 INJECTION, SOLUTION INTRAVENOUS; SUBCUTANEOUS at 17:21

## 2020-10-19 RX ADMIN — POTASSIUM CHLORIDE 20 MEQ: 20 TABLET, EXTENDED RELEASE ORAL at 08:22

## 2020-10-19 RX ADMIN — OXYBUTYNIN CHLORIDE 5 MG: 5 TABLET ORAL at 14:19

## 2020-10-19 RX ADMIN — LISINOPRIL AND HYDROCHLOROTHIAZIDE 2 TABLET: 12.5; 1 TABLET ORAL at 08:21

## 2020-10-19 RX ADMIN — INSULIN LISPRO 6 UNITS: 100 INJECTION, SOLUTION INTRAVENOUS; SUBCUTANEOUS at 12:16

## 2020-10-19 RX ADMIN — METOPROLOL TARTRATE 50 MG: 50 TABLET, FILM COATED ORAL at 08:22

## 2020-10-19 RX ADMIN — INSULIN LISPRO 1 UNITS: 100 INJECTION, SOLUTION INTRAVENOUS; SUBCUTANEOUS at 20:48

## 2020-10-19 RX ADMIN — OXYBUTYNIN CHLORIDE 5 MG: 5 TABLET ORAL at 08:22

## 2020-10-19 RX ADMIN — METOPROLOL TARTRATE 50 MG: 50 TABLET, FILM COATED ORAL at 20:47

## 2020-10-19 RX ADMIN — INSULIN LISPRO 4 UNITS: 100 INJECTION, SOLUTION INTRAVENOUS; SUBCUTANEOUS at 08:25

## 2020-10-19 RX ADMIN — OXYBUTYNIN CHLORIDE 5 MG: 5 TABLET ORAL at 20:47

## 2020-10-19 RX ADMIN — ENOXAPARIN SODIUM 30 MG: 30 INJECTION SUBCUTANEOUS at 08:22

## 2020-10-19 RX ADMIN — CLOPIDOGREL BISULFATE 75 MG: 75 TABLET ORAL at 08:22

## 2020-10-19 RX ADMIN — POTASSIUM CHLORIDE 20 MEQ: 20 TABLET, EXTENDED RELEASE ORAL at 20:47

## 2020-10-19 RX ADMIN — INSULIN GLARGINE 50 UNITS: 100 INJECTION, SOLUTION SUBCUTANEOUS at 20:48

## 2020-10-19 ASSESSMENT — PAIN SCALES - GENERAL: PAINLEVEL_OUTOF10: 0

## 2020-10-19 NOTE — PROGRESS NOTES
Physical Therapy    [x] daily progress note       [] discharge       Patient Name:  Pablito Berrios   :  1950 MRN: [id number]  Room:  [address] [place] [address] Date of Admission: 10/8/2020  Rehabilitation Diagnosis:   CVA (cerebral vascular accident) [facility]) [I63.9]  Acute CVA (cerebrovascular accident) ([facility] [address] [I63.9]       Date 10/19/2020       Day of ARU Week:  5   Time IN/OUT 1000/1100  1503/1535   Individual Tx Minutes 60+32   Group Tx Minutes    Co-Treat Minutes    Concurrent Tx Minutes    TOTAL Tx Time Mins 80   Variance Time +2   Variance Time []   Refusal due to:     []   Medical hold/reason:    []   Illness   []   Off Unit for test/procedure  []   Extra time needed to complete task  []   Therapeutic need  []   Other (specify):   Restrictions Restrictions/Precautions  Restrictions/Precautions: General Precautions, Fall Risk(R eye deficit (double vision/decreased ability to focus), mild RLE weakness)      Interdisciplinary communication [x]   Cleared for therapy per nursing     []   RN notified about issues during session  []   RN updated on pt performance  []   Spoke with   []   Spoke with OT  []   Spoke with MD  []   Other:    Subjective observations and cognitive status: (AM) Pt seated in recliner, alert, cooperative, expressed awareness of discharge date. (PM) Pt in recliner, awakens when name is called, \"I worked hard today. \"     Pain level/location: None reported at rest and with activities    Discharge recommendations  Anticipated discharge date:  10/21/2020  Destination: []?home alone   []?home alone with assist PRN     [x]? home w/ family      [x]? Continuous supervision  []? SNF    []? Assisted living     []? Other:  Continued therapy: [x]? [facility] PT  []? OUTPATIENT  PT   []? No Further PT  []? SNF PT  Caregiver training recommended: [x]? Yes  []?  No   Equipment needs: CALVIN Kirby requires the assistance of a front-wheeled walker to successfully ambulate from room to room at home to allow completion of daily living tasks such as: bathing, toileting, dressing and grooming. A wheeled walker is necessary due to the patient's unsteady gait, upper body weakness, inability to  a standard walker. This patient can ambulate only by pushing a walker instead of using a lesser assistive device such as a cane or crutch. Bed Mobility:           [x]   Pt received out of bed   Rolling R/L:  Ind  Scooting:  Ind  Lying --> Sit:  Ind  Sit --> lying:  Ind   Bed features used: [] Yes  [x] No     Transfers:    Sit--> Stand: Mod Ind   Stand --> Sit:   SBA-Sup (inconsistent hand placement and eccentric control; initiates correction when prompted)   Car Transfers: CGA to get in (required sequential cues on AD management due to reduced problem solving), SBA to get out   Assistive device required for transfer:  RW    Gait:    Distance:  (AM) 20 ft with 2 turns + 71 ft + 61 ft (all submax)  Assistance:  SBA (usual)->CGA at the end of 1st bout of gait training due to staggering while turning but denied any dizziness   Device:  RW   Gait Quality:  starts with slow tello and then unpredictably increases as distance progressed, step-to and then gradually progresses to step-through, inconsistent L step length, inconsistent standing posture     Gait:    Distance:  (AM) 100 ft (max; ambulation tolerance was limited by fatigue)   Assistance:  SBA->CGA (usual)   Device:  RW   Gait Quality:  starts with slow tello and then unpredictably increases as distance progressed, step-to and then gradually progresses to step-through, inconsistent L step length, inconsistent standing posture     Stairs   # Completed:  5 (max; limited by fatigue)   Assistance:  SBA-Sup  Supportive Device:  bilat rails   Pattern: step-to  Pt required VCs on appropriate step sequencing on ascent/descent 50% of the time.      Curb   Height:  4\"  Assistance:  CGA   Device:  RW  Pt required sequential cues 50% of the time to safely ascent/descent including attention to AD placement. Additional Therapeutic activities/exercises completed this date:     []   Nu-step:  Time:        Level:         #Steps:       []   Rebounder:    []  Seated     []  Standing        []   Balance training         []   Postural training    []   Supine ther ex (reps/sets):     []   Seated ther ex (reps/sets):     []   Standing ther ex (reps/sets):     [x]   Picking up object from floor (standing): CGA                  [x]   Reacher used   []   Other: Toileting activity completed with    [x]   Other:  (AM) Vital signs assessment prior to mobility tasks: VU=741/79, HR=56 bpm    Comments:      Patient/Caregiver Education and Training:   []   Role of PT  []   Education about Dx  []   Use of call light for assist   []   HEP provided and explained   [x]   Treatment plan reviewed  []   Home safety  []   Wheelchair mobility/management   []   Body mechanics  [x]   Bed Mobility/Transfer technique  []   Gait technique/sequencing  []   Proper use of assistive device/adaptive equipment  [x]   Stair training/Advanced mobility safety and technique  []   Reinforced patient's precautions/mobility while maintaining precautions  []   Postural awareness  []   Family/caregiver training  []   Progress was updated and reviewed in Rehabtracker with patient and/or family this date. [x]   Other: Benefits of mobility activities as part of DM management     Treatment Plan for Next Session: discharge QI tasks     Assessment: Pt's gait quality is not consistently steady due to her inconsistent gait speed, posture, and body positioning in relation to AD and impaired divided attention. Pt initiates self-correction >50% of the time with visual cue provided on RW to readjust her body positioning during ambulation. Pt's ability to ambulate 50 ft at a time is consistent but not with 150 ft due to fatigue or dizziness as observed in previous PT sessions.         Treatment/Activity Tolerance:   [] Tolerated treatment more this year with some requiring hospitalization but no significant injuries; sleeps in regular, flat bed; R hand dominant    Objective                                                                                    Goals:  (Update in navigator)  Short term goals  Time Frame for Short term goals: 10 tx days:  Short term goal 1: Pt will complete rolling L/R and sup<->sit Ind. Short term goal 2: Pt will complete OOB transfers using RW Mod Ind. Short term goal 3: Pt will ambulate 150 ft using RW with SBA-Sup. Short term goal 4: Pt will ascend/descend 4-5 steps using bilat rails with SBA-Sup. Short term goal 5: Pt will ascend/descend curb step and ambulate over uneven surfaces using RW with SBA-Sup. Short term goal 6: Pt will complete object retrieval in standing with 1UE support using adaptive equipment prn Mod Ind.:   :        Plan of Care                                                                              Times per week: 5 days per week for a minimum of 60 minutes/day plus group as appropriate for 60 minutes.   Treatment to include Current Treatment Recommendations: Strengthening, Functional Mobility Training, Home Exercise Program, Equipment Evaluation, Education, & procurement, Transfer Training, Gait Training, Safety Education & Training, Balance Training, Endurance Training, Stair training, Patient/Caregiver Education & Training, Cognitive/Perceptual Training    Electronically signed by   Ronald Lacy, PT   10/19/2020, 10:00 AM

## 2020-10-19 NOTE — PROGRESS NOTES
Dysphagia/Swallowing     Group     Other    TOTAL Minutes Billed  30    Variance          Date: 10/19/2020  Day of ARU Week:  5       SLP Individual Minutes  Time In: 7505  Time Out: 9281  Minutes: 30     Variance/Reason:  [] Refusal due to   [] Medical hold/reason  [] Illness   [] Off Unit for test/procedure  [] Extra time needed to complete task  [] Other (specify)    Activity completed: Pt seen for cognition for carryover of learned tasks around the room and in the ADL apt. Pain: denies  Current Diet: DIET GENERAL; Carb Control: 4 carb choices (60 gms)/meal; Low Sodium (2 GM)  Subjective: fatigues easily but motivated and talking herself through tasks. Goals and POC: Co-treats where appropriate with PT or OT to facilitate patient goals in functional tasks. LTG                           Short-term Goals  Timeframe for Short-term Goals: 3xs weekly for 2 weeks  Goal 1: Pt will recall 3 items following a 3-5 min delay when provided with min cues 1/3 and 2/3 with ongoing cues. Pt monitoring self for staying close to walker and using hands to push up or reach back. Goal 2: Pt will recall learned safety protocols during a functional task with 90% accuracy Mod to min cues it L neglect, inattn, and some impulsivity. 70% acc this date. Verbal review more difficult for pt with leading questions needed. When presented in a situation in fx pt showed improved recall with fewer cues as session progressed. Goal 3: Pt will complete medication mgmt tasks for independence at home--Pt going to have s/o manage all meds; this therapist in agreement. Discharge goal.      Pt continues to get distracted--beginning to redirect self however safety cues needed during that moment. She also gets distracted by talking and forgets to monitor. Pt discharging home with s/o in 2 days with ongoing supervision recommended due to cognitive deficits. No further ST recommended.                                     Alarm placed: []bed [x]chair   []other:   [] activated        Barriers to progress:   [] Fatigue        [x] Cognitive Deficits   [] Memory Deficits   [x] Reduced Attn   [] Self Limiting Behaviors    [] Reduced insight/awareness     [] Visual Deficits   [] Premorbid Conditions  [] Impulsivity     [] Other      Education/Interventions used this date: safety review in fx. [x]   Progress was updated and reviewed in Rehabtracker with patient and/or family this         date. [] Attending Care Conference for pt this date. See Team Patient Care Conference Note for updates.     Interventions used this date:  [] Speech/Language Treatment    [] Instruction in HEP    Group   [] Dysphagia Treatment   [x] Cognitive Skill Alberto    Other:         Assessment / Impression                                                          Treatment/Activity Tolerance:   [x] Tolerated Treatment well:     [] Patient limited by fatigue/pain:       [] Patient limited by medical complications:    [] Adverse Reaction to Tx:   [] Significant change in status:      Electronically Signed by  Ankit Cote MA, CCC-SLP    10/19/2020  1:36 PM

## 2020-10-19 NOTE — PLAN OF CARE
Problem: Falls - Risk of:  Goal: Will remain free from falls  Description: Will remain free from falls  10/19/2020 1051 by Jumana Ruelas RN  Outcome: Ongoing  10/18/2020 2323 by Gallo Monroe RN  Outcome: Ongoing  Goal: Absence of physical injury  Description: Absence of physical injury  10/19/2020 1051 by Jumana Ruelas RN  Outcome: Ongoing  10/18/2020 2323 by Gallo Monroe RN  Outcome: Ongoing     Problem: Skin Integrity:  Goal: Will show no infection signs and symptoms  Description: Will show no infection signs and symptoms  10/19/2020 1051 by Jumana Ruelas RN  Outcome: Ongoing  10/18/2020 2323 by Gallo Monroe RN  Outcome: Ongoing  Goal: Absence of new skin breakdown  Description: Absence of new skin breakdown  10/19/2020 1051 by Jumana Ruelas RN  Outcome: Ongoing  10/18/2020 2323 by Gallo Monroe RN  Outcome: Ongoing     Problem: HEMODYNAMIC STATUS  Goal: Patient has stable vital signs and fluid balance  10/19/2020 1051 by Jumana Ruelas RN  Outcome: Ongoing  10/18/2020 2323 by Gallo Monroe RN  Outcome: Ongoing     Problem: ACTIVITY INTOLERANCE/IMPAIRED MOBILITY  Goal: Mobility/activity is maintained at optimum level for patient  10/19/2020 1051 by Jumana Ruelas RN  Outcome: Ongoing  10/18/2020 2323 by Gallo Monroe RN  Outcome: Ongoing     Problem: COMMUNICATION IMPAIRMENT  Goal: Ability to express needs and understand communication  10/19/2020 1051 by Jumana Ruelas RN  Outcome: Ongoing  10/18/2020 2323 by Gallo Monroe RN  Outcome: Ongoing     Problem: Infection:  Goal: Will remain free from infection  Description: Will remain free from infection  10/19/2020 1051 by Jumana Ruelas RN  Outcome: Ongoing  10/18/2020 2323 by Gallo Monroe RN  Outcome: Ongoing     Problem: Safety:  Goal: Free from accidental physical injury  Description: Free from accidental physical injury  10/19/2020 1051 by Jumana Ruelas RN  Outcome: Ongoing  10/18/2020 2323 by Gallo Monroe RN  Outcome: Ongoing  Goal: Free from intentional harm  Description: Free from intentional harm  10/19/2020 1051 by Jose Daniels RN  Outcome: Ongoing  10/18/2020 2323 by Jimbo Gomez RN  Outcome: Ongoing     Problem: Daily Care:  Goal: Daily care needs are met  Description: Daily care needs are met  10/19/2020 1051 by Jose Daniels RN  Outcome: Ongoing  10/18/2020 2323 by Jimbo Gomez RN  Outcome: Ongoing     Problem: Pain:  Goal: Patient's pain/discomfort is manageable  Description: Patient's pain/discomfort is manageable  10/19/2020 1051 by Jose Daniels RN  Outcome: Ongoing  10/18/2020 2323 by Jimbo Gomez RN  Outcome: Ongoing     Problem: Skin Integrity:  Goal: Skin integrity will stabilize  Description: Skin integrity will stabilize  10/19/2020 1051 by Jose Daniels RN  Outcome: Ongoing  10/18/2020 2323 by Jimbo Gomez RN  Outcome: Ongoing     Problem: Discharge Planning:  Goal: Patients continuum of care needs are met  Description: Patients continuum of care needs are met  10/19/2020 1051 by Jose Daniels RN  Outcome: Ongoing  10/18/2020 2323 by Jimbo Gomez RN  Outcome: Ongoing

## 2020-10-19 NOTE — PLAN OF CARE
Problem: Falls - Risk of:  Goal: Will remain free from falls  Description: Will remain free from falls  10/18/2020 2323 by Luis Cloud RN  Outcome: Ongoing  10/18/2020 0924 by Fabricio Vásquez RN  Outcome: Ongoing  Goal: Absence of physical injury  Description: Absence of physical injury  10/18/2020 2323 by Luis Cloud RN  Outcome: Ongoing  10/18/2020 0924 by Fabricio Vásquez RN  Outcome: Ongoing     Problem: Skin Integrity:  Goal: Will show no infection signs and symptoms  Description: Will show no infection signs and symptoms  10/18/2020 2323 by Luis Cloud RN  Outcome: Ongoing  10/18/2020 0924 by Fabricio Vásquez RN  Outcome: Ongoing  Goal: Absence of new skin breakdown  Description: Absence of new skin breakdown  10/18/2020 2323 by Luis Cloud RN  Outcome: Ongoing  10/18/2020 0924 by Fabricio Vásquez RN  Outcome: Ongoing     Problem: HEMODYNAMIC STATUS  Goal: Patient has stable vital signs and fluid balance  10/18/2020 2323 by Luis Cloud RN  Outcome: Ongoing  10/18/2020 0924 by Fabricio Vásquez RN  Outcome: Ongoing     Problem: ACTIVITY INTOLERANCE/IMPAIRED MOBILITY  Goal: Mobility/activity is maintained at optimum level for patient  10/18/2020 2323 by Luis Cloud RN  Outcome: Ongoing  10/18/2020 0924 by Fabricio Vásquez RN  Outcome: Ongoing     Problem: COMMUNICATION IMPAIRMENT  Goal: Ability to express needs and understand communication  10/18/2020 2323 by Luis Cloud RN  Outcome: Ongoing  10/18/2020 0924 by Fabricio Vásquez RN  Outcome: Ongoing     Problem: Infection:  Goal: Will remain free from infection  Description: Will remain free from infection  10/18/2020 2323 by Luis Cloud RN  Outcome: Ongoing  10/18/2020 0924 by Fabricio Vásquez RN  Outcome: Ongoing     Problem: Safety:  Goal: Free from accidental physical injury  Description: Free from accidental physical injury  10/18/2020 2323 by Luis Cloud RN  Outcome: Ongoing  10/18/2020 0924 by Fabricio Vásquez RN  Outcome: Ongoing  Goal: Free from intentional harm  Description: Free from intentional harm  10/18/2020 2323 by Eulalia Reynolds RN  Outcome: Ongoing  10/18/2020 0924 by Meredith Quiles RN  Outcome: Ongoing     Problem: Daily Care:  Goal: Daily care needs are met  Description: Daily care needs are met  10/18/2020 2323 by Eulalia Reynolds RN  Outcome: Ongoing  10/18/2020 0924 by Meredith Quiles RN  Outcome: Ongoing     Problem: Pain:  Goal: Patient's pain/discomfort is manageable  Description: Patient's pain/discomfort is manageable  10/18/2020 2323 by Eulalia Reynolds RN  Outcome: Ongoing  10/18/2020 0924 by Meredith Quiles RN  Outcome: Ongoing     Problem: Skin Integrity:  Goal: Skin integrity will stabilize  Description: Skin integrity will stabilize  10/18/2020 2323 by Eulalia Reynolds RN  Outcome: Ongoing  10/18/2020 0924 by Meredith Quiles RN  Outcome: Ongoing     Problem: Discharge Planning:  Goal: Patients continuum of care needs are met  Description: Patients continuum of care needs are met  10/18/2020 2323 by Eulalia Reynolds RN  Outcome: Ongoing  10/18/2020 0924 by Meredith Quiles RN  Outcome: Ongoing

## 2020-10-19 NOTE — PATIENT CARE CONFERENCE
ACUTE REHAB TEAM CONFERENCE SUMMARY   [facility]    NAME: Nimo Damon  : 1950 ADMIT DATE: 10/8/2020    Rehab Admitting Dx:CVA (cerebral vascular accident) [facility]) [I63.9]  Acute CVA (cerebrovascular accident) ([address] [I63.9]  Patient Comorbid Conditions: Active Hospital Problems    Diagnosis Date Noted    Dysarthria due to acute stroke ([address] [I63.9, R47.1]     Dysphagia due to recent cerebral infarction [I69.391]     Uncontrolled type 2 diabetes mellitus with hyperglycemia ([address] [E11.65]     Morbid obesity with BMI of 45.0-49.9, adult (HCC) [E66.01, Z68.42]     Acute CVA (cerebrovascular accident) ([address] [I63.9] 10/08/2020    Hemiparesis of right dominant side as late effect of cerebral infarction [facility]) [I69.351] 10/06/2020    Uncontrolled hypertension [I10] 10/06/2020     Date: 10/20/2020    CASE MANAGEMENT  Current issues/needs regarding patient and family discharge status:   Patient plans d/c apartment with sig other. Sig other unreachable to caregiver training. Dtr also not responding to calls. New diabetic. PHYSICAL THERAPY   Short term goals  Time Frame for Short term goals: 10 tx days:  Short term goal 1: Pt will complete rolling L/R and sup<->sit Ind. MET   Short term goal 2: Pt will complete OOB transfers using RW Mod Ind. PART MET (CAR SBA-CGA; SIT->STAND MOD IND; STAND->SIT SBA-SUP)  Short term goal 3: Pt will ambulate 150 ft using RW with SBA-Sup. NOT MET (100 FT, CGA)  Short term goal 4: Pt will ascend/descend 4-5 steps using bilat rails with SBA-Sup. MET   Short term goal 5: Pt will ascend/descend curb step and ambulate over uneven surfaces using RW with SBA-Sup.  NOT MET (CURB, CGA)  Short term goal 6: Pt will complete object retrieval in standing with 1UE support using adaptive equipment prn Mod Ind.   NOT MET (CGA USING REACHER)       Impairments/deficits, barriers:  SELF-LIMITING BEHAVIOR, INTERMITTENT DIZZINESS  Body structures, Functions, Activity limitations: Decreased functional mobility , Decreased strength, Decreased endurance, Decreased vision/visual deficit, Decreased posture, Decreased coordination, Decreased balance, Decreased cognition     Prognosis: Good  Decision Making: High Complexity  Clinical Presentation: unstable/unpredictable characteristics  Equipment needed at discharge:RW and manual w/c      PT IRF-KRISTYN scores and goals since initial assessment:   Roll Left and Right  Assistance Needed: Supervision or touching assistance  Comment: required SBA, no use of bed features  CARE Score: 4  Discharge Goal: Independent  Sit to Lying  Assistance Needed: Supervision or touching assistance  Comment: required SBA, no use of bed features  CARE Score: 4  Discharge Goal: Independent  Lying to Sitting on Side of Bed  Assistance Needed: Supervision or touching assistance  Comment: required SBA, no use of bed features  CARE Score: 4  Discharge Goal: Independent  Sit to Stand  Assistance Needed: Supervision or touching assistance  Comment: required CGA using RW  CARE Score: 4  Discharge Goal: Independent  Chair/Bed-to-Chair Transfer  Assistance Needed: Supervision or touching assistance  Comment: required CGA  CARE Score: 4  Discharge Goal: Independent  Toilet Transfer  Assistance Needed: Independent  Comment: 3(deemed usual performance per team huddle)  CARE Score: 6  Discharge Goal: Independent  Car Transfer  Assistance Needed: Supervision or touching assistance  Comment: required CGA  CARE Score: 4  Discharge Goal: Independent  Walk 10 Feet?   Walk 10 Feet?: Yes  1 Step  1 Step?: Yes  Picking Up Object  Assistance Needed: Partial/moderate assistance  Comment: pt required Min A with LUE support on RW (task completed without use of reacher)  CARE Score: 3  Discharge Goal: Independent  Wheelchair Ability  Uses a Wheelchair and/or Scooter?: No              1 Step (Curb)  Comment: pt declined to attempt due to increased fatigue and fear of falling; stated Palomo Mendoza we just do this tomorrow. When I'm tired I don't do well. \"  Reason if not Attempted: Not attempted due to medical condition or safety concerns  CARE Score: 88  Discharge Goal: Supervision or touching assistance   4 Steps  Assistance Needed: Partial/moderate assistance  Comment: required Min A using bilat rails with step-to pattern (reported increased fatigue upon ascent/descent of 5 steps)  CARE Score: 3  Discharge Goal: Supervision or touching assistance   12 Steps  Reason if not Attempted: Not applicable  CARE Score: 9  Discharge Goal: Not Applicable    Fall Risk: []  Yes  []  No    OCCUPATIONAL THERAPY   Short term goals  Time Frame for Short term goals: STG=LTG :   Long term goals  Time Frame for Long term goals : 10-12 days or until d/c  Long term goal 1: Pt will increase BUE coordination/fine motor skills in order to complete feeding/grooming/oral care task c MOD I by d/c MET  Long term goal 2: Pt will complete total body bathing c MOD I by d/c MET  Long term goal 3: Pt will complete UB dressing c MOD I and retrieve items from closet by d/c MET  Long term goal 4: Pt will complete LB dressing c MOD I and retrieve items from closet by d/c MET  Long term goal 5: Pt will don/doff footwear c MOD I by d/c  MET  Long term goals 6: Pt will complete toileting c MOD I by d/c MET  Long term goal 7: Pt will complete functional transfer (toilet, tub, shower) c MOD I by d/c. SUP  Long term goal 8: Pt will perform therex/therax to facilitate increased strength/ax tolerance (c emphasis on dynamic standing balance/tolerance >10 mins) c MOD I in order to complete ADLs. : ONGOING: self-limiting behavior noted. OT IRF-KRISTYN scores and goals since initial assessment:    Eating  Assistance Needed: Independent  CARE Score: 6  Discharge Goal: Independent  Oral Hygiene  Assistance Needed: Independent  Comment: Pt demo poor standing balance and required Min A to maintain upright posture.   CARE Score: 6  Discharge Goal: Independent  Toileting Hygiene  Assistance Needed: Independent  Comment: Pt did own hygiene/changed depends  CARE Score: 6  Discharge Goal: Independent  Shower/Bathe Self  Assistance Needed: Independent  CARE Score: 6  Discharge Goal: Independent  Upper Body Dressing  Assistance Needed: Independent  CARE Score: 6  Discharge Goal: Independent  Lower Body Dressing  Assistance Needed: Independent  Comment: Min A to maintain upright posture. CARE Score: 6  Discharge Goal: Independent  Putting On/Taking Off Footwear  Assistance Needed: Independent  CARE Score: 6  Discharge Goal: Independent      Impairments/deficits, barriers:  Self-limiting behavior  Assessment  Performance deficits / Impairments: Decreased functional mobility , Decreased safe awareness, Decreased balance, Decreased high-level IADLs, Decreased ADL status, Decreased strength, Decreased cognition, Decreased vision/visual deficit, Decreased fine motor control, Decreased coordination  Decision Making: Medium Complexity  REQUIRES OT FOLLOW UP: Yes  Equipment needed at discharge:TTB      COGNITIVE FUNCTION/SPEECH THERAPY (AS INDICATED)  LTG                         Short-term Goals  Timeframe for Short-term Goals: 3xs weekly for 2 weeks  Goal 1: Pt will recall 3 items following a 3-5 min delay when provided with min cues  Partially met, continue. Improving slowly with repetition and practical application 33% acc  Goal 2: Pt will recall learned safety protocols during a functional task with 90% accuracy Partially met, continue--Mod to min cues with improvement observed from session to sessino with verbal review and practical application. Goal 3: Pt will complete medication mgmt tasks for independence at home  Discontinue--pt to have s/o continue to manage upon discharge. Ongoing impairments or deficits or barriers: cognition/memory  Areas where progress has been made: learned strategies are showing some carryover.  L unit  [] Pt will improve swallowing for safe diet advancement with use of strategies  [x]  Plan for discharge to home. Patient Strengths: Motivated, Cooperative and Pleasant    Justification for Continued Stay  Based on my medical assessment of the patient and review of information from the interdisciplinary team as part of this weekly team conference, the patient continues to meet the following criteria for IRF level of care:   The patient requires active and ongoing intervention of multiple therapy disciplines   The patient requires and intensive rehabilitation therapy program   The patient requires continued physician supervision by a rehabilitation physician   The patient requires 24 hours rehab nursing care   The patient requires an intensive and coordinated interdisciplinary team approach to the delivery of rehabilitative care. Assessment/Plan   [x]  The patient is making good progression towards their long term goals and is actively participating in and has a reasonable expectation to continue to benefit from the intensive rehabilitation therapy program   []  The estimated discharge date has been changed from initial team conference due to:   []  The estimated discharge destination has been changed from initial team conference due to:         Ongoing tx following discharge: [x]C PT/OT/NSG     []OUTPATIENT     [] No Further Treatment     [] Family/Caregiver Training  []  Transitional Living Arrangement   [] Home Assessment (date  )     [] Family Conference   []  Therapeutic Pass       []  Other: (specify)    Estimated Discharge Date: 10/21/2020    Estimated Discharge Destination: []home alone   []home alone with assist prn  []Continuous supervision [x]Return home with spouse/family   [] Assisted living  []SNF     Team members participating in today's conference.     [x] LIZBET Maurer, Medical Director  [x] Barbara Srivastava,   [x] Irene Feliz, Nurse Mgr     [x]  Lidia Meryle Ellison  []  Kierra Solano, PT   [] Lucas Episcopal, OT  [x] Natividad Purdy, OT   [x] Alejandro Marsh, SLP     [x]  Rigo Interiano, KATELYN   [] Lavonne Benjamin,    [x]Ruthann Rand,    []  Medhat Kamara RN    [x] Wendy Sanders RN  [] Isma Lazaro RN    [] Tyrese Perry RN        I have led this Team Conference and agree with the plan, Nai Mejias MD, 10/20/2020, 12:46 PM  Goals have been updated to reflect recent status.     Team conference note transcribed this date by: Yumiko Gale MA, [address] [facility], Therapy Coordinator

## 2020-10-19 NOTE — PROGRESS NOTES
assistance  Comment: required SBA, no use of bed features  CARE Score: 4  Discharge Goal: Independent  Sit to Lying  Assistance Needed: Supervision or touching assistance  Comment: required SBA, no use of bed features  CARE Score: 4  Discharge Goal: Independent  Sit to Stand  Assistance Needed: Supervision or touching assistance  Comment: required CGA using RW  CARE Score: 4  Discharge Goal: Independent  Chair/Bed-to-Chair Transfer  Assistance Needed: Supervision or touching assistance  Comment: required CGA  CARE Score: 4  Discharge Goal: Independent  Toilet Transfer  Assistance Needed: Supervision or touching assistance  Comment: 3(deemed usual performance per team huddle)  CARE Score: 4  Discharge Goal: Independent  Car Transfer  Assistance Needed: Supervision or touching assistance  Comment: required CGA  CARE Score: 4  Discharge Goal: Independent   Walk 10 Feet? Walk 10 Feet?: Yes  1 Step  1 Step?: Yes  Picking Up Object  Assistance Needed: Partial/moderate assistance  Comment: pt required Min A with LUE support on RW (task completed without use of reacher)  CARE Score: 3  Discharge Goal: Independent  Wheelchair Ability  Uses a Wheelchair and/or Scooter?: No                  I      Exam:    Blood pressure (!) 154/76, pulse 61, temperature 98 °F (36.7 °C), temperature source Oral, resp. rate 16, height 5' 2\" (1.575 m), weight 230 lb 12.8 oz (104.7 kg), SpO2 97 %. General: Up in a bedside chair. Alert and oriented. Mild dysarthria. Impulsive. HEENT: Mild left facial droop. MMM. Neck supple. Pulmonary: Unlabored and clear. Cardiac: Regular rate and rhythm. Abdomen: Patient's abdomen is soft and nondistended. Bowel sounds were present throughout. There was no rebound, guarding or masses noted. Upper extremities: Clumsy left  but dexterity is improving. Strength is nearly symmetric. Lower extremities: Wide base of support with stance. No new edema or bruising.   4 -/5 left ankle dorsiflexion. Sitting balance was good. Standing balance was fair-.    Lab Results   Component Value Date    WBC 6.7 10/07/2020    HGB 15.5 10/07/2020    HCT 46.0 10/07/2020    MCV 91.5 10/07/2020     10/07/2020     Lab Results   Component Value Date    INR 1.19 10/06/2020    PROTIME 14.4 10/06/2020     Lab Results   Component Value Date    CREATININE 1.0 10/07/2020    BUN 10 10/07/2020     10/07/2020    K 3.4 (L) 10/07/2020     10/07/2020    CO2 26 10/07/2020     Lab Results   Component Value Date    ALT 28 10/06/2020    AST 26 10/06/2020    ALKPHOS 112 10/06/2020    BILITOT 1.1 (H) 10/06/2020       Expected length of stay  prior to a supervised level of function for discharge home with a walker and [address] OT/PT is 10/21/2020. Recommendations:    1. Acute pontine infarction with gait disturbance:  She has clearly benefited from her daily occupational physical therapy with speech-language pathology.      Tolerating her antiplatelet therapy with Plavix and the statin (Lipitor).      Systolic blood pressure nearly within target range. Blood sugars remain too high. Benefiting from pulmonary hygiene, nutritional support and bowel and bladder retraining. Double vision is less symptomatic.  DVT prophylaxis. Ambulating with a front wheeled walker community distances. 2. DVT prophylaxis: Lovenox 30 mg subcu daily.  I must monitor her hemoglobin and platelet count periodically while on this medication.  Weightbearing activities are improving daily.  GI prophylaxis offered. No new bruising or swelling. Checking labs in a.m.   3. Uncontrolled diabetes with hyperglycemia (type II): Patient requires a Humalog sliding scale with each meal and blood sugars are checked at mealtime and bedtime.  Blood sugars are coming down some but have room to go. I will increase her Lantus to 50 units nightly and continue the medium sliding scale with Humalog. She needs to learn self administration techniques.

## 2020-10-19 NOTE — PROGRESS NOTES
Case mgt left VM for patient's sig other inquiring if he's attending caregiver training. Left schedule on his VM.    1320: telephoned patient's dtr to scheduled diabetes mgt caregiver training.  full. Was able to send an sms notification with case mgt office number.

## 2020-10-20 LAB
ANION GAP SERPL CALCULATED.3IONS-SCNC: 12 MMOL/L (ref 4–16)
ANION GAP SERPL CALCULATED.3IONS-SCNC: 12 MMOL/L (ref 4–16)
ANION GAP SERPL CALCULATED.3IONS-SCNC: 9 MMOL/L (ref 4–16)
BACTERIA: ABNORMAL /HPF
BILIRUBIN URINE: NEGATIVE MG/DL
BLOOD, URINE: NEGATIVE
BUN BLDV-MCNC: 24 MG/DL (ref 6–23)
BUN BLDV-MCNC: 25 MG/DL (ref 6–23)
BUN BLDV-MCNC: 26 MG/DL (ref 6–23)
CALCIUM SERPL-MCNC: 9.1 MG/DL (ref 8.3–10.6)
CALCIUM SERPL-MCNC: 9.4 MG/DL (ref 8.3–10.6)
CALCIUM SERPL-MCNC: 9.5 MG/DL (ref 8.3–10.6)
CHLORIDE BLD-SCNC: 94 MMOL/L (ref 99–110)
CHLORIDE BLD-SCNC: 96 MMOL/L (ref 99–110)
CHLORIDE BLD-SCNC: 98 MMOL/L (ref 99–110)
CLARITY: ABNORMAL
CO2: 24 MMOL/L (ref 21–32)
CO2: 25 MMOL/L (ref 21–32)
CO2: 27 MMOL/L (ref 21–32)
COLOR: YELLOW
CREAT SERPL-MCNC: 1.3 MG/DL (ref 0.6–1.1)
CREAT SERPL-MCNC: 1.3 MG/DL (ref 0.6–1.1)
CREAT SERPL-MCNC: 1.4 MG/DL (ref 0.6–1.1)
CREATININE URINE: 134 MG/DL (ref 28–217)
GFR AFRICAN AMERICAN: 45 ML/MIN/1.73M2
GFR AFRICAN AMERICAN: 49 ML/MIN/1.73M2
GFR AFRICAN AMERICAN: 49 ML/MIN/1.73M2
GFR NON-AFRICAN AMERICAN: 37 ML/MIN/1.73M2
GFR NON-AFRICAN AMERICAN: 40 ML/MIN/1.73M2
GFR NON-AFRICAN AMERICAN: 40 ML/MIN/1.73M2
GLUCOSE BLD-MCNC: 170 MG/DL (ref 70–99)
GLUCOSE BLD-MCNC: 181 MG/DL (ref 70–99)
GLUCOSE BLD-MCNC: 190 MG/DL (ref 70–99)
GLUCOSE BLD-MCNC: 193 MG/DL (ref 70–99)
GLUCOSE BLD-MCNC: 210 MG/DL (ref 70–99)
GLUCOSE BLD-MCNC: 210 MG/DL (ref 70–99)
GLUCOSE BLD-MCNC: 282 MG/DL (ref 70–99)
GLUCOSE, URINE: NEGATIVE MG/DL
HCT VFR BLD CALC: 44.3 % (ref 37–47)
HEMOGLOBIN: 14.5 GM/DL (ref 12.5–16)
KETONES, URINE: NEGATIVE MG/DL
LEUKOCYTE ESTERASE, URINE: ABNORMAL
MCH RBC QN AUTO: 30.7 PG (ref 27–31)
MCHC RBC AUTO-ENTMCNC: 32.7 % (ref 32–36)
MCV RBC AUTO: 93.9 FL (ref 78–100)
MUCUS: ABNORMAL HPF
NITRITE URINE, QUANTITATIVE: NEGATIVE
PDW BLD-RTO: 13.1 % (ref 11.7–14.9)
PH, URINE: 5 (ref 5–8)
PLATELET # BLD: 204 K/CU MM (ref 140–440)
PMV BLD AUTO: 11.5 FL (ref 7.5–11.1)
POTASSIUM SERPL-SCNC: 4.6 MMOL/L (ref 3.5–5.1)
POTASSIUM SERPL-SCNC: 5.6 MMOL/L (ref 3.5–5.1)
POTASSIUM SERPL-SCNC: 5.7 MMOL/L (ref 3.5–5.1)
PROT/CREAT RATIO, UR: 0.4
PROTEIN UA: 30 MG/DL
RBC # BLD: 4.72 M/CU MM (ref 4.2–5.4)
RBC URINE: ABNORMAL /HPF (ref 0–6)
SODIUM BLD-SCNC: 131 MMOL/L (ref 135–145)
SODIUM BLD-SCNC: 132 MMOL/L (ref 135–145)
SODIUM BLD-SCNC: 134 MMOL/L (ref 135–145)
SPECIFIC GRAVITY UA: 1.01 (ref 1–1.03)
SQUAMOUS EPITHELIAL: 3 /HPF
TRICHOMONAS: ABNORMAL /HPF
URINE TOTAL PROTEIN: 47.8 MG/DL
UROBILINOGEN, URINE: 1 MG/DL (ref 0.2–1)
WBC # BLD: 5.5 K/CU MM (ref 4–10.5)
WBC CLUMP: ABNORMAL /HPF
WBC UA: 131 /HPF (ref 0–5)

## 2020-10-20 PROCEDURE — 97535 SELF CARE MNGMENT TRAINING: CPT

## 2020-10-20 PROCEDURE — 81001 URINALYSIS AUTO W/SCOPE: CPT

## 2020-10-20 PROCEDURE — 99233 SBSQ HOSP IP/OBS HIGH 50: CPT | Performed by: PHYSICAL MEDICINE & REHABILITATION

## 2020-10-20 PROCEDURE — 82962 GLUCOSE BLOOD TEST: CPT

## 2020-10-20 PROCEDURE — 85027 COMPLETE CBC AUTOMATED: CPT

## 2020-10-20 PROCEDURE — 36415 COLL VENOUS BLD VENIPUNCTURE: CPT

## 2020-10-20 PROCEDURE — 6370000000 HC RX 637 (ALT 250 FOR IP): Performed by: PHYSICAL MEDICINE & REHABILITATION

## 2020-10-20 PROCEDURE — 6370000000 HC RX 637 (ALT 250 FOR IP): Performed by: INTERNAL MEDICINE

## 2020-10-20 PROCEDURE — 1280000000 HC REHAB R&B

## 2020-10-20 PROCEDURE — 97530 THERAPEUTIC ACTIVITIES: CPT

## 2020-10-20 PROCEDURE — 84156 ASSAY OF PROTEIN URINE: CPT

## 2020-10-20 PROCEDURE — 80048 BASIC METABOLIC PNL TOTAL CA: CPT

## 2020-10-20 PROCEDURE — 97116 GAIT TRAINING THERAPY: CPT

## 2020-10-20 PROCEDURE — 6370000000 HC RX 637 (ALT 250 FOR IP): Performed by: NURSE PRACTITIONER

## 2020-10-20 PROCEDURE — 94150 VITAL CAPACITY TEST: CPT

## 2020-10-20 PROCEDURE — 97110 THERAPEUTIC EXERCISES: CPT

## 2020-10-20 PROCEDURE — 6360000002 HC RX W HCPCS: Performed by: INTERNAL MEDICINE

## 2020-10-20 PROCEDURE — 82570 ASSAY OF URINE CREATININE: CPT

## 2020-10-20 PROCEDURE — 94761 N-INVAS EAR/PLS OXIMETRY MLT: CPT

## 2020-10-20 RX ORDER — SODIUM POLYSTYRENE SULFONATE 15 G/60ML
15 SUSPENSION ORAL; RECTAL ONCE
Status: COMPLETED | OUTPATIENT
Start: 2020-10-20 | End: 2020-10-20

## 2020-10-20 RX ORDER — AMLODIPINE BESYLATE 5 MG/1
5 TABLET ORAL DAILY
Status: DISCONTINUED | OUTPATIENT
Start: 2020-10-20 | End: 2020-10-21 | Stop reason: HOSPADM

## 2020-10-20 RX ORDER — SODIUM BICARBONATE 650 MG/1
650 TABLET ORAL 2 TIMES DAILY
Status: DISCONTINUED | OUTPATIENT
Start: 2020-10-20 | End: 2020-10-21

## 2020-10-20 RX ADMIN — AMLODIPINE BESYLATE 5 MG: 5 TABLET ORAL at 12:47

## 2020-10-20 RX ADMIN — OXYBUTYNIN CHLORIDE 5 MG: 5 TABLET ORAL at 20:47

## 2020-10-20 RX ADMIN — INSULIN GLARGINE 50 UNITS: 100 INJECTION, SOLUTION SUBCUTANEOUS at 20:49

## 2020-10-20 RX ADMIN — SODIUM BICARBONATE 650 MG: 650 TABLET ORAL at 14:42

## 2020-10-20 RX ADMIN — ATORVASTATIN CALCIUM 10 MG: 10 TABLET, FILM COATED ORAL at 20:48

## 2020-10-20 RX ADMIN — OXYBUTYNIN CHLORIDE 5 MG: 5 TABLET ORAL at 07:28

## 2020-10-20 RX ADMIN — OXYBUTYNIN CHLORIDE 5 MG: 5 TABLET ORAL at 12:46

## 2020-10-20 RX ADMIN — INSULIN LISPRO 4 UNITS: 100 INJECTION, SOLUTION INTRAVENOUS; SUBCUTANEOUS at 12:16

## 2020-10-20 RX ADMIN — INSULIN LISPRO 2 UNITS: 100 INJECTION, SOLUTION INTRAVENOUS; SUBCUTANEOUS at 08:09

## 2020-10-20 RX ADMIN — METOPROLOL TARTRATE 50 MG: 50 TABLET, FILM COATED ORAL at 07:28

## 2020-10-20 RX ADMIN — POTASSIUM CHLORIDE 20 MEQ: 20 TABLET, EXTENDED RELEASE ORAL at 07:28

## 2020-10-20 RX ADMIN — ENOXAPARIN SODIUM 30 MG: 30 INJECTION SUBCUTANEOUS at 07:29

## 2020-10-20 RX ADMIN — LISINOPRIL AND HYDROCHLOROTHIAZIDE 2 TABLET: 12.5; 1 TABLET ORAL at 07:28

## 2020-10-20 RX ADMIN — CLOPIDOGREL BISULFATE 75 MG: 75 TABLET ORAL at 07:28

## 2020-10-20 RX ADMIN — SODIUM BICARBONATE 650 MG: 650 TABLET ORAL at 20:47

## 2020-10-20 RX ADMIN — METOPROLOL TARTRATE 50 MG: 50 TABLET, FILM COATED ORAL at 20:47

## 2020-10-20 RX ADMIN — SODIUM POLYSTYRENE SULFONATE 15 G: 15 SUSPENSION ORAL; RECTAL at 12:44

## 2020-10-20 RX ADMIN — PANTOPRAZOLE SODIUM 40 MG: 40 TABLET, DELAYED RELEASE ORAL at 06:33

## 2020-10-20 ASSESSMENT — PAIN SCALES - GENERAL: PAINLEVEL_OUTOF10: 0

## 2020-10-20 NOTE — PROGRESS NOTES
Junior Triana    : 1950  Acct #: [de-identified]  MRN: [id number]              PM&R Progress Note      Admitting diagnosis: Acute CVA (dorsal left ian)     Comorbid diagnoses impacting rehabilitation: Right hemiparesis, gait disturbance, essential hypertension, uncontrolled diabetes with hyperglycemia, morbid obesity (BMI 45.5), disconjugate gaze.     Chief complaint: Anxious about getting home. A little tired today. No muscle cramping or nausea. Prior (baseline) level of function: Independent. Current level of function:         Current  IRF-KRISTYN and Goals:   Hearing, Speech, and Vision  Expression of Ideas and Wants: Without difficulty  Understanding Verbal and Non-Verbal Content: Understands  Prior Functioning: Everyday Activities  Self Care: Independent  Indoor Mobility (Ambulation): Independent  Stairs: Needed some help  Functional Cognition: Needed some help  Prior Device Use: Walker    Eating  Assistance Needed: Independent  CARE Score: 6  Discharge Goal: Independent  Oral Hygiene  Assistance Needed: Independent  Comment: Pt demo poor standing balance and required Min A to maintain upright posture. CARE Score: 6  Discharge Goal: Independent  Toileting Hygiene  Assistance Needed: Independent  Comment: Pt did own hygiene/changed depends  CARE Score: 6  Discharge Goal: Independent  Shower/Bathe Self  Assistance Needed: Independent  CARE Score: 6  Discharge Goal: Independent  Upper Body Dressing  Assistance Needed: Independent  CARE Score: 6  Discharge Goal: Independent  Lower Body Dressing  Assistance Needed: Independent  Comment: Min A to maintain upright posture.   CARE Score: 6  Discharge Goal: Independent  Putting On/Taking Off Footwear  Assistance Needed: Independent  CARE Score: 6  Discharge Goal: Independent    Roll Left and Right  Assistance Needed: Independent  Comment: required SBA, no use of bed features  CARE Score: 6  Discharge Goal: Independent  Sit to Lying  Assistance Needed: Independent  Comment: required SBA, no use of bed features  CARE Score: 6  Discharge Goal: Independent  Sit to Stand  Assistance Needed: Independent  Comment: required CGA using RW  CARE Score: 6  Discharge Goal: Independent  Chair/Bed-to-Chair Transfer  Assistance Needed: Independent  Comment: required CGA  CARE Score: 6  Discharge Goal: Independent  Toilet Transfer  Assistance Needed: Independent  Comment: 3(deemed usual performance per team huddle)  CARE Score: 6  Discharge Goal: Independent  Car Transfer  Assistance Needed: Independent  Comment: required CGA  CARE Score: 6  Discharge Goal: Independent   Walk 10 Feet? Walk 10 Feet?: Yes  1 Step  1 Step?: Yes  Picking Up Object  Assistance Needed: Partial/moderate assistance  Comment: pt required Min A with LUE support on RW (task completed without use of reacher)  CARE Score: 3  Discharge Goal: Independent  Wheelchair Ability  Uses a Wheelchair and/or Scooter?: No                  I      Exam:    Blood pressure 123/65, pulse 59, temperature 98.2 °F (36.8 °C), temperature source Oral, resp. rate 16, height 5' 2\" (1.575 m), weight 230 lb 12.8 oz (104.7 kg), SpO2 94 %. General: Up in a bedside chair. Talkative. Easily distracted. Fair-insight. HEENT: Mild left facial droop with mild dysarthria. MMM. Neck supple. Pulmonary: A bit labored under her girth but no rales or rhonchi. Cardiac: RRR. Abdomen: Patient's abdomen is soft and nondistended. Bowel sounds were present throughout. There was no rebound, guarding or masses noted. Upper extremities: Clumsy abrupt movements of the left upper limb without subluxation. Lower extremities: Wide base of support with 4/5 MMT at the left ankle. Sitting balance was fair.   Standing balance was fair-.    Lab Results   Component Value Date    WBC 5.5 10/20/2020    HGB 14.5 10/20/2020    HCT 44.3 10/20/2020    MCV 93.9 10/20/2020     10/20/2020     Lab Results   Component Value Date    INR 1.19 10/06/2020    PROTIME 14.4 10/06/2020     Lab Results   Component Value Date    CREATININE 1.3 (H) 10/20/2020    BUN 25 (H) 10/20/2020     (L) 10/20/2020    K 5.6 (HH) 10/20/2020    CL 96 (L) 10/20/2020    CO2 24 10/20/2020     Lab Results   Component Value Date    ALT 28 10/06/2020    AST 26 10/06/2020    ALKPHOS 112 10/06/2020    BILITOT 1.1 (H) 10/06/2020       Expected length of stay  prior to a supervised level of function for discharge home with a walker and [facility] OT/PT is 10/21/2020. Recommendations:    1. Acute pontine infarction with gait disturbance: Diligent with her work in the daily occupational physical therapy with speech-language pathology.      Tolerating her antiplatelet therapy with Plavix and the statin (Lipitor).      Systolic blood pressure nearly within target range. Blood sugars remain too high. Benefiting from pulmonary hygiene, nutritional support and bowel and bladder retraining. Double vision is less symptomatic.  DVT prophylaxis.   Ambulating with a front wheeled walker community distances. 2. DVT prophylaxis: Lovenox 30 mg subcu daily.  I must monitor her hemoglobin and platelet count periodically while on this medication.  Weightbearing activities are improving daily.  GI prophylaxis offered.  No new bruising or swelling. Hemoglobin excellent at 14.5.   3. Uncontrolled diabetes with hyperglycemia (type II): Patient requires a Humalog sliding scale with each meal and blood sugars are checked at mealtime and bedtime.  Blood sugars are coming down some but have room to go. Increased her Lantus to 50 units and her blood sugar was still nearly 200 this morning. I will increase the scheduled Humalog with each meal and discontinue the sliding scale. She is practicing self administration techniques. 4. Hypertension: Patient has required lisinopril, hydrochlorothiazide and Lopressor for blood pressure regulation. Today, her potassium was quite elevated.   I will withdrawal the supplement, redraw all the chemistries to make sure its correct and then asked nephrology to assist.  Kayexalate if her potassium is elevated on the second draw.        Counseling and Coordination of Care: In care conference today I met with the patient's OT, PT, RN and . We discussed the patient's problems, progress and prognosis. Disposition issues were clarified and plans were established for ongoing rehabilitation efforts beyond the ARU stay. I reviewed this information with the patient during a second distinct visit with the patient. More than half of the total time of 34 minutes spent with the patient involved counseling and coordination of care. Jenniffer Glow a standard wheelchair to successfully complete daily living tasks such as: bathing, toileting, dressing and grooming; or any other daily living task in the home.  A standard manual wheelchair is necessary due to patient's impaired ambulation and mobility restrictions and would be unable to resolve these daily living tasks using a cane or walker.  The patient is capable of using/self-propelling a standard wheelchair safely in their home and can maneuver within their home with adequate access in a reasonable amount of time. The patient has not expressed an unwillingness to use the wheelchair. Having this wheelchair would enable Sky Skaggs to regularly participate in the above mentioned daily living tasks around the home. There is a caregiver available to provide necessary assistance.    Expected length of need is lifetime.    Christopher Young not have any infectious diagnoses.

## 2020-10-20 NOTE — PROGRESS NOTES
Comprehensive Nutrition Assessment    Type and Reason for Visit:  Reassess    Nutrition Recommendations/Plan:   Continue crab controlled diet, low sodium with potassium restriction as needed  Continue diabetes education     Nutrition Assessment:  Meal intake remains %. Content with carb controlled diet. Discussed again meal plan. Plans for discharge tomorrow, will attempt to discuss diet again before leaving. Assisted patient with watching some education videos on hospital TV regarding diabetes care. Remains low nutrition risk at this time. Malnutrition Assessment:  Malnutrition Status:  Insufficient data(limited wt hx and po data but eatign well at this time)    Context:  Acute Illness       Estimated Daily Nutrient Needs:  Energy (kcal):  4838-5514; Weight Used for Energy Requirements:  Current     Protein (g):  60-70 (1.2-1.4 g/kg); Weight Used for Protein Requirements:  Ideal        Fluid (ml/day):  1800 (1ml/farhan); Weight Used for Fluid Requirements:  Current      Nutrition Related Findings:  up in chair, receptive to discussion on meal plan, reports significant other plans to change eating habits also      Wounds:  (red excoriated)       Current Nutrition Therapies:    DIET GENERAL; Carb Control: 4 carb choices (60 gms)/meal; Low Sodium (2 GM);  Daily Fluid Restriction: 1800 ml; Low Potassium    Anthropometric Measures:  · Height: 5' 2\" (157.5 cm)  · Current Body Weight: 230 lb 13.2 oz (104.7 kg)   · Admission Body Weight: 238 lb 15.7 oz (108.4 kg)    · Usual Body Weight: (limited wt hx, 300# in 2016)     · Ideal Body Weight: 110 lbs; % Ideal Body Weight 217.3 %   · BMI: 42.2  · Adjusted Body Weight:  ; No Adjustment   · BMI Categories: Obese Class 3 (BMI 40.0 or greater)       Nutrition Diagnosis:   · Altered nutrition-related lab values related to endocrine dysfuntion as evidenced by lab values      Nutrition Interventions:   Food and/or Nutrient Delivery:  Continue Current Diet  Nutrition Education/Counseling:  Education initiated   Coordination of Nutrition Care:  Continued Inpatient Monitoring    Goals:  Patient will tolerate carb controlled diet with meal intake at least 75%       Nutrition Monitoring and Evaluation:   Behavioral-Environmental Outcomes:  Readiness for Change   Food/Nutrient Intake Outcomes:  Food and Nutrient Intake  Physical Signs/Symptoms Outcomes:  Skin, Weight, Chewing or Swallowing, Biochemical Data     Discharge Planning:    Continue current diet, Recommend pursue outpatient diabetes education     Electronically signed by Luli Corrales RD, JOSE F on 10/20/20 at 4:02 PM EDT    Contact: [phone]

## 2020-10-20 NOTE — CONSULTS
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 female who was transported to [facility] ED on 10/6. It is reported that the patient had presented to the ED with right arm and leg  Weakness as well as left sided facial droop. Accompanying these   Symptoms, patient also had gait instability as well as double vision   She was hospitalized at [facility] and is presently attending rehab at [facility]. Patient denies any chest or abdominal pain during our visit    REVIEW OF SYSTEMS:  The ten point review of systems   are negative except as mentioned above.       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  -no known history of nephrolithiasis / denies having RRT      SOCIAL HISTORY:   Tobacco: previous smoker: 20 pack-year history      Alcohol: denies use     Demographic History; prior to admission:  Residing at home with     Medications:   Scheduled Meds:   amLODIPine  5 mg Oral Daily    insulin glargine  50 Units Subcutaneous Nightly    insulin lispro  10 Units Subcutaneous TID WC    insulin lispro  0-12 Units Subcutaneous TID WC    insulin lispro  0-6 Units Subcutaneous Nightly    enoxaparin  30 mg Subcutaneous Daily    sodium chloride flush  10 mL Intravenous 2 times per day    atorvastatin  10 mg Oral Daily    clopidogrel  75 mg Oral Daily    metoprolol tartrate  50 mg Oral BID    oxybutynin  5 mg Oral TID    pantoprazole  40 mg Oral QAM AC     Continuous Infusions:   dextrose       PRN Meds:.promethazine **OR** ondansetron, sodium chloride flush, dextrose, dextrose, glucagon (rDNA), glucose, acetaminophen, polyethylene glycol    Allergies:  Patient has no known allergies. Family History:       Problem Relation Age of Onset    No Known Problems Mother     No Known Problems Father      Physical Exam:    Vitals: /65   Pulse 59   Temp 98.2 °F (36.8 °C) (Oral)   Resp 16   Ht 5' 2\" (1.575 m)   Wt 230 lb 12.8 oz (104.7 kg)   SpO2 94%   BMI 42.21 kg/m²     General appearance:  awake and verbally interactive   HEENT: Head: Normal, normocephalic, atraumatic. Neck: supple, symmetrical, trachea midline  Cardiovascular: S1 and S2: normal / no rub  Pulmonary: diminished lung sounds bilaterally  Abdomen:  soft / non-tender   Extremities: Trace edema to bilateral lower legs     CBC:   Recent Labs     10/20/20  0650   WBC 5.5   HGB 14.5        BMP:    Recent Labs     10/20/20  0650 10/20/20  0928   * 132*   K 5.7* 5.6*   CL 98* 96*   CO2 27 24   BUN 24* 25*   CREATININE 1.3* 1.3*   GLUCOSE 190* 282*     Hepatic: No results for input(s): AST, ALT, ALB, BILITOT, ALKPHOS in the last 72 hours. Troponin: No results for input(s): TROPONINI in the last 72 hours. Mg, Phos: No results for input(s): MG, PHOS in the last 72 hours. ABGs: No results found for: PHART, PO2ART, JVH1WZV  INR: No results for input(s): INR in the last 72 hours.   -----------------------------------------------------------------  Patient Active Problem List   Diagnosis Code    Hypertension I10    Vitamin D deficiency E55.9    Hyperlipidemia E78.5    Back pain, chronic M54.9, G89.29    Hemiparesis of right dominant side as late effect of cerebral infarction (HCC) I69.351    Facial droop R29.810    Hypokalemia E87.6    Uncontrolled hypertension I10    Hyperglycemia R73.9    TIA (transient ischemic attack) G45.9    Class 3 severe obesity due to excess calories with body mass index (BMI) of 45.0 to 49.9 in adult (HCC) E66.01, W22.81    Acute CVA (cerebrovascular accident) ([facility] [address] I63.9    Dysarthria due to acute stroke (HCC) I63.9, R47.1    Dysphagia due to recent cerebral infarction I69.391    Uncontrolled type 2 diabetes mellitus with hyperglycemia ([facility] [address] E11.65    Morbid obesity with BMI of 45.0-49.9, adult ([facility]) E66.01, Z68.42     Assessment and Recommendations     Impression  1. 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 Pre-renal azotemia)  -likely etiology for SEMAJ including: intravascular volume   Depletion accentuated by Lisinopril / HCTZ     2. Acute CVA (acute pontine infarction)   3. HTN   4.  DM2  5. Hyperkalemia       PLAN   1.   -bladder scan ordered to screen for urinary retention   -additional labs: upc, chemistry panel in am, ua with micro  -Lisinopril / HCTZ has been discontinued   2.   -completing rehab at [facility]; anticipates discharge tomorrow   -currently on Plavix / Lipitor   3. -BP trend: systolic BP's have recently been 112 - 156  -on metoprolol and amlodipine to replace Lisinopril / HCTZ   -amlodipine has holding parameters  4.  -on Lantus and SSI for diabetes management   5.   -latest serum potassium: 5.6; following trend  -Lisinopril / HCTZ has been dc'd   -kayexalate ordered this afternoon   -[facility] this afternoon to recheck serum potassium     Electronically signed by MELANI Fernandes - [facility]      Nephrology Attending Progress Note  10/20/2020 4:35 PM  Subjective:   Admit Date: 10/8/2020  PCP: Sirena Allen MD    Interval History: I have personally performed face to face diagnostic evaluation on this patient. I have personally reviewed pertinent labs and imaging and agree with the care plan above. My additional findings are as follows:  79year old white female with htn and Dm2 with weakness and facial dropp concern cva and sent [facility] for rehab. Now better but concern renal insuffiency with high K and was on ace.      Objective:   Vitals: BP (!) 145/67   Pulse 65   Temp 97.8 °F (36.6 °C) (Oral)   Resp 16   Ht 5' 2\" (1.575 m)   Wt 230 lb 12.8 oz (104.7 kg)   SpO2 90%   BMI 42.21 kg/m²   Weak awake  Soft nt obese  Thick legs    Assessment and Plan:  IMP:  As stated above    Plan     1 bp stable today stay off ace and diuretic  2 repeat labs this pm and kayexlate if need  3 ssi and low K diet  4 on plavix with cva and rehab [facility]  5 low salt diet  6 creat 1.3 baseline possible and monitor   7 watch na as correct glucose  If K < 5.5 can plan dc in am and fu outpt           Electronically signed by Frantz Degroot MD on 10/20/2020 at 4:35 PM

## 2020-10-20 NOTE — PROGRESS NOTES
Extremity Used:        []    R UE []    L UE         Dentures: []   N/A    []    Partial []    Full  Adaptive Equipment Used:        Grooming: Mod I   Oral Hygiene: Mod I       Bathing: Mod I       Dressing:      Upper Body Dressing: Mod I   Lower Body Dressing: Mod I   Footwear: Mod I     Toileting: Mod I       Toilet Transfers: Mod I   Device Used:    [x]   Standard Toilet         [x]   Grab Bars           []  Bedside Commode       []   Elevated Toilet          []   Other:        Tub/Shower Transfer:   Sup vc to manage shower chair and directional change c body positioing in RW   Device Used:    [x]   Shower Bench      []   Shower Chair      []   Tub Transfer Bench           []   Bathtub    []   Shower         []   Other:         Bed Mobility:           [x]   Pt received out of bed       Transfers:    Sit--> Stand: Mod I   Stand --> Sit:   Mod I   Stand-Pivot:   Sup  Other:    Assistive device required for transfer:   RW      Functional Mobility:  Throughout  Room and bathroom for ADL session   Assistance:  Sup   Device:   [x]   Rolling Walker     []   Standard Walker []   Wheelchair        []   [employer]       []   Breanna Ores         []   Cardiac Charna Yamilet       []   Other:        Homemaking Tasks:   Mod I patient retrieved clothing and toiletries transporting items in RW bag, prior to shower and places soiled clothing in laundry bag for wash upon dc following day       Additional Therapeutic activities/exercises completed this date:     [x]   ADL Training   [x]   Balance/Postural training     [x]   Bed/Transfer Training   []   Endurance Training   []   Neuromuscular Re-ed   []   Nu-step:  Time:        Level:         #Steps:       []   Rebounder:    []  Seated     []  Standing        []   Supine Ther Ex (reps/sets):     []   Seated Ther Ex (reps/sets):     []   Standing Ther Ex (reps/sets):     []   Other:      Comments:  Patient with increased safety with good self awareness and scanning to reduce falls around obstacles and during turns this date     Patient/Caregiver Education and Training:   [x]   Adaptive Equipment Use  [x]   Bed Mobility/Transfer Technique/Safety  [x]   Energy Conservation Tips  []   Family training  [x]   Postural Awareness  [x]   Safety During Functional Activities  []   Reinforced Patient's Precautions   []   Progress was updated and reviewed in Rehabtracker with patient and/or family this         date.      Treatment Plan for Next Session: patient discharge 10/21      Assessment:        Treatment/Activity Tolerance:   [x] Tolerated treatment with no adverse effects    [] Patient limited by fatigue  [] Patient limited by pain   [] Patient limited by medical complications:    [] Adverse reaction to Tx:   [] Significant change in status    Safety:       []  bed alarm set    [x]  chair alarm set    []  Pt refused alarms                []  Telesitter activated      [x]  Gait belt used during tx session      []other:       Number of Minutes/Billable Intervention  Therapeutic Exercise    ADL Self-care 45   Neuro Re-Ed    Therapeutic Activity 15   Group    Other:    TOTAL 60       Social History  Social/Functional History  Lives With: Significant other(Duane)  Type of Home: Apartment  Home Layout: (3rd floor c elevator)  Home Access: Level entry  Bathroom Shower/Tub: Tub/Shower unit  Bathroom Toilet: Handicap height  Bathroom Equipment: Grab bars in shower, Grab bars around toilet  Bathroom Accessibility: Walker accessible  Home Equipment: Rolling walker  ADL Assistance: Independent  Homemaking Assistance: Independent  Homemaking Responsibilities: Yes  Meal Prep Responsibility: No  Laundry Responsibility: No  Cleaning Responsibility: No  Bill Paying/Finance Responsibility: Primary(Pt shares responsibility c significant other)  Shopping Responsibility: No  Dependent Care Responsibility: Secondary(Pt has cat)  Health Care Management: Primary  Ambulation Assistance: Independent(Mod Ind using RW (household distances usually at baseline))  Transfer Assistance: Independent  Active : No  Patient's  Info: Pt's significant other Duane  Mode of Transportation: Car  Occupation: On disability  Leisure & Hobbies: Pt enjoys staying home to watch tv  Additional Comments: Pt reports, falling 5x times or more this year with some requiring hospitalization but no significant injuries; sleeps in regular, flat bed; R hand dominant    Objective                                                                                    Goals:  (Update in navigator)  Short term goals  Time Frame for Short term goals: STG=LTG:  Long term goals ALL GOALS MET  Time Frame for Long term goals : 10-12 days or until d/c  Long term goal 1: Pt will increase BUE coordination/fine motor skills in order to complete feeding/grooming/oral care task c MOD I by d/c  Long term goal 2: Pt will complete total body bathing c MOD I by d/c  Long term goal 3: Pt will complete UB dressing c MOD I and retrieve items from closet by d/c  Long term goal 4: Pt will complete LB dressing c MOD I and retrieve items from closet by d/c  Long term goal 5: Pt will don/doff footwear c MOD I by d/c  Long term goals 6: Pt will complete toileting c MOD I by d/c  Long term goal 7: Pt will complete functional transfer (toilet, tub, shower) c MOD I by d/c. Long term goal 8: Pt will perform therex/therax to facilitate increased strength/ax tolerance (c emphasis on dynamic standing balance/tolerance >10 mins) c MOD I in order to complete ADLs. :        Plan of Care                                                                              Times per week: 5 days per week for a minimum of 60 minutes/day plus group as appropriate for 60 minutes.   Treatment to include Plan  Times per day: Daily  Current Treatment Recommendations: Neuromuscular Re-education    Electronically signed by   DIALLO Mccollum  10/20/2020, 8:34 AM     NISHANT Moscoso OTR/CHAO  License # [id number]  Goals edited by OTR/L

## 2020-10-20 NOTE — PROGRESS NOTES
Occupational Therapy  Physical Rehabilitation: OCCUPATIONAL THERAPY     [x] daily progress note       [] discharge       Patient Name:  Vicky Wilson   :  1950 MRN: [id number]  Room:  [address] [place] [address] Date of Admission: 10/8/2020  Rehabilitation Diagnosis:   CVA [facility] accident[facility]) [I63.9]  Acute CVA (cerebrovascular accident) ([facility] [address] [I63.9]       Date 10/20/2020       Day of ARU Week:  6   Time IN/OUT 6819-3338   Individual Tx Minutes 30   Group Tx Minutes    Co-Treat Minutes    Concurrent Tx Minutes    TOTAL Tx Time Mins 30   Variance Time    Variance Time []   Refusal due to:     []   Medical hold/reason:    []   Illness   []   Off Unit for test/procedure  []   Extra time needed to complete task  []   Therapeutic need  []   Other (specify):   Restrictions Restrictions/Precautions: General Precautions, Fall Risk(R eye deficit (double vision/decreased ability to focus), mild RLE weakness)         Communication with other providers: [x]   OK to see per nursing:     []   Spoke with team member regarding:      Subjective observations and cognitive status: Pt in recliner upon arrival. Pt agreeable to therapy. Pain level/location:    /10       Location: Denied   Discharge recommendations  Anticipated discharge date:  10/21  Destination: []?home alone   []?home alone w assist prn [x]? home w/ family    []? Continuous supervision       []? SNF            []? Assisted living     []? Other:   Continued therapy: [x]? Venancio Campos OT  []? OUTPATIENT  OT   []? No Further OT  Equipment needs: Hailey Ordaz the assistance of a tub transfer bench to successfully and safely complete bathing activities necessary due to reduced balance, endurance, need for ADL assist, and LE weakness and reduced ROM.  These tasks cannot be safely completed without this device and would place Sarai Kirby at greater risk of falls. Toileting:    Mod I; however,  require verbal cues for hand washing        Toilet Transfers: Mod I  Device Used:    [x]   Standard Toilet         [x]   Grab Bars           []  Bedside Commode       []   Elevated Toilet          []   Other:        Bed Mobility:           [x]   Pt received out of bed   Rolling R/L:    Scooting:    Supine --> Sit:    Sit --> Supine:      Transfers:    Sit--> Stand: Mod I  Stand --> Sit:   Mod I  Stand-Pivot: Mod I  Other:    Assistive device required for transfer:   RW      Functional Mobility:  Room<>bathroom  Assistance:  Sup  Device:   [x]   Rolling Walker     []   Standard Walker []   Wheelchair        []   Asa Held       []   Jamel Waite         []   Cardiac Miley Reeves       []   Other:        Homemaking Tasks: Additional Therapeutic activities/exercises completed this date:     [x]   ADL Training Engaged in personal/toileting hygiene. Demo Mod I for hand washing    [x]   Balance/Postural training     []   Bed/Transfer Training   []   Endurance Training   []   Neuromuscular Re-ed   []   Nu-step:  Time:        Level:         #Steps:       []   Rebounder:    []  Seated     []  Standing        []   Supine Ther Ex (reps/sets):     [x]   Seated Ther Ex (reps/sets):  Targeting core muscles utilizing 4# ball x10 reps on edge of chair in order to improve trunk stability. Perform 3# free weight targeting all planes of motion x15 reps. Demo F techs and activity tolerance. []   Standing Ther Ex (reps/sets):     []   Other:      Comments:      Patient/Caregiver Education and Training:   []   Adaptive Equipment Use  [x]   Bed Mobility/Transfer Technique/Safety  []   Energy Conservation Tips  []   Family training  [x]   Postural Awareness  [x]   Safety During Functional Activities  []   Reinforced Patient's Precautions   []   Progress was updated and reviewed in Rehabtracker with patient and/or family this         date.     Treatment Plan for Next Session:Continue OT POC      Assessment:        Treatment/Activity Tolerance:   [] Tolerated treatment with no adverse effects    [x] Patient limited by fatigue  [] Patient limited by pain   [] Patient limited by medical complications:    [] Adverse reaction to Tx:   [] Significant change in status    Safety:       []  bed alarm set    [x]  chair alarm set    []  Pt refused alarms                []  Telesitter activated      [x]  Gait belt used during tx session      []other:       Number of Minutes/Billable Intervention  Therapeutic Exercise 20   ADL Self-care 10   Neuro Re-Ed    Therapeutic Activity    Group    Other:    TOTAL 30       Social History  Social/Functional History  Lives With: Significant other(Duane)  Type of Home: Apartment  Home Layout: (3rd floor c elevator)  Home Access: Level entry  Bathroom Shower/Tub: Tub/Shower unit  Bathroom Toilet: Handicap height  Bathroom Equipment: Grab bars in shower, Grab bars around toilet  Bathroom Accessibility: Walker accessible  Home Equipment: Rolling walker  ADL Assistance: Independent  Homemaking Assistance: Independent  Homemaking Responsibilities: Yes  Meal Prep Responsibility: No  Laundry Responsibility: No  Cleaning Responsibility: No  Bill Paying/Finance Responsibility: Primary(Pt shares responsibility c significant other)  Shopping Responsibility: No  Dependent Care Responsibility: Secondary(Pt has cat)  Health Care Management: Primary  Ambulation Assistance: Independent(Mod Ind using RW (household distances usually at baseline))  Transfer Assistance: Independent  Active : No  Patient's  Info: Pt's significant other Duane  Mode of Transportation: Car  Occupation: On disability  Leisure & Hobbies: Pt enjoys staying home to watch tv  Additional Comments: Pt reports, falling 5x times or more this year with some requiring hospitalization but no significant injuries; sleeps in regular, flat bed; R hand dominant    Objective                                                                                    Goals:  (Update in navigator)  Short term goals  Time Frame for Short term goals: STG=LTG:  Long term goals  Time Frame for Long term goals : 10-12 days or until d/c  Long term goal 1: Pt will increase BUE coordination/fine motor skills in order to complete feeding/grooming/oral care task c MOD I by d/c  Long term goal 2: Pt will complete total body bathing c MOD I by d/c  Long term goal 3: Pt will complete UB dressing c MOD I and retrieve items from closet by d/c  Long term goal 4: Pt will complete LB dressing c MOD I and retrieve items from closet by d/c  Long term goal 5: Pt will don/doff footwear c MOD I by d/c  Long term goals 6: Pt will complete toileting c MOD I by d/c  Long term goal 7: Pt will complete functional transfer (toilet, tub, shower) c MOD I by d/c. Long term goal 8: Pt will perform therex/therax to facilitate increased strength/ax tolerance (c emphasis on dynamic standing balance/tolerance >10 mins) c MOD I in order to complete ADLs. :        Plan of Care                                                                              Times per week: 5 days per week for a minimum of 60 minutes/day plus group as appropriate for 60 minutes.   Treatment to include Plan  Times per day: Daily  Current Treatment Recommendations: Neuromuscular Re-education    Electronically signed by   DIALLO Terrazas  10/20/2020, 10:40 AM

## 2020-10-20 NOTE — PROGRESS NOTES
Telephoned patient's dtr Stormy Fitzgerald. No answer \"person you're calling cannot accept calls right now\" automated msg.    1000 case mgt notified that patient requesting a WC. Lidia will enter documentation. 1320: orders faxed to [facility]. Face to face for wheelchair requested.

## 2020-10-20 NOTE — PROGRESS NOTES
Physical Therapy    [x] daily progress note       [x]  discharge note        Patient Name:  Sinan Adames   :  1950 MRN: [id number]  Room:  [address] [place] [address] Date of Admission: 10/8/2020  Rehabilitation Diagnosis:   CVA (cerebral vascular accident) [facility]) [I63.9]  Acute CVA (cerebrovascular accident) ([facility] [address] [I63.9]       Date 10/20/2020       Day of ARU Week:  6   Time IN/OUT 1000-x  1030/1130  1410-x  1450/1520   Individual Tx Minutes 60+30   Group Tx Minutes    Co-Treat Minutes    Concurrent Tx Minutes    TOTAL Tx Time Mins 90   Variance Time    Variance Time []   Refusal due to:     []   Medical hold/reason:    []   Illness   []   Off Unit for test/procedure  []   Extra time needed to complete task  []   Therapeutic need  []   Other (specify):   Restrictions Restrictions/Precautions  Restrictions/Precautions: General Precautions, Fall Risk(R eye deficit (double vision/decreased ability to focus), mild RLE weakness)      Interdisciplinary communication [x]   Cleared for therapy per nursing     []   RN notified about issues during session  []   RN updated on pt performance  []   Spoke with   []   Spoke with OT  []   Spoke with MD  []   Other:    Subjective observations and cognitive status: (AM1) Pt resting in recliner stating that her head feels like spinning and requested to be seen at later time.  (AM2) Pt seated in recliner, alert, \"So what are we doing today? \"  (PM1) Pt in recliner, refusing to participate in PT as she just finished her OT session and feels tired. (PM2) Pt in recliner, mood was more pleasant and motivated, \"Let's do it. How far do I need to go? \"   Pain level/location: None reported at rest and with activities   Discharge recommendations  Anticipated discharge date:  10/21/2020  Destination: []??home alone   []? ?home alone with assist PRN     [x]? ? home w/ family      [x]? ? Continuous supervision  []? ?SNF    []? ? Assisted living     []? ? Other:  Continued therapy: [x]? ?[facility]io [facility] PT  []??OUTPATIENT  PT   []? ? No Further PT  []? ?SNF PT  Caregiver training recommended: [x]? ? Yes  []? ? No   Equipment needs: RW and manual w/c   Sarai Kirby requires the assistance of a front-wheeled walker to successfully ambulate from room to room at home to allow completion of daily living tasks such as: bathing, toileting, dressing and grooming.  A wheeled walker is necessary due to the patient's unsteady gait, upper body weakness, inability to  a standard walker.  This patient can ambulate only by pushing a walker instead of using a lesser assistive device such as a cane or crutch. Kevon Castro requires a standard wheelchair to successfully complete daily living tasks such as: bathing, toileting, dressing and grooming; or any other daily living task in the home. A standard manual wheelchair is necessary due to patient's impaired ambulation and mobility restrictions and would be unable to resolve these daily living tasks using a cane or walker. The patient is capable of using/self-propelling a standard wheelchair safely in their home and can maneuver within their home with adequate access in a reasonable amount of time. The patient has not expressed an unwillingness to use the wheelchair. Having this wheelchair would enable Kevon Castro ability to regularly participate in the above mentioned daily living tasks around the home. There is a caregiver available to provide necessary assistance. Expected length of need is lifetime. Kevon Castro does not have any infectious diagnoses. Bed Mobility:           []   Pt received out of bed   Rolling R/L:  Ind  Scooting:  Ind  Lying --> Sit:  Ind  Sit --> lying:  Ind   Bed features used: [] Yes  [x] No       Transfers:    Sit--> Stand: Mod Ind   Stand --> Sit: SBA-Sup. (mild staggering when turning and impulsive to sit)   Chair-->Bed/Bed --> Chair:  Mod Ind  Car Transfers: Mod Ind   Toilet Transfer (if applicable):  Mod Ind Assistive device required for transfer:  RW     Gait:    Walk 10 feet: Mod Ind   Walk 50 feet with 2 turns:  SBA-Sup  Walk 150 feet:  Pt unable due to increased fatigue   Other distance:  62 ft + 76 ft (max; limited by fatigue)  Device:  RW  Gait Quality:   starts with slow tello and steady gait quality  and then gait speed unpredictably increases as distance progressed, step-to and then gradually progresses to step-through, inconsistent L step length, inconsistent standing posture as distance progressed and when distracted, tends to push AD too far forward as distance progressed     Stairs   1 step or curb: CGA   Supportive Device:  RW  Height:   4\"    4 steps:  SBA-Sup  12 steps:  Not applicable (pt's max was 5 steps as her usual performance during ARU stay)  Supportive Device:  bilat rails     Uneven Surfaces:       Assistance:  SBA  Device:  RW  Surfaces Completed:   []  Green mat with bean bags beneath      []  Throw rugs       []  Ramp       []  Outdoor pavements        []  Grass             []  Loose gravel        [x]  Other: carpet       Picking Up Object From Floor (must be standing position):  Assistance:  SBA-Sup. (required sequential cues due to impaired problem solving)    [x]   Reacher used    Additional Therapeutic activities/exercises completed this date:     []   Nu-step:  Time:        Level:         #Steps:       []   Rebounder:    []  Seated     []  Standing        []   Balance training         []   Postural training    []   Supine ther ex (reps/sets):     []   Seated ther ex (reps/sets):     []   Standing ther ex (reps/sets):     []   Picking up object from floor (standing):                   []   Reacher used   [x]   Other:  Toileting activity completed Mod Ind    []   Other:    Comments:      Patient/Caregiver Education and Training:   []   Role of PT  []   Education about Dx  []   Use of call light for assist   []   HEP provided and explained   [x]   Treatment plan reviewed  []   Home safety  []   Wheelchair mobility/management   []   Body mechanics  []   Bed Mobility/Transfer technique  []   Gait technique/sequencing  []   Proper use of assistive device/adaptive equipment  []   Stair training/Advanced mobility safety and technique  []   Reinforced patient's precautions/mobility while maintaining precautions  []   Postural awareness  []   Family/caregiver training  []   Progress was updated and reviewed in Rehabtracker with patient and/or family this date. [x]   Other: Pt education about benefits of walking regimen at home to prevent deconditioning     Treatment Plan for Next Session: discharge to home with ongoing assist/supervision from family     Assessment:  Pt's gait quality and safety insight regress when fatigued and/or when distracted. She verbalizes safe strategies during transfers and when initiating ambulation, however these strategies do not consistently carry over during ambulation tasks requiring verbal prompts. CGT was recommended but was not addressed due to caregiver not being present during therapy sessions.        Treatment/Activity Tolerance:   [] Tolerated treatment with no adverse effects    [x] Patient limited by fatigue  [] Patient limited by pain   [] Patient limited by medical complications:    [] Adverse reaction to Tx:   [] Significant change in status    Barrier/s to progress/learning:   []   None  [x]   Cognition  []   Hearing deficit  []   Pre-morbid mental/psychological status   [x]   Motivation  []   Communication  []   Anxiety  [x]   Vision deficit  [x]   Attention  []   Other:    Safety:       []  bed alarm set    [x]  chair alarm set    []  Pt refused alarms                []  Telesitter activated      [x]  Gait belt used during tx session      []other:         Number of Minutes/Billable Intervention  Gait Training 45   Therapeutic Exercise    Neuro Re-Ed    Therapeutic Activity 45   Wheelchair Propulsion    Group    Other:    TOTAL 90         Social equipment prn Mod Ind.:   :        Plan of Care                                                                              Times per week: 5 days per week for a minimum of 60 minutes/day plus group as appropriate for 60 minutes.   Treatment to include Current Treatment Recommendations: Strengthening, Functional Mobility Training, Home Exercise Program, Equipment Evaluation, Education, & procurement, Transfer Training, Gait Training, Safety Education & Training, Balance Training, Endurance Training, Stair training, Patient/Caregiver Education & Training, Cognitive/Perceptual Training    Electronically signed by   Carlos Eduardo Almonte, PT   10/20/2020, 10:26 AM

## 2020-10-21 VITALS
SYSTOLIC BLOOD PRESSURE: 145 MMHG | OXYGEN SATURATION: 94 % | TEMPERATURE: 98.6 F | RESPIRATION RATE: 16 BRPM | DIASTOLIC BLOOD PRESSURE: 72 MMHG | HEIGHT: 62 IN | WEIGHT: 230.8 LBS | BODY MASS INDEX: 42.47 KG/M2 | HEART RATE: 67 BPM

## 2020-10-21 LAB
ANION GAP SERPL CALCULATED.3IONS-SCNC: 11 MMOL/L (ref 4–16)
BUN BLDV-MCNC: 23 MG/DL (ref 6–23)
CALCIUM SERPL-MCNC: 9.5 MG/DL (ref 8.3–10.6)
CHLORIDE BLD-SCNC: 98 MMOL/L (ref 99–110)
CO2: 27 MMOL/L (ref 21–32)
CREAT SERPL-MCNC: 1.2 MG/DL (ref 0.6–1.1)
GFR AFRICAN AMERICAN: 54 ML/MIN/1.73M2
GFR NON-AFRICAN AMERICAN: 44 ML/MIN/1.73M2
GLUCOSE BLD-MCNC: 163 MG/DL (ref 70–99)
GLUCOSE BLD-MCNC: 177 MG/DL (ref 70–99)
GLUCOSE BLD-MCNC: 311 MG/DL (ref 70–99)
GLUCOSE BLD-MCNC: 316 MG/DL (ref 70–99)
POTASSIUM SERPL-SCNC: 4.8 MMOL/L (ref 3.5–5.1)
SODIUM BLD-SCNC: 136 MMOL/L (ref 135–145)

## 2020-10-21 PROCEDURE — 6370000000 HC RX 637 (ALT 250 FOR IP): Performed by: NURSE PRACTITIONER

## 2020-10-21 PROCEDURE — 80048 BASIC METABOLIC PNL TOTAL CA: CPT

## 2020-10-21 PROCEDURE — 6370000000 HC RX 637 (ALT 250 FOR IP): Performed by: INTERNAL MEDICINE

## 2020-10-21 PROCEDURE — 99239 HOSP IP/OBS DSCHRG MGMT >30: CPT | Performed by: PHYSICAL MEDICINE & REHABILITATION

## 2020-10-21 PROCEDURE — 94761 N-INVAS EAR/PLS OXIMETRY MLT: CPT

## 2020-10-21 PROCEDURE — 94150 VITAL CAPACITY TEST: CPT

## 2020-10-21 PROCEDURE — 36415 COLL VENOUS BLD VENIPUNCTURE: CPT

## 2020-10-21 PROCEDURE — 82962 GLUCOSE BLOOD TEST: CPT

## 2020-10-21 RX ORDER — INSULIN GLARGINE 100 [IU]/ML
50 INJECTION, SOLUTION SUBCUTANEOUS NIGHTLY
Qty: 1 VIAL | Refills: 3 | Status: ON HOLD | OUTPATIENT
Start: 2020-10-21 | End: 2021-07-09 | Stop reason: SDUPTHER

## 2020-10-21 RX ORDER — OXYBUTYNIN CHLORIDE 5 MG/1
5 TABLET ORAL 3 TIMES DAILY
Qty: 90 TABLET | Refills: 0 | Status: ON HOLD | OUTPATIENT
Start: 2020-10-21 | End: 2021-07-09 | Stop reason: HOSPADM

## 2020-10-21 RX ORDER — AMLODIPINE BESYLATE 5 MG/1
5 TABLET ORAL DAILY
Qty: 30 TABLET | Refills: 0 | Status: ON HOLD | OUTPATIENT
Start: 2020-10-22 | End: 2021-07-09 | Stop reason: SDUPTHER

## 2020-10-21 RX ORDER — METOPROLOL TARTRATE 50 MG/1
50 TABLET, FILM COATED ORAL 2 TIMES DAILY
Qty: 60 TABLET | Refills: 0 | Status: ON HOLD | OUTPATIENT
Start: 2020-10-21 | End: 2021-07-09 | Stop reason: SDUPTHER

## 2020-10-21 RX ORDER — PANTOPRAZOLE SODIUM 40 MG/1
40 TABLET, DELAYED RELEASE ORAL
Qty: 30 TABLET | Refills: 0 | Status: ON HOLD | OUTPATIENT
Start: 2020-10-22 | End: 2021-07-09 | Stop reason: SDUPTHER

## 2020-10-21 RX ORDER — CLOPIDOGREL BISULFATE 75 MG/1
75 TABLET ORAL DAILY
Qty: 30 TABLET | Refills: 0 | Status: ON HOLD | OUTPATIENT
Start: 2020-10-22 | End: 2021-07-09 | Stop reason: SDUPTHER

## 2020-10-21 RX ORDER — ATORVASTATIN CALCIUM 10 MG/1
10 TABLET, FILM COATED ORAL DAILY
Qty: 30 TABLET | Refills: 0 | Status: ON HOLD | OUTPATIENT
Start: 2020-10-21 | End: 2021-07-09 | Stop reason: HOSPADM

## 2020-10-21 RX ADMIN — OXYBUTYNIN CHLORIDE 5 MG: 5 TABLET ORAL at 12:13

## 2020-10-21 RX ADMIN — METOPROLOL TARTRATE 50 MG: 50 TABLET, FILM COATED ORAL at 09:37

## 2020-10-21 RX ADMIN — AMLODIPINE BESYLATE 5 MG: 5 TABLET ORAL at 09:37

## 2020-10-21 RX ADMIN — PANTOPRAZOLE SODIUM 40 MG: 40 TABLET, DELAYED RELEASE ORAL at 05:50

## 2020-10-21 RX ADMIN — OXYBUTYNIN CHLORIDE 5 MG: 5 TABLET ORAL at 09:37

## 2020-10-21 RX ADMIN — CLOPIDOGREL BISULFATE 75 MG: 75 TABLET ORAL at 09:37

## 2020-10-21 ASSESSMENT — PAIN SCALES - GENERAL: PAINLEVEL_OUTOF10: 0

## 2020-10-21 NOTE — PROGRESS NOTES
Nephrology Progress Note  10/21/2020 7:44 AM  Subjective:   Admit Date: 10/8/2020  PCP: Ana Tamez MD  Interval History: pt state doing better and hoping leave today        Data:   Scheduled Meds:   amLODIPine  5 mg Oral Daily    sodium bicarbonate  650 mg Oral BID    insulin lispro  16 Units Subcutaneous TID WC    insulin glargine  50 Units Subcutaneous Nightly    sodium chloride flush  10 mL Intravenous 2 times per day    atorvastatin  10 mg Oral Daily    clopidogrel  75 mg Oral Daily    metoprolol tartrate  50 mg Oral BID    oxybutynin  5 mg Oral TID    pantoprazole  40 mg Oral QAM AC     Continuous Infusions:   dextrose         No intake/output data recorded.   / ;  CBC:   Recent Labs     10/20/20  0650   WBC 5.5   HGB 14.5        BMP:    Recent Labs     10/20/20  0650 10/20/20  0928 10/20/20  1738   * 132* 131*   K 5.7* 5.6* 4.6   CL 98* 96* 94*   CO2 27 24 25   BUN 24* 25* 26*   CREATININE 1.3* 1.3* 1.4*   GLUCOSE 190* 282* 181*       Renal Labs  Albumin:    Lab Results   Component Value Date    LABALBU 4.2 10/06/2020     Calcium:    Lab Results   Component Value Date    CALCIUM 9.1 10/20/2020     Phosphorus:  No results found for: PHOS  U/A:    Lab Results   Component Value Date    NITRU NEGATIVE 10/20/2020    COLORU YELLOW 10/20/2020    WBCUA 131 10/20/2020    RBCUA NONE SEEN 10/20/2020    MUCUS RARE 10/20/2020    TRICHOMONAS NONE SEEN 10/20/2020    BACTERIA MANY 10/20/2020    CLARITYU SLIGHTLY CLOUDY 10/20/2020    SPECGRAV 1.013 10/20/2020    UROBILINOGEN 1 10/20/2020    BILIRUBINUR NEGATIVE 10/20/2020    BLOODU NEGATIVE 10/20/2020    KETUA NEGATIVE 10/20/2020           Objective:   Vitals: /60   Pulse 56   Temp 98 °F (36.7 °C) (Oral)   Resp 16   Ht 5' 2\" (1.575 m)   Wt 230 lb 12.8 oz (104.7 kg)   SpO2 91%   BMI 42.21 kg/m²   General appearance: awake weak  HEENT: Head: Normal, normocephalic, atraumatic.   Neck: supple, symmetrical, trachea midline  Lungs: diminished breath sounds bilaterally  Heart: S1, S2 normal  Abdomen: abnormal findings:  soft nt  Extremities: edema trace  Neurologic: Mental status: alertness: alert      Patient Active Problem List:     Hypertension     Vitamin D deficiency     Hyperlipidemia     Back pain, chronic     Hemiparesis of right dominant side as late effect of cerebral infarction (HCC)     Facial droop     Hypokalemia     Uncontrolled hypertension     Hyperglycemia     TIA (transient ischemic attack)     Class 3 severe obesity due to excess calories with body mass index (BMI) of 45.0 to 49.9 in adult [facility])     Acute CVA (cerebrovascular accident) ([facility] [address]     Dysarthria due to acute stroke ([facility] [address]     Dysphagia due to recent cerebral infarction     Uncontrolled type 2 diabetes mellitus with hyperglycemia ([facility] [address]     Morbid obesity with BMI of 45.0-49.9, adult ([facility] [address]    Assessment and Plan:      IMP:  ckd 3 A with arf from atn  cva with weakness  htn  Dm2  Low na with high K    Plan     Creat 1.4 and monitor likely baseline ckd with Dm2 and htn with mild protenuria and can monitor as outpt  Sp rehab and improve with care outpt and on plavix  bp stbale on norvasc and lopressor only and low salt diet- stay off ace and diuretic for now  ssi and try control glucose  Watch water intake as outpt and K improved, stop bicarb tab    Ok to dc on current med list and fu with me 2 week in office and will adjust and check labs             Adalberto Cesar MD

## 2020-10-21 NOTE — CARE COORDINATION
ARU  Discharge Summary    D/C Date: 10/21/20    Patient discharged to: home    Transported by:  family    Referrals made to: Dr Yajaira Martinez, [facility] AT [facility]    Additional information:

## 2020-10-21 NOTE — PROGRESS NOTES
Patient referred to [facility] via perfect serve. [facility] won't accept any more [place] medicaid at this time. Patient referred to donna Campos. Received insurance denial by fax today. Patient's continued stay denied beginning 10/19. Peer to peer was completed. Patient's discharge is today. Case mgt spoke with patient in room. Looking forward to today's d/c home. Raven Meche will transport today @1300 and come in for diabetic ed. Patient's DME in room. Agreeable to donna Campos referral as they're in network.

## 2020-10-22 NOTE — CARE COORDINATION
Case mgt received VM from [facility]. [facility]. They've decided not to accept patient due to low staff and patient's payor source. Case mgt telephoned interim [facility]. Calls rolling over to [facility] which won't accept referrals for Dequan Sewell. Will re-attempt. 10/23/20: [facility] won't accept patient. [facility] accepted patient. Notes and orders faxed.

## 2020-11-14 NOTE — DISCHARGE SUMMARY
Patient Name: Kaleb Farrell  Patient :  1950  Patient MRN:   [id number]      Admission Date:  10/8/2020  Discharge Date: 10/21/2020. Admitting diagnosis: Acute CVA (dorsal left ian)     Comorbid diagnoses impacting rehabilitation: Right hemiparesis, gait disturbance, essential hypertension, uncontrolled diabetes with hyperglycemia, morbid obesity (BMI 45.5), disconjugate gaze. Discharging diagnosis: Acute CVA (dorsal left ian)     Comorbid diagnoses impacting rehabilitation: Right hemiparesis, gait disturbance, essential hypertension, uncontrolled diabetes with hyperglycemia, morbid obesity (BMI 45.5), disconjugate gaze. History of present illness: Patient is a 80-year-old right-hand-dominant female with a history of hypertension, diabetes and morbid obesity who developed sudden onset of double vision and right arm and leg weakness in the early morning hours of 10/6/2020. Around 2 AM she suffered a fall when trying to get out of bed because she could not control her right leg. Later that morning when symptoms persisted and she had difficulty seeing she came to our ED. The patient demonstrated left facial droop and right arm and leg weakness at that time. Brain imaging showed a lesion in the dorsal ian (ischemia). No evidence of aspiration or infection was noted. There was no history of trauma or seizure activity. She was started on antiplatelet therapy and had speech therapy evaluate her swallowing. Prior (baseline) level of function: Independent. Current level of function:         Current  IRF-KRISTYN and Goals:   Hearing, Speech, and Vision  Expression of Ideas and Wants: Without difficulty  Understanding Verbal and Non-Verbal Content: Understands  Prior Functioning: Everyday Activities  Self Care: Independent  Indoor Mobility (Ambulation):  Independent  Stairs: Needed some help  Functional Cognition: Needed some help  Prior Device Use: Walker    Eating  Assistance Needed: pulse 67, temperature 98.6 °F (37 °C), temperature source Oral, resp. rate 16, height 5' 2\" (1.575 m), weight 230 lb 12.8 oz (104.7 kg), SpO2 94 %. General: Sitting up in a wheelchair. Alert. Interactive. Fair insight. Still easily distracted. HEENT: Mild left facial droop with mild dysarthria. MMM. Neck supple. Pulmonary: Clear and unlabored. No rales. Cardiac: RRR. Abdomen: Patient's abdomen is soft and nondistended. Bowel sounds were present throughout. There was no rebound, guarding or masses noted. Upper extremities: Clumsy abrupt movements of the left upper limb without subluxation. Lower extremities: Wide base of support with 4/5 MMT at the left ankle. Sitting balance was fair. Standing balance was fair-.    Lab Results   Component Value Date    WBC 5.5 10/20/2020    HGB 14.5 10/20/2020    HCT 44.3 10/20/2020    MCV 93.9 10/20/2020     10/20/2020     Lab Results   Component Value Date    INR 1.19 10/06/2020    PROTIME 14.4 10/06/2020     Lab Results   Component Value Date    CREATININE 1.2 (H) 10/21/2020    BUN 23 10/21/2020     10/21/2020    K 4.8 10/21/2020    CL 98 (L) 10/21/2020    CO2 27 10/21/2020     Lab Results   Component Value Date    ALT 28 10/06/2020    AST 26 10/06/2020    ALKPHOS 112 10/06/2020    BILITOT 1.1 (H) 10/06/2020           The patient presented to the ARU with the above history requiring a multidisciplinary treatment plan including close medical supervision by the [facility] Director. The patient participated in the prescribed therapy treatment plan with reasonable compliance and progressive tolerance. They avoided significant medical complications. By the time of discharge the patient had become safer with adaptive equipment to transfer and toilet with a FWW with the supervision of family/caregivers.  The patient was tolerating an oral diet without choking/coughing and was back to their baseline with regards to bowel and bladder control. Discharge instructions were reviewed with the patient and family. The patient is to follow up with the PCP in 1 week. No driving. [facility] PT/OT/RN will be arranged. [facility] [facility] Medication Instructions OHK:[id number]    Printed on:11/14/20 5831   Medication Information                      amLODIPine (NORVASC) 5 MG tablet  Take 1 tablet by mouth daily             atorvastatin (LIPITOR) 10 MG tablet  Take 1 tablet by mouth daily             clopidogrel (PLAVIX) 75 MG tablet  Take 1 tablet by mouth daily             insulin glargine (LANTUS) 100 UNIT/ML injection vial  Inject 50 Units into the skin nightly             insulin lispro (HUMALOG) 100 UNIT/ML injection vial  Inject 16 Units into the skin 3 times daily (with meals)             metoprolol tartrate (LOPRESSOR) 50 MG tablet  Take 1 tablet by mouth 2 times daily             oxybutynin (DITROPAN) 5 MG tablet  Take 1 tablet by mouth 3 times daily             pantoprazole (PROTONIX) 40 MG tablet  Take 1 tablet by mouth every morning (before breakfast)                 CONDITION ON DISCHARGE: Stable. The prognosis is fair for further improvements in ADL's and safety with adapted gait/transfers. Record review, patient exam, discharge instructions, medication reconciliation and summary for this discharge visit took more than 30 minutes.

## 2021-06-23 ENCOUNTER — APPOINTMENT (OUTPATIENT)
Dept: CT IMAGING | Age: 71
DRG: 045 | End: 2021-06-23
Payer: MEDICAID

## 2021-06-23 ENCOUNTER — APPOINTMENT (OUTPATIENT)
Dept: GENERAL RADIOLOGY | Age: 71
DRG: 045 | End: 2021-06-23
Payer: MEDICAID

## 2021-06-23 ENCOUNTER — HOSPITAL ENCOUNTER (INPATIENT)
Age: 71
LOS: 6 days | Discharge: NURSING, REHAB OR LONG-TERM CARE | DRG: 045 | End: 2021-06-29
Attending: EMERGENCY MEDICINE | Admitting: INTERNAL MEDICINE
Payer: MEDICAID

## 2021-06-23 DIAGNOSIS — I63.9 CEREBROVASCULAR ACCIDENT (CVA), UNSPECIFIED MECHANISM (HCC): Primary | ICD-10-CM

## 2021-06-23 DIAGNOSIS — R73.9 HYPERGLYCEMIA: ICD-10-CM

## 2021-06-23 DIAGNOSIS — R79.89 ELEVATED LACTIC ACID LEVEL: ICD-10-CM

## 2021-06-23 DIAGNOSIS — R41.0 CONFUSION: ICD-10-CM

## 2021-06-23 LAB
ALBUMIN SERPL-MCNC: 4.4 GM/DL (ref 3.4–5)
ALCOHOL SCREEN SERUM: <0.01 %WT/VOL
ALP BLD-CCNC: 130 IU/L (ref 40–129)
ALT SERPL-CCNC: 15 U/L (ref 10–40)
AMMONIA: 12 UMOL/L (ref 11–51)
AMPHETAMINES: NEGATIVE
ANION GAP SERPL CALCULATED.3IONS-SCNC: 13 MMOL/L (ref 4–16)
AST SERPL-CCNC: 15 IU/L (ref 15–37)
BACTERIA: ABNORMAL /HPF
BARBITURATE SCREEN URINE: NEGATIVE
BASOPHILS ABSOLUTE: 0.1 K/CU MM
BASOPHILS RELATIVE PERCENT: 0.9 % (ref 0–1)
BENZODIAZEPINE SCREEN, URINE: NEGATIVE
BILIRUB SERPL-MCNC: 1 MG/DL (ref 0–1)
BILIRUBIN URINE: NEGATIVE MG/DL
BLOOD, URINE: ABNORMAL
BUN BLDV-MCNC: 15 MG/DL (ref 6–23)
CALCIUM SERPL-MCNC: 9.9 MG/DL (ref 8.3–10.6)
CANNABINOID SCREEN URINE: NEGATIVE
CHLORIDE BLD-SCNC: 96 MMOL/L (ref 99–110)
CHP ED QC CHECK: YES
CLARITY: ABNORMAL
CO2: 26 MMOL/L (ref 21–32)
COCAINE METABOLITE: NEGATIVE
COLOR: YELLOW
CREAT SERPL-MCNC: 0.9 MG/DL (ref 0.6–1.1)
DIFFERENTIAL TYPE: ABNORMAL
EOSINOPHILS ABSOLUTE: 0.2 K/CU MM
EOSINOPHILS RELATIVE PERCENT: 1.9 % (ref 0–3)
GFR AFRICAN AMERICAN: >60 ML/MIN/1.73M2
GFR NON-AFRICAN AMERICAN: >60 ML/MIN/1.73M2
GLUCOSE BLD-MCNC: 298 MG/DL
GLUCOSE BLD-MCNC: 298 MG/DL (ref 70–99)
GLUCOSE BLD-MCNC: 341 MG/DL (ref 70–99)
GLUCOSE BLD-MCNC: 343 MG/DL
GLUCOSE BLD-MCNC: 343 MG/DL (ref 70–99)
GLUCOSE, URINE: >500 MG/DL
HCT VFR BLD CALC: 49.9 % (ref 37–47)
HEMOGLOBIN: 17.1 GM/DL (ref 12.5–16)
IMMATURE NEUTROPHIL %: 0.5 % (ref 0–0.43)
INR BLD: 1.14 INDEX
KETONES, URINE: ABNORMAL MG/DL
LACTATE: 3.5 MMOL/L (ref 0.4–2)
LEUKOCYTE ESTERASE, URINE: ABNORMAL
LIPASE: 17 IU/L (ref 13–60)
LYMPHOCYTES ABSOLUTE: 1.1 K/CU MM
LYMPHOCYTES RELATIVE PERCENT: 13.8 % (ref 24–44)
MCH RBC QN AUTO: 30.5 PG (ref 27–31)
MCHC RBC AUTO-ENTMCNC: 34.3 % (ref 32–36)
MCV RBC AUTO: 88.9 FL (ref 78–100)
MONOCYTES ABSOLUTE: 0.6 K/CU MM
MONOCYTES RELATIVE PERCENT: 7 % (ref 0–4)
NITRITE URINE, QUANTITATIVE: NEGATIVE
NUCLEATED RBC %: 0 %
OPIATES, URINE: NEGATIVE
OXYCODONE: NEGATIVE
PDW BLD-RTO: 13.2 % (ref 11.7–14.9)
PH, URINE: 6 (ref 5–8)
PHENCYCLIDINE, URINE: NEGATIVE
PLATELET # BLD: 186 K/CU MM (ref 140–440)
PMV BLD AUTO: 11.2 FL (ref 7.5–11.1)
POTASSIUM SERPL-SCNC: 3.6 MMOL/L (ref 3.5–5.1)
PRO-BNP: 278 PG/ML
PROTEIN UA: 100 MG/DL
PROTHROMBIN TIME: 13.8 SECONDS (ref 11.7–14.5)
RBC # BLD: 5.61 M/CU MM (ref 4.2–5.4)
RBC URINE: 3956 /HPF (ref 0–6)
SEGMENTED NEUTROPHILS ABSOLUTE COUNT: 5.9 K/CU MM
SEGMENTED NEUTROPHILS RELATIVE PERCENT: 75.9 % (ref 36–66)
SODIUM BLD-SCNC: 135 MMOL/L (ref 135–145)
SPECIFIC GRAVITY UA: 1.03 (ref 1–1.03)
TOTAL IMMATURE NEUTOROPHIL: 0.04 K/CU MM
TOTAL NUCLEATED RBC: 0 K/CU MM
TOTAL PROTEIN: 7.3 GM/DL (ref 6.4–8.2)
TRICHOMONAS: ABNORMAL /HPF
TROPONIN T: <0.01 NG/ML
UROBILINOGEN, URINE: NEGATIVE MG/DL (ref 0.2–1)
WBC # BLD: 7.8 K/CU MM (ref 4–10.5)
WBC UA: 1185 /HPF (ref 0–5)

## 2021-06-23 PROCEDURE — 83880 ASSAY OF NATRIURETIC PEPTIDE: CPT

## 2021-06-23 PROCEDURE — 6360000002 HC RX W HCPCS: Performed by: INTERNAL MEDICINE

## 2021-06-23 PROCEDURE — 81001 URINALYSIS AUTO W/SCOPE: CPT

## 2021-06-23 PROCEDURE — 82140 ASSAY OF AMMONIA: CPT

## 2021-06-23 PROCEDURE — 87077 CULTURE AEROBIC IDENTIFY: CPT

## 2021-06-23 PROCEDURE — 87186 SC STD MICRODIL/AGAR DIL: CPT

## 2021-06-23 PROCEDURE — G0480 DRUG TEST DEF 1-7 CLASSES: HCPCS

## 2021-06-23 PROCEDURE — 93005 ELECTROCARDIOGRAM TRACING: CPT | Performed by: EMERGENCY MEDICINE

## 2021-06-23 PROCEDURE — 70450 CT HEAD/BRAIN W/O DYE: CPT

## 2021-06-23 PROCEDURE — 6360000004 HC RX CONTRAST MEDICATION: Performed by: INTERNAL MEDICINE

## 2021-06-23 PROCEDURE — 83690 ASSAY OF LIPASE: CPT

## 2021-06-23 PROCEDURE — 87086 URINE CULTURE/COLONY COUNT: CPT

## 2021-06-23 PROCEDURE — 71045 X-RAY EXAM CHEST 1 VIEW: CPT

## 2021-06-23 PROCEDURE — 99285 EMERGENCY DEPT VISIT HI MDM: CPT

## 2021-06-23 PROCEDURE — 85610 PROTHROMBIN TIME: CPT

## 2021-06-23 PROCEDURE — 82962 GLUCOSE BLOOD TEST: CPT

## 2021-06-23 PROCEDURE — 85025 COMPLETE CBC W/AUTO DIFF WBC: CPT

## 2021-06-23 PROCEDURE — 87040 BLOOD CULTURE FOR BACTERIA: CPT

## 2021-06-23 PROCEDURE — 83036 HEMOGLOBIN GLYCOSYLATED A1C: CPT

## 2021-06-23 PROCEDURE — 2060000000 HC ICU INTERMEDIATE R&B

## 2021-06-23 PROCEDURE — 84484 ASSAY OF TROPONIN QUANT: CPT

## 2021-06-23 PROCEDURE — 70496 CT ANGIOGRAPHY HEAD: CPT

## 2021-06-23 PROCEDURE — 72125 CT NECK SPINE W/O DYE: CPT

## 2021-06-23 PROCEDURE — 80053 COMPREHEN METABOLIC PANEL: CPT

## 2021-06-23 PROCEDURE — 2580000003 HC RX 258: Performed by: INTERNAL MEDICINE

## 2021-06-23 PROCEDURE — 80307 DRUG TEST PRSMV CHEM ANLYZR: CPT

## 2021-06-23 PROCEDURE — 83605 ASSAY OF LACTIC ACID: CPT

## 2021-06-23 RX ORDER — TROSPIUM CHLORIDE 20 MG/1
20 TABLET, FILM COATED ORAL
Status: DISCONTINUED | OUTPATIENT
Start: 2021-06-24 | End: 2021-06-29 | Stop reason: HOSPADM

## 2021-06-23 RX ORDER — POLYETHYLENE GLYCOL 3350 17 G/17G
17 POWDER, FOR SOLUTION ORAL DAILY PRN
Status: DISCONTINUED | OUTPATIENT
Start: 2021-06-23 | End: 2021-06-29 | Stop reason: HOSPADM

## 2021-06-23 RX ORDER — SODIUM CHLORIDE 9 MG/ML
25 INJECTION, SOLUTION INTRAVENOUS PRN
Status: DISCONTINUED | OUTPATIENT
Start: 2021-06-23 | End: 2021-06-29 | Stop reason: HOSPADM

## 2021-06-23 RX ORDER — INSULIN GLARGINE 100 [IU]/ML
50 INJECTION, SOLUTION SUBCUTANEOUS NIGHTLY
Status: DISCONTINUED | OUTPATIENT
Start: 2021-06-24 | End: 2021-06-29 | Stop reason: HOSPADM

## 2021-06-23 RX ORDER — ATORVASTATIN CALCIUM 80 MG/1
80 TABLET, FILM COATED ORAL NIGHTLY
Status: DISCONTINUED | OUTPATIENT
Start: 2021-06-24 | End: 2021-06-24

## 2021-06-23 RX ORDER — LABETALOL HYDROCHLORIDE 5 MG/ML
10 INJECTION, SOLUTION INTRAVENOUS EVERY 4 HOURS PRN
Status: DISCONTINUED | OUTPATIENT
Start: 2021-06-23 | End: 2021-06-29 | Stop reason: HOSPADM

## 2021-06-23 RX ORDER — ONDANSETRON 2 MG/ML
4 INJECTION INTRAMUSCULAR; INTRAVENOUS EVERY 6 HOURS PRN
Status: DISCONTINUED | OUTPATIENT
Start: 2021-06-23 | End: 2021-06-29 | Stop reason: HOSPADM

## 2021-06-23 RX ORDER — ONDANSETRON 4 MG/1
4 TABLET, ORALLY DISINTEGRATING ORAL EVERY 8 HOURS PRN
Status: DISCONTINUED | OUTPATIENT
Start: 2021-06-23 | End: 2021-06-29 | Stop reason: HOSPADM

## 2021-06-23 RX ORDER — AMLODIPINE BESYLATE 5 MG/1
5 TABLET ORAL DAILY
Status: DISCONTINUED | OUTPATIENT
Start: 2021-06-24 | End: 2021-06-29 | Stop reason: HOSPADM

## 2021-06-23 RX ORDER — SODIUM CHLORIDE 9 MG/ML
INJECTION, SOLUTION INTRAVENOUS CONTINUOUS
Status: DISCONTINUED | OUTPATIENT
Start: 2021-06-23 | End: 2021-06-23

## 2021-06-23 RX ORDER — DEXTROSE MONOHYDRATE 50 MG/ML
100 INJECTION, SOLUTION INTRAVENOUS PRN
Status: DISCONTINUED | OUTPATIENT
Start: 2021-06-23 | End: 2021-06-29 | Stop reason: HOSPADM

## 2021-06-23 RX ORDER — DEXTROSE MONOHYDRATE 25 G/50ML
12.5 INJECTION, SOLUTION INTRAVENOUS PRN
Status: DISCONTINUED | OUTPATIENT
Start: 2021-06-23 | End: 2021-06-29 | Stop reason: HOSPADM

## 2021-06-23 RX ORDER — CLOPIDOGREL BISULFATE 75 MG/1
75 TABLET ORAL DAILY
Status: DISCONTINUED | OUTPATIENT
Start: 2021-06-24 | End: 2021-06-29 | Stop reason: HOSPADM

## 2021-06-23 RX ORDER — POTASSIUM CHLORIDE AND SODIUM CHLORIDE 900; 300 MG/100ML; MG/100ML
INJECTION, SOLUTION INTRAVENOUS CONTINUOUS
Status: DISCONTINUED | OUTPATIENT
Start: 2021-06-23 | End: 2021-06-28

## 2021-06-23 RX ORDER — NICOTINE POLACRILEX 4 MG
15 LOZENGE BUCCAL PRN
Status: DISCONTINUED | OUTPATIENT
Start: 2021-06-23 | End: 2021-06-29 | Stop reason: HOSPADM

## 2021-06-23 RX ORDER — SODIUM CHLORIDE 0.9 % (FLUSH) 0.9 %
5-40 SYRINGE (ML) INJECTION 2 TIMES DAILY
Status: DISCONTINUED | OUTPATIENT
Start: 2021-06-23 | End: 2021-06-29 | Stop reason: HOSPADM

## 2021-06-23 RX ORDER — METOPROLOL TARTRATE 50 MG/1
50 TABLET, FILM COATED ORAL 2 TIMES DAILY
Status: DISCONTINUED | OUTPATIENT
Start: 2021-06-24 | End: 2021-06-29 | Stop reason: HOSPADM

## 2021-06-23 RX ORDER — ASPIRIN 81 MG/1
81 TABLET ORAL DAILY
Status: DISCONTINUED | OUTPATIENT
Start: 2021-06-24 | End: 2021-06-24

## 2021-06-23 RX ORDER — PANTOPRAZOLE SODIUM 40 MG/1
40 TABLET, DELAYED RELEASE ORAL
Status: DISCONTINUED | OUTPATIENT
Start: 2021-06-24 | End: 2021-06-29 | Stop reason: HOSPADM

## 2021-06-23 RX ORDER — ASPIRIN 300 MG/1
300 SUPPOSITORY RECTAL DAILY
Status: DISCONTINUED | OUTPATIENT
Start: 2021-06-24 | End: 2021-06-24

## 2021-06-23 RX ORDER — ASPIRIN 81 MG/1
324 TABLET, CHEWABLE ORAL ONCE
Status: COMPLETED | OUTPATIENT
Start: 2021-06-24 | End: 2021-06-24

## 2021-06-23 RX ORDER — OXYBUTYNIN CHLORIDE 5 MG/1
5 TABLET ORAL 3 TIMES DAILY
Status: DISCONTINUED | OUTPATIENT
Start: 2021-06-23 | End: 2021-06-23 | Stop reason: CLARIF

## 2021-06-23 RX ORDER — SODIUM CHLORIDE 0.9 % (FLUSH) 0.9 %
5-40 SYRINGE (ML) INJECTION PRN
Status: DISCONTINUED | OUTPATIENT
Start: 2021-06-23 | End: 2021-06-29 | Stop reason: HOSPADM

## 2021-06-23 RX ADMIN — POTASSIUM CHLORIDE AND SODIUM CHLORIDE: 900; 300 INJECTION, SOLUTION INTRAVENOUS at 21:34

## 2021-06-23 RX ADMIN — IOPAMIDOL 80 ML: 755 INJECTION, SOLUTION INTRAVENOUS at 23:16

## 2021-06-23 RX ADMIN — CEFTRIAXONE 1000 MG: 1 INJECTION, POWDER, FOR SOLUTION INTRAMUSCULAR; INTRAVENOUS at 21:34

## 2021-06-23 ASSESSMENT — ENCOUNTER SYMPTOMS
NAUSEA: 0
DIARRHEA: 1
SHORTNESS OF BREATH: 0
VOMITING: 0

## 2021-06-23 NOTE — ED NOTES
Pt incontinent of large quantity of dark red and foul smelling urine at this time. [place] care provided, pt cleaned and placed into hospital gown. Urine sample collected at this time and sent to lab.      Shira Amador RN  06/23/21 [id number]

## 2021-06-23 NOTE — ED NOTES
Per CT pt refusing to move over or lay down for CT. She told CT tech to \"try later\". Dr. Froilan Quintana notified at this time.      Shayne Davidson RN  06/23/21 9400

## 2021-06-23 NOTE — ED PROVIDER NOTES
Triage Chief Complaint:   Fatigue (Pt lives in an apartment and called EMS for fatigue, slurred speech, and visual hallucinations x1 week. ), Aphasia, and Hallucinations    [place]:  Osorio Greenberg is a 70 y.o. female that presents with confusion, slurred speech and reported visual hallucinations for about the last week. Patient tells me that her bladder has been \"turning on and turning off\" she told me that she fell 1 day ago and the tells me that she fell 2 days ago and she told me she did not fall at all. She told me that she slid down and denies any injuries. She denies any pain currently. Denies any chest pain or shortness of breath. No numbness, tingling or weakness per patient. She denies any headache. She denies being on any blood thinners. She denies any nausea, vomiting or fevers. She tells me that she had diarrhea 2 days ago. Patient story changes over time you ask her a different question. History of hallucinations was per EMS report. ROS:   Review of Systems   Unable to perform ROS: Mental status change   Constitutional: Negative for fatigue. Respiratory: Negative for shortness of breath. Cardiovascular: Negative for chest pain. Gastrointestinal: Positive for diarrhea (2 days ago). Negative for nausea and vomiting. Neurological: Negative for weakness, numbness and headaches. Psychiatric/Behavioral: Positive for hallucinations (per report).        Past Medical History:   Diagnosis Date    Arthritis     Cerebral artery occlusion with cerebral infarction ([facility] [address]     Diabetes mellitus ([facility] [address]     Hyperlipidemia     Hypertension      Past Surgical History:   Procedure Laterality Date    BLADDER SUSPENSION      COLONOSCOPY  3/23/15    polyps, int hem    DILATION AND CURETTAGE OF UTERUS      HYSTERECTOMY       Family History   Problem Relation Age of Onset    No Known Problems Mother     No Known Problems Father      Social History     Socioeconomic History    Marital status: Single     Spouse name: Not on file    Number of children: Not on file    Years of education: Not on file    Highest education level: Not on file   Occupational History    Not on file   Tobacco Use    Smoking status: Former Smoker     Packs/day: 1.00     Years: 20.00     Pack years: 20.00     Types: Cigarettes     Start date: 10/8/1970     Quit date: 10/8/2016     Years since quittin.7    Smokeless tobacco: Never Used   Vaping Use    Vaping Use: Never used   Substance and Sexual Activity    Alcohol use: No     Alcohol/week: 0.0 standard drinks    Drug use: No    Sexual activity: Not Currently   Other Topics Concern    Not on file   Social History Narrative    Not on file     Social Determinants of Health     Financial Resource Strain:     Difficulty of Paying Living Expenses:    Food Insecurity:     Worried About Running Out of Food in the Last Year:     [address] in the Last Year:    Transportation Needs:     Lack of Transportation (Medical):      Lack of Transportation (Non-Medical):    Physical Activity:     Days of Exercise per Week:     Minutes of Exercise per Session:    Stress:     Feeling of Stress :    Social Connections:     Frequency of Communication with Friends and Family:     Frequency of Social Gatherings with Friends and Family:     Attends Orthodoxy Services:     Active Member of Clubs or Organizations:     Attends Club or Organization Meetings:     Marital Status:    Intimate Partner Violence:     Fear of Current or Ex-Partner:     Emotionally Abused:     Physically Abused:     Sexually Abused:      Current Facility-Administered Medications   Medication Dose Route Frequency Provider Last Rate Last Admin    0.9% NaCl with KCl 40 mEq infusion   Intravenous Continuous Kimber Chavez  mL/hr at 21 New Bag at 21    cefTRIAXone (ROCEPHIN) 1000 mg IVPB in 50 mL D5W minibag  1,000 mg Intravenous Q24H Kimber Chavez  mL/hr at 06/23/21 2134 1,000 mg at 06/23/21 2134     Current Outpatient Medications   Medication Sig Dispense Refill    insulin glargine (LANTUS) 100 UNIT/ML injection vial Inject 50 Units into the skin nightly 1 vial 3    insulin lispro (HUMALOG) 100 UNIT/ML injection vial Inject 16 Units into the skin 3 times daily (with meals) 1 vial 3    atorvastatin (LIPITOR) 10 MG tablet Take 1 tablet by mouth daily 30 tablet 0    metoprolol tartrate (LOPRESSOR) 50 MG tablet Take 1 tablet by mouth 2 times daily 60 tablet 0    amLODIPine (NORVASC) 5 MG tablet Take 1 tablet by mouth daily 30 tablet 0    clopidogrel (PLAVIX) 75 MG tablet Take 1 tablet by mouth daily 30 tablet 0    pantoprazole (PROTONIX) 40 MG tablet Take 1 tablet by mouth every morning (before breakfast) 30 tablet 0    oxybutynin (DITROPAN) 5 MG tablet Take 1 tablet by mouth 3 times daily 90 tablet 0     No Known Allergies    Nursing Notes Reviewed     Physical Exam:   ED Triage Vitals   Enc Vitals Group      BP       Pulse       Resp       Temp       Temp src       SpO2       Weight       Height       Head Circumference       Peak Flow       Pain Score       Pain Loc       Pain Edu? Excl. in [address]? BP (!) 150/98   Pulse 84   Temp 98.4 °F (36.9 °C) (Oral)   Resp 19   Ht 5' 2\" (1.575 m)   Wt 230 lb (104.3 kg)   SpO2 97%   BMI 42.07 kg/m²   My pulse ox interpretation is  normal  Physical Exam  Vitals and nursing note reviewed. Constitutional:       General: She is not in acute distress. Appearance: She is not toxic-appearing or diaphoretic. Comments: Elderly appearing patient sitting comfortably in the bed. HENT:      Head: Normocephalic and atraumatic. Eyes:      General:         Right eye: No discharge. Left eye: No discharge. Conjunctiva/sclera: Conjunctivae normal.   Cardiovascular:      Rate and Rhythm: Normal rate. Rhythm irregular. Pulses: Normal pulses.            Radial pulses are 2+ on the right side and 2+ on the left side. Pulmonary:      Effort: Pulmonary effort is normal. No respiratory distress. Breath sounds: No wheezing or rales. Abdominal:      General: There is no distension. Tenderness: There is no abdominal tenderness. Musculoskeletal:         General: No swelling or tenderness. Normal range of motion. Skin:     General: Skin is warm and dry. Neurological:      General: No focal deficit present. Mental Status: She is alert. Cranial Nerves: No cranial nerve deficit. Psychiatric:         Mood and Affect: Mood normal.         Behavior: Behavior normal.      Comments: Patient is confused and appears to have an impaired memory as she is telling me different things happened throughout the week every time I speak with her in the room.          I have reviewed and interpreted all of the currently available lab results from this visit (if applicable):  Results for orders placed or performed during the hospital encounter of 06/23/21   CBC Auto Differential   Result Value Ref Range    WBC 7.8 4.0 - 10.5 K/CU MM    RBC 5.61 (H) 4.2 - 5.4 M/CU MM    Hemoglobin 17.1 (H) 12.5 - 16.0 GM/DL    Hematocrit 49.9 (H) 37 - 47 %    MCV 88.9 78 - 100 FL    MCH 30.5 27 - 31 PG    MCHC 34.3 32.0 - 36.0 %    RDW 13.2 11.7 - 14.9 %    Platelets 008 212 - 481 K/CU MM    MPV 11.2 (H) 7.5 - 11.1 FL    Differential Type AUTOMATED DIFFERENTIAL     Segs Relative 75.9 (H) 36 - 66 %    Lymphocytes % 13.8 (L) 24 - 44 %    Monocytes % 7.0 (H) 0 - 4 %    Eosinophils % 1.9 0 - 3 %    Basophils % 0.9 0 - 1 %    Segs Absolute 5.9 K/CU MM    Lymphocytes Absolute 1.1 K/CU MM    Monocytes Absolute 0.6 K/CU MM    Eosinophils Absolute 0.2 K/CU MM    Basophils Absolute 0.1 K/CU MM    Nucleated RBC % 0.0 %    Total Nucleated RBC 0.0 K/CU MM    Total Immature Neutrophil 0.04 K/CU MM    Immature Neutrophil % 0.5 (H) 0 - 0.43 %   Comprehensive Metabolic Panel w/ Reflex to MG   Result Value Ref Range    Sodium 135 135 - 145 Screen, Urine NEGATIVE NEGATIVE    Barbiturate Screen, Ur NEGATIVE NEGATIVE    Opiates, Urine NEGATIVE NEGATIVE    Phencyclidine, Urine NEGATIVE NEGATIVE    Oxycodone NEGATIVE NEGATIVE   POC Blood Glucose   Result Value Ref Range    Glucose 343 mg/dL   POCT Glucose   Result Value Ref Range    POC Glucose 343 (H) 70 - 99 MG/DL   EKG 12 Lead   Result Value Ref Range    Ventricular Rate 89 BPM    Atrial Rate 94 BPM    QRS Duration 82 ms    Q-T Interval 398 ms    QTc Calculation (Bazett) 484 ms    R Axis -18 degrees    T Axis 7 degrees    Diagnosis       Atrial fibrillation with premature ventricular or aberrantly conducted complexes  Moderate voltage criteria for LVH, may be normal variant  Nonspecific ST and T wave abnormality  Abnormal ECG  When compared with ECG of 06-OCT-2020 16:19,  Previous ECG has undetermined rhythm, needs review  ST no longer depressed in Anterior leads  Nonspecific T wave abnormality, worse in Anterior leads  T wave inversion no longer evident in Lateral leads        Radiographs (if obtained):  [] The following radiograph was interpreted by myself in the absence of a radiologist:  [x]Radiologist's Report Reviewed:  CT CERVICAL SPINE WO CONTRAST   Final Result   Multilevel degenerate changes. No acute fracture traumatic malalignment         CT HEAD WO CONTRAST   Preliminary Result   Moderate-sized low-attenuation in the right temporal lobe concerning for   acute infarct. MRI brain recommended to confirm. No acute intracranial hemorrhage. XR CHEST PORTABLE   Final Result   No acute abnormality. EKG (if obtained): (All EKG's are interpreted by myself in the absence of a cardiologist)  Atrial fibrillation with PVC and a rate of 89. QRS 82, QTc 484. No ST elevations or depressions. Nonspecific T waves. Left ventricular hypertrophy. Impression: Abnormal EKG.   When compared to previous EKG from 10/6/2020, the previously noted tachycardia is resolved, otherwise no significant changes. MDM:  Differential diagnoses considered include but are not limited to CVA, hypoglycemia, electrolyte abnormality, encephalopathy, hepatic encephalopathy, intracranial hemorrhage, dementia, delirium. Patient arrives elderly appearing slightly disheveled but in no acute distress with a elevated blood pressure but otherwise normal vital signs. Basic labs were obtained and show an elevated glucose level but are otherwise unremarkable. She has a slightly elevated lactic acid level on lab work. CT scan of her head shows low-attenuation the right temporal lobe concerning for an acute infarct. I suspect this is a CVA and the cause of patient's symptoms. CT scan of her neck shows no acute abnormalities. No traumatic injuries. Patient's urine has been full of red blood cells and white blood cells and moderate bacteria. This may represent an infection or contaminated urine. I still suspect the majority of her symptoms are due to the CVA noted on CT scan. We will send her urine for culture. Urine drug screen is negative. We will go to the hospital for further evaluation likely to include an MRI. I spoke with Dr. Tlou Kothari, who graciously agreed to admit the patient. Plan of care explained to patient. All questions and concerns were addressed to the patient's satisfaction. Patient understood and agreed with plan. I did don appropriate PPE (including face mask, protective eye ware/safety glasses and gloves), as recommended by the health facility/national standard best practice, during my bedside interactions with the patient. Clinical Impression:  1. Cerebrovascular accident (CVA), unspecified mechanism ([address]    2. Confusion    3. Elevated lactic acid level    4.  Hyperglycemia          Juan Godoy MD       Please note that portions of this note may have been complete with a voice recognition program.  Effortswere made to edit the dictations, but occasional words are mis-transcribed.           Renetta Richardson MD  06/23/21 2148

## 2021-06-24 LAB
ALBUMIN SERPL-MCNC: 3.5 GM/DL (ref 3.4–5)
ALP BLD-CCNC: 110 IU/L (ref 40–128)
ALT SERPL-CCNC: 12 U/L (ref 10–40)
ANION GAP SERPL CALCULATED.3IONS-SCNC: 10 MMOL/L (ref 4–16)
AST SERPL-CCNC: 13 IU/L (ref 15–37)
BASOPHILS ABSOLUTE: 0.1 K/CU MM
BASOPHILS RELATIVE PERCENT: 1.3 % (ref 0–1)
BILIRUB SERPL-MCNC: 0.8 MG/DL (ref 0–1)
BUN BLDV-MCNC: 13 MG/DL (ref 6–23)
CALCIUM SERPL-MCNC: 9.3 MG/DL (ref 8.3–10.6)
CHLORIDE BLD-SCNC: 102 MMOL/L (ref 99–110)
CHOLESTEROL: 175 MG/DL
CO2: 28 MMOL/L (ref 21–32)
CREAT SERPL-MCNC: 0.9 MG/DL (ref 0.6–1.1)
DIFFERENTIAL TYPE: ABNORMAL
EKG ATRIAL RATE: 94 BPM
EKG DIAGNOSIS: NORMAL
EKG Q-T INTERVAL: 398 MS
EKG QRS DURATION: 82 MS
EKG QTC CALCULATION (BAZETT): 484 MS
EKG R AXIS: -18 DEGREES
EKG T AXIS: 7 DEGREES
EKG VENTRICULAR RATE: 89 BPM
EOSINOPHILS ABSOLUTE: 0.6 K/CU MM
EOSINOPHILS RELATIVE PERCENT: 9.1 % (ref 0–3)
ESTIMATED AVERAGE GLUCOSE: 283 MG/DL
GFR AFRICAN AMERICAN: >60 ML/MIN/1.73M2
GFR NON-AFRICAN AMERICAN: >60 ML/MIN/1.73M2
GLUCOSE BLD-MCNC: 146 MG/DL (ref 70–99)
GLUCOSE BLD-MCNC: 171 MG/DL (ref 70–99)
GLUCOSE BLD-MCNC: 210 MG/DL (ref 70–99)
GLUCOSE BLD-MCNC: 223 MG/DL (ref 70–99)
GLUCOSE BLD-MCNC: 241 MG/DL (ref 70–99)
GLUCOSE BLD-MCNC: 269 MG/DL (ref 70–99)
GLUCOSE BLD-MCNC: 97 MG/DL (ref 70–99)
HBA1C MFR BLD: 11.5 % (ref 4.2–6.3)
HCT VFR BLD CALC: 44.5 % (ref 37–47)
HDLC SERPL-MCNC: 39 MG/DL
HEMOGLOBIN: 15.3 GM/DL (ref 12.5–16)
IMMATURE NEUTROPHIL %: 0.4 % (ref 0–0.43)
LACTATE: 1.7 MMOL/L (ref 0.4–2)
LACTATE: 1.7 MMOL/L (ref 0.4–2)
LDL CHOLESTEROL DIRECT: 116 MG/DL
LV EF: 53 %
LVEF MODALITY: NORMAL
LYMPHOCYTES ABSOLUTE: 1.7 K/CU MM
LYMPHOCYTES RELATIVE PERCENT: 25.5 % (ref 24–44)
MAGNESIUM: 1.7 MG/DL (ref 1.8–2.4)
MCH RBC QN AUTO: 31 PG (ref 27–31)
MCHC RBC AUTO-ENTMCNC: 34.4 % (ref 32–36)
MCV RBC AUTO: 90.3 FL (ref 78–100)
MONOCYTES ABSOLUTE: 0.6 K/CU MM
MONOCYTES RELATIVE PERCENT: 8.3 % (ref 0–4)
NUCLEATED RBC %: 0 %
PDW BLD-RTO: 13.1 % (ref 11.7–14.9)
PHOSPHORUS: 2.9 MG/DL (ref 2.5–4.9)
PLATELET # BLD: 181 K/CU MM (ref 140–440)
PMV BLD AUTO: 10.5 FL (ref 7.5–11.1)
POTASSIUM SERPL-SCNC: 3.3 MMOL/L (ref 3.5–5.1)
RBC # BLD: 4.93 M/CU MM (ref 4.2–5.4)
SEGMENTED NEUTROPHILS ABSOLUTE COUNT: 3.7 K/CU MM
SEGMENTED NEUTROPHILS RELATIVE PERCENT: 55.4 % (ref 36–66)
SODIUM BLD-SCNC: 140 MMOL/L (ref 135–145)
TOTAL IMMATURE NEUTOROPHIL: 0.03 K/CU MM
TOTAL NUCLEATED RBC: 0 K/CU MM
TOTAL PROTEIN: 6.1 GM/DL (ref 6.4–8.2)
TRIGL SERPL-MCNC: 144 MG/DL
WBC # BLD: 6.7 K/CU MM (ref 4–10.5)

## 2021-06-24 PROCEDURE — 97530 THERAPEUTIC ACTIVITIES: CPT

## 2021-06-24 PROCEDURE — 92610 EVALUATE SWALLOWING FUNCTION: CPT

## 2021-06-24 PROCEDURE — 2060000000 HC ICU INTERMEDIATE R&B

## 2021-06-24 PROCEDURE — 82962 GLUCOSE BLOOD TEST: CPT

## 2021-06-24 PROCEDURE — 85025 COMPLETE CBC W/AUTO DIFF WBC: CPT

## 2021-06-24 PROCEDURE — 83735 ASSAY OF MAGNESIUM: CPT

## 2021-06-24 PROCEDURE — 80053 COMPREHEN METABOLIC PANEL: CPT

## 2021-06-24 PROCEDURE — 83605 ASSAY OF LACTIC ACID: CPT

## 2021-06-24 PROCEDURE — 93010 ELECTROCARDIOGRAM REPORT: CPT | Performed by: INTERNAL MEDICINE

## 2021-06-24 PROCEDURE — 2580000003 HC RX 258: Performed by: INTERNAL MEDICINE

## 2021-06-24 PROCEDURE — 6360000002 HC RX W HCPCS: Performed by: INTERNAL MEDICINE

## 2021-06-24 PROCEDURE — 6370000000 HC RX 637 (ALT 250 FOR IP): Performed by: INTERNAL MEDICINE

## 2021-06-24 PROCEDURE — 97162 PT EVAL MOD COMPLEX 30 MIN: CPT

## 2021-06-24 PROCEDURE — 94761 N-INVAS EAR/PLS OXIMETRY MLT: CPT

## 2021-06-24 PROCEDURE — 36415 COLL VENOUS BLD VENIPUNCTURE: CPT

## 2021-06-24 PROCEDURE — 84100 ASSAY OF PHOSPHORUS: CPT

## 2021-06-24 PROCEDURE — 93306 TTE W/DOPPLER COMPLETE: CPT

## 2021-06-24 PROCEDURE — 97112 NEUROMUSCULAR REEDUCATION: CPT

## 2021-06-24 PROCEDURE — 97166 OT EVAL MOD COMPLEX 45 MIN: CPT

## 2021-06-24 PROCEDURE — 83721 ASSAY OF BLOOD LIPOPROTEIN: CPT

## 2021-06-24 PROCEDURE — 80061 LIPID PANEL: CPT

## 2021-06-24 RX ORDER — ASPIRIN 600 MG/1
300 SUPPOSITORY RECTAL DAILY
Status: DISCONTINUED | OUTPATIENT
Start: 2021-06-24 | End: 2021-06-29 | Stop reason: HOSPADM

## 2021-06-24 RX ORDER — ASPIRIN 81 MG/1
81 TABLET ORAL DAILY
Status: DISCONTINUED | OUTPATIENT
Start: 2021-06-24 | End: 2021-06-29 | Stop reason: HOSPADM

## 2021-06-24 RX ORDER — ATORVASTATIN CALCIUM 80 MG/1
80 TABLET, FILM COATED ORAL NIGHTLY
Status: DISCONTINUED | OUTPATIENT
Start: 2021-06-24 | End: 2021-06-29 | Stop reason: HOSPADM

## 2021-06-24 RX ADMIN — METOPROLOL TARTRATE 50 MG: 50 TABLET, FILM COATED ORAL at 21:11

## 2021-06-24 RX ADMIN — ASPIRIN 324 MG: 81 TABLET, CHEWABLE ORAL at 02:05

## 2021-06-24 RX ADMIN — CLOPIDOGREL BISULFATE 75 MG: 75 TABLET ORAL at 10:21

## 2021-06-24 RX ADMIN — INSULIN LISPRO 3 UNITS: 100 INJECTION, SOLUTION INTRAVENOUS; SUBCUTANEOUS at 02:04

## 2021-06-24 RX ADMIN — TROSPIUM CHLORIDE 20 MG: 20 TABLET, FILM COATED ORAL at 06:53

## 2021-06-24 RX ADMIN — INSULIN LISPRO 3 UNITS: 100 INJECTION, SOLUTION INTRAVENOUS; SUBCUTANEOUS at 04:05

## 2021-06-24 RX ADMIN — CEFTRIAXONE 1000 MG: 1 INJECTION, POWDER, FOR SOLUTION INTRAMUSCULAR; INTRAVENOUS at 21:35

## 2021-06-24 RX ADMIN — ATORVASTATIN CALCIUM 80 MG: 80 TABLET, FILM COATED ORAL at 02:08

## 2021-06-24 RX ADMIN — TROSPIUM CHLORIDE 20 MG: 20 TABLET, FILM COATED ORAL at 18:31

## 2021-06-24 RX ADMIN — ASPIRIN 81 MG: 81 TABLET, COATED ORAL at 10:21

## 2021-06-24 RX ADMIN — AMLODIPINE BESYLATE 5 MG: 5 TABLET ORAL at 10:21

## 2021-06-24 RX ADMIN — PANTOPRAZOLE SODIUM 40 MG: 40 TABLET, DELAYED RELEASE ORAL at 06:53

## 2021-06-24 RX ADMIN — INSULIN GLARGINE 50 UNITS: 100 INJECTION, SOLUTION SUBCUTANEOUS at 02:05

## 2021-06-24 RX ADMIN — SODIUM CHLORIDE, PRESERVATIVE FREE 10 ML: 5 INJECTION INTRAVENOUS at 01:56

## 2021-06-24 RX ADMIN — ENOXAPARIN SODIUM 40 MG: 40 INJECTION SUBCUTANEOUS at 10:21

## 2021-06-24 RX ADMIN — ATORVASTATIN CALCIUM 80 MG: 80 TABLET, FILM COATED ORAL at 21:11

## 2021-06-24 RX ADMIN — METOPROLOL TARTRATE 50 MG: 50 TABLET, FILM COATED ORAL at 02:08

## 2021-06-24 ASSESSMENT — PAIN SCALES - GENERAL
PAINLEVEL_OUTOF10: 0

## 2021-06-24 NOTE — H&P
Hospital Medicine History and Physical    6/23/2021    Date of Admission: 6/23/2021    Date of Service: Pt seen/examined on 6/23/2021 and admitted to inpatient. Assessment/plan:  1. Acute versus subacute CVA, suspected. CThead without contrast with moderate sized low-attenuation in the right temporal lobe concerning for acute infarct. Will obtain MRIbrain without contrast, CTAhead/neck with contrast, echocardiogram.  Neurochecks ordered. We will continue home dose of Plavix, and aspirin, change Lipitor to high-dose. Check lipid profile in the morning. Speech therapy, physical therapy, occupational therapy consults. Neurology consult. No need for permissive hypertension, given onset of symptoms of about a week. 2. Acute metabolic encephalopathy/visual hallucination. Could be secondary to underlying CVA. Follow-up pending MRIbrain. Patient is on antibiotics for suspected urinary tract infection. Monitor mental status closely. 3. Lactic acidosis. Suspect dehydration more than infectious etiology. Continue intravenous fluid and trend lactic acid level. 4. Acute cystitis with hematuria. Foul-smelling urine concerning for possible cystitis. Start Rocephin and follow pending urine culture results. 5. History of atrial fibrillation (based on previous EKG from October 2020). Patient is not on chronic anticoagulation. Recommend full dose anticoagulation if MRIbrain is negative for large territory infarct for which we may need to hold off full dose anticoagulation for some time (due to risk of hemorrhagic conversion). Consider cardiology consult to assist with evaluation. 6. Other comorbidities: History of osteoarthritis, history of CVA with residual right-sided hemiparesis, uncontrolled diabetes type 2 with hyperglycemia, hyperlipidemia, essential hypertension, right internal carotid artery stenosis, history of obesity with BMI of 42 kg/m².     Activities: Up with assist  Prophylaxis: Subcutaneous Lovenox  Code status: Full code    ==========================================================  Chief complaint:  Chief Complaint   Patient presents with    Fatigue     Pt lives in an apartment and called EMS for fatigue, slurred speech, and visual hallucinations x1 week.  Aphasia    Hallucinations       History of Presenting Illness: This is a pleasant 70 y.o. female with history of osteoarthritis, history of CVA with residual right hemiparesis (on Plavix and Lipitor prior to admission), uncontrolled diabetes type 2 with hyperglycemia, hyperlipidemia, essential hypertension, history of atrial fibrillation (not on chronic anticoagulation), morbid obesity with BMI of 42 kg/m², known right internal carotid artery stenosis (see CTAhead/neck from October 2020), who was brought to the emergency room with report of confusion, visual hallucination, generalized weakness, and expressive aphasia, ongoing for the past week. It was also reported that patient has had multiple falls at home. On presentation to the emergency room, patient was noted to have foul-smelling urine in the emergency room and urinalysis was sent, concerning for infectious process. Patient is confused and unable to provide reliable HPI. CTcervical spine with no acute pathology; reveals multilevel degenerative changes with no acute fracture or traumatic malalignment. CThead without contrast with moderate sized low-attenuation in the right temporal lobe concerning for acute infarct, for which MRI is recommended (no evidence of intracranial hemorrhage).     Past Medical History:      Diagnosis Date    Arthritis     Cerebral artery occlusion with cerebral infarction ([address]     Diabetes mellitus ([address]     Hyperlipidemia     Hypertension        Past Surgical History:      Procedure Laterality Date    BLADDER SUSPENSION      COLONOSCOPY  3/23/15    polyps, int hem    DILATION AND CURETTAGE OF UTERUS      HYSTERECTOMY Medications (prior to admission):  Prior to Admission medications    Medication Sig Start Date End Date Taking? Authorizing Provider   insulin glargine (LANTUS) 100 UNIT/ML injection vial Inject 50 Units into the skin nightly 10/21/20   C Malu León MD   insulin lispro (HUMALOG) 100 UNIT/ML injection vial Inject 16 Units into the skin 3 times daily (with meals) 10/21/20   C Malu León MD   atorvastatin (LIPITOR) 10 MG tablet Take 1 tablet by mouth daily 10/21/20   LIZBET León MD   metoprolol tartrate (LOPRESSOR) 50 MG tablet Take 1 tablet by mouth 2 times daily 10/21/20   LIZBET León MD   amLODIPine (NORVASC) 5 MG tablet Take 1 tablet by mouth daily 10/22/20   C Malu León MD   clopidogrel (PLAVIX) 75 MG tablet Take 1 tablet by mouth daily 10/22/20   C Malu León MD   pantoprazole (PROTONIX) 40 MG tablet Take 1 tablet by mouth every morning (before breakfast) 10/22/20   C Malu León MD   oxybutynin (DITROPAN) 5 MG tablet Take 1 tablet by mouth 3 times daily 10/21/20   C Malu León MD   oxybutynin (DITROPAN) 5 MG tablet Take 1 tablet by mouth 3 times daily. 7/1/13 11/15/13  Denita Murillo MD       Allergy(ies):  Patient has no known allergies. Social History:  TOBACCO:  reports that she quit smoking about 4 years ago. Her smoking use included cigarettes. She started smoking about 50 years ago. She has a 20.00 pack-year smoking history. She has never used smokeless tobacco.  ETOH:  reports no history of alcohol use. Family History:      Problem Relation Age of Onset    No Known Problems Mother     No Known Problems Father        Review of Systems:  Confused and unable to provide reliable HPI. Much of HPI obtained from discussion with emergency room physician.     Vitals and physical examination:  BP (!) 150/98   Pulse 84   Temp 98.4 °F (36.9 °C) (Oral)   Resp 19   Ht 5' 2\" (1.575 m)   Wt 230 lb (104.3 kg)   SpO2 97%   BMI 42.07 kg/m²   Gen/overall appearance: Not in acute distress. Alert. Confused. Head: Normocephalic, atraumatic  Eyes: EOMI, good acuity  ENT: Oral mucosa slightly dry. Neck: No JVD, thyromegaly  CVS: Nml S1S2, no MRG, RRR  Pulm: Clear bilaterally. No crackles/wheezes  Gastrointestinal: Soft, NT/ND, +BS  Musculoskeletal: No edema. Warm  Neuro: Mild right lower extremity weakness, 3+/5. Confused. Moves extremity spontaneously. Psychiatry: Appropriate affect. Not agitated. Skin: Warm, dry with normal turgor. No rash  Capillary refill: Brisk,< 3 seconds   Peripheral Pulses: +2 palpable, equal bilaterally       Labs/imaging/EKG:  CBC:   Recent Labs     06/23/21  1657   WBC 7.8   HGB 17.1*        BMP:    Recent Labs     06/23/21  1657 06/23/21  1715     --    K 3.6  --    CL 96*  --    CO2 26  --    BUN 15  --    CREATININE 0.9  --    GLUCOSE 341* 343     Hepatic:   Recent Labs     06/23/21  1657   AST 15   ALT 15   BILITOT 1.0   ALKPHOS 130*       CT HEAD WO CONTRAST    Result Date: 6/23/2021  EXAMINATION: CT OF THE HEAD WITHOUT CONTRAST  6/23/2021 2:40 pm TECHNIQUE: CT of the head was performed without the administration of intravenous contrast. Dose modulation, iterative reconstruction, and/or weight based adjustment of the mA/kV was utilized to reduce the radiation dose to as low as reasonably achievable. COMPARISON: 10/06/2020, 10/07/2020 HISTORY: ORDERING SYSTEM PROVIDED HISTORY: fall, slurred speech TECHNOLOGIST PROVIDED HISTORY: Has a \"code stroke\" or \"stroke alert\" been called? ->No Reason for exam:->fall, slurred speech Decision Support Exception - unselect if not a suspected or confirmed emergency medical condition->Emergency Medical Condition (MA) Reason for Exam: fall, slurred speech Relevant Medical/Surgical History: pt refused exam until 1800 FINDINGS: BRAIN/VENTRICLES: There is low-attenuation in right temporal lobe involving a 4.5 x 3 x 1 cm area of brain parenchyma. Findings concerning for an acute infarct. Otherwise no acute intracranial hemorrhage. No midline shift. No acute hydrocephalus. No extra-axial fluid collection. ORBITS: The visualized portion of the orbits demonstrate no acute abnormality. SINUSES: The visualized paranasal sinuses and mastoid air cells demonstrate no acute abnormality. SOFT TISSUES/SKULL:  No acute abnormality of the visualized skull or soft tissues. Moderate-sized low-attenuation in the right temporal lobe concerning for acute infarct. MRI brain recommended to confirm. No acute intracranial hemorrhage. CT CERVICAL SPINE WO CONTRAST    Result Date: 6/23/2021  EXAMINATION: CT OF THE CERVICAL SPINE WITHOUT CONTRAST 6/23/2021 3:40 pm TECHNIQUE: CT of the cervical spine was performed without the administration of intravenous contrast. Multiplanar reformatted images are provided for review. Dose modulation, iterative reconstruction, and/or weight based adjustment of the mA/kV was utilized to reduce the radiation dose to as low as reasonably achievable. COMPARISON: None. HISTORY: ORDERING SYSTEM PROVIDED HISTORY: fall TECHNOLOGIST PROVIDED HISTORY: Reason for exam:->fall Decision Support Exception - unselect if not a suspected or confirmed emergency medical condition->Emergency Medical Condition (MA) Reason for Exam: fall, neck pain FINDINGS: BONES/ALIGNMENT: Mild motion degradation this is most pronounced at C2. There is no acute fracture or traumatic malalignment. DEGENERATIVE CHANGES: Multilevel degenerate changes most pronounced C5-6 less so C6-C7 prominent disc osteophyte complex mission at C5-6 causing canal narrowing at this level. SOFT TISSUES: There is no prevertebral soft tissue swelling. Multilevel degenerate changes. No acute fracture traumatic malalignment     XR CHEST PORTABLE    Result Date: 6/23/2021  EXAMINATION: ONE XRAY VIEW OF THE CHEST 6/23/2021 3:34 pm COMPARISON: None.  HISTORY: ORDERING SYSTEM PROVIDED HISTORY: other, slurrred speech, hypertension TECHNOLOGIST PROVIDED HISTORY: Reason for exam:->other, slurrred speech, hypertension Reason for Exam: other, slurrred speech, hypertension Acuity: Acute Type of Exam: Initial Additional signs and symptoms: na Relevant Medical/Surgical History: diabetes, hypertension FINDINGS: The lungs are clear. The mediastinum and cardiac silhouette are unremarkable. No acute abnormality. EKG: Atrial fibrillation, rate 89 beats per minutes. PVCs present, nonspecific T changes present. No ST changes. I reviewed EKG. Discussed with ER provider.       Thank you Shahzad Rivera MD for the opportunity to be involved in this patient's care.    -----------------------------  Darlene Hayes MD  [facility]

## 2021-06-24 NOTE — PROGRESS NOTES
Yoselin Solomon MD.  Section of General Neurology - Adult  Consult Note        Reason for Consult:    Requesting Physician:  No referring provider defined for this encounter. Thank you for your kind referral.    CHIEF COMPLAINT:  weakness         HISTORY OF PRESENT ILLNESS:              The patient is a 70 y.o. female with a history of  female with history of osteoarthritis, history of CVA with residual right hemiparesis (on Plavix and Lipitor prior to admission), uncontrolled diabetes type 2 with hyperglycemia, hyperlipidemia, essential hypertension, history of atrial fibrillation (not on chronic anticoagulation), morbid obesity with BMI of 42 kg/m², known right internal carotid artery stenosis (see CTAhead/neck from October 2020), who was brought to the emergency room with report of confusion, visual hallucination, generalized weakness, and expressive aphasia, ongoing for the past week. It was also reported that patient has had multiple falls at home. On presentation to the emergency room, patient was noted to have foul-smelling urine in the emergency room and urinalysis was sent, concerning for infectious process. Patient is confused and unable to provide reliable HPI.     CTcervical spine with no acute pathology; reveals multilevel degenerative changes with no acute fracture or traumatic malalignment. CThead without contrast with moderate sized low-attenuation in the right temporal lobe concerning for acute infarct,     Pt has A fib. Pt has right carotid stenosis 75 %, left vertebral artery occlusion. Pt states she has not been taking her plavix for a few months since last 12/2020. pt denies side effects. Pt states she is weak in her legs from previous cva and does not walk most of the time. pts SBP was 208.     Past Medical History:        Diagnosis Date    Arthritis     Cerebral [facility] occlusion with [facility] infarction [facility]     Diabetes mellitus ([facility]     Hyperlipidemia     Hypertension Past Surgical History:        Procedure Laterality Date    BLADDER SUSPENSION      COLONOSCOPY  3/23/15    polyps, int hem    DILATION AND CURETTAGE OF UTERUS      HYSTERECTOMY       Current Medications:   Current Facility-Administered Medications: atorvastatin (LIPITOR) tablet 80 mg, 80 mg, Oral, Nightly  aspirin EC tablet 81 mg, 81 mg, Oral, Daily **OR** aspirin suppository 300 mg, 300 mg, Rectal, Daily  insulin lispro (HUMALOG) injection vial 0-6 Units, 0-6 Units, Subcutaneous, 4x Daily AC & HS  0.9% NaCl with KCl 40 mEq infusion, , Intravenous, Continuous  amLODIPine (NORVASC) tablet 5 mg, 5 mg, Oral, Daily  clopidogrel (PLAVIX) tablet 75 mg, 75 mg, Oral, Daily  insulin glargine (LANTUS) injection vial 50 Units, 50 Units, Subcutaneous, Nightly  insulin lispro (HUMALOG) injection vial 16 Units, 16 Units, Subcutaneous, TID WC  metoprolol tartrate (LOPRESSOR) tablet 50 mg, 50 mg, Oral, BID  pantoprazole (PROTONIX) tablet 40 mg, 40 mg, Oral, QAM AC  glucose (GLUTOSE) 40 % oral gel 15 g, 15 g, Oral, PRN  dextrose 50 % IV solution, 12.5 g, Intravenous, PRN  glucagon (rDNA) injection 1 mg, 1 mg, Intramuscular, PRN  dextrose 5 % solution, 100 mL/hr, Intravenous, PRN  labetalol (NORMODYNE;TRANDATE) injection 10 mg, 10 mg, Intravenous, Q4H PRN  sodium chloride flush 0.9 % injection 5-40 mL, 5-40 mL, Intravenous, BID  sodium chloride flush 0.9 % injection 5-40 mL, 5-40 mL, Intravenous, PRN  0.9 % sodium chloride infusion, 25 mL, Intravenous, PRN  ondansetron (ZOFRAN-ODT) disintegrating tablet 4 mg, 4 mg, Oral, Q8H PRN **OR** ondansetron (ZOFRAN) injection 4 mg, 4 mg, Intravenous, Q6H PRN  polyethylene glycol (GLYCOLAX) packet 17 g, 17 g, Oral, Daily PRN  enoxaparin (LOVENOX) injection 40 mg, 40 mg, Subcutaneous, Daily  cefTRIAXone (ROCEPHIN) 1000 mg IVPB in 50 mL D5W minibag, 1,000 mg, Intravenous, Q24H  trospium (SANCTURA) tablet 20 mg, 20 mg, Oral, BID AC  Allergies:  Patient has no known allergies.     Social Extraocular Movements: intact      Cranial nerve V           Facial sensation:  intact      Cranial nerve VII           Facial strength: intact      Cranial nerve VIII           Hearing:  intact      Cranial nerve IX           Palate:  intact      Cranial nerve XI         Shoulder shrug:  intact      Cranial nerve XII          Tongue movement:  normal    Motor:    Drift:  absent  Motor exam is symmetrical 5- out of 5 rigth arm- 5/5 left arm--4-/5 joe LE which pt states has been weak before.   Tone:  normal  Abnormal Movements:  Absent    DTRs-2+ biceps,triceps,brachioradialis,knee jerks and ankle jerks bilaterally symmetrical.  Toes-downgoing bilaterally            Sensory:normal sensation              CBC with Differential:    Lab Results   Component Value Date    WBC 6.7 06/24/2021    RBC 4.93 06/24/2021    HGB 15.3 06/24/2021    HCT 44.5 06/24/2021     06/24/2021    MCV 90.3 06/24/2021    MCH 31.0 06/24/2021    MCHC 34.4 06/24/2021    RDW 13.1 06/24/2021    SEGSPCT 55.4 06/24/2021    LYMPHOPCT 25.5 06/24/2021    MONOPCT 8.3 06/24/2021    BASOPCT 1.3 06/24/2021    MONOSABS 0.6 06/24/2021    LYMPHSABS 1.7 06/24/2021    EOSABS 0.6 06/24/2021    BASOSABS 0.1 06/24/2021    DIFFTYPE AUTOMATED DIFFERENTIAL 06/24/2021     CMP:    Lab Results   Component Value Date     06/24/2021    K 3.3 06/24/2021     06/24/2021    CO2 28 06/24/2021    BUN 13 06/24/2021    CREATININE 0.9 06/24/2021    GFRAA >60 06/24/2021    AGRATIO 1.7 12/15/2014    LABGLOM >60 06/24/2021    GLUCOSE 241 06/24/2021    PROT 6.1 06/24/2021    LABALBU 3.5 06/24/2021    CALCIUM 9.3 06/24/2021    BILITOT 0.8 06/24/2021    ALKPHOS 110 06/24/2021    AST 13 06/24/2021    ALT 12 06/24/2021     BMP:    Lab Results   Component Value Date     06/24/2021    K 3.3 06/24/2021     06/24/2021    CO2 28 06/24/2021    BUN 13 06/24/2021    LABALBU 3.5 06/24/2021    CREATININE 0.9 06/24/2021    CALCIUM 9.3 06/24/2021    GFRAA >60 06/24/2021 LABGLOM >60 06/24/2021    GLUCOSE 241 06/24/2021     PT/INR:    Lab Results   Component Value Date    PROTIME 13.8 06/23/2021    INR 1.14 06/23/2021     PTT:  No results found for: APTT, PTT[APTT  U/A:    Lab Results   Component Value Date    COLORU YELLOW 06/23/2021    WBCUA 1185 06/23/2021    RBCUA 3,956 06/23/2021    MUCUS RARE 10/20/2020    TRICHOMONAS NONE SEEN 06/23/2021    BACTERIA MODERATE 06/23/2021    CLARITYU SLIGHTLY CLOUDY 06/23/2021    SPECGRAV 1.027 06/23/2021    LEUKOCYTESUR SMALL 06/23/2021    UROBILINOGEN NEGATIVE 06/23/2021    BILIRUBINUR NEGATIVE 06/23/2021    BLOODU LARGE 06/23/2021     TSH:    Lab Results   Component Value Date    TSH 2.58 06/06/2014     VITAMIN B12: No components found for: B12  FOLATE:  No results found for: FOLATE  RPR:  No results found for: RPR  DIANA:  No results found for: ANATITER, DIANA  Urine Toxicology:  No components found for: IAMMENTA, IBARBIT, IBENZO, ICOCAINE, IMARTHC, IOPIATES, IPHENCYC     IMPRESSION:    Right temporal acute infarct    Right carotid stenosis 75 % consult vascular surgery    Left vertebral artery occlusion    Hypertensive encephalopathy    Mild to moderate right MCA stenosis    A fib    PLAN:    CT brain CTA head  Neck as above    Mri brain    Echo    B 12 folate TSH Homocysteine level    Consult cardiology for ? AC for A fib    Consult vascular surgery    plavix ( pt states she was not taking p[lavix at home for a few months )    Discussed dx prognosis meds side effects and above with pt and answered all questions. Lesly Cunha MD MD  BOARD CERTIFIED-NEUROLOGY.

## 2021-06-24 NOTE — CONSULTS
[facility] PHYSICAL THERAPY EVALUATION  Hemalatha Mosher, 1950, 2015/2015-A, 6/24/2021    History  [place]:  The primary encounter diagnosis was Cerebrovascular accident (CVA), unspecified mechanism ([address] Diagnoses of Confusion, Elevated lactic acid level, and Hyperglycemia were also pertinent to this visit. Patient  has a past medical history of Arthritis, Cerebral artery occlusion with cerebral infarction ([address] Diabetes mellitus ([address] Hyperlipidemia, and Hypertension. Patient  has a past surgical history that includes Hysterectomy; bladder suspension; Dilation and curettage of uterus; and Colonoscopy (3/23/15). Subjective:  Patient states:  \"I've had several strokes\". Pain:  No c/o. Communication with other providers:  Handoff to RN, co-eval with OT.    Restrictions: General fall risk, NPO, decreased balance, fear of falling     Home Setup/Prior level of function  Social/Functional History  Lives With: Significant other  Type of Home: Apartment  Home Layout: One level  Home Access: Stairs to enter with rails (3rd floor, elevator used)  Entrance Stairs - Number of Steps: 3rd floor  Bathroom Shower/Tub: Walk-in shower  Bathroom Toilet: Handicap height  Bathroom Equipment: Shower chair, Grab bars in shower  Bathroom Accessibility: Accessible  Home Equipment: 4 wheeled walker, [employer] (rollator)  Receives Help From: Family  ADL Assistance: Needs assistance (significant other helps with transfers)  Homemaking Assistance: Needs assistance (significant other performs all)  Homemaking Responsibilities: No  Transfer Assistance: Needs assistance  Active : No  Patient's  Info: Pt's significant other Saurabh Diamond  Occupation: On disability  Additional Comments: Pt reports recent falls    Examination of body systems (includes body structures/functions, activity/participation limitations):  · Observation:  Pt supine with HOB 60* upon arrival  · Vision:  Visual field testing pt able to identify correct numbers with incr time, poor visual tracking with saccadic corrections. · [facility]  · Cardiopulmonary:  No 02 needs  · Cognition: Pt requires prompting to recall month, location, year, aware of reason for stay, see OT/SLP note for further evaluation. Slurred speech and increased time required for recall. Musculoskeletal  · ROM R/L:  WFL BLE. · Strength R/L:  4+/5 BLE, decreased in function and endurance. · Neuro:  H/o CVA with residual deficits, slurred speech  · Gait pattern: N/T due to safety concern     Mobility:  · Rolling L/R: Mod I  · Supine to sit:  Mod I  · Transfers: Mod A x2  · Sitting balance:  SBA- intermittent Mod A.    · Standing balance:  Min A x2. · Gait: N/t due to safety concern    [facility] 6 Clicks Inpatient Mobility:  AM-PAC Inpatient Mobility Raw Score : 15    Treatment:  Bed mobility: Lowered HOB to 45* to attempt supine to sit, pt performed with Mod I use of bed rail for support. Pt denies dizziness with change in position, vitals stable. Sitting balance: Pt maintains fair static and dynamic sitting balance at EOB with intermittent UE support, intermittent Mod A for correction of posterior/lateral LOB. Pt tolerates seated EOB x15 minutes during assessment of UE/LE strength and ROM, preparation for transfer, and neuro screen. OT present and assessing ADL abilities. Sit<>Stand: From EOB with FWW and Mod A x2 for assist to upright. Pt requires max encouragement and reassurance d/t fear as well as v/c and t/c throughout for UE placement and sequencing with AD. Pt responds best to t/c due to decreased cognitive function. Pt achieves partial stand. Return to sit pt demonstrates poor eccentric control. Standing balance: Pt maintains standing balance with use of FWW, Min A of 2 for steadying assist and safety x20 seconds. V/c provided for upright posture and increased glute activation with good carryover.  Pt requests return to sit d/t fear and statements of LE weakness. SPT: With Max encouragement, pt agreeable to participate in SPT from EOB>chair. V/c and education provided prior to transfer in order to ensure pt understanding and safety. Mod A x2 required for assist to upright and facilitation of pivot. Safety: patient left in chair with alarm, call light within reach, RN notified, gait belt used. Assessment:  Pt is a 69 y/o female presenting s/p admission to ED 6/23 with s/sx of stroke. Pt currently demonstrates deficits in cognitive functioning, decreased functional strength and mobility, impaired balance, and fear of falling. This pt would benefit from skilled PT services in order to address above deficits and improve pt functional independence. Pt would be appropriate for skilled services with d/c to ARU following this admission. Complexity: Moderate  Prognosis: Good, no significant barriers to participation at this time. Plan Times per week: 4+/week, 1 week,   Discharge Recommendations: IP Rehab  Equipment: Defer to next LOC    Goals:  Short term goals  Time Frame for Short term goals: 1 week  Short term goal 1: Pt will demonstrate STS from standard chair with Mod A and FWW and min v/c. Short term goal 2: Pt will participate in gait with FWW, w/c follow, and Mod A x25 ft. Short term goal 3: Pt will tolerate x3 minutes of dynamic standing activity with UE support and Min A.        Treatment plan:  Bed mobility, transfers, balance, gait, TA, TX,     Recommendations for NURSING mobility: Encourage OOB to chair, SPT with Mod A x2    Time:   Time in: 09:20  Time out: 10:08  Timed treatment minutes: 38  Total time: 48    Electronically signed by:    Mee Melendez, PT  6/24/2021, 1:29 PM

## 2021-06-24 NOTE — PROGRESS NOTES
dec cognition   · Hearing: WFL  · Vitals: Stable vitals throughout session    Body Systems and functions:  · ROM: 110' shoulder flexion BUEs, WFL all other joints, no BL differences  · Strength: 4-/5 all major muscle groups BUEs, no BL differences or focal weaknesses  · Sensation: WFL, no BL differences with light touch  · Tone: Normal  · Coordination: WFL, no BL differences  · Perception: WNL    Activities of Daily Living (ADLs):  · Feeding: Independent (see speech eval for diet)  · Grooming: SBA seated  · UB bathing: Min A for balance  · LB bathing: Mod A for washing posterior thighs and for balance with distal BLEs  · UB dressing: Min A for balance  · LB dressing: Mod A for balance with threading BLEs through pants openings/pants mgmt to knees seated and assist for pants mgmt from knees to hips (Pt donned socks using \"figure-4 method\" with Mod A for balance seated at EOB)  · Toileting: Max A for pants mgmt both directions, use of BSC    **Pt requires min cueing for sequencing d/t decreased cognition     Cognitive and Psychosocial Functioning:  · Overall cognitive status: Impaired (oriented to birth date, oriented to month and city with options, not oriented to year or place; visual hallucinations this date)  · Affect: Normal     Balance:   · Sitting:  · SBA at EOB with static sitting  · Min A-Mod A at EOB with dynamic sitting  · Standing: Min A x 2 with RW    Functional Mobility:  · Bed Mobility: Mod I with HOB elevated 45', use of bed rail  · Transfers:   · Mod A x 2 sit-stand from EOB  · Mod A x 2 stand-sit to EOB  · Mod A x 2 squat pivot from EOB to chair  · Ambulation: Unsafe/unable to assess this date      AM-PAC 6 click short form for inpatient daily activity:   How much help from another person does the patient currently need. .. Unable  Dep A Lot  Max A A Lot   Mod A A Little  Min A A Little   CGA  SBA None   Mod I  Indep  Sup   1. Putting on and taking off regular lower body clothing?  [] 1    [] 2   [x] 2   [] 3   [] 3   [] 4      2. Bathing (including washing, rinsing, drying)? [] 1   [] 2   [x] 2 [] 3 [] 3 [] 4   3. Toileting, which includes using toilet, bedpan, or urinal? [] 1    [x] 2   [] 2   [] 3   [] 3   [] 4     4. Putting on and taking off regular upper body clothing? [] 1   [] 2   [] 2   [x] 3   [] 3    [] 4      5. Taking care of personal grooming such as brushing teeth? [] 1   [] 2    [] 2 [] 3    [x] 3   [] 4      6. Eating meals? [] 1   [] 2   [] 2   [] 3   [] 3   [x] 4      Raw Score:  16     [24=0% impaired(CH), 23=1-19%(CI), 20-22=20-39%(CJ), 15-19=40-59%(CK), 10-14=60-79%(CL), 7-9=80-99%(CM), 6=100%(CN)]     Treatment:  Therapeutic Activity Training:   Therapeutic activity training was instructed today. Cues were given for safety, sequence, UE/LE placement, awareness, and balance. Neuro-Muscular re-education:  Cues were given for position, posture, kinesthetic sense, safety, recruitment, and rationale. Cues were verbal and/or tactile. Activities performed today included bed mobility training, transfer training, sitting and standing balance/tolerance. Pt required cueing for maintaining balance using BUEs and correcting posture to midline. Pt educated on role of OT, POC, and importance of OOB activity. Safety Measures: Gait belt used, Left in chair, Alarm in place    Assessment:  Pt is a 70-year-old female with a past medical history of Arthritis, Cerebral artery occlusion with cerebral infarction ([address] Diabetes mellitus ([address] Hyperlipidemia, and Hypertension. Pt admitted to ED with fatigue, confusion, and slurred speech, as well as report of visual hallucinations. Pt diagnosed with acute vs subacute CVA (Rt temporal lobe) and acute metabolic encephalopathy likely secondary to CVA. Pt reports she lives at home with SO and at baseline is independent in ADLs and mobility and needs assistance for IADLs and transportation.  Pt presents with above impairments and requires rehab to return to PLOF. Recommend ARU. Complexity: Moderate  Prognosis: Good  Plan: 3+x/week      Goals:  1. Pt will complete all aspects of bed mobility for EOB/OOB ADLs with supervision with HOB flat. 2. Pt will complete UB/LB bathing with SBA. 3. Pt will complete all aspects of LB dressing with SBA  4. Pt will complete all functional transfers to and from bed, chair, BSC/toilet, with Min A  5. Pt will ambulate 3 ft to [facility] with RW with Mod A  6. Pt will complete all aspects of toileting task with Min A  7. Pt will complete oral hygiene/grooming routine seated with supervision  8. Pt will complete ther ex/ther act with focus on sitting and standing balance, standing activity tolerance, cognitive re-orientation, UB strengthening, visual tracking exercises.        Time:  Time in: 0920  Time out: 1008  Timed treatment minutes: 33  Total time: 48      Electronically signed by:    GURWINDER Jacobs/OT    PRICE Holguin/L, [place], [id number]

## 2021-06-24 NOTE — PROGRESS NOTES
Speech Language Pathology  Facility/Department: [facility] ICU STEPDOWN   CLINICAL BEDSIDE SWALLOW EVALUATION    NAME: Vicky Wilson  : 1950  MRN: [id number]    ADMISSION DATE: 2021  ADMITTING DIAGNOSIS: has Vitamin D deficiency; Hyperlipidemia; Back pain, chronic; Hemiparesis of right dominant side as late effect of cerebral infarction ([facility] [address] Facial droop; Hypokalemia; Essential hypertension; Hyperglycemia; TIA (transient ischemic attack); Class 3 severe obesity due to excess calories with body mass index (BMI) of 45.0 to 49.9 in adult [facility]); Acute CVA (cerebrovascular accident) ([facility] [address] Dysarthria due to acute stroke ([facility] [address] Dysphagia due to recent cerebral infarction; Uncontrolled type 2 diabetes mellitus with hyperglycemia ([facility] [address] Morbid obesity with BMI of 45.0-49.9, adult ([facility] [address] and Incontinence in female on their problem list.      Impressions: Vicky Wilson was seen for a bedside swallowing evaluation after being admitted to [facility] with a possible CVA. Pt was alert and cooperative throughout assessment. Relevant medical hx includes hypertension and CVA. Pt denies hx of dysphagia PTA. Pt was positioned upright in bed and presented with a clear vocal quality and strong volitional cough. Oral mechanism examination was limited d/t pt's ability to follow commands- reduced labial/lingual strength and coordination was noted throughout assessment. Pt presented with a milddysarthria characterized by slow rate and imprecise articulation. PO trials of puree, soft/regular solids, ice chips and thin liquids by cup/straw sips were given. Mild oral dysphagia was observed characterized by lingual protrusion, prolonged mastication and slow oral A-P transit. Suspect mild pharyngeal dysphagia characterized by reduced laryngeal excursion with timely swallow initiation. Pt demonstrated a delayed dry cough with thin liquids by straw sips x1.   Clear vocal quality and 0 overt s/s of aspiration were observed with continued trials of thin liquids by straw sips, puree and soft/regular solids. Recommend initiate soft and bite size diet/thin liquids by small sips with strict aspiration precautions. ST will continue to follow Sarai Kirby for diet tolerance monitoring. ONSET DATE: this admission   Date of Eval: 6/24/2021  Evaluating Therapist: SHAGGY Childress    Current Diet level:  Current Diet : NPO  Current Liquid Diet : NPO      Primary Complaint  Patient Complaint: weakness    Pain:  Pain Assessment  Pain Assessment: 0-10  Pain Level: 0  Patient's Stated Pain Goal: No pain    Reason for Referral  Junior Triana was referred for a bedside swallow evaluation to assess the efficiency of her swallow function, identify signs and symptoms of aspiration and make recommendations regarding safe dietary consistencies, effective compensatory strategies, and safe eating environment. Impression  Dysphagia Diagnosis: Mild oral stage dysphagia;Mild pharyngeal stage dysphagia  Dysphagia Outcome Severity Scale: Level 5: Mild dysphagia- Distant supervision. May need one diet consistency restricted     Treatment Plan  Requires SLP Intervention: Yes  Duration/Frequency of Treatment: 2-3xs weekly/ LOS or until goals are met  D/C Recommendations: To be determined       Recommended Diet and Intervention  Diet Solids Recommendation: Dysphagia Soft and Bite-Sized (Dysphagia III)  Liquid Consistency Recommendation: Thin  Recommended Form of Meds: PO     Therapeutic Interventions: Diet tolerance monitoring; Therapeutic PO trials with SLP    Compensatory Swallowing Strategies  Compensatory Swallowing Strategies: Eat/Feed slowly;Upright as possible for all oral intake;Small bites/sips    Treatment/Goals  Short-term Goals  Timeframe for Short-term Goals: LOS or until goals are met  Goal 1: Pt will tolerate soft and bite size diet level/thin liquids without clinical evidence of aspiration 100%  Goal 2: Pt/caregivers will demonstrate comprehension of recommendations/POC    General  Chart Reviewed: Yes  Behavior/Cognition: Alert; Cooperative;Pleasant mood  Respiratory Status: O2 via nasual cannula  O2 Device: Nasal cannula  Communication Observation: Functional;Dysarthria  Follows Directions: Simple  Dentition: Some missing teeth  Patient Positioning: Upright in bed  Baseline Vocal Quality: Normal  Volitional Cough: Strong  Prior Dysphagia History: Pt denies hx of dysphagia  Consistencies Administered: Reg solid; Dysphagia Minced and Moist (Dysphagia II); Dysphagia Pureed (Dysphagia I); Thin - straw; Thin - cup; Ice Chips           Vision/Hearing  Vision  Vision: Within Functional Limits  Hearing  Hearing: Within functional limits    Oral Motor Deficits  Oral/Motor  Oral Motor: Exceptions to [facility]    Oral Phase Dysfunction  Oral Phase  Oral Phase: Exceptions  Oral Phase Dysfunction  Impaired Mastication: Reg Solid  Decreased Anterior to Posterior Transit: Reg solid  Lingual/Palatal Residue: Reg solid     Indicators of Pharyngeal Phase Dysfunction   Pharyngeal Phase  Pharyngeal Phase: Exceptions  Indicators of Pharyngeal Phase Dysfunction  Decreased Laryngeal Elevation: All  Cough - Delayed: Thin - straw    Prognosis  Prognosis  Prognosis for safe diet advancement: fair  Barriers to reach goals: severity of dysphagia  Individuals consulted  Consulted and agree with results and recommendations: Patient;RN    Education  Patient Education: recommendations/POC  Patient Education Response: Verbalizes understanding  Safety Devices in place: Yes  Type of devices:  All fall risk precautions in place       Therapy Time  SLP Individual Minutes  Time In: 0900  Time Out: 0930  Minutes: [address], [facility]-SLP, 6/24/2021

## 2021-06-24 NOTE — PROGRESS NOTES
Hospitalist Progress Note      Name:  Vicky Wilson /Age/Sex: 1950  (75 y.o. female)   MRN & CSN:  [id number] & [id number] Admission Date/Time: 2021  2:40 PM   Location:  -A PCP: Edmund Evans MD         Hospital Day: 2    Assessment and Plan: This is a pleasant 70 y.o. female with history of osteoarthritis, history of CVA with residual right hemiparesis (on Plavix and Lipitor prior to admission), uncontrolled diabetes type 2 with hyperglycemia, hyperlipidemia, essential hypertension, history of atrial fibrillation (not on chronic anticoagulation), morbid obesity with BMI of 42 kg/m², known right internal carotid artery stenosis (see CTAhead/neck from 2020), who was brought to the emergency room with report of confusion, visual hallucination, generalized weakness, and expressive aphasia, ongoing for the past week. It was also reported that patient has had multiple falls at home. On presentation to the emergency room, patient was noted to have foul-smelling urine in the emergency room and urinalysis was sent, concerning for infectious process. Patient is confused and unable to provide reliable HPI. Assessment/plan:  Acute versus subacute CVA, suspected. CThead without contrast with moderate sized low-attenuation in the right temporal lobe concerning for acute infarct. Will obtain MRIbrain without contrast, CTAhead/neck with contrast, echocardiogram.  Neurochecks ordered. We will continue home dose of Plavix, and aspirin, change Lipitor to high-dose. Check lipid profile in the morning. Speech therapy, physical therapy, occupational therapy consults. Neurology consult. No need for permissive hypertension, given onset of symptoms of about a week. Acute metabolic encephalopathy/visual hallucination. Could be secondary to underlying CVA. Follow-up pending MRIbrain. Patient is on antibiotics for suspected urinary tract infection.   Monitor mental status closely. Lactic acidosis. Suspect dehydration more than infectious etiology. Continue intravenous fluid and trend lactic acid level. Acute cystitis with hematuria. Foul-smelling urine concerning for possible cystitis. Start Rocephin and follow pending urine culture results. History of atrial fibrillation (based on previous EKG from October 2020). Patient is not on chronic anticoagulation. Recommend full dose anticoagulation if MRIbrain is negative for large territory infarct for which we may need to hold off full dose anticoagulation for some time (due to risk of hemorrhagic conversion). Consider cardiology consult to assist with evaluation. Other comorbidities: History of osteoarthritis, history of CVA with residual right-sided hemiparesis, uncontrolled diabetes type 2 with hyperglycemia, hyperlipidemia, essential hypertension, right internal carotid artery stenosis, history of obesity with BMI of 42 kg/m².     Activities: Up with assist  Prophylaxis: Subcutaneous Lovenox  Code status: Full code    Diet ADULT DIET; Dysphagia - Soft and Bite Sized; 4 carb choices (60 gm/meal)   DVT Prophylaxis [] Lovenox, []  Heparin, [] SCDs, [] Ambulation   GI Prophylaxis [] PPI,  [] H2 Blocker,  [] Carafate,  [] Diet/Tube Feeds   Code Status Full Code   Disposition Rehab vs Home   MDM [] Low, [x] Moderate,[]  High       History of Present Illness:     Subjective:    Pt seen and examined in the room. She is observed sitting up in chair. She stated that she feels fine. Residual weakness. Objective:   No intake or output data in the 24 hours ending 06/24/21 1559   Vitals:   Vitals:    06/24/21 1229   BP: 130/67   Pulse:    Resp:    Temp: 98.1 °F (36.7 °C)   SpO2: 98%     Physical Exam:   GEN Awake female, sitting upright in bed in no apparent distress. Appears given age. EYES Pupils are equally round. No scleral erythema, discharge, or conjunctivitis.   HENT Mucous membranes are moist. Oral pharynx without exudates, no evidence of thrush. NECK Supple, no apparent thyromegaly or masses. RESP Clear to auscultation, no wheezes, rales or rhonchi. Symmetric chest movement while on room air. CARDIO/VASC S1/S2 auscultated. Regular rate without appreciable murmurs, rubs, or gallops. No JVD or carotid bruits. Peripheral pulses equal bilaterally and palpable. No peripheral edema. GI Abdomen is soft without significant tenderness, masses, or guarding. Bowel sounds are normoactive. Rectal exam deferred.  No costovertebral angle tenderness. Normal appearing external genitalia. Bond catheter is not present. HEME/LYMPH No palpable cervical lymphadenopathy and no hepatosplenomegaly. No petechiae or ecchymoses. MSK No gross joint deformities. SKIN Normal coloration, warm, dry. NEURO Cranial nerves appear grossly intact, normal speech, no lateralizing weakness. PSYCH Awake, alert, oriented x 4. Affect appropriate.     Medications:   Medications:    atorvastatin  80 mg Oral Nightly    aspirin  81 mg Oral Daily    Or    aspirin  300 mg Rectal Daily    insulin lispro  0-6 Units Subcutaneous 4x Daily AC & HS    amLODIPine  5 mg Oral Daily    clopidogrel  75 mg Oral Daily    insulin glargine  50 Units Subcutaneous Nightly    insulin lispro  16 Units Subcutaneous TID WC    metoprolol tartrate  50 mg Oral BID    pantoprazole  40 mg Oral QAM AC    sodium chloride flush  5-40 mL Intravenous BID    enoxaparin  40 mg Subcutaneous Daily    cefTRIAXone (ROCEPHIN) IV  1,000 mg Intravenous Q24H    trospium  20 mg Oral BID AC      Infusions:    0.9% NaCl with KCl 40 mEq Stopped (06/24/21 0805)    dextrose      sodium chloride       PRN Meds: glucose, 15 g, PRN  dextrose, 12.5 g, PRN  glucagon (rDNA), 1 mg, PRN  dextrose, 100 mL/hr, PRN  labetalol, 10 mg, Q4H PRN  sodium chloride flush, 5-40 mL, PRN  sodium chloride, 25 mL, PRN  ondansetron, 4 mg, Q8H PRN   Or  ondansetron, 4 mg, Q6H PRN  polyethylene glycol, 17 g, Daily PRN          Electronically signed by Marilyn Samuel DO on 6/24/2021 at 3:59 PM

## 2021-06-24 NOTE — ED NOTES
Updated patient's boyfriend, Chary Mars, regarding pt's status.       Jacob Soriano RN  06/23/21 2023

## 2021-06-25 ENCOUNTER — APPOINTMENT (OUTPATIENT)
Dept: MRI IMAGING | Age: 71
DRG: 045 | End: 2021-06-25
Payer: MEDICAID

## 2021-06-25 LAB
FOLATE: 9.9 NG/ML (ref 3.1–17.5)
GLUCOSE BLD-MCNC: 159 MG/DL (ref 70–99)
GLUCOSE BLD-MCNC: 163 MG/DL (ref 70–99)
GLUCOSE BLD-MCNC: 199 MG/DL (ref 70–99)
GLUCOSE BLD-MCNC: 91 MG/DL (ref 70–99)
HOMOCYSTEINE: 17.9 UMOL/L (ref 0–10)
TSH HIGH SENSITIVITY: 2.01 UIU/ML (ref 0.27–4.2)
VITAMIN B-12: 443.7 PG/ML (ref 211–911)

## 2021-06-25 PROCEDURE — 2580000003 HC RX 258: Performed by: INTERNAL MEDICINE

## 2021-06-25 PROCEDURE — 70551 MRI BRAIN STEM W/O DYE: CPT

## 2021-06-25 PROCEDURE — 82962 GLUCOSE BLOOD TEST: CPT

## 2021-06-25 PROCEDURE — 82607 VITAMIN B-12: CPT

## 2021-06-25 PROCEDURE — 94761 N-INVAS EAR/PLS OXIMETRY MLT: CPT

## 2021-06-25 PROCEDURE — 6360000002 HC RX W HCPCS: Performed by: INTERNAL MEDICINE

## 2021-06-25 PROCEDURE — 2060000000 HC ICU INTERMEDIATE R&B

## 2021-06-25 PROCEDURE — 84443 ASSAY THYROID STIM HORMONE: CPT

## 2021-06-25 PROCEDURE — 82746 ASSAY OF FOLIC ACID SERUM: CPT

## 2021-06-25 PROCEDURE — 6370000000 HC RX 637 (ALT 250 FOR IP): Performed by: INTERNAL MEDICINE

## 2021-06-25 PROCEDURE — 36415 COLL VENOUS BLD VENIPUNCTURE: CPT

## 2021-06-25 PROCEDURE — 83090 ASSAY OF HOMOCYSTEINE: CPT

## 2021-06-25 RX ORDER — B12/LEVOMEFOLATE CALCIUM/B-6 2-1.13-25
1 TABLET ORAL DAILY
Status: DISCONTINUED | OUTPATIENT
Start: 2021-06-25 | End: 2021-06-29 | Stop reason: HOSPADM

## 2021-06-25 RX ADMIN — ATORVASTATIN CALCIUM 80 MG: 80 TABLET, FILM COATED ORAL at 21:04

## 2021-06-25 RX ADMIN — SODIUM CHLORIDE, PRESERVATIVE FREE 10 ML: 5 INJECTION INTRAVENOUS at 09:03

## 2021-06-25 RX ADMIN — SODIUM CHLORIDE, PRESERVATIVE FREE 10 ML: 5 INJECTION INTRAVENOUS at 21:37

## 2021-06-25 RX ADMIN — CLOPIDOGREL BISULFATE 75 MG: 75 TABLET ORAL at 09:03

## 2021-06-25 RX ADMIN — METOPROLOL TARTRATE 50 MG: 50 TABLET, FILM COATED ORAL at 09:03

## 2021-06-25 RX ADMIN — INSULIN GLARGINE 50 UNITS: 100 INJECTION, SOLUTION SUBCUTANEOUS at 21:10

## 2021-06-25 RX ADMIN — TROSPIUM CHLORIDE 20 MG: 20 TABLET, FILM COATED ORAL at 07:07

## 2021-06-25 RX ADMIN — CEFTRIAXONE 1000 MG: 1 INJECTION, POWDER, FOR SOLUTION INTRAMUSCULAR; INTRAVENOUS at 21:04

## 2021-06-25 RX ADMIN — METOPROLOL TARTRATE 50 MG: 50 TABLET, FILM COATED ORAL at 21:04

## 2021-06-25 RX ADMIN — ENOXAPARIN SODIUM 40 MG: 40 INJECTION SUBCUTANEOUS at 09:00

## 2021-06-25 RX ADMIN — PANTOPRAZOLE SODIUM 40 MG: 40 TABLET, DELAYED RELEASE ORAL at 07:07

## 2021-06-25 RX ADMIN — AMLODIPINE BESYLATE 5 MG: 5 TABLET ORAL at 09:03

## 2021-06-25 RX ADMIN — ASPIRIN 81 MG: 81 TABLET, COATED ORAL at 09:03

## 2021-06-25 ASSESSMENT — PAIN SCALES - GENERAL: PAINLEVEL_OUTOF10: 0

## 2021-06-25 NOTE — CONSULTS
Department of Cardiovascular & Thoracic Surgery   Consult Note    Reason for Consult:  R carotid stenosis     Requesting Physician: Dr. Giselle Li     Date of Consult: 6/25/21      History Obtained From:  patient     HISTORY OF PRESENT ILLNESS:    The patient is a 70 y.o. female who presents with confusion, weakness, and aphasia for the past week. She has a history of L sided CVA in 2020. She remains confused and there is no family at bedside. History is limited.       Past Medical History:        Diagnosis Date    Arthritis     Cerebral artery occlusion with cerebral infarction ([facility] [address]     Diabetes mellitus ([facility] [address]     Hyperlipidemia     Hypertension      Past Surgical History:        Procedure Laterality Date    BLADDER SUSPENSION      COLONOSCOPY  3/23/15    polyps, int hem    DILATION AND CURETTAGE OF UTERUS      HYSTERECTOMY       Current Medications:   Current Facility-Administered Medications: folic acid-pyridoxine-cyancobalamin (FOLTX) 1.13-25-2 MG TABS 1 tablet, 1 tablet, Oral, Daily  atorvastatin (LIPITOR) tablet 80 mg, 80 mg, Oral, Nightly  aspirin EC tablet 81 mg, 81 mg, Oral, Daily **OR** aspirin suppository 300 mg, 300 mg, Rectal, Daily  insulin lispro (HUMALOG) injection vial 0-6 Units, 0-6 Units, Subcutaneous, 4x Daily AC & HS  0.9% NaCl with KCl 40 mEq infusion, , Intravenous, Continuous  amLODIPine (NORVASC) tablet 5 mg, 5 mg, Oral, Daily  clopidogrel (PLAVIX) tablet 75 mg, 75 mg, Oral, Daily  insulin glargine (LANTUS) injection vial 50 Units, 50 Units, Subcutaneous, Nightly  insulin lispro (HUMALOG) injection vial 16 Units, 16 Units, Subcutaneous, TID WC  metoprolol tartrate (LOPRESSOR) tablet 50 mg, 50 mg, Oral, BID  pantoprazole (PROTONIX) tablet 40 mg, 40 mg, Oral, QAM AC  glucose (GLUTOSE) 40 % oral gel 15 g, 15 g, Oral, PRN  dextrose 50 % IV solution, 12.5 g, Intravenous, PRN  glucagon (rDNA) injection 1 mg, 1 mg, Intramuscular, PRN  dextrose 5 % solution, 100 mL/hr, Intravenous, PRN  labetalol (NORMODYNE;TRANDATE) injection 10 mg, 10 mg, Intravenous, Q4H PRN  sodium chloride flush 0.9 % injection 5-40 mL, 5-40 mL, Intravenous, BID  sodium chloride flush 0.9 % injection 5-40 mL, 5-40 mL, Intravenous, PRN  0.9 % sodium chloride infusion, 25 mL, Intravenous, PRN  ondansetron (ZOFRAN-ODT) disintegrating tablet 4 mg, 4 mg, Oral, Q8H PRN **OR** ondansetron (ZOFRAN) injection 4 mg, 4 mg, Intravenous, Q6H PRN  polyethylene glycol (GLYCOLAX) packet 17 g, 17 g, Oral, Daily PRN  enoxaparin (LOVENOX) injection 40 mg, 40 mg, Subcutaneous, Daily  cefTRIAXone (ROCEPHIN) 1000 mg IVPB in 50 mL D5W minibag, 1,000 mg, Intravenous, Q24H  trospium (SANCTURA) tablet 20 mg, 20 mg, Oral, BID AC  Allergies:  No Known Allergies    Social History:   Social History     Socioeconomic History    Marital status: Single     Spouse name: Not on file    Number of children: Not on file    Years of education: Not on file    Highest education level: Not on file   Occupational History    Not on file   Tobacco Use    Smoking status: Former Smoker     Packs/day: 1.00     Years: 20.00     Pack years: 20.00     Types: Cigarettes     Start date: 10/8/1970     Quit date: 10/8/2016     Years since quittin.7    Smokeless tobacco: Never Used   Vaping Use    Vaping Use: Never used   Substance and Sexual Activity    Alcohol use: No     Alcohol/week: 0.0 standard drinks    Drug use: No    Sexual activity: Not Currently   Other Topics Concern    Not on file   Social History Narrative    Not on file     Social Determinants of Health     Financial Resource Strain:     Difficulty of Paying Living Expenses:    Food Insecurity:     Worried About Running Out of Food in the Last Year:     Ran Out of Food in the Last Year:    Transportation Needs:     Lack of Transportation (Medical):      Lack of Transportation (Non-Medical):    Physical Activity:     Days of Exercise per Week:     Minutes of Exercise per Session:    Stress:  Feeling of Stress :    Social Connections:     Frequency of Communication with Friends and Family:     Frequency of Social Gatherings with Friends and Family:     Attends Synagogue Services:     Active Member of Clubs or Organizations:     Attends Club or Organization Meetings:     Marital Status:    Intimate Partner Violence:     Fear of Current or Ex-Partner:     Emotionally Abused:     Physically Abused:     Sexually Abused:        Family History:        Problem Relation Age of Onset    No Known Problems Mother     No Known Problems Father        REVIEW OF SYSTEMS:  Constitutional: - fatigue, - fever, - chills, - night sweats  Eyes: - vision loss  Cardiovascular: -  chest pain, - palpitations, - leg swelling, - leg pain   Respiratory: - cough, - shortness of breath, - wheezing   GI: - nausea, - vomiting, - abdominal pain, - constipation, - diarrhea   : - dysuria   MSK: - joint pain, - muscle pain  Integument: - rash, - skin color change   Heme: - easy bruising or bleeding  Neurologic: - headache, + weakness, - dizziness, - paresthesias       EXAM:  Constitutional: Blood pressure (!) 111/56, pulse 60, temperature 99 °F (37.2 °C), temperature source Oral, resp. rate 19, height 5' 2\" (1.575 m), weight 242 lb 4.6 oz (109.9 kg), SpO2 93 %. No apparent distress, appears stated age and cooperative. Neurologic: follows commands, R sided weakness noted   Lungs: Good respiratory effort.  Clear to auscultation,   CV: Regular rate/ rhythm , no peripheral edema, feet warm and well perfused  GI: Soft, non-tender in all four quadrants, non-distended, + bowel sounds, liver and spleen no palpable masses  : bladder nondistended   MSK: no obvious deformity   Skin: warm, pink and dry       DATA:  CTA  Impression   Mild tapering versus mild to moderate areas stenosis involving the right MCA   M2 branches.  Otherwise no large vessel occlusion or hemodynamic stenosis   involving remainder of the vessels intracranially.       Stable appearance of the age-indeterminate occlusion proximal V2 segment left   vertebral artery with retrograde opacification of the V4 segment.       75% narrowing right proximal ICA and 50% narrowing left proximal ICA per   NASCET criteria     CT  Impression   Moderate-sized low-attenuation in the right temporal lobe concerning for   acute infarct.  MRI brain recommended to confirm.       No acute intracranial hemorrhage. IMPRESSION  Patient Active Problem List   Diagnosis    Vitamin D deficiency    Hyperlipidemia    Back pain, chronic    Hemiparesis of right dominant side as late effect of cerebral infarction ([address]    Facial droop    Hypokalemia    Essential hypertension    Hyperglycemia    TIA (transient ischemic attack)    Class 3 severe obesity due to excess calories with body mass index (BMI) of 45.0 to 49.9 in adult [facility])    Acute CVA (cerebrovascular accident) ([address]    Dysarthria due to acute stroke ([address]    Dysphagia due to recent cerebral infarction    Uncontrolled type 2 diabetes mellitus with hyperglycemia ([address]    Morbid obesity with BMI of 45.0-49.9, adult ([address]    Incontinence in female       R carotid stenosis 75%      RECOMMENDATIONS:  Pt is going for MRI but CT shows possible acute R sided infarct. Recommend OP follow up to discuss R CEA. Pt seen with Dr. Dmitry Warren.      Alma Treadwell PA-C

## 2021-06-25 NOTE — PROGRESS NOTES
conjunctivitis. HENT Mucous membranes are moist. Oral pharynx without exudates, no evidence of thrush. NECK Supple, no apparent thyromegaly or masses. RESP Clear to auscultation, no wheezes, rales or rhonchi. Symmetric chest movement while on room air. CARDIO/VASC S1/S2 auscultated. Regular rate without appreciable murmurs, rubs, or gallops. No JVD or carotid bruits. Peripheral pulses equal bilaterally and palpable. No peripheral edema. GI Abdomen is soft without significant tenderness, masses, or guarding. Bowel sounds are normoactive. Rectal exam deferred.  No costovertebral angle tenderness. Normal appearing external genitalia. Bond catheter is not present. HEME/LYMPH No palpable cervical lymphadenopathy and no hepatosplenomegaly. No petechiae or ecchymoses. MSK No gross joint deformities. SKIN Normal coloration, warm, dry. NEURO Cranial nerves appear grossly intact, normal speech, no lateralizing weakness. PSYCH Awake, alert, oriented x 4. Affect appropriate.     Medications:   Medications:    folic acid-pyridoxine-cyancobalamin  1 tablet Oral Daily    atorvastatin  80 mg Oral Nightly    aspirin  81 mg Oral Daily    Or    aspirin  300 mg Rectal Daily    insulin lispro  0-6 Units Subcutaneous 4x Daily AC & HS    amLODIPine  5 mg Oral Daily    clopidogrel  75 mg Oral Daily    insulin glargine  50 Units Subcutaneous Nightly    insulin lispro  16 Units Subcutaneous TID WC    metoprolol tartrate  50 mg Oral BID    pantoprazole  40 mg Oral QAM AC    sodium chloride flush  5-40 mL Intravenous BID    enoxaparin  40 mg Subcutaneous Daily    cefTRIAXone (ROCEPHIN) IV  1,000 mg Intravenous Q24H    trospium  20 mg Oral BID AC      Infusions:    0.9% NaCl with KCl 40 mEq Stopped (06/24/21 0805)    dextrose      sodium chloride       PRN Meds: glucose, 15 g, PRN  dextrose, 12.5 g, PRN  glucagon (rDNA), 1 mg, PRN  dextrose, 100 mL/hr, PRN  labetalol, 10 mg, Q4H PRN  sodium chloride flush, 5-40

## 2021-06-25 NOTE — CARE COORDINATION
Patient appears asleep. Per ARU- she has been approved by [facility] for ARU . Precert pending; will be accepted when medically stable and precert/ bed is available.  Mani Rich RN

## 2021-06-25 NOTE — PROGRESS NOTES
NEUROLOGY NOTE  DR. Donte Hogan MD.  -------------------------------------------------  Subjective:    Doing better. Denies any new symptoms. Denies headache nausea vomiting dizziness    Denies numbness weakness extremities    Denies blurring of vision double vision    Objective:    BP (!) 111/56   Pulse 60   Temp 99 °F (37.2 °C) (Oral)   Resp 19   Ht 5' 2\" (1.575 m)   Wt 242 lb 4.6 oz (109.9 kg)   SpO2 93%   BMI 44.31 kg/m²   HEENT nl      Neuro exam    Alert Oriented  X 3  Follow simple commands  EOMI Pupils 3 mm joe  5-/5 all 4 extremities      RADIOLOGY  -----------------    ECHO Complete 2D W Doppler W Color    Result Date: 6/24/2021  Transthoracic Echocardiography Report (TTE)  Demographics   Patient Name       Selena Alfaro   Date of Study       06/24/2021   Date of Birth      1950         Gender              Female   Age                70 year(s)         Race                   Patient Number     [id number]         Room Number         2015   Visit Number       [id number]   Corporate ID       [id number]   Accession Number   [id number]         Mayda Campbell RDMS   Ordering Physician Scott Garcia MD           Physician           MD  Procedure Type of Study   TTE procedure:ECHOCARDIOGRAM COMPLETE 2D W DOPPLER W COLOR. Procedure Date Date: 06/24/2021 Start: 08:44 AM Study Location: Portable Technical Quality: Technically difficult study Indications:CVA. Patient Status: Routine Height: 62 inches Weight: 230 pounds BSA: 2.03 m2 BMI: 42.07 kg/m2 HR: 55 bpm BP: 146/124 mmHg  Conclusions   Summary  Technically difficult examination. Left ventricular systolic function is normal.  Ejection fraction is visually estimated at 50-55%. Moderate left ventricular hypertrophy. Mildly dilated right ventricle.  No evidence of right heart strain. Sclerotic, but non-stenotic aortic valve. No evidence of any pericardial effusion. Signature   ------------------------------------------------------------------  Electronically signed by Alek Jim MD (Interpreting  physician) on 06/24/2021 at 05:26 PM  ------------------------------------------------------------------   Findings   Left Ventricle  Left ventricular systolic function is normal.  Ejection fraction is visually estimated at 50-55%. Moderate left ventricular hypertrophy. Left Atrium  Essentially normal left atrium. Right Atrium  Essentially normal right atrium. Right Ventricle  Mildly dilated right ventricle. No evidence of right heart strain. Aortic Valve  Sclerotic, but non-stenotic aortic valve. Trace aortic regurgitation. Mitral Valve  Trace mitral regurgitation. Tricuspid Valve  Trace tricuspid regurgitation; normal RVSP. Pulmonic Valve  The pulmonic valve was not well visualized. Pericardial Effusion  No evidence of any pericardial effusion. Pleural Effusion  No evidence of pleural effusion. Miscellaneous  No IV access for bubble study. Would recommend SENA for bubble study due to  image quality.   M-Mode/2D Measurements & Calculations   LV Diastolic Dimension:  LV Systolic Dimension:  LA Dimension: 3.1 cmAO Root  3.67 cm                  2.65 cm                 Dimension: 3 cmLA Area:  LV FS:27.8 %             LV Volume Diastolic: 76 74.7 cm2  LV PW Diastolic: 8.61 cm ml  LV PW Systolic: 7.47 cm  LV Volume Systolic: 39  Septum Diastolic: 1.91   ml  cm                       LV EDV/LV EDV Index: 76 RV Diastolic Dimension:  Septum Systolic: 6.24 cm WP/73 W2SL ESV/LV ESV   4.17 cm  CO: 3.37 l/min           Index: 39 ml/19 m2  CI: 1.66 l/m*m2          EF Calculated (A4C):    LA/Aorta: 1.03                           48.7 %  LV Area Diastolic: 44.5  EF Calculated (2D):     LA volume/Index: 36 ml  cm2                      54.7 %                  /18m2  LV Area Systolic: 07.6  cm2                      LV Length: 6.48 cm                            LVOT: 2 cm  Doppler Measurements & Calculations   MV Peak E-Wave: 91.8 AV Peak Velocity: 135 cm/s    LVOT Peak Velocity: 81.2  cm/s                 AV Peak Gradient: 7.29 mmHg   cm/s  MV Peak A-Wave: 63.2 AV Mean Velocity: 104 cm/s    LVOT Mean Velocity: 53.8  cm/s                 AV Mean Gradient: 5 mmHg      cm/s  MV E/A Ratio: 1.45   AV VTI: 35 cm                 LVOT Peak Gradient: 3  MV Peak Gradient:    AV Area (Continuity):1.75 cm2 mmHgLVOT Mean Gradient: 1  3.37 mmHg                                          mmHg                       LVOT VTI: 19.5 cm             Estimated RVSP: 20 mmHg  MV P1/2t: 54 msec                                  Estimated RAP:3 mmHg  MVA by PHT:4.07 cm2  Estimated PASP: 15.82 mmHg   MV E' Septal                                       TR Velocity:179 cm/s  Velocity: 5.37 cm/s                                TR Gradient:12.82 mmHg  MV E' Lateral  Velocity: 6.58 cm/s  MV E/E' septal:  17.09  MV E/E' lateral:  13.95      CT HEAD WO CONTRAST    Result Date: 6/23/2021  EXAMINATION: CT OF THE HEAD WITHOUT CONTRAST  6/23/2021 2:40 pm TECHNIQUE: CT of the head was performed without the administration of intravenous contrast. Dose modulation, iterative reconstruction, and/or weight based adjustment of the mA/kV was utilized to reduce the radiation dose to as low as reasonably achievable. COMPARISON: 10/06/2020, 10/07/2020 HISTORY: ORDERING SYSTEM PROVIDED HISTORY: fall, slurred speech TECHNOLOGIST PROVIDED HISTORY: Has a \"code stroke\" or \"stroke alert\" been called? ->No Reason for exam:->fall, slurred speech Decision Support Exception - unselect if not a suspected or confirmed emergency medical condition->Emergency Medical Condition (MA) Reason for Exam: fall, slurred speech Relevant Medical/Surgical History: pt refused exam until 1800 FINDINGS: BRAIN/VENTRICLES: There is low-attenuation in right temporal lobe None. HISTORY: ORDERING SYSTEM PROVIDED HISTORY: other, slurrred speech, hypertension TECHNOLOGIST PROVIDED HISTORY: Reason for exam:->other, slurrred speech, hypertension Reason for Exam: other, slurrred speech, hypertension Acuity: Acute Type of Exam: Initial Additional signs and symptoms: na Relevant Medical/Surgical History: diabetes, hypertension FINDINGS: The lungs are clear. The mediastinum and cardiac silhouette are unremarkable. No acute abnormality. CTA HEAD NECK W CONTRAST    Result Date: 6/23/2021  EXAMINATION: CTA OF THE HEAD AND NECK WITH CONTRAST 6/23/2021 11:17 pm: TECHNIQUE: CTA of the head and neck was performed with the administration of intravenous contrast. Multiplanar reformatted images are provided for review. MIP images are provided for review. Stenosis of the internal carotid arteries measured using NASCET criteria. Dose modulation, iterative reconstruction, and/or weight based adjustment of the mA/kV was utilized to reduce the radiation dose to as low as reasonably achievable. COMPARISON: 10/06/2020 CT angiogram head neck HISTORY: ORDERING SYSTEM PROVIDED HISTORY: cva TECHNOLOGIST PROVIDED HISTORY: Reason for exam:->cva Decision Support Exception - unselect if not a suspected or confirmed emergency medical condition->Emergency Medical Condition (MA) Reason for Exam: cva FINDINGS: CTA NECK: AORTIC ARCH/ARCH VESSELS: No dissection or arterial injury. No significant stenosis of the brachiocephalic or subclavian arteries. Main pulmonary artery is enlarged suggesting pulmonary hypertension measures 3.1 cm. CAROTID ARTERIES: Right: 75% stenosis of the right proximal ICA per NASCET criteria. Left: There is 50% stenosis left proximal ICA per NASCET criteria. Otherwise the common carotid artery arteries are patent. No additional hemodynamic stenosis identified involving the remainder of the cervical ICA or common carotid arteries.  VERTEBRAL ARTERIES: Right vertebral artery is patent throughout its course. Left V1 segment demonstrates minimal contrast opacification. There is diminished thread-like enhanced identified involving portions of the left proximal to mid V2 segment. Distal V2 appears occluded. Proximal V3 segment appears occluded. There is reconstitution of the distal V3 segment perhaps related to retrograde flow. SOFT TISSUES: The lung apices are clear. No cervical or superior mediastinal lymphadenopathy. The larynx and pharynx are unremarkable. No acute abnormality of the salivary and thyroid glands. BONES: Central canal stenosis notably at C5-6 due to a prominent disc osteophyte complex noted. CTA HEAD: ANTERIOR CIRCULATION: Suspect mild moderate narrowing involving right M2 branches along the right anterior temporal lobe. No significant stenosis of the intracranial internal carotid, anterior cerebral, or middle cerebral arteries. No aneurysm. POSTERIOR CIRCULATION: Retrograde opacification left vertebral artery. No significant stenosis of the vertebral, basilar, or posterior cerebral arteries. No aneurysm. OTHER: No dural venous sinus thrombosis on this non-dedicated study. 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 Mild tapering versus mild to moderate areas stenosis involving the right MCA M2 branches. Otherwise no large vessel occlusion or hemodynamic stenosis involving remainder of the vessels intracranially. Stable appearance of the age-indeterminate occlusion proximal V2 segment left vertebral artery with retrograde opacification of the V4 segment. 75% narrowing right proximal ICA and 50% narrowing left proximal ICA per NASCET criteria The findings were sent to the Radiology Results [address] at 11:44 pm on 6/23/2021to be communicated to a licensed caregiver.        LAB RESULTS  --------------------    Recent Results (from the past 24 hour(s))   POCT Glucose    Collection Time: 06/24/21  8:56 PM   Result Value Ref vascular surgery-out pt cirilo for possible RCEA     plavix ( pt states she was not taking p[lavix at home for a few months )     Discussed dx prognosis meds side effects and above with pt and answered all questions.           Electronically signed by Laura Gudino MD on 6/25/2021 at 6:57 PM

## 2021-06-25 NOTE — CARE COORDINATION
ARU pre-cert is pending at this time with [facility]. Will continue to follow for determination.

## 2021-06-26 LAB
GLUCOSE BLD-MCNC: 141 MG/DL (ref 70–99)
GLUCOSE BLD-MCNC: 143 MG/DL (ref 70–99)
GLUCOSE BLD-MCNC: 201 MG/DL (ref 70–99)
GLUCOSE BLD-MCNC: 203 MG/DL (ref 70–99)

## 2021-06-26 PROCEDURE — 6370000000 HC RX 637 (ALT 250 FOR IP): Performed by: INTERNAL MEDICINE

## 2021-06-26 PROCEDURE — 1200000000 HC SEMI PRIVATE

## 2021-06-26 PROCEDURE — 82962 GLUCOSE BLOOD TEST: CPT

## 2021-06-26 PROCEDURE — 2580000003 HC RX 258: Performed by: INTERNAL MEDICINE

## 2021-06-26 PROCEDURE — 94761 N-INVAS EAR/PLS OXIMETRY MLT: CPT

## 2021-06-26 PROCEDURE — 6360000002 HC RX W HCPCS: Performed by: INTERNAL MEDICINE

## 2021-06-26 PROCEDURE — 6370000000 HC RX 637 (ALT 250 FOR IP): Performed by: PSYCHIATRY & NEUROLOGY

## 2021-06-26 PROCEDURE — 2060000000 HC ICU INTERMEDIATE R&B

## 2021-06-26 RX ADMIN — SODIUM CHLORIDE, PRESERVATIVE FREE 10 ML: 5 INJECTION INTRAVENOUS at 09:13

## 2021-06-26 RX ADMIN — AMLODIPINE BESYLATE 5 MG: 5 TABLET ORAL at 08:03

## 2021-06-26 RX ADMIN — INSULIN GLARGINE 50 UNITS: 100 INJECTION, SOLUTION SUBCUTANEOUS at 22:05

## 2021-06-26 RX ADMIN — METOPROLOL TARTRATE 50 MG: 50 TABLET, FILM COATED ORAL at 08:03

## 2021-06-26 RX ADMIN — TROSPIUM CHLORIDE 20 MG: 20 TABLET, FILM COATED ORAL at 17:28

## 2021-06-26 RX ADMIN — ATORVASTATIN CALCIUM 80 MG: 80 TABLET, FILM COATED ORAL at 22:06

## 2021-06-26 RX ADMIN — TROSPIUM CHLORIDE 20 MG: 20 TABLET, FILM COATED ORAL at 05:53

## 2021-06-26 RX ADMIN — CLOPIDOGREL BISULFATE 75 MG: 75 TABLET ORAL at 08:04

## 2021-06-26 RX ADMIN — PANTOPRAZOLE SODIUM 40 MG: 40 TABLET, DELAYED RELEASE ORAL at 05:53

## 2021-06-26 RX ADMIN — Medication 1 TABLET: at 08:03

## 2021-06-26 RX ADMIN — ENOXAPARIN SODIUM 40 MG: 40 INJECTION SUBCUTANEOUS at 08:04

## 2021-06-26 RX ADMIN — METOPROLOL TARTRATE 50 MG: 50 TABLET, FILM COATED ORAL at 22:06

## 2021-06-26 RX ADMIN — SODIUM CHLORIDE, PRESERVATIVE FREE 10 ML: 5 INJECTION INTRAVENOUS at 22:39

## 2021-06-26 RX ADMIN — CEFTRIAXONE 1000 MG: 1 INJECTION, POWDER, FOR SOLUTION INTRAMUSCULAR; INTRAVENOUS at 22:06

## 2021-06-26 RX ADMIN — ASPIRIN 81 MG: 81 TABLET, COATED ORAL at 08:03

## 2021-06-26 ASSESSMENT — PAIN SCALES - GENERAL: PAINLEVEL_OUTOF10: 0

## 2021-06-26 NOTE — PROGRESS NOTES
Hospitalist Progress Note      Name:  Hayes Thomas DOB/Age/Sex: 1950  (75 y.o. female)   MRN & CSN:  [id number] & [id number] Admission Date/Time: 2021  2:40 PM   Location:  -A PCP: Emerson Castro MD         Hospital Day: 4    Assessment and Plan: This is a pleasant 68 y. o. female with history of osteoarthritis, history of CVA with residual right hemiparesis (on Plavix and Lipitor prior to admission), uncontrolled diabetes type 2 with hyperglycemia, hyperlipidemia, essential hypertension, history of atrial fibrillation (not on chronic anticoagulation), morbid obesity with BMI of 42 kg/m², known right internal carotid artery stenosis (see CTAhead/neck from 2020), who was brought to the emergency room with report of confusion, visual hallucination, generalized weakness, and expressive aphasia, ongoing for the past week.  It was also reported that patient has had multiple falls at home.  On presentation to the emergency room, patient was noted to have foul-smelling urine in the emergency room and urinalysis was sent, concerning for infectious process.  Patient is confused and unable to provide reliable HPI.        Right Acute Temporal Infarct: Patient doing fine. She will need ARU,appreciate acceptance. Awaiting Insurance pre-cert. Appreciate Neurology consult and input. Continue Plavix, Statins and Maintain adequate BP control. Acute metabolic encephalopathy/visual hallucination.  Could be secondary to underlying CVA.  Follow-up pending MRIbrain.  Patient is on antibiotics for suspected urinary tract infection.  Monitor mental status closely. Lactic acidosis 2/2 Dehydration and infection. Resolved. Acute cystitis with hematuria.  Foul-smelling urine concerning for possible cystitis. Continue Rocephin and follow  urine culture results. Will deescalate antibiotics per sensitivity. History of atrial fibrillation (based on previous EKG from 2020).   Patient is not on chronic anticoagulation.  Recommend full dose anticoagulation if MRIbrain is negative for large territory infarct for which we may need to hold off full dose anticoagulation for some time (due to risk of hemorrhagic conversion). [facility] with [facility]. Carotid Stenosis - Outpatient evaluation by Vascular for CEA  Other comorbidities: History of osteoarthritis, history of CVA with residual right-sided hemiparesis, uncontrolled diabetes type 2 with hyperglycemia, hyperlipidemia, essential hypertension, right internal carotid artery stenosis, history of obesity with BMI of 42 kg/m².     Activities: Up with assist  Prophylaxis: Subcutaneous Lovenox  Code status: Full code     Diet ADULT DIET; Dysphagia - Soft and Bite Sized; 4 carb choices (60 gm/meal)   DVT Prophylaxis [] Lovenox, []  Heparin, [] SCDs, [] Ambulation   GI Prophylaxis [] PPI,  [] H2 Blocker,  [] Carafate,  [] Diet/Tube Feeds   Code Status Full Code   Disposition ARU   MDM [] Low, [] Moderate,[x]  High       History of Present Illness:    Pt seen and examined in the room. She is observed sitting up in chair. She stated that she feels fine. Residual weakness from old stroke. She offers no new medical complaint. Objective:   No intake or output data in the 24 hours ending 06/26/21 0920   Vitals:   Vitals:    06/26/21 0500   BP: (!) 183/81   Pulse: 64   Resp: 18   Temp: 98.7 °F (37.1 °C)   SpO2: 93%     Physical Exam:   GEN Awake female, sitting upright in bed in no apparent distress. Appears given age. EYES Pupils are equally round. No scleral erythema, discharge, or conjunctivitis. HENT Mucous membranes are moist. Oral pharynx without exudates, no evidence of thrush. NECK Supple, no apparent thyromegaly or masses. RESP Clear to auscultation, no wheezes, rales or rhonchi. Symmetric chest movement while on room air. CARDIO/VASC S1/S2 auscultated. Regular rate without appreciable murmurs, rubs, or gallops.  No JVD or carotid bruits. Peripheral pulses equal bilaterally and palpable. No peripheral edema. GI Abdomen is soft without significant tenderness, masses, or guarding. Bowel sounds are normoactive. Rectal exam deferred.  No costovertebral angle tenderness. Normal appearing external genitalia. Bond catheter is not present. HEME/LYMPH No palpable cervical lymphadenopathy and no hepatosplenomegaly. No petechiae or ecchymoses. MSK No gross joint deformities. SKIN Normal coloration, warm, dry. NEURO Cranial nerves appear grossly intact, normal speech, no lateralizing weakness. PSYCH Awake, alert, oriented x 4. Affect appropriate.     Medications:   Medications:    folic acid-pyridoxine-cyancobalamin  1 tablet Oral Daily    atorvastatin  80 mg Oral Nightly    aspirin  81 mg Oral Daily    Or    aspirin  300 mg Rectal Daily    insulin lispro  0-6 Units Subcutaneous 4x Daily AC & HS    amLODIPine  5 mg Oral Daily    clopidogrel  75 mg Oral Daily    insulin glargine  50 Units Subcutaneous Nightly    insulin lispro  16 Units Subcutaneous TID WC    metoprolol tartrate  50 mg Oral BID    pantoprazole  40 mg Oral QAM AC    sodium chloride flush  5-40 mL Intravenous BID    enoxaparin  40 mg Subcutaneous Daily    cefTRIAXone (ROCEPHIN) IV  1,000 mg Intravenous Q24H    trospium  20 mg Oral BID AC      Infusions:    0.9% NaCl with KCl 40 mEq Stopped (06/24/21 0805)    dextrose      sodium chloride       PRN Meds: glucose, 15 g, PRN  dextrose, 12.5 g, PRN  glucagon (rDNA), 1 mg, PRN  dextrose, 100 mL/hr, PRN  labetalol, 10 mg, Q4H PRN  sodium chloride flush, 5-40 mL, PRN  sodium chloride, 25 mL, PRN  ondansetron, 4 mg, Q8H PRN   Or  ondansetron, 4 mg, Q6H PRN  polyethylene glycol, 17 g, Daily PRN          Electronically signed by Leny Salgado DO on 6/26/2021 at 9:20 AM

## 2021-06-26 NOTE — PROGRESS NOTES
NEUROLOGY NOTE  DR. Heather Young MD.  -------------------------------------------------  Subjective:    Doing better. Denies any new symptoms. Denies headache nausea vomiting dizziness    Denies numbness weakness extremities    Denies blurring of vision double vision    Objective:    BP (!) 162/89   Pulse 53   Temp 98.6 °F (37 °C) (Oral)   Resp 23   Ht 5' 2\" (1.575 m)   Wt 243 lb 6.2 oz (110.4 kg)   SpO2 95%   BMI 44.52 kg/m²   HEENT nl      Neuro exam    Alert Oriented  X 3  Follow simple commands  EOMI Pupils 3 mm joe  5-/5 all 4 extremities      RADIOLOGY  -----------------    ECHO Complete 2D W Doppler W Color    Result Date: 6/24/2021  Transthoracic Echocardiography Report (TTE)  Demographics   Patient Name       Jackie Pederson   Date of Study       06/24/2021   Date of Birth      1950         Gender              Female   Age                70 year(s)         Race                   Patient Number     [id number]         Room Number         2015   Visit Number       [id number]   Corporate ID       [id number]   Accession Number   [id number]         Paula Bazzi RDMS   Ordering Physician Shelby Lino MD           Physician           MD  Procedure Type of Study   TTE procedure:ECHOCARDIOGRAM COMPLETE 2D W DOPPLER W COLOR. Procedure Date Date: 06/24/2021 Start: 08:44 AM Study Location: Portable Technical Quality: Technically difficult study Indications:CVA. Patient Status: Routine Height: 62 inches Weight: 230 pounds BSA: 2.03 m2 BMI: 42.07 kg/m2 HR: 55 bpm BP: 146/124 mmHg  Conclusions   Summary  Technically difficult examination. Left ventricular systolic function is normal.  Ejection fraction is visually estimated at 50-55%. Moderate left ventricular hypertrophy. Mildly dilated right ventricle.  No evidence of right heart strain. Sclerotic, but non-stenotic aortic valve. No evidence of any pericardial effusion. Signature   ------------------------------------------------------------------  Electronically signed by Sarai Valdes MD (Interpreting  physician) on 06/24/2021 at 05:26 PM  ------------------------------------------------------------------   Findings   Left Ventricle  Left ventricular systolic function is normal.  Ejection fraction is visually estimated at 50-55%. Moderate left ventricular hypertrophy. Left Atrium  Essentially normal left atrium. Right Atrium  Essentially normal right atrium. Right Ventricle  Mildly dilated right ventricle. No evidence of right heart strain. Aortic Valve  Sclerotic, but non-stenotic aortic valve. Trace aortic regurgitation. Mitral Valve  Trace mitral regurgitation. Tricuspid Valve  Trace tricuspid regurgitation; normal RVSP. Pulmonic Valve  The pulmonic valve was not well visualized. Pericardial Effusion  No evidence of any pericardial effusion. Pleural Effusion  No evidence of pleural effusion. Miscellaneous  No IV access for bubble study. Would recommend SENA for bubble study due to  image quality.   M-Mode/2D Measurements & Calculations   LV Diastolic Dimension:  LV Systolic Dimension:  LA Dimension: 3.1 cmAO Root  3.67 cm                  2.65 cm                 Dimension: 3 cmLA Area:  LV FS:27.8 %             LV Volume Diastolic: 76 57.4 cm2  LV PW Diastolic: 8.62 cm ml  LV PW Systolic: 3.88 cm  LV Volume Systolic: 39  Septum Diastolic: 3.06   ml  cm                       LV EDV/LV EDV Index: 76 RV Diastolic Dimension:  Septum Systolic: 5.80 cm WB/36 [id number] ESV/LV ESV   4.17 cm  CO: 3.37 l/min           Index: 39 ml/19 m2  CI: 1.66 l/m*m2          EF Calculated (A4C):    LA/Aorta: 1.03                           48.7 %  LV Area Diastolic: 00.4  EF Calculated (2D):     LA volume/Index: 36 ml  cm2                      54.7 %                  /18m2  LV Area Systolic: 74.1  cm2                      LV Length: 6.48 cm                            LVOT: 2 cm  Doppler Measurements & Calculations   MV Peak E-Wave: 91.8 AV Peak Velocity: 135 cm/s    LVOT Peak Velocity: 81.2  cm/s                 AV Peak Gradient: 7.29 mmHg   cm/s  MV Peak A-Wave: 63.2 AV Mean Velocity: 104 cm/s    LVOT Mean Velocity: 53.8  cm/s                 AV Mean Gradient: 5 mmHg      cm/s  MV E/A Ratio: 1.45   AV VTI: 35 cm                 LVOT Peak Gradient: 3  MV Peak Gradient:    AV Area (Continuity):1.75 cm2 mmHgLVOT Mean Gradient: 1  3.37 mmHg                                          mmHg                       LVOT VTI: 19.5 cm             Estimated RVSP: 20 mmHg  MV P1/2t: 54 msec                                  Estimated RAP:3 mmHg  MVA by PHT:4.07 cm2  Estimated PASP: 15.82 mmHg   MV E' Septal                                       TR Velocity:179 cm/s  Velocity: 5.37 cm/s                                TR Gradient:12.82 mmHg  MV E' Lateral  Velocity: 6.58 cm/s  MV E/E' septal:  17.09  MV E/E' lateral:  13.95      CT HEAD WO CONTRAST    Result Date: 6/23/2021  EXAMINATION: CT OF THE HEAD WITHOUT CONTRAST  6/23/2021 2:40 pm TECHNIQUE: CT of the head was performed without the administration of intravenous contrast. Dose modulation, iterative reconstruction, and/or weight based adjustment of the mA/kV was utilized to reduce the radiation dose to as low as reasonably achievable. COMPARISON: 10/06/2020, 10/07/2020 HISTORY: ORDERING SYSTEM PROVIDED HISTORY: fall, slurred speech TECHNOLOGIST PROVIDED HISTORY: Has a \"code stroke\" or \"stroke alert\" been called? ->No Reason for exam:->fall, slurred speech Decision Support Exception - unselect if not a suspected or confirmed emergency medical condition->Emergency Medical Condition (MA) Reason for Exam: fall, slurred speech Relevant Medical/Surgical History: pt refused exam until 1800 FINDINGS: BRAIN/VENTRICLES: There is low-attenuation in right temporal lobe involving a 4.5 x 3 x 1 cm area of brain parenchyma. Findings concerning for an acute infarct. Otherwise no acute intracranial hemorrhage. No midline shift. No acute hydrocephalus. No extra-axial fluid collection. ORBITS: The visualized portion of the orbits demonstrate no acute abnormality. SINUSES: The visualized paranasal sinuses and mastoid air cells demonstrate no acute abnormality. SOFT TISSUES/SKULL:  No acute abnormality of the visualized skull or soft tissues. Moderate-sized low-attenuation in the right temporal lobe concerning for acute infarct. MRI brain recommended to confirm. No acute intracranial hemorrhage. CT CERVICAL SPINE WO CONTRAST    Result Date: 6/23/2021  EXAMINATION: CT OF THE CERVICAL SPINE WITHOUT CONTRAST 6/23/2021 3:40 pm TECHNIQUE: CT of the cervical spine was performed without the administration of intravenous contrast. Multiplanar reformatted images are provided for review. Dose modulation, iterative reconstruction, and/or weight based adjustment of the mA/kV was utilized to reduce the radiation dose to as low as reasonably achievable. COMPARISON: None. HISTORY: ORDERING SYSTEM PROVIDED HISTORY: fall TECHNOLOGIST PROVIDED HISTORY: Reason for exam:->fall Decision Support Exception - unselect if not a suspected or confirmed emergency medical condition->Emergency Medical Condition (MA) Reason for Exam: fall, neck pain FINDINGS: BONES/ALIGNMENT: Mild motion degradation this is most pronounced at C2. There is no acute fracture or traumatic malalignment. DEGENERATIVE CHANGES: Multilevel degenerate changes most pronounced C5-6 less so C6-C7 prominent disc osteophyte complex mission at C5-6 causing canal narrowing at this level. SOFT TISSUES: There is no prevertebral soft tissue swelling. Multilevel degenerate changes.   No acute fracture traumatic malalignment     XR CHEST PORTABLE    Result Date: 6/23/2021  EXAMINATION: ONE XRAY VIEW OF THE CHEST 6/23/2021 3:34 pm COMPARISON: None. HISTORY: ORDERING SYSTEM PROVIDED HISTORY: other, slurrred speech, hypertension TECHNOLOGIST PROVIDED HISTORY: Reason for exam:->other, slurrred speech, hypertension Reason for Exam: other, slurrred speech, hypertension Acuity: Acute Type of Exam: Initial Additional signs and symptoms: na Relevant Medical/Surgical History: diabetes, hypertension FINDINGS: The lungs are clear. The mediastinum and cardiac silhouette are unremarkable. No acute abnormality. CTA HEAD NECK W CONTRAST    Result Date: 6/23/2021  EXAMINATION: CTA OF THE HEAD AND NECK WITH CONTRAST 6/23/2021 11:17 pm: TECHNIQUE: CTA of the head and neck was performed with the administration of intravenous contrast. Multiplanar reformatted images are provided for review. MIP images are provided for review. Stenosis of the internal carotid arteries measured using NASCET criteria. Dose modulation, iterative reconstruction, and/or weight based adjustment of the mA/kV was utilized to reduce the radiation dose to as low as reasonably achievable. COMPARISON: 10/06/2020 CT angiogram head neck HISTORY: ORDERING SYSTEM PROVIDED HISTORY: cva TECHNOLOGIST PROVIDED HISTORY: Reason for exam:->cva Decision Support Exception - unselect if not a suspected or confirmed emergency medical condition->Emergency Medical Condition (MA) Reason for Exam: cva FINDINGS: CTA NECK: AORTIC ARCH/ARCH VESSELS: No dissection or arterial injury. No significant stenosis of the brachiocephalic or subclavian arteries. Main pulmonary artery is enlarged suggesting pulmonary hypertension measures 3.1 cm. CAROTID ARTERIES: Right: 75% stenosis of the right proximal ICA per NASCET criteria. Left: There is 50% stenosis left proximal ICA per NASCET criteria. Otherwise the common carotid artery arteries are patent. No additional hemodynamic stenosis identified involving the remainder of the cervical ICA or common carotid arteries.  VERTEBRAL ARTERIES: Right vertebral artery is patent throughout its course. Left V1 segment demonstrates minimal contrast opacification. There is diminished thread-like enhanced identified involving portions of the left proximal to mid V2 segment. Distal V2 appears occluded. Proximal V3 segment appears occluded. There is reconstitution of the distal V3 segment perhaps related to retrograde flow. SOFT TISSUES: The lung apices are clear. No cervical or superior mediastinal lymphadenopathy. The larynx and pharynx are unremarkable. No acute abnormality of the salivary and thyroid glands. BONES: Central canal stenosis notably at C5-6 due to a prominent disc osteophyte complex noted. CTA HEAD: ANTERIOR CIRCULATION: Suspect mild moderate narrowing involving right M2 branches along the right anterior temporal lobe. No significant stenosis of the intracranial internal carotid, anterior cerebral, or middle cerebral arteries. No aneurysm. POSTERIOR CIRCULATION: Retrograde opacification left vertebral artery. No significant stenosis of the vertebral, basilar, or posterior cerebral arteries. No aneurysm. OTHER: No dural venous sinus thrombosis on this non-dedicated study. 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 Mild tapering versus mild to moderate areas stenosis involving the right MCA M2 branches. Otherwise no large vessel occlusion or hemodynamic stenosis involving remainder of the vessels intracranially. Stable appearance of the age-indeterminate occlusion proximal V2 segment left vertebral artery with retrograde opacification of the V4 segment. 75% narrowing right proximal ICA and 50% narrowing left proximal ICA per NASCET criteria The findings were sent to the Radiology Results [id number] at 11:44 pm on 6/23/2021to be communicated to a licensed caregiver.        LAB RESULTS  --------------------    Recent Results (from the past 24 hour(s))   POCT Glucose    Collection Time: 06/25/21  8:52 PM   Result Value Ref Range    POC Glucose 199 (H) 70 - 99 MG/DL   POCT Glucose    Collection Time: 06/26/21  8:09 AM   Result Value Ref Range    POC Glucose 203 (H) 70 - 99 MG/DL   POCT Glucose    Collection Time: 06/26/21 11:55 AM   Result Value Ref Range    POC Glucose 143 (H) 70 - 99 MG/DL   POCT Glucose    Collection Time: 06/26/21  5:04 PM   Result Value Ref Range    POC Glucose 141 (H) 70 - 99 MG/DL         Medical problems    Patient Active Problem List:     Vitamin D deficiency     Hyperlipidemia     Back pain, chronic     Hemiparesis of right dominant side as late effect of cerebral infarction (HCC)     Facial droop     Hypokalemia     Essential hypertension     Hyperglycemia     TIA (transient ischemic attack)     Class 3 severe obesity due to excess calories with body mass index (BMI) of 45.0 to 49.9 in adult [facility])     Acute CVA (cerebrovascular accident) ([facility] [place] [address]     Dysarthria due to acute stroke ([facility] [place] [address]     Dysphagia due to recent cerebral infarction     Uncontrolled type 2 diabetes mellitus with hyperglycemia ([facility] [place] [address]     Morbid obesity with BMI of 45.0-49.9, adult ([facility] [place] [address]     Incontinence in female      ASSESSMENT:  ---------------------    Right temporal acute infarct    Left ian acute infarct     Right carotid stenosis 75 % consult vascular surgery     Left vertebral artery occlusion     Hypertensive encephalopathy     Mild to moderate right MCA stenosis     A fib     PLAN:     CT brain CTA head  Neck as above     Mri brain as above     Echo neg     B 12 folate TSH nl    High  Homocysteine level add foltx     Consult cardiology for ?  AC for A fib- and SENA     Consult vascular surgery-out pt cirilo for possible RCEA     plavix ( pt states she was not taking p[lavix at home for a few months )     Discussed dx prognosis meds side effects and above with pt and answered all questions.           Electronically signed by Joann Melendez MD on 6/26/2021 at 6:13 PM

## 2021-06-27 LAB
CULTURE: ABNORMAL
CULTURE: ABNORMAL
EKG ATRIAL RATE: 90 BPM
EKG DIAGNOSIS: NORMAL
EKG Q-T INTERVAL: 296 MS
EKG QRS DURATION: 86 MS
EKG QTC CALCULATION (BAZETT): 400 MS
EKG R AXIS: -16 DEGREES
EKG T AXIS: 94 DEGREES
EKG VENTRICULAR RATE: 110 BPM
GLUCOSE BLD-MCNC: 140 MG/DL (ref 70–99)
GLUCOSE BLD-MCNC: 146 MG/DL (ref 70–99)
GLUCOSE BLD-MCNC: 174 MG/DL (ref 70–99)
GLUCOSE BLD-MCNC: 182 MG/DL (ref 70–99)
GLUCOSE BLD-MCNC: 92 MG/DL (ref 70–99)
Lab: ABNORMAL
SPECIMEN: ABNORMAL

## 2021-06-27 PROCEDURE — 6370000000 HC RX 637 (ALT 250 FOR IP): Performed by: INTERNAL MEDICINE

## 2021-06-27 PROCEDURE — 94761 N-INVAS EAR/PLS OXIMETRY MLT: CPT

## 2021-06-27 PROCEDURE — 99254 IP/OBS CNSLTJ NEW/EST MOD 60: CPT | Performed by: INTERNAL MEDICINE

## 2021-06-27 PROCEDURE — 2580000003 HC RX 258: Performed by: INTERNAL MEDICINE

## 2021-06-27 PROCEDURE — 82962 GLUCOSE BLOOD TEST: CPT

## 2021-06-27 PROCEDURE — 6360000002 HC RX W HCPCS: Performed by: INTERNAL MEDICINE

## 2021-06-27 PROCEDURE — 1200000000 HC SEMI PRIVATE

## 2021-06-27 PROCEDURE — 6370000000 HC RX 637 (ALT 250 FOR IP): Performed by: PSYCHIATRY & NEUROLOGY

## 2021-06-27 PROCEDURE — 2060000000 HC ICU INTERMEDIATE R&B

## 2021-06-27 RX ADMIN — ENOXAPARIN SODIUM 40 MG: 40 INJECTION SUBCUTANEOUS at 08:36

## 2021-06-27 RX ADMIN — INSULIN GLARGINE 50 UNITS: 100 INJECTION, SOLUTION SUBCUTANEOUS at 20:45

## 2021-06-27 RX ADMIN — CEFTRIAXONE 1000 MG: 1 INJECTION, POWDER, FOR SOLUTION INTRAMUSCULAR; INTRAVENOUS at 20:35

## 2021-06-27 RX ADMIN — TROSPIUM CHLORIDE 20 MG: 20 TABLET, FILM COATED ORAL at 06:23

## 2021-06-27 RX ADMIN — Medication 1 TABLET: at 08:30

## 2021-06-27 RX ADMIN — SODIUM CHLORIDE, PRESERVATIVE FREE 10 ML: 5 INJECTION INTRAVENOUS at 21:05

## 2021-06-27 RX ADMIN — ASPIRIN 81 MG: 81 TABLET, COATED ORAL at 08:29

## 2021-06-27 RX ADMIN — CLOPIDOGREL BISULFATE 75 MG: 75 TABLET ORAL at 08:30

## 2021-06-27 RX ADMIN — METOPROLOL TARTRATE 50 MG: 50 TABLET, FILM COATED ORAL at 08:29

## 2021-06-27 RX ADMIN — AMLODIPINE BESYLATE 5 MG: 5 TABLET ORAL at 08:30

## 2021-06-27 RX ADMIN — PANTOPRAZOLE SODIUM 40 MG: 40 TABLET, DELAYED RELEASE ORAL at 06:23

## 2021-06-27 RX ADMIN — METOPROLOL TARTRATE 50 MG: 50 TABLET, FILM COATED ORAL at 20:35

## 2021-06-27 RX ADMIN — ATORVASTATIN CALCIUM 80 MG: 80 TABLET, FILM COATED ORAL at 20:35

## 2021-06-27 RX ADMIN — SODIUM CHLORIDE, PRESERVATIVE FREE 10 ML: 5 INJECTION INTRAVENOUS at 08:30

## 2021-06-27 RX ADMIN — TROSPIUM CHLORIDE 20 MG: 20 TABLET, FILM COATED ORAL at 16:41

## 2021-06-27 ASSESSMENT — PAIN SCALES - GENERAL: PAINLEVEL_OUTOF10: 0

## 2021-06-27 NOTE — PROGRESS NOTES
Hospitalist Progress Note      Name:  Ban Crowe /Age/Sex: 1950  (75 y.o. female)   MRN & CSN:  [id number] & [id number] Admission Date/Time: 2021  2:40 PM   Location:  -A PCP: Alisha Colon MD         Hospital Day: 5    Assessment and Plan: This is a pleasant 68 y. o. female with history of osteoarthritis, history of CVA with residual right hemiparesis (on Plavix and Lipitor prior to admission), uncontrolled diabetes type 2 with hyperglycemia, hyperlipidemia, essential hypertension, history of atrial fibrillation (not on chronic anticoagulation), morbid obesity with BMI of 42 kg/m², known right internal carotid artery stenosis (see CTAhead/neck from 2020), who was brought to the emergency room with report of confusion, visual hallucination, generalized weakness, and expressive aphasia, ongoing for the past week.  It was also reported that patient has had multiple falls at home.  On presentation to the emergency room, patient was noted to have foul-smelling urine in the emergency room and urinalysis was sent, concerning for infectious process.  Patient is confused and unable to provide reliable HPI.        Right Acute Temporal Infarct: Patient doing fine. She will need ARU,appreciate acceptance. Awaiting Insurance pre-cert. Appreciate Neurology consult and input. Continue Plavix, Statins and Maintain adequate BP control. Acute metabolic encephalopathy/visual hallucination.  Could be secondary to underlying CVA.  Follow-up pending MRIbrain.  Patient is on antibiotics for suspected urinary tract infection.  Monitor mental status closely. Lactic acidosis 2/2 Dehydration and infection. Resolved. Acute cystitis with hematuria.  Foul-smelling urine concerning for possible cystitis. Continue Rocephin and follow  urine culture results. Will deescalate antibiotics per sensitivity. History of atrial fibrillation (based on previous EKG from 2020).   Patient is not on chronic anticoagulation.  Recommend full dose anticoagulation if MRIbrain is negative for large territory infarct for which we may need to hold off full dose anticoagulation for some time (due to risk of hemorrhagic conversion). [facility] cardiology consult to assist with evaluation. Carotid Stenosis - Outpatient evaluation by Vascular for CEA  Other comorbidities: History of osteoarthritis, history of CVA with residual right-sided hemiparesis, uncontrolled diabetes type 2 with hyperglycemia, hyperlipidemia, essential hypertension, right internal carotid artery stenosis, history of obesity with BMI of 42 kg/m².     Activities: Up with assist  Prophylaxis: Subcutaneous Lovenox  Code status: Full code       Diet ADULT DIET; Dysphagia - Soft and Bite Sized; 4 carb choices (60 gm/meal)   DVT Prophylaxis [] Lovenox, []  Heparin, [] SCDs, [] Ambulation   GI Prophylaxis [] PPI,  [] H2 Blocker,  [] Carafate,  [] Diet/Tube Feeds   Code Status Full Code   Disposition ARU   MDM [] Low, [x] Moderate,[]  High        History of Present Illness:     Subjective:  Pt seen and examined in the room. She is observed sitting up in chair. She stated that she feels fine. Residual weakness from old stroke. She offers no new medical complaint.         Objective: Intake/Output Summary (Last 24 hours) at 6/27/2021 1006  Last data filed at 6/27/2021 0829  Gross per 24 hour   Intake 180 ml   Output    Net 180 ml      Vitals:   Vitals:    06/27/21 0829   BP:    Pulse: 62   Resp:    Temp:    SpO2:      Physical Exam:   GEN Awake female, sitting upright in bed in no apparent distress. Appears given age. EYES Pupils are equally round. No scleral erythema, discharge, or conjunctivitis. HENT Mucous membranes are moist. Oral pharynx without exudates, no evidence of thrush. NECK Supple, no apparent thyromegaly or masses. RESP Clear to auscultation, no wheezes, rales or rhonchi.   Symmetric chest movement while on room air.  CARDIO/VASC S1/S2 auscultated. Regular rate without appreciable murmurs, rubs, or gallops. No JVD or carotid bruits. Peripheral pulses equal bilaterally and palpable. No peripheral edema. GI Abdomen is soft without significant tenderness, masses, or guarding. Bowel sounds are normoactive. Rectal exam deferred.  No costovertebral angle tenderness. Normal appearing external genitalia. Bond catheter is not present. HEME/LYMPH No palpable cervical lymphadenopathy and no hepatosplenomegaly. No petechiae or ecchymoses. MSK No gross joint deformities. SKIN Normal coloration, warm, dry. NEURO Cranial nerves appear grossly intact, normal speech, no lateralizing weakness. PSYCH Awake, alert, oriented x 4. Affect appropriate.     Medications:   Medications:    folic acid-pyridoxine-cyancobalamin  1 tablet Oral Daily    atorvastatin  80 mg Oral Nightly    aspirin  81 mg Oral Daily    Or    aspirin  300 mg Rectal Daily    insulin lispro  0-6 Units Subcutaneous 4x Daily AC & HS    amLODIPine  5 mg Oral Daily    clopidogrel  75 mg Oral Daily    insulin glargine  50 Units Subcutaneous Nightly    insulin lispro  16 Units Subcutaneous TID WC    metoprolol tartrate  50 mg Oral BID    pantoprazole  40 mg Oral QAM AC    sodium chloride flush  5-40 mL Intravenous BID    enoxaparin  40 mg Subcutaneous Daily    cefTRIAXone (ROCEPHIN) IV  1,000 mg Intravenous Q24H    trospium  20 mg Oral BID AC      Infusions:    0.9% NaCl with KCl 40 mEq Stopped (06/24/21 0805)    dextrose      sodium chloride       PRN Meds: glucose, 15 g, PRN  dextrose, 12.5 g, PRN  glucagon (rDNA), 1 mg, PRN  dextrose, 100 mL/hr, PRN  labetalol, 10 mg, Q4H PRN  sodium chloride flush, 5-40 mL, PRN  sodium chloride, 25 mL, PRN  ondansetron, 4 mg, Q8H PRN   Or  ondansetron, 4 mg, Q6H PRN  polyethylene glycol, 17 g, Daily PRN          Electronically signed by Kathia Harrell DO on 6/27/2021 at 10:06 AM

## 2021-06-27 NOTE — CONSULTS
INPATIENT CARDIOLOGY CONSULT NOTE       Reason for consultation:   ? AFIB     Referring physician:  Gustabo Dawkins MD     Primary care physician: Elda Anaya MD      Dear Gustabo Dawkins MD Thank you for the consult    Chief Complaint   Patient presents with    Fatigue     Pt lives in an apartment and called EMS for fatigue, slurred speech, and visual hallucinations x1 week.  Aphasia    Hallucinations       History of present illness:Sarai is a 70 y. o.year old who  presents with  Chief Complaint   Patient presents with    Fatigue     Pt lives in an apartment and called EMS for fatigue, slurred speech, and visual hallucinations x1 week.  Aphasia    Hallucinations       68-year-old female with prior medical history significant for stroke and right-sided hemiparesis, hypertension, hyperlipidemia, diabetes mellitus presents to the hospital with chief complaint of confusion, possible aphasia and hallucinations. Patient has been treated for acute metabolic encephalopathy, attributed to stroke. Cardiology is consulted to evaluate patient for possible A. Fib   and use of oral anticoagulation. Upon further questioning patient denies any prior established history of CAD CHF or cardiac arrhythmias  Patient denies being on blood thinners for thromboembolic disease. For the history of stroke patient is maintained on Plavix and statin therapy. Patient had an EKG performed back in October 2020 with baseline artifacts suspicious for atrial fibrillation. However the EKG was reexamined today, and shows definite P waves, possible PACs a lot of artifacts. The quality of EKG is not good enough to call it as definite A. Fib    EKG this admission shows sinus rhythm. Telemetry shows sinus rhythm. Past medical history:    has a past medical history of Arthritis, Cerebral artery occlusion with cerebral infarction ([address] Diabetes mellitus ([address] Hyperlipidemia, and Hypertension.   Past surgical history:   has a past surgical history that includes Hysterectomy; bladder suspension; Dilation and curettage of uterus; and Colonoscopy (3/23/15). Social History:   reports that she quit smoking about 4 years ago. Her smoking use included cigarettes. She started smoking about 50 years ago. She has a 20.00 pack-year smoking history. She has never used smokeless tobacco. She reports that she does not drink alcohol and does not use drugs.   Family history:   no family history of CAD, STROKE of DM    No Known Allergies    folic acid-pyridoxine-cyancobalamin (FOLTX) 1.13-25-2 MG TABS 1 tablet, Daily  atorvastatin (LIPITOR) tablet 80 mg, Nightly  aspirin EC tablet 81 mg, Daily   Or  aspirin suppository 300 mg, Daily  insulin lispro (HUMALOG) injection vial 0-6 Units, 4x Daily AC & HS  0.9% NaCl with KCl 40 mEq infusion, Continuous  amLODIPine (NORVASC) tablet 5 mg, Daily  clopidogrel (PLAVIX) tablet 75 mg, Daily  insulin glargine (LANTUS) injection vial 50 Units, Nightly  insulin lispro (HUMALOG) injection vial 16 Units, TID WC  metoprolol tartrate (LOPRESSOR) tablet 50 mg, BID  pantoprazole (PROTONIX) tablet 40 mg, QAM AC  glucose (GLUTOSE) 40 % oral gel 15 g, PRN  dextrose 50 % IV solution, PRN  glucagon (rDNA) injection 1 mg, PRN  dextrose 5 % solution, PRN  labetalol (NORMODYNE;TRANDATE) injection 10 mg, Q4H PRN  sodium chloride flush 0.9 % injection 5-40 mL, BID  sodium chloride flush 0.9 % injection 5-40 mL, PRN  0.9 % sodium chloride infusion, PRN  ondansetron (ZOFRAN-ODT) disintegrating tablet 4 mg, Q8H PRN   Or  ondansetron (ZOFRAN) injection 4 mg, Q6H PRN  polyethylene glycol (GLYCOLAX) packet 17 g, Daily PRN  enoxaparin (LOVENOX) injection 40 mg, Daily  cefTRIAXone (ROCEPHIN) 1000 mg IVPB in 50 mL D5W minibag, Q24H  trospium (SANCTURA) tablet 20 mg, BID AC      Current Facility-Administered Medications   Medication Dose Route Frequency Provider Last Rate Last Admin    folic acid-pyridoxine-cyancobalamin PRN Macho Crook MD        0.9 % sodium chloride infusion  25 mL Intravenous PRN Macho Crook MD        ondansetron (ZOFRAN-ODT) disintegrating tablet 4 mg  4 mg Oral Q8H PRN Macho Crook MD        Or    ondansetron (ZOFRAN) injection 4 mg  4 mg Intravenous Q6H PRN Macho Crook MD        polyethylene glycol (GLYCOLAX) packet 17 g  17 g Oral Daily PRN Macho Crook MD        enoxaparin (LOVENOX) injection 40 mg  40 mg Subcutaneous Daily Macho Crook MD   40 mg at 06/27/21 0836    cefTRIAXone (ROCEPHIN) 1000 mg IVPB in 50 mL D5W minibag  1,000 mg Intravenous Q24H Macho Crook MD   Stopped at 06/26/21 2236    trospium (SANCTURA) tablet 20 mg  20 mg Oral BID AC Macho Crook MD   20 mg at 06/27/21 5276         Review of Systems:     · Constitutional: No Fever or Weight Loss   · Eyes: No Decreased Vision  · ENT: No Headaches, Hearing Loss or Vertigo  · Cardiovascular: No chest pain, dyspnea on exertion, palpitations or loss of consciousness  · Respiratory: No cough or wheezing    · Gastrointestinal: No abdominal pain, appetite loss, blood in stools, constipation, diarrhea or heartburn  · Genitourinary: No dysuria, trouble voiding, or hematuria  · Musculoskeletal:  No gait disturbance, weakness or joint complaints  · Integumentary: No rash or pruritis  · Neurological: No TIA or stroke symptoms  · Psychiatric: No anxiety or depression  · Endocrine: No malaise, fatigue or temperature intolerance  · Hematologic/Lymphatic: No bleeding problems, blood clots or swollen lymph nodes  · Allergic/Immunologic: No nasal congestion or hives    All other systems were reviewed and were negative otherwise.          Physical Examination:      Vitals:    06/27/21 0829   BP:    Pulse: 62   Resp:    Temp:    SpO2:       Wt Readings from Last 3 Encounters:   06/26/21 243 lb 6.2 oz (110.4 kg)   10/19/20 230 lb 12.8 oz (104.7 kg)   10/06/20 257 lb (116.6 kg)     Body mass index is 44.52 kg/m². General Appearance:  No distress, conversant  Constitutional:  Well developed, Well nourished  HEENT:  Normocephalic, Atraumatic, Oropharynx moist, No oral exudates,   Nose normal. Neck Supple Carotid: no carotid bruit  Eyes:  Conjunctiva normal, No discharge. Respiratory:    Normal breath sounds, No respiratory distress, No wheezing, no use of accessory muscles, diaphragm movement is normal  No chest Tenderness  Cardiovascular: S1-S2 No murmurs auscultated. No rubs, thrills or gallops. Normal rhythm. Pedal pulses are normal. No pedal edema  GI:  Soft Non tender, non distended. :  No CVA tenderness. Musculoskeletal:   No tenderness, No cyanosis, No clubbing. Integument:  Warm, Dry, No erythema, No rash. Lymphatic:  No lymphadenopathy noted. Neurologic:  Alert & oriented x 3  No focal deficits noted. Psychiatric:  Affect normal, Judgment normal, Mood normal.       Lab Review     No results for input(s): WBC, HGB, HCT, PLT in the last 72 hours. No results for input(s): NA, K, CL, CO2, PHOS, BUN, CREATININE in the last 72 hours. Invalid input(s): CA  No results for input(s): AST, ALT, ALB, BILIDIR, BILITOT, ALKPHOS in the last 72 hours. No results for input(s): TROPONINI in the last 72 hours. No results found for: BNP  Lab Results   Component Value Date    INR 1.14 06/23/2021    PROTIME 13.8 06/23/2021         All labs, images, EKGs were personally reviewed      Assessment: 70 y. o.year old with PMH of  has a past medical history of Arthritis, Cerebral artery occlusion with cerebral infarction ([facility] [address] Diabetes mellitus ([facility] [address] Hyperlipidemia, and Hypertension. Recommendations:      1. ? Atrial fibrillation: Patient does not have any documented clear history of A. fib. Patient had an EKG performed back in October 2020 with baseline artifacts suspicious for atrial fibrillation. However the EKG was reexamined today, and shows definite P waves, possible PACs a lot of artifacts. The quality of EKG is not good enough to call it as definite A. Fib. Similarly EKG this admission shows sinus rhythm with PAC. Low amplitude P waves with baseline artifact noted. Recommend event monitor upon discharge. Will hold off on oral anticoagulation  Will plan for SENA this admission. Rule out PFO    2. History of recurrent CVA: Patient on Plavix/statins. 3. Metabolic encephalopathy visual his hallucinations secondary to above. 4. Suspected UTI on antibiotics.       Thank you for the consult    Dr. Arianne Ann  6/27/2021 12:23 PM

## 2021-06-28 LAB
ANION GAP SERPL CALCULATED.3IONS-SCNC: 13 MMOL/L (ref 4–16)
BASOPHILS ABSOLUTE: 0.1 K/CU MM
BASOPHILS RELATIVE PERCENT: 1.3 % (ref 0–1)
BUN BLDV-MCNC: 14 MG/DL (ref 6–23)
CALCIUM SERPL-MCNC: 9.3 MG/DL (ref 8.3–10.6)
CHLORIDE BLD-SCNC: 105 MMOL/L (ref 99–110)
CO2: 23 MMOL/L (ref 21–32)
CREAT SERPL-MCNC: 0.9 MG/DL (ref 0.6–1.1)
DIFFERENTIAL TYPE: ABNORMAL
EOSINOPHILS ABSOLUTE: 0.2 K/CU MM
EOSINOPHILS RELATIVE PERCENT: 3.8 % (ref 0–3)
GFR AFRICAN AMERICAN: >60 ML/MIN/1.73M2
GFR NON-AFRICAN AMERICAN: >60 ML/MIN/1.73M2
GLUCOSE BLD-MCNC: 108 MG/DL (ref 70–99)
GLUCOSE BLD-MCNC: 125 MG/DL (ref 70–99)
GLUCOSE BLD-MCNC: 127 MG/DL (ref 70–99)
GLUCOSE BLD-MCNC: 131 MG/DL (ref 70–99)
GLUCOSE BLD-MCNC: 203 MG/DL (ref 70–99)
HCT VFR BLD CALC: 51.9 % (ref 37–47)
HEMOGLOBIN: 16.6 GM/DL (ref 12.5–16)
IMMATURE NEUTROPHIL %: 0.5 % (ref 0–0.43)
LV EF: 58 %
LVEF MODALITY: NORMAL
LYMPHOCYTES ABSOLUTE: 1.7 K/CU MM
LYMPHOCYTES RELATIVE PERCENT: 27.5 % (ref 24–44)
MCH RBC QN AUTO: 29.8 PG (ref 27–31)
MCHC RBC AUTO-ENTMCNC: 32 % (ref 32–36)
MCV RBC AUTO: 93.2 FL (ref 78–100)
MONOCYTES ABSOLUTE: 0.5 K/CU MM
MONOCYTES RELATIVE PERCENT: 8.3 % (ref 0–4)
NUCLEATED RBC %: 0 %
PDW BLD-RTO: 13.2 % (ref 11.7–14.9)
PLATELET # BLD: 231 K/CU MM (ref 140–440)
PMV BLD AUTO: 11 FL (ref 7.5–11.1)
POTASSIUM SERPL-SCNC: 3.7 MMOL/L (ref 3.5–5.1)
RBC # BLD: 5.57 M/CU MM (ref 4.2–5.4)
SARS-COV-2, NAAT: NOT DETECTED
SEGMENTED NEUTROPHILS ABSOLUTE COUNT: 3.5 K/CU MM
SEGMENTED NEUTROPHILS RELATIVE PERCENT: 58.6 % (ref 36–66)
SODIUM BLD-SCNC: 141 MMOL/L (ref 135–145)
SOURCE: NORMAL
TOTAL IMMATURE NEUTOROPHIL: 0.03 K/CU MM
TOTAL NUCLEATED RBC: 0 K/CU MM
WBC # BLD: 6 K/CU MM (ref 4–10.5)

## 2021-06-28 PROCEDURE — 93325 DOPPLER ECHO COLOR FLOW MAPG: CPT | Performed by: INTERNAL MEDICINE

## 2021-06-28 PROCEDURE — 87635 SARS-COV-2 COVID-19 AMP PRB: CPT

## 2021-06-28 PROCEDURE — 93312 ECHO TRANSESOPHAGEAL: CPT

## 2021-06-28 PROCEDURE — 1200000000 HC SEMI PRIVATE

## 2021-06-28 PROCEDURE — 7100000000 HC PACU RECOVERY - FIRST 15 MIN

## 2021-06-28 PROCEDURE — 97530 THERAPEUTIC ACTIVITIES: CPT

## 2021-06-28 PROCEDURE — 94761 N-INVAS EAR/PLS OXIMETRY MLT: CPT

## 2021-06-28 PROCEDURE — 85025 COMPLETE CBC W/AUTO DIFF WBC: CPT

## 2021-06-28 PROCEDURE — 6370000000 HC RX 637 (ALT 250 FOR IP): Performed by: INTERNAL MEDICINE

## 2021-06-28 PROCEDURE — 6370000000 HC RX 637 (ALT 250 FOR IP): Performed by: PSYCHIATRY & NEUROLOGY

## 2021-06-28 PROCEDURE — 2580000003 HC RX 258: Performed by: INTERNAL MEDICINE

## 2021-06-28 PROCEDURE — 7100000001 HC PACU RECOVERY - ADDTL 15 MIN

## 2021-06-28 PROCEDURE — 2500000003 HC RX 250 WO HCPCS: Performed by: INTERNAL MEDICINE

## 2021-06-28 PROCEDURE — 93312 ECHO TRANSESOPHAGEAL: CPT | Performed by: INTERNAL MEDICINE

## 2021-06-28 PROCEDURE — B24BZZ4 ULTRASONOGRAPHY OF HEART WITH AORTA, TRANSESOPHAGEAL: ICD-10-PCS | Performed by: INTERNAL MEDICINE

## 2021-06-28 PROCEDURE — 6360000002 HC RX W HCPCS: Performed by: INTERNAL MEDICINE

## 2021-06-28 PROCEDURE — 80048 BASIC METABOLIC PNL TOTAL CA: CPT

## 2021-06-28 PROCEDURE — 82962 GLUCOSE BLOOD TEST: CPT

## 2021-06-28 PROCEDURE — 99233 SBSQ HOSP IP/OBS HIGH 50: CPT | Performed by: INTERNAL MEDICINE

## 2021-06-28 PROCEDURE — 36415 COLL VENOUS BLD VENIPUNCTURE: CPT

## 2021-06-28 RX ORDER — FLUMAZENIL 0.1 MG/ML
0.2 INJECTION, SOLUTION INTRAVENOUS ONCE
Status: COMPLETED | OUTPATIENT
Start: 2021-06-28 | End: 2021-06-28

## 2021-06-28 RX ADMIN — ENOXAPARIN SODIUM 40 MG: 40 INJECTION SUBCUTANEOUS at 08:00

## 2021-06-28 RX ADMIN — METOPROLOL TARTRATE 50 MG: 50 TABLET, FILM COATED ORAL at 22:20

## 2021-06-28 RX ADMIN — SODIUM CHLORIDE, PRESERVATIVE FREE 10 ML: 5 INJECTION INTRAVENOUS at 08:00

## 2021-06-28 RX ADMIN — ASPIRIN 81 MG: 81 TABLET, COATED ORAL at 08:00

## 2021-06-28 RX ADMIN — FLUMAZENIL 0.2 MG: 0.1 INJECTION, SOLUTION INTRAVENOUS at 10:59

## 2021-06-28 RX ADMIN — CLOPIDOGREL BISULFATE 75 MG: 75 TABLET ORAL at 08:00

## 2021-06-28 RX ADMIN — AMLODIPINE BESYLATE 5 MG: 5 TABLET ORAL at 07:59

## 2021-06-28 RX ADMIN — CEFTRIAXONE 1000 MG: 1 INJECTION, POWDER, FOR SOLUTION INTRAMUSCULAR; INTRAVENOUS at 22:19

## 2021-06-28 RX ADMIN — SODIUM CHLORIDE, PRESERVATIVE FREE 10 ML: 5 INJECTION INTRAVENOUS at 22:20

## 2021-06-28 RX ADMIN — TROSPIUM CHLORIDE 20 MG: 20 TABLET, FILM COATED ORAL at 17:03

## 2021-06-28 RX ADMIN — Medication 1 TABLET: at 08:00

## 2021-06-28 RX ADMIN — PANTOPRAZOLE SODIUM 40 MG: 40 TABLET, DELAYED RELEASE ORAL at 06:51

## 2021-06-28 RX ADMIN — TROSPIUM CHLORIDE 20 MG: 20 TABLET, FILM COATED ORAL at 06:51

## 2021-06-28 RX ADMIN — METOPROLOL TARTRATE 50 MG: 50 TABLET, FILM COATED ORAL at 08:00

## 2021-06-28 RX ADMIN — ATORVASTATIN CALCIUM 80 MG: 80 TABLET, FILM COATED ORAL at 22:20

## 2021-06-28 ASSESSMENT — PAIN SCALES - GENERAL: PAINLEVEL_OUTOF10: 0

## 2021-06-28 NOTE — PROGRESS NOTES
Cardiology consulted and SENA scheduled for today. Carotid Stenosis   - Outpatient evaluation by Vascular for CEA     Activities: Up with assist  Prophylaxis: Subcutaneous Lovenox  Code status: Full code    Diet ADULT DIET; Dysphagia - Soft and Bite Sized; 4 carb choices (60 gm/meal)   DVT Prophylaxis [] Lovenox, []  Heparin, [] SCDs, [] Ambulation   GI Prophylaxis [] PPI,  [] H2 Blocker,  [] Carafate,  [] Diet/Tube Feeds   Code Status Full Code   Disposition ARU   MDM [] Low, [x] Moderate,[]  High       History of Present Illness:      Subjective:  Pt seen and examined in the room. She is observed sitting up in chair. She stated that she feels fine. She offers no new medical complaint.           Objective: Intake/Output Summary (Last 24 hours) at 6/28/2021 1220  Last data filed at 6/28/2021 1115  Gross per 24 hour   Intake 420 ml   Output 100 ml   Net 320 ml      Vitals:   Vitals:    06/28/21 1107   BP: (!) 149/70   Pulse: 61   Resp: 18   Temp:    SpO2: 96%     Physical Exam:   GEN Awake female, sitting upright in bed in no apparent distress. Appears given age. EYES Pupils are equally round. No scleral erythema, discharge, or conjunctivitis. HENT Mucous membranes are moist. Oral pharynx without exudates, no evidence of thrush. NECK Supple, no apparent thyromegaly or masses. RESP Clear to auscultation, no wheezes, rales or rhonchi. Symmetric chest movement while on room air. CARDIO/VASC S1/S2 auscultated. Regular rate without appreciable murmurs, rubs, or gallops. No JVD or carotid bruits. Peripheral pulses equal bilaterally and palpable. No peripheral edema. GI Abdomen is soft without significant tenderness, masses, or guarding. Bowel sounds are normoactive. Rectal exam deferred.  No costovertebral angle tenderness. Normal appearing external genitalia. Bond catheter is not present. HEME/LYMPH No palpable cervical lymphadenopathy and no hepatosplenomegaly. No petechiae or ecchymoses.   MSK No gross joint deformities. SKIN Normal coloration, warm, dry. NEURO Cranial nerves appear grossly intact, normal speech, no lateralizing weakness. PSYCH Awake, alert, oriented x 4. Affect appropriate.     Medications:   Medications:    folic acid-pyridoxine-cyancobalamin  1 tablet Oral Daily    atorvastatin  80 mg Oral Nightly    aspirin  81 mg Oral Daily    Or    aspirin  300 mg Rectal Daily    insulin lispro  0-6 Units Subcutaneous 4x Daily AC & HS    amLODIPine  5 mg Oral Daily    clopidogrel  75 mg Oral Daily    insulin glargine  50 Units Subcutaneous Nightly    insulin lispro  16 Units Subcutaneous TID WC    metoprolol tartrate  50 mg Oral BID    pantoprazole  40 mg Oral QAM AC    sodium chloride flush  5-40 mL Intravenous BID    enoxaparin  40 mg Subcutaneous Daily    cefTRIAXone (ROCEPHIN) IV  1,000 mg Intravenous Q24H    trospium  20 mg Oral BID AC      Infusions:    dextrose      sodium chloride       PRN Meds: glucose, 15 g, PRN  dextrose, 12.5 g, PRN  glucagon (rDNA), 1 mg, PRN  dextrose, 100 mL/hr, PRN  labetalol, 10 mg, Q4H PRN  sodium chloride flush, 5-40 mL, PRN  sodium chloride, 25 mL, PRN  ondansetron, 4 mg, Q8H PRN   Or  ondansetron, 4 mg, Q6H PRN  polyethylene glycol, 17 g, Daily PRN          Electronically signed by Jacqui Osei DO on 6/28/2021 at 12:20 PM

## 2021-06-28 NOTE — PROGRESS NOTES
Physical Therapy    Physical Therapy Treatment Note  Name: Sandra Lynn MRN: [id number] :   1950   Date:  2021   Admission Date: 2021 Room:  -A   Restrictions/Precautions:        High fall risk, h/o CVA with R hemiparesis, 90* for meals  Communication with other providers:  Co-treat with PETE  Subjective:  Patient states:  \"I Arya Alex stay in the bed I'll do the chair tomorrow\", \"I'm hungry now\"  Pain:   Location, Type, Intensity (0/10 to 10/10):  No c/o  Objective:    Observation:  Pt supine with HOB raised ~70* with lunch tray placed over lap upon entry. Telemetry in place. Treatment, including education/measures:  Supine>Sit: Pt required max encouragement and education on importance of participation for d/c in order to initiate transfer. Pt perseverating on \"eating my lunch\" and \"my daughter's coming tomorrow\", requiring re-direction to the task at hand. Once re-directed, pt performs supine>sit with HOB ~45* and Min A for hand-held pull to sit. Sitting balance: Pt demonstrates fair static and dynamic sitting balance during scooting tasks anterior to EOB x3 with CGA, v/c and t/c at R hip provided to improve understanding, good carryover. Pt maintains good static sitting at EOB with BUE support x3 minutes during preparation for transfer to chair. Squat-pivot transfer: Pt agreeable to transfer from bed to chair, positioned close at 90* for safety. With gait belt, v/c for sequencing, and Mod A x2 positioned anteriorly and posteriorly, pt achieved partial stand and assisted with pivot. Mod A required for assist to upright and pivot of trunk to chair. Pt positioned comfortably in recliner 90* with chair alarm, tray table over lap, call light and phone in reach, ensured pt understanding of use of call light at end of session.      Assessment / Impression:       Patient's tolerance of treatment:  Fair, requires encouragement to participate   Adverse Reaction: none  Significant change in status and impact:  None  Barriers to improvement:  None  Plan for Next Session:    Continue to encourage OOB, STS and standing tolerance and balance  Time in:  13:15  Time out:  13:31  Timed treatment minutes:  16  Total treatment time:  16    Previously filed items:  Social/Functional History  Lives With: Significant other  Type of Home: Apartment  Home Layout: One level  Home Access: Stairs to enter with rails (3rd floor, elevator used)  Entrance Stairs - Number of Steps: 3rd floor  Bathroom Shower/Tub: Walk-in shower  Bathroom Toilet: Handicap height  Bathroom Equipment: Shower chair, Grab bars in shower  Bathroom Accessibility: Accessible  Home Equipment: 4 wheeled walker, [address] (rollator)  [address] [facility] From: Family  ADL Assistance: Needs assistance (significant other helps with transfers)  [address]: Needs assistance (significant other performs all)  Homemaking Responsibilities: No  Transfer Assistance: Needs assistance  Active : No  Patient's  Info: Pt's significant other Luz Freedman  Occupation: On disability  Additional Comments: Pt reports recent falls  Short term goals  Time Frame for Short term goals: 1 week  Short term goal 1: Pt will demonstrate STS from standard chair with Mod A and FWW and min v/c. Short term goal 2: Pt will participate in gait with FWW, w/c follow, and Mod A x25 ft. Short term goal 3: Pt will tolerate x3 minutes of dynamic standing activity with UE support and Min A.        Electronically signed by:    Jeanne Gamble, PT  6/28/2021, 1:36 PM

## 2021-06-28 NOTE — PROGRESS NOTES
Occupational Therapy  . Occupational Therapy Treatment Note      Name: Junior Triana MRN: [id number] :   1950   Date:  2021   Admission Date: 2021 Room:  -A     Primary Problem:  The primary encounter diagnosis was Cerebrovascular accident (CVA), unspecified mechanism ([address] Diagnoses of Confusion, Elevated lactic acid level, and Hyperglycemia were also pertinent to this visit. Restrictions/Precautions:    General precautions; Fall risk    Communication with other providers:  .Per chart review and Nurse Mitchell Santizo, patient is appropriate for therapeutic intervention. Co-Tx c PT Skylar      Subjective:  Patient states: With max encouragement, pt agreeable to Tx session, including up to chair. Pain: 0/10  (location, type, intensity)    Objective:    Observation:  Received in high fowlers self-feeding meal, required cues for clearing mouth prior to next bite as pt exhibited rushing. Meal set aside to be re-introduced following transfer to chair. Alert and oriented to self / situation. Objective Measures:  N/A    Treatment, including education:  Therapeutic Activity Training:   Therapeutic activity training was instructed today. Cues were given for safety, sequence, UE/LE placement, awareness, and balance. Education for rationale for therapeutic intervention. Activities performed today included bed mobility training, sup-sit, sit-stand, SPT. Supine to sit: Min A + cues for sequencing and use of bed rail. Pt reached also for therapist's hand w/o any additional assist provided. Scooting: CGA to trunk + cues for weight shifting to advance R hip to edge of bed  Sitting balance / tolerance: SBA seated EOB  Squat Pivot Transfer: Mod A x2 from EOB to bed side chair + cues for sequencing. Pt making partial stand.     All therapeutic intervention performed c emphasis on dynamic balance / standing tolerance to inc strength, endurance and act tolerance for inc Indep c ADL tasks, func

## 2021-06-28 NOTE — PROGRESS NOTES
Today's plan:  SENA done, essentially normal SENA, ASA, MALLAMPATI 3:3      Admit Date:  6/23/2021    Subjective: ok      Chief complaints on admission  Chief Complaint   Patient presents with    Fatigue     Pt lives in an apartment and called EMS for fatigue, slurred speech, and visual hallucinations x1 week.  Aphasia    Hallucinations         History of present illness:Sarai is a 70 y. o.year old who  presents with had concerns including Fatigue (Pt lives in an apartment and called EMS for fatigue, slurred speech, and visual hallucinations x1 week. ), Aphasia, and Hallucinations. Past medical history:    has a past medical history of Arthritis, Cerebral artery occlusion with cerebral infarction ([facility] [address] Diabetes mellitus ([facility] [address] Hyperlipidemia, and Hypertension. Past surgical history:   has a past surgical history that includes Hysterectomy; bladder suspension; Dilation and curettage of uterus; and Colonoscopy (3/23/15). Social History:   reports that she quit smoking about 4 years ago. Her smoking use included cigarettes. She started smoking about 50 years ago. She has a 20.00 pack-year smoking history. She has never used smokeless tobacco. She reports that she does not drink alcohol and does not use drugs. Family history:  family history includes No Known Problems in her father and mother. No Known Allergies      Objective:   BP (!) 164/94   Pulse 65   Temp 97 °F (36.1 °C) (Oral)   Resp 15   Ht 5' 2\" (1.575 m)   Wt 243 lb 6.2 oz (110.4 kg)   SpO2 96%   BMI 44.52 kg/m²       Intake/Output Summary (Last 24 hours) at 6/28/2021 1058  Last data filed at 6/28/2021 5198  Gross per 24 hour   Intake 120 ml   Output 100 ml   Net 20 ml       TELEMETRY: Sinus     Physical Exam:  Constitutional:  Well developed, Well nourished, No acute distress, Non-toxic appearance.    HENT:  Normocephalic, Atraumatic, Bilateral external ears normal, Oropharynx moist, No oral exudates, Nose normal. Neck- Normal range of motion, No tenderness, Supple, No stridor. Eyes:  PERRL, EOMI, Conjunctiva normal, No discharge. Respiratory:  Normal breath sounds, No respiratory distress, No wheezing, No chest tenderness. ,no use of accessory muscles, diaphragm movement is normal  Cardiovascular: (PMI) apex non displaced,no lifts no thrills, no s3,no s4, Normal heart rate, Normal rhythm, No murmurs, No rubs, No gallops. Carotid arteries pulse and amplitude are normal no bruit, no abdominal bruit noted ( normal abdominal aorta ausculation), femoral arteries pulse and amplitude are normal no bruit, pedal pulses are normal  GI:  Bowel sounds normal, Soft, No tenderness, No masses, No pulsatile masses. : External genitalia appear normal, No masses or lesions. No discharge. No CVA tenderness. Musculoskeletal:  Intact distal pulses, No edema, No tenderness, No cyanosis, No clubbing. Good range of motion in all major joints. No tenderness to palpation or major deformities noted. Back- No tenderness. Integument:  Warm, Dry, No erythema, No rash. Lymphatic:  No lymphadenopathy noted. Neurologic:  Alert & oriented x 3, Normal motor function, Normal sensory function, No focal deficits noted.    Psychiatric:  Affect normal, Judgment normal, Mood normal.     Medications:    folic acid-pyridoxine-cyancobalamin  1 tablet Oral Daily    atorvastatin  80 mg Oral Nightly    aspirin  81 mg Oral Daily    Or    aspirin  300 mg Rectal Daily    insulin lispro  0-6 Units Subcutaneous 4x Daily AC & HS    amLODIPine  5 mg Oral Daily    clopidogrel  75 mg Oral Daily    insulin glargine  50 Units Subcutaneous Nightly    insulin lispro  16 Units Subcutaneous TID WC    metoprolol tartrate  50 mg Oral BID    pantoprazole  40 mg Oral QAM AC    sodium chloride flush  5-40 mL Intravenous BID    enoxaparin  40 mg Subcutaneous Daily    cefTRIAXone (ROCEPHIN) IV  1,000 mg Intravenous Q24H    trospium  20 mg Oral BID AC      dextrose      sodium chloride       glucose, dextrose, glucagon (rDNA), dextrose, labetalol, sodium chloride flush, sodium chloride, ondansetron **OR** ondansetron, polyethylene glycol    Lab Data:  CBC: No results for input(s): WBC, HGB, HCT, MCV, PLT in the last 72 hours. BMP: No results for input(s): NA, K, CL, CO2, PHOS, BUN, CREATININE in the last 72 hours. Invalid input(s): CA  LIVER PROFILE: No results for input(s): AST, ALT, LIPASE, BILIDIR, BILITOT, ALKPHOS in the last 72 hours. Invalid input(s): AMYLASE,  ALB  PT/INR: No results for input(s): PROTIME, INR in the last 72 hours. APTT: No results for input(s): APTT in the last 72 hours. BNP:  No results for input(s): BNP in the last 72 hours. TROPONIN: @TROPONINI:3@      Assessment:  70 y. o.year old who is admitted for          Plan:  No afib noted, SENA results are above  DM: stable  HTN: relatively high, she had Stoke recently, will not be very aggressive at this time to continue cerebral perfusion, once she recovers, will start to aggresively control her blood pressure/.  RT TEMPORAL LOBE ACUTE INFARCTION: as per neurolog  All labs, medications and tests reviewed, continue all other medications of all above medical condition listed as is.       Ladan Disla MD, MD 6/28/2021 10:58 AM

## 2021-06-29 ENCOUNTER — HOSPITAL ENCOUNTER (INPATIENT)
Age: 71
LOS: 10 days | Discharge: HOME | DRG: 045 | End: 2021-07-09
Attending: PHYSICAL MEDICINE & REHABILITATION | Admitting: PHYSICAL MEDICINE & REHABILITATION
Payer: MEDICAID

## 2021-06-29 VITALS
OXYGEN SATURATION: 94 % | HEART RATE: 64 BPM | SYSTOLIC BLOOD PRESSURE: 145 MMHG | RESPIRATION RATE: 13 BRPM | HEIGHT: 62 IN | DIASTOLIC BLOOD PRESSURE: 75 MMHG | TEMPERATURE: 99.5 F | WEIGHT: 243.17 LBS | BODY MASS INDEX: 44.75 KG/M2

## 2021-06-29 DIAGNOSIS — I10 ESSENTIAL HYPERTENSION: ICD-10-CM

## 2021-06-29 PROBLEM — I65.21 SYMPTOMATIC STENOSIS OF RIGHT CAROTID ARTERY: Status: ACTIVE | Noted: 2021-06-29

## 2021-06-29 PROBLEM — I63.9 ACUTE CVA (CEREBROVASCULAR ACCIDENT) (HCC): Status: ACTIVE | Noted: 2021-06-29

## 2021-06-29 LAB
GLUCOSE BLD-MCNC: 100 MG/DL (ref 70–99)
GLUCOSE BLD-MCNC: 112 MG/DL (ref 70–99)
GLUCOSE BLD-MCNC: 118 MG/DL (ref 70–99)
GLUCOSE BLD-MCNC: 148 MG/DL (ref 70–99)
GLUCOSE BLD-MCNC: 154 MG/DL (ref 70–99)
GLUCOSE BLD-MCNC: 191 MG/DL (ref 70–99)

## 2021-06-29 PROCEDURE — 6370000000 HC RX 637 (ALT 250 FOR IP): Performed by: INTERNAL MEDICINE

## 2021-06-29 PROCEDURE — 99223 1ST HOSP IP/OBS HIGH 75: CPT | Performed by: PHYSICAL MEDICINE & REHABILITATION

## 2021-06-29 PROCEDURE — 94761 N-INVAS EAR/PLS OXIMETRY MLT: CPT

## 2021-06-29 PROCEDURE — 82962 GLUCOSE BLOOD TEST: CPT

## 2021-06-29 PROCEDURE — 99232 SBSQ HOSP IP/OBS MODERATE 35: CPT | Performed by: INTERNAL MEDICINE

## 2021-06-29 PROCEDURE — 6370000000 HC RX 637 (ALT 250 FOR IP): Performed by: PSYCHIATRY & NEUROLOGY

## 2021-06-29 PROCEDURE — 2580000003 HC RX 258: Performed by: INTERNAL MEDICINE

## 2021-06-29 PROCEDURE — 6360000002 HC RX W HCPCS: Performed by: INTERNAL MEDICINE

## 2021-06-29 PROCEDURE — 1280000000 HC REHAB R&B

## 2021-06-29 RX ORDER — METOPROLOL TARTRATE 50 MG/1
50 TABLET, FILM COATED ORAL 2 TIMES DAILY
Status: CANCELLED | OUTPATIENT
Start: 2021-06-29

## 2021-06-29 RX ORDER — POLYETHYLENE GLYCOL 3350 17 G/17G
17 POWDER, FOR SOLUTION ORAL DAILY PRN
Status: DISCONTINUED | OUTPATIENT
Start: 2021-06-29 | End: 2021-07-09 | Stop reason: HOSPADM

## 2021-06-29 RX ORDER — B12/LEVOMEFOLATE CALCIUM/B-6 2-1.13-25
1 TABLET ORAL DAILY
Status: DISCONTINUED | OUTPATIENT
Start: 2021-06-30 | End: 2021-07-09 | Stop reason: HOSPADM

## 2021-06-29 RX ORDER — ATORVASTATIN CALCIUM 40 MG/1
80 TABLET, FILM COATED ORAL NIGHTLY
Status: DISCONTINUED | OUTPATIENT
Start: 2021-06-29 | End: 2021-07-09 | Stop reason: HOSPADM

## 2021-06-29 RX ORDER — ASPIRIN 600 MG/1
300 SUPPOSITORY RECTAL DAILY
Status: CANCELLED | OUTPATIENT
Start: 2021-06-30

## 2021-06-29 RX ORDER — NICOTINE POLACRILEX 4 MG
15 LOZENGE BUCCAL PRN
Status: DISCONTINUED | OUTPATIENT
Start: 2021-06-29 | End: 2021-07-09 | Stop reason: HOSPADM

## 2021-06-29 RX ORDER — INSULIN GLARGINE 100 [IU]/ML
50 INJECTION, SOLUTION SUBCUTANEOUS NIGHTLY
Status: DISCONTINUED | OUTPATIENT
Start: 2021-06-29 | End: 2021-06-29

## 2021-06-29 RX ORDER — ASPIRIN 300 MG/1
300 SUPPOSITORY RECTAL DAILY
Status: DISCONTINUED | OUTPATIENT
Start: 2021-06-30 | End: 2021-06-30

## 2021-06-29 RX ORDER — B12/LEVOMEFOLATE CALCIUM/B-6 2-1.13-25
1 TABLET ORAL DAILY
Status: CANCELLED | OUTPATIENT
Start: 2021-06-30

## 2021-06-29 RX ORDER — ASPIRIN 81 MG/1
81 TABLET ORAL DAILY
Status: CANCELLED | OUTPATIENT
Start: 2021-06-30

## 2021-06-29 RX ORDER — CLOPIDOGREL BISULFATE 75 MG/1
75 TABLET ORAL DAILY
Status: DISCONTINUED | OUTPATIENT
Start: 2021-06-30 | End: 2021-07-09 | Stop reason: HOSPADM

## 2021-06-29 RX ORDER — ASPIRIN 81 MG/1
81 TABLET ORAL DAILY
Status: DISCONTINUED | OUTPATIENT
Start: 2021-06-30 | End: 2021-07-09 | Stop reason: HOSPADM

## 2021-06-29 RX ORDER — ATORVASTATIN CALCIUM 80 MG/1
80 TABLET, FILM COATED ORAL NIGHTLY
Status: CANCELLED | OUTPATIENT
Start: 2021-06-29

## 2021-06-29 RX ORDER — METOPROLOL TARTRATE 50 MG/1
50 TABLET, FILM COATED ORAL 2 TIMES DAILY
Status: DISCONTINUED | OUTPATIENT
Start: 2021-06-29 | End: 2021-07-09 | Stop reason: HOSPADM

## 2021-06-29 RX ORDER — INSULIN GLARGINE 100 [IU]/ML
50 INJECTION, SOLUTION SUBCUTANEOUS NIGHTLY
Status: CANCELLED | OUTPATIENT
Start: 2021-06-29

## 2021-06-29 RX ORDER — POLYETHYLENE GLYCOL 3350 17 G/17G
17 POWDER, FOR SOLUTION ORAL DAILY PRN
Status: CANCELLED | OUTPATIENT
Start: 2021-06-29

## 2021-06-29 RX ORDER — INSULIN GLARGINE 100 [IU]/ML
45 INJECTION, SOLUTION SUBCUTANEOUS NIGHTLY
Status: DISCONTINUED | OUTPATIENT
Start: 2021-06-29 | End: 2021-07-02

## 2021-06-29 RX ORDER — CLOPIDOGREL BISULFATE 75 MG/1
75 TABLET ORAL DAILY
Status: CANCELLED | OUTPATIENT
Start: 2021-06-30

## 2021-06-29 RX ORDER — INSULIN GLARGINE 100 [IU]/ML
20 INJECTION, SOLUTION SUBCUTANEOUS ONCE
Status: COMPLETED | OUTPATIENT
Start: 2021-06-29 | End: 2021-06-29

## 2021-06-29 RX ORDER — NICOTINE POLACRILEX 4 MG
15 LOZENGE BUCCAL PRN
Status: CANCELLED | OUTPATIENT
Start: 2021-06-29

## 2021-06-29 RX ORDER — AMLODIPINE BESYLATE 5 MG/1
5 TABLET ORAL DAILY
Status: CANCELLED | OUTPATIENT
Start: 2021-06-30

## 2021-06-29 RX ORDER — POLYETHYLENE GLYCOL 3350 17 G/17G
17 POWDER, FOR SOLUTION ORAL DAILY PRN
Status: DISCONTINUED | OUTPATIENT
Start: 2021-06-29 | End: 2021-06-30

## 2021-06-29 RX ORDER — AMLODIPINE BESYLATE 5 MG/1
5 TABLET ORAL DAILY
Status: DISCONTINUED | OUTPATIENT
Start: 2021-06-30 | End: 2021-07-09 | Stop reason: HOSPADM

## 2021-06-29 RX ORDER — ACETAMINOPHEN 325 MG/1
650 TABLET ORAL EVERY 4 HOURS PRN
Status: DISCONTINUED | OUTPATIENT
Start: 2021-06-29 | End: 2021-07-09 | Stop reason: HOSPADM

## 2021-06-29 RX ADMIN — Medication 1 TABLET: at 10:25

## 2021-06-29 RX ADMIN — ATORVASTATIN CALCIUM 80 MG: 40 TABLET, FILM COATED ORAL at 20:01

## 2021-06-29 RX ADMIN — AMLODIPINE BESYLATE 5 MG: 5 TABLET ORAL at 10:24

## 2021-06-29 RX ADMIN — ENOXAPARIN SODIUM 40 MG: 40 INJECTION SUBCUTANEOUS at 10:16

## 2021-06-29 RX ADMIN — TROSPIUM CHLORIDE 20 MG: 20 TABLET, FILM COATED ORAL at 06:32

## 2021-06-29 RX ADMIN — SODIUM CHLORIDE, PRESERVATIVE FREE 10 ML: 5 INJECTION INTRAVENOUS at 10:16

## 2021-06-29 RX ADMIN — CLOPIDOGREL BISULFATE 75 MG: 75 TABLET ORAL at 10:15

## 2021-06-29 RX ADMIN — METOPROLOL TARTRATE 50 MG: 50 TABLET, FILM COATED ORAL at 20:01

## 2021-06-29 RX ADMIN — METOPROLOL TARTRATE 50 MG: 50 TABLET, FILM COATED ORAL at 10:15

## 2021-06-29 RX ADMIN — ATORVASTATIN CALCIUM 80 MG: 80 TABLET, FILM COATED ORAL at 10:15

## 2021-06-29 RX ADMIN — INSULIN GLARGINE 20 UNITS: 100 INJECTION, SOLUTION SUBCUTANEOUS at 20:47

## 2021-06-29 RX ADMIN — ASPIRIN 81 MG: 81 TABLET, COATED ORAL at 10:14

## 2021-06-29 RX ADMIN — PANTOPRAZOLE SODIUM 40 MG: 40 TABLET, DELAYED RELEASE ORAL at 06:32

## 2021-06-29 RX ADMIN — TROSPIUM CHLORIDE 20 MG: 20 TABLET, FILM COATED ORAL at 10:15

## 2021-06-29 ASSESSMENT — PAIN SCALES - GENERAL
PAINLEVEL_OUTOF10: 0

## 2021-06-29 NOTE — CARE COORDINATION
Visited with patient to educate her the importance of participating in therapy. Patient understood and said she will try.

## 2021-06-29 NOTE — CARE COORDINATION
Patient approved for ARU. Auth #[id number]. Notified Dr. Logan Winston of [facility] and that ARU has bed for patient today if medically stable. Per Dr. Logan Winston patient is medically ready for d/c to ARU. Dr. Logan Winston also explained that per patient she has not been compliant with medication and going to f/u appts when at home. Per Dr. Logan Winston he's going to d/w family and if this is true patients new CVA could have been prevented if patient would have remained compliant. Dr. Logan Winston would like for patient to be educated on ARU the importance of remaining complaint to prevent a new CVA in the future. Patient meets criteria and is approved to come to ARU. Patient able to admit once medically stable and after ARU Medical Director and  sign the pre-admission screen (PAS). COVID negative test noted.

## 2021-06-29 NOTE — PROGRESS NOTES
Sclerotic, but non-stenotic aortic valve. No evidence of any pericardial effusion. Signature   ------------------------------------------------------------------  Electronically signed by Precious Floyd MD (Interpreting  physician) on 06/24/2021 at 05:26 PM  ------------------------------------------------------------------   Findings   Left Ventricle  Left ventricular systolic function is normal.  Ejection fraction is visually estimated at 50-55%. Moderate left ventricular hypertrophy. Left Atrium  Essentially normal left atrium. Right Atrium  Essentially normal right atrium. Right Ventricle  Mildly dilated right ventricle. No evidence of right heart strain. Aortic Valve  Sclerotic, but non-stenotic aortic valve. Trace aortic regurgitation. Mitral Valve  Trace mitral regurgitation. Tricuspid Valve  Trace tricuspid regurgitation; normal RVSP. Pulmonic Valve  The pulmonic valve was not well visualized. Pericardial Effusion  No evidence of any pericardial effusion. Pleural Effusion  No evidence of pleural effusion. Miscellaneous  No IV access for bubble study. Would recommend SENA for bubble study due to  image quality.   M-Mode/2D Measurements & Calculations   LV Diastolic Dimension:  LV Systolic Dimension:  LA Dimension: 3.1 cmAO Root  3.67 cm                  2.65 cm                 Dimension: 3 cmLA Area:  LV FS:27.8 %             LV Volume Diastolic: 76 38.3 cm2  LV PW Diastolic: 0.82 cm ml  LV PW Systolic: 5.99 cm  LV Volume Systolic: 39  Septum Diastolic: 5.06   ml  cm                       LV EDV/LV EDV Index: 76 RV Diastolic Dimension:  Septum Systolic: 7.01 cm RI/92 F2PP ESV/LV ESV   4.17 cm  CO: 3.37 l/min           Index: 39 ml/19 m2  CI: 1.66 l/m*m2          EF Calculated (A4C):    LA/Aorta: 1.03                           48.7 %  LV Area Diastolic: 40.4  EF Calculated (2D):     LA volume/Index: 36 ml  cm2                      54.7 %                  /60H5  LV Area Systolic: 36.2 4.5 x 3 x 1 cm area of brain parenchyma. Findings concerning for an acute infarct. Otherwise no acute intracranial hemorrhage. No midline shift. No acute hydrocephalus. No extra-axial fluid collection. ORBITS: The visualized portion of the orbits demonstrate no acute abnormality. SINUSES: The visualized paranasal sinuses and mastoid air cells demonstrate no acute abnormality. SOFT TISSUES/SKULL:  No acute abnormality of the visualized skull or soft tissues. Moderate-sized low-attenuation in the right temporal lobe concerning for acute infarct. MRI brain recommended to confirm. No acute intracranial hemorrhage. CT CERVICAL SPINE WO CONTRAST    Result Date: 6/23/2021  EXAMINATION: CT OF THE CERVICAL SPINE WITHOUT CONTRAST 6/23/2021 3:40 pm TECHNIQUE: CT of the cervical spine was performed without the administration of intravenous contrast. Multiplanar reformatted images are provided for review. Dose modulation, iterative reconstruction, and/or weight based adjustment of the mA/kV was utilized to reduce the radiation dose to as low as reasonably achievable. COMPARISON: None. HISTORY: ORDERING SYSTEM PROVIDED HISTORY: fall TECHNOLOGIST PROVIDED HISTORY: Reason for exam:->fall Decision Support Exception - unselect if not a suspected or confirmed emergency medical condition->Emergency Medical Condition (MA) Reason for Exam: fall, neck pain FINDINGS: BONES/ALIGNMENT: Mild motion degradation this is most pronounced at C2. There is no acute fracture or traumatic malalignment. DEGENERATIVE CHANGES: Multilevel degenerate changes most pronounced C5-6 less so C6-C7 prominent disc osteophyte complex mission at C5-6 causing canal narrowing at this level. SOFT TISSUES: There is no prevertebral soft tissue swelling. Multilevel degenerate changes. No acute fracture traumatic malalignment     XR CHEST PORTABLE    Result Date: 6/23/2021  EXAMINATION: ONE XRAY VIEW OF THE CHEST 6/23/2021 3:34 pm COMPARISON: None. HISTORY: ORDERING SYSTEM PROVIDED HISTORY: other, slurrred speech, hypertension TECHNOLOGIST PROVIDED HISTORY: Reason for exam:->other, slurrred speech, hypertension Reason for Exam: other, slurrred speech, hypertension Acuity: Acute Type of Exam: Initial Additional signs and symptoms: na Relevant Medical/Surgical History: diabetes, hypertension FINDINGS: The lungs are clear. The mediastinum and cardiac silhouette are unremarkable. No acute abnormality. CTA HEAD NECK W CONTRAST    Result Date: 6/23/2021  EXAMINATION: CTA OF THE HEAD AND NECK WITH CONTRAST 6/23/2021 11:17 pm: TECHNIQUE: CTA of the head and neck was performed with the administration of intravenous contrast. Multiplanar reformatted images are provided for review. MIP images are provided for review. Stenosis of the internal carotid arteries measured using NASCET criteria. Dose modulation, iterative reconstruction, and/or weight based adjustment of the mA/kV was utilized to reduce the radiation dose to as low as reasonably achievable. COMPARISON: 10/06/2020 CT angiogram head neck HISTORY: ORDERING SYSTEM PROVIDED HISTORY: cva TECHNOLOGIST PROVIDED HISTORY: Reason for exam:->cva Decision Support Exception - unselect if not a suspected or confirmed emergency medical condition->Emergency Medical Condition (MA) Reason for Exam: cva FINDINGS: CTA NECK: AORTIC ARCH/ARCH VESSELS: No dissection or arterial injury. No significant stenosis of the brachiocephalic or subclavian arteries. Main pulmonary artery is enlarged suggesting pulmonary hypertension measures 3.1 cm. CAROTID ARTERIES: Right: 75% stenosis of the right proximal ICA per NASCET criteria. Left: There is 50% stenosis left proximal ICA per NASCET criteria. Otherwise the common carotid artery arteries are patent. No additional hemodynamic stenosis identified involving the remainder of the cervical ICA or common carotid arteries.  VERTEBRAL ARTERIES: Right vertebral artery is patent throughout its course. Left V1 segment demonstrates minimal contrast opacification. There is diminished thread-like enhanced identified involving portions of the left proximal to mid V2 segment. Distal V2 appears occluded. Proximal V3 segment appears occluded. There is reconstitution of the distal V3 segment perhaps related to retrograde flow. SOFT TISSUES: The lung apices are clear. No cervical or superior mediastinal lymphadenopathy. The larynx and pharynx are unremarkable. No acute abnormality of the salivary and thyroid glands. BONES: Central canal stenosis notably at C5-6 due to a prominent disc osteophyte complex noted. CTA HEAD: ANTERIOR CIRCULATION: Suspect mild moderate narrowing involving right M2 branches along the right anterior temporal lobe. No significant stenosis of the intracranial internal carotid, anterior cerebral, or middle cerebral arteries. No aneurysm. POSTERIOR CIRCULATION: Retrograde opacification left vertebral artery. No significant stenosis of the vertebral, basilar, or posterior cerebral arteries. No aneurysm. OTHER: No dural venous sinus thrombosis on this non-dedicated study. 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 Mild tapering versus mild to moderate areas stenosis involving the right MCA M2 branches. Otherwise no large vessel occlusion or hemodynamic stenosis involving remainder of the vessels intracranially. Stable appearance of the age-indeterminate occlusion proximal V2 segment left vertebral artery with retrograde opacification of the V4 segment. 75% narrowing right proximal ICA and 50% narrowing left proximal ICA per NASCET criteria The findings were sent to the Radiology Results [id number] at 11:44 pm on 6/23/2021to be communicated to a licensed caregiver.        LAB RESULTS  --------------------    Recent Results (from the past 24 hour(s))   POCT Glucose    Collection Time: 06/27/21  8:30 PM   Result Value Ref Range    POC Glucose 146 (H) 70 - 99 MG/DL   COVID-19, Rapid    Collection Time: 06/28/21  7:30 AM    Specimen: Nasopharyngeal   Result Value Ref Range    Source VIRAL SWAB     SARS-CoV-2, NAAT NOT DETECTED NOT DETECTED   POCT Glucose    Collection Time: 06/28/21  7:59 AM   Result Value Ref Range    POC Glucose 131 (H) 70 - 99 MG/DL   POCT Glucose    Collection Time: 06/28/21 11:55 AM   Result Value Ref Range    POC Glucose 127 (H) 70 - 99 MG/DL   CBC auto differential    Collection Time: 06/28/21  1:49 PM   Result Value Ref Range    WBC 6.0 4.0 - 10.5 K/CU MM    RBC 5.57 (H) 4.2 - 5.4 M/CU MM    Hemoglobin 16.6 (H) 12.5 - 16.0 GM/DL    Hematocrit 51.9 (H) 37 - 47 %    MCV 93.2 78 - 100 FL    MCH 29.8 27 - 31 PG    MCHC 32.0 32.0 - 36.0 %    RDW 13.2 11.7 - 14.9 %    Platelets 776 390 - 898 K/CU MM    MPV 11.0 7.5 - 11.1 FL    Differential Type AUTOMATED DIFFERENTIAL     Segs Relative 58.6 36 - 66 %    Lymphocytes % 27.5 24 - 44 %    Monocytes % 8.3 (H) 0 - 4 %    Eosinophils % 3.8 (H) 0 - 3 %    Basophils % 1.3 (H) 0 - 1 %    Segs Absolute 3.5 K/CU MM    Lymphocytes Absolute 1.7 K/CU MM    Monocytes Absolute 0.5 K/CU MM    Eosinophils Absolute 0.2 K/CU MM    Basophils Absolute 0.1 K/CU MM    Nucleated RBC % 0.0 %    Total Nucleated RBC 0.0 K/CU MM    Total Immature Neutrophil 0.03 K/CU MM    Immature Neutrophil % 0.5 (H) 0 - 0.43 %   Basic metabolic panel    Collection Time: 06/28/21  1:49 PM   Result Value Ref Range    Sodium 141 135 - 145 MMOL/L    Potassium 3.7 3.5 - 5.1 MMOL/L    Chloride 105 99 - 110 mMol/L    CO2 23 21 - 32 MMOL/L    Anion Gap 13 4 - 16    BUN 14 6 - 23 MG/DL    CREATININE 0.9 0.6 - 1.1 MG/DL    Glucose 125 (H) 70 - 99 MG/DL    Calcium 9.3 8.3 - 10.6 MG/DL    GFR Non-African American >60 >60 mL/min/1.73m2    GFR African American >60 >60 mL/min/1.73m2   POCT Glucose    Collection Time: 06/28/21  4:33 PM   Result Value Ref Range    POC Glucose 203 (H) 70 - 99 MG/DL         Medical problems    Patient Active

## 2021-06-29 NOTE — DISCHARGE SUMMARY
morning (before breakfast)                 Objective Findings at Discharge:   BP (!) 145/75   Pulse 64   Temp 99.5 °F (37.5 °C) (Oral)   Resp 13   Ht 5' 2\" (1.575 m)   Wt 243 lb 2.7 oz (110.3 kg)   SpO2 94%   BMI 44.48 kg/m²            PHYSICAL EXAM   GEN Awake female, sitting upright in bed in no apparent distress. Appears given age. LABS:    CBC:   Lab Results   Component Value Date    WBC 6.0 06/28/2021    HGB 16.6 06/28/2021    HCT 51.9 06/28/2021    MCV 93.2 06/28/2021     06/28/2021     BMP:   Lab Results   Component Value Date     06/28/2021    K 3.7 06/28/2021     06/28/2021    CO2 23 06/28/2021    PHOS 2.9 06/24/2021    BUN 14 06/28/2021    CREATININE 0.9 06/28/2021    CALCIUM 9.3 06/28/2021     IMAGING:     CT HEAD WO CONTRAST [[id number]] Collected: 06/23/21 1932     Order Status: Completed Updated: 06/28/21 0749     Narrative:       EXAMINATION:   CT OF THE HEAD WITHOUT CONTRAST  6/23/2021 2:40 pm     TECHNIQUE:   CT of the head was performed without the administration of intravenous   contrast. Dose modulation, iterative reconstruction, and/or weight based   adjustment of the mA/kV was utilized to reduce the radiation dose to as low   as reasonably achievable. COMPARISON:   10/06/2020, 10/07/2020     HISTORY:   ORDERING SYSTEM PROVIDED HISTORY: fall, slurred speech   TECHNOLOGIST PROVIDED HISTORY:   Has a \"code stroke\" or \"stroke alert\" been called? ->No   Reason for exam:->fall, slurred speech   Decision Support Exception - unselect if not a suspected or confirmed   emergency medical condition->Emergency Medical Condition (MA)   Reason for Exam: fall, slurred speech   Relevant Medical/Surgical History: pt refused exam until 1800     FINDINGS:   BRAIN/VENTRICLES: There is low-attenuation in right temporal lobe involving a   4.5 x 3 x 1 cm area of brain parenchyma. Findings concerning for an acute   infarct. Otherwise no acute intracranial hemorrhage.   No midline shift. No acute   hydrocephalus. No extra-axial fluid collection. ORBITS: The visualized portion of the orbits demonstrate no acute abnormality. SINUSES: The visualized paranasal sinuses and mastoid air cells demonstrate   no acute abnormality. SOFT TISSUES/SKULL:  No acute abnormality of the visualized skull or soft   tissues. Impression:       Moderate-sized low-attenuation in the right temporal lobe concerning for   acute infarct. MRI brain recommended to confirm. No acute intracranial hemorrhage. MRI brain without contrast [[id number]] Collected: 06/25/21 1857     Order Status: Completed Updated: 06/25/21 1904     Narrative:       EXAMINATION:   MRI OF THE BRAIN WITHOUT CONTRAST  6/25/2021 6:18 pm     TECHNIQUE:   Multiplanar multisequence MRI of the brain was performed without the   administration of intravenous contrast.     COMPARISON:   10/07/2020. HISTORY:   ORDERING SYSTEM PROVIDED HISTORY: cva   TECHNOLOGIST PROVIDED HISTORY:   Reason for exam:->cva   Decision Support Exception - unselect if not a suspected or confirmed   emergency medical condition->Emergency Medical Condition (MA)   Reason for Exam: cva   Acuity: Acute   Type of Exam: Initial   Relevant Medical/Surgical History: none     FINDINGS:   Motion degrades images limiting evaluation. INTRACRANIAL STRUCTURES/VENTRICLES: Acute infarct is seen involving the left   paramedian ian. Acute infarct is also seen involving the right temporal   lobe within the right MCA territory. Minimal sulcal effacement is seen   involving the right temporal lobe infarct. No mass effect or midline shift. Areas of T2 FLAIR hyperintensity are seen in the periventricular and   subcortical white matter, which are nonspecific, but may represent chronic   microvascular ischemic change. There is prominence of the ventricles and   sulci due to global parenchymal volume loss. No convincing acute   intracranial hemorrhage.   Loss of confirmed   emergency medical condition->Emergency Medical Condition (MA)   Reason for Exam: cva     FINDINGS:     CTA NECK:     AORTIC ARCH/ARCH VESSELS: No dissection or arterial injury. No significant   stenosis of the brachiocephalic or subclavian arteries. Main pulmonary   artery is enlarged suggesting pulmonary hypertension measures 3.1 cm. CAROTID ARTERIES: Right: 75% stenosis of the right proximal ICA per NASCET   criteria. Left: There is 50% stenosis left proximal ICA per NASCET criteria. Otherwise the common carotid artery arteries are patent. No additional   hemodynamic stenosis identified involving the remainder of the cervical ICA   or common carotid arteries. VERTEBRAL ARTERIES: Right vertebral artery is patent throughout its course. Left V1 segment demonstrates minimal contrast opacification. There is   diminished thread-like enhanced identified involving portions of the left   proximal to mid V2 segment. Distal V2 appears occluded. Proximal V3 segment   appears occluded. There is reconstitution of the distal V3 segment perhaps   related to retrograde flow. SOFT TISSUES: The lung apices are clear. No cervical or superior mediastinal   lymphadenopathy. The larynx and pharynx are unremarkable. No acute   abnormality of the salivary and thyroid glands. BONES: Central canal stenosis notably at C5-6 due to a prominent disc   osteophyte complex noted. CTA HEAD:     ANTERIOR CIRCULATION: Suspect mild moderate narrowing involving right M2   branches along the right anterior temporal lobe. No significant stenosis of   the intracranial internal carotid, anterior cerebral, or middle cerebral   arteries. No aneurysm. POSTERIOR CIRCULATION: Retrograde opacification left vertebral artery. No   significant stenosis of the vertebral, basilar, or posterior cerebral   arteries. No aneurysm. OTHER: No dural venous sinus thrombosis on this non-dedicated study. BRAIN: Subacute right MCA distribution stroke involving the right temporal   lobe. Impression:       Mild tapering versus mild to moderate areas stenosis involving the right MCA   M2 branches. Otherwise no large vessel occlusion or hemodynamic stenosis   involving remainder of the vessels intracranially. Stable appearance of the age-indeterminate occlusion proximal V2 segment left   vertebral artery with retrograde opacification of the V4 segment. 75% narrowing right proximal ICA and 50% narrowing left proximal ICA per   NASCET criteria     The findings were sent to the Radiology Results [id number] at 11:44   pm on 6/23/2021to be communicated to a licensed caregiver. CT CERVICAL SPINE WO CONTRAST [[id number]] Collected: 06/23/21 1936     Order Status: Completed Updated: 06/23/21 2000     Narrative:       EXAMINATION:   CT OF THE CERVICAL SPINE WITHOUT CONTRAST 6/23/2021 3:40 pm     TECHNIQUE:   CT of the cervical spine was performed without the administration of   intravenous contrast. Multiplanar reformatted images are provided for review. Dose modulation, iterative reconstruction, and/or weight based adjustment of   the mA/kV was utilized to reduce the radiation dose to as low as reasonably   achievable. COMPARISON:   None. HISTORY:   ORDERING SYSTEM PROVIDED HISTORY: fall   TECHNOLOGIST PROVIDED HISTORY:   Reason for exam:->fall   Decision Support Exception - unselect if not a suspected or confirmed   emergency medical condition->Emergency Medical Condition (MA)   Reason for Exam: fall, neck pain     FINDINGS:   BONES/ALIGNMENT: Mild motion degradation this is most pronounced at C2. There is no acute fracture or traumatic malalignment. DEGENERATIVE CHANGES: Multilevel degenerate changes most pronounced C5-6 less   so C6-C7 prominent disc osteophyte complex mission at C5-6 causing canal   narrowing at this level.      SOFT TISSUES: There is no prevertebral soft tissue swelling. Impression:       Multilevel degenerate changes. No acute fracture traumatic malalignment      XR CHEST PORTABLE [[id number]] Collected: 06/23/21 1550     Order Status: Completed Updated: 06/23/21 1553     Narrative:       EXAMINATION:   ONE XRAY VIEW OF THE CHEST     6/23/2021 3:34 pm     COMPARISON:   None. HISTORY:   ORDERING SYSTEM PROVIDED HISTORY: other, slurrred speech, hypertension   TECHNOLOGIST PROVIDED HISTORY:   Reason for exam:->other, slurrred speech, hypertension   Reason for Exam: other, slurrred speech, hypertension   Acuity: Acute   Type of Exam: Initial   Additional signs and symptoms: na   Relevant Medical/Surgical History: diabetes, hypertension     FINDINGS:   The lungs are clear. The mediastinum and cardiac silhouette are unremarkable. Impression:       No acute abnormality.           Discharge Time of 45 minutes    Electronically signed by Andi Hall MD on 6/29/2021 at 11:02 AM

## 2021-06-29 NOTE — CONSULTS
Endocrinology   Consult Note      Dear Doctor  Meri// Veronica Never    Thank You for the Consult     Pt. Was Admitted for : On June 23, 2021 for slurred speech aphasia hallucination and confusion possibly UTI  Was transferred to acute rehab unit today 6/29/2021    Reason for Consult: Letter control of blood glucose    History Obtained From:  Patient/ EMR       HISTORY OF PRESENT ILLNESS:                The patient is a 70 y.o. 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 Has not been taking her medicine including diabetic medicine for last several months. She lives alone by herself. I was  consulted for better control of blood glucose. ROS:   Pt's ROS done in detail. Abnormal ROS are noted in Medical and Surgical History Section below: Other Medical History:        Diagnosis Date    Arthritis     Cerebral artery occlusion with cerebral infarction ([address]     Diabetes mellitus ([address]     Hyperlipidemia     Hypertension      Surgical History:        Procedure Laterality Date    BLADDER SUSPENSION      COLONOSCOPY  3/23/15    polyps, int hem    DILATION AND CURETTAGE OF UTERUS      HYSTERECTOMY         Allergies:  Patient has no known allergies.     Family History:       Problem Relation Age of Onset    No Known Problems Mother     No Known Problems Father      REVIEW OF SYSTEMS:  Review of System Done as noted above     PHYSICAL EXAM:      Vitals:    BP (!) 164/75   Pulse 102   Temp 98.8 °F (37.1 °C) (Oral)   Resp 16   Ht 5' 2\" (1.575 m)   Wt 239 lb (108.4 kg)   SpO2 100%   BMI 43.71 kg/m²     CONSTITUTIONAL:  awake, alert, cooperative, appears stated age   EYES:  vision intact Fundoscopic Exam not performed   ENT:Normal  NECK:  Supple, No JVD. Thyroid Exam:Normal   LUNGS:  Has Vesicular Breath Sounds,   CARDIOVASCULAR:  Normal apical impulse, regular rate and rhythm, normal S1 and S2, no S3 or S4, and has no  murmur   ABDOMEN:  No scars, normal bowel sounds, soft, non-distended, non-tender, no masses palpated, no hepatolienomegaly  Musculoskeletal: Normal  Extremities: Normal, peripheral pulses normal, , has no edema   NEUROLOGIC:  Awake, alert, oriented to name, place and time. Cranial nerves II-XII are grossly intact.   Motor is right-sided weakness sensory stable neuropathy t. ,  and gait is abnormal.  Stable  DATA:    CBC:   Recent Labs     06/28/21  1349   WBC 6.0   HGB 16.6*       CMP:  Recent Labs     06/28/21  1349      K 3.7      CO2 23   BUN 14   CREATININE 0.9   CALCIUM 9.3     Lipids:   Lab Results   Component Value Date    CHOL 175 06/24/2021    CHOL 188 12/15/2014    HDL 39 06/24/2021    TRIG 144 06/24/2021     Glucose:   Recent Labs     06/29/21  1226 06/29/21  1554 06/29/21  1713   POCGLU 191* 154* 112*     Hemoglobin A1C:   Lab Results   Component Value Date    LABA1C 11.5 06/23/2021     Free T4:   Lab Results   Component Value Date    T4FREE 0.93 05/09/2016     Free T3: No results found for: FT3  TSH High Sensitivity:   Lab Results   Component Value Date    TSHHS 2.010 06/25/2021       Echocardiogram transesophageal    Result Date: 6/28/2021  Transesophageal Echocardiography Report (SENA)   Demographics   Patient Name       [facility]   Date of Study       06/28/2021   Date of Birth      1950         Gender              Female   Age                70 year(s)         Race                   Patient Number     [id number]         Room Number         2015   Visit Number       [id number]   Corporate ID       [id number] Accession Number   [id number]         [facility]                                                            KATELYN GAYMS   Ordering Physician Davion Meneses MD                 Physician           Christel Pruitt MD  Procedure Type of Study   SENA procedure:ECHOCARDIOGRAM TRANSESOPHAGEAL. Procedure Date Date: 06/28/2021 Start: 10:36 AM Study Location: [facility] - Echo Lab Technical Quality: Good visualization Indications:CVA. Patient Status: Routine Height: 62 inches Weight: 230 pounds BSA: 2.03 m2 BMI: 42.07 kg/m2 HR: 65 bpm BP: 151/82 mmHg SENA Performed By: the attending and the sonographer  Type of Anesthesia: Moderate sedation   Conclusions   Summary  Left ventricular systolic function is normal.  Ejection fraction is visually estimated at 55-60%. No evidence of thrombus within the left atrial appendage. Negative bubble study; no ASD or PFO noted. Mild mitral regurgitation.    Signature   ------------------------------------------------------------------  Electronically signed by Trini Davis MD  (Interpreting physician) on 06/28/2021 at 02:23 PM         ECHO Complete 2D W Doppler W Color    Result Date: 6/24/2021  Transthoracic Echocardiography Report (TTE)  Demographics   Patient Name       Clance Railing   Date of Study       06/24/2021   Date of Birth      1950         Gender              Female   Age                70 year(s)         Race                   Patient Number     [id number]         Room Number         2015   Visit Number       [id number]   Corporate ID       [id number]   Accession Number   [id number]         Nicki Fierro RDMS   Ordering Physician Antonio Santillan MD  Procedure Type of Study   TTE procedure:ECHOCARDIOGRAM COMPLETE 2D W DOPPLER W COLOR. Procedure Date Date: 06/24/2021 Start: 08:44 AM Study Location: Portable Technical Quality: Technically difficult study Indications:CVA. Patient Status: Routine Height: 62 inches Weight: 230 pounds BSA: 2.03 m2 BMI: 42.07 kg/m2 HR: 55 bpm BP: 146/124 mmHg  Conclusions   Summary  Technically difficult examination. Left ventricular systolic function is normal.  Ejection fraction is visually estimated at 50-55%. Moderate left ventricular hypertrophy. Mildly dilated right ventricle. No evidence of right heart strain. Sclerotic, but non-stenotic aortic valve. No evidence of any pericardial effusion. Signature   ------------------------------------------------------------------  Electronically signed by Denae Larson MD (Interpreting  physician) on 06/24/2021 at 05:26 PM    5      CT HEAD WO CONTRAST    Result Date: 6/28/2021  EXAMINATION: CT OF THE HEAD WITHOUT CONTRAST  6/23/2021 2:40 pm     Moderate-sized low-attenuation in the right temporal lobe concerning for acute infarct. MRI brain recommended to confirm. No acute intracranial hemorrhage. CT CERVICAL SPINE WO CONTRAST    Result Date: 6/23/2021  EXAMINATION: CT OF THE CERVICAL SPINE WITHOUT CONTRAST 6/23/2021 3:40 pm    Multilevel degenerate changes. No acute fracture traumatic malalignment     XR CHEST PORTABLE    Result Date: 6/23/2021  EXAMINATION: ONE XRAY VIEW OF THE CHEST 6/23/2021 3:34 pm COMPARISON: None. HISTORY: ORDERING SYSTEM PROVIDED HISTORY: other, slurrred speech, hypertension TECHNOLOGIST PROVIDED HISTORY: Reason for exam:->other, slurrred speech, hypertension Reason for Exam: other, slurrred speech, hypertension Acuity: Acute Type of Exam: Initial Additional signs and symptoms: na Relevant Medical/Surgical History: diabetes, hypertension FINDINGS: The lungs are clear. The mediastinum and cardiac silhouette are unremarkable. No acute abnormality.      CTA HEAD NECK W CONTRAST    Result Date: 6/23/2021  EXAMINATION: CTA OF THE HEAD AND NECK WITH CONTRAST 6/23/2021 11:17 pm      Mild tapering versus mild to moderate areas stenosis involving the right MCA M2 branches. Otherwise no large vessel occlusion or hemodynamic stenosis involving remainder of the vessels intracranially. Stable appearance of the age-indeterminate occlusion proximal V2 segment left vertebral artery with retrograde opacification of the V4 segment. 75% narrowing right proximal ICA and 50% narrowing left proximal ICA per NASCET criteria The findings were sent to the Radiology Results [address] at 11:44 pm on 6/23/2021to be communicated to a licensed caregiver. MRI brain without contrast    Result Date: 6/25/2021  EXAMINATION: MRI OF THE BRAIN WITHOUT CONTRAST  6/25/2021 6:18 pm       1. Patient motion limits evaluation. 2. There is a moderate to large acute infarct involving the right temporal lobe within the right MCA territory. 3. An acute infarct is also seen involving the left paramedian ian. 4. No significant mass effect or midline shift. 5. Loss of the normal signal void is again seen within the proximal left V4 segment, which can be seen with slow flow versus occlusion. 6. Mild global parenchymal volume loss with chronic microvascular ischemic changes. These results were sent to the Results [address] ([address] [facility]) on 6/25/2021 at 7:00 pm to be communicated to the referring/covering health care provider/office.        Scheduled Medicines   Medications:    [START ON 6/30/2021] enoxaparin  40 mg Subcutaneous Daily    atorvastatin  80 mg Oral Nightly    [START ON 6/30/2021] aspirin  81 mg Oral Daily    Or    [START ON 6/30/2021] aspirin  300 mg Rectal Daily    [START ON 6/30/2021] amLODIPine  5 mg Oral Daily    insulin lispro  16 Units Subcutaneous TID WC    metoprolol tartrate  50 mg Oral BID    [START ON 6/30/2021] clopidogrel  75 mg Oral Daily    [START ON 3/63/3236] folic acid-pyridoxine-cyancobalamin  1 tablet Oral Daily    insulin glargine  45 Units Subcutaneous Nightly    insulin lispro  0-6 Units Subcutaneous TID WC    insulin lispro  0-3 Units Subcutaneous 2 times per day      Infusions:       IMPRESSION    Patient Active Problem List   Diagnosis    Vitamin D deficiency    Hyperlipidemia    Back pain, chronic    Hemiparesis of right dominant side as late effect of cerebral infarction ([facility]    Facial droop    Hypokalemia    Essential hypertension    Hyperglycemia    TIA (transient ischemic attack)    Class 3 severe obesity due to excess calories with body mass index (BMI) of 45.0 to 49.9 in adult [facility])    Cerebrovascular accident (CVA) ([facility]    Dysarthria due to acute stroke ([facility]    Dysphagia due to recent cerebral infarction    Uncontrolled type 2 diabetes mellitus with hyperglycemia ([facility]    Morbid obesity with BMI of 45.0-49.9, adult ([facility]    Incontinence in female    Symptomatic stenosis of right carotid artery    Acute CVA (cerebrovascular accident) ([facility]         RECOMMENDATIONS:      1. Reviewed POC blood glucose . Labs and X ray results   2. Reviewed Home and Current Medicines   3. Will Start On meal/ Correction bolus Humalog/ Lantus Insulin regime  4. Monitor Blood glucose frequently / OHGD  as needed        Will follow with you  Again thank you for sharing pt's care with me.      Truly yours,       Ivett Franks MD

## 2021-06-29 NOTE — PROGRESS NOTES
Patient was a new admit from [facility]. He is awake alert and orient x 4. No chest pain or shortness of breath. Informed of safety and call system. In a sinus rhythm.   Today's plan:  SENA done, essentially normal SENA,       Admit Date:  6/23/2021    Subjective: ok      Chief complaints on admission  Chief Complaint   Patient presents with    Fatigue     Pt lives in an apartment and called EMS for fatigue, slurred speech, and visual hallucinations x1 week.  Aphasia    Hallucinations         History of present illness:Sarai is a 70 y. o.year old who  presents with had concerns including Fatigue (Pt lives in an apartment and called EMS for fatigue, slurred speech, and visual hallucinations x1 week. ), Aphasia, and Hallucinations. Past medical history:    has a past medical history of Arthritis, Cerebral artery occlusion with cerebral infarction ([facility] Diabetes mellitus ([facility] Hyperlipidemia, and Hypertension. Past surgical history:   has a past surgical history that includes Hysterectomy; bladder suspension; Dilation and curettage of uterus; and Colonoscopy (3/23/15). Social History:   reports that she quit smoking about 4 years ago. Her smoking use included cigarettes. She started smoking about 50 years ago. She has a 20.00 pack-year smoking history. She has never used smokeless tobacco. She reports that she does not drink alcohol and does not use drugs. Family history:  family history includes No Known Problems in her father and mother. No Known Allergies      Objective:   BP (!) 145/75   Pulse 64   Temp 99.5 °F (37.5 °C) (Oral)   Resp 13   Ht 5' 2\" (1.575 m)   Wt 243 lb 2.7 oz (110.3 kg)   SpO2 94%   BMI 44.48 kg/m²     No intake or output data in the 24 hours ending 06/29/21 1344    TELEMETRY: Sinus     Physical Exam:  Constitutional:  Well developed, Well nourished, No acute distress, Non-toxic appearance. HENT:  Normocephalic, Atraumatic, Bilateral external ears normal, Oropharynx moist, No oral exudates, Nose normal. Neck- Normal range of motion, No tenderness, Supple, No stridor. Eyes:  PERRL, EOMI, Conjunctiva normal, No discharge. Respiratory:  Normal breath sounds, No respiratory distress, No wheezing, No chest tenderness. ,no use of accessory muscles, diaphragm movement is normal  Cardiovascular: (PMI) apex non displaced,no lifts no thrills, no s3,no s4, Normal heart rate, Normal rhythm, No murmurs, No rubs, No gallops. Carotid arteries pulse and amplitude are normal no bruit, no abdominal bruit noted ( normal abdominal aorta ausculation), femoral arteries pulse and amplitude are normal no bruit, pedal pulses are normal  GI:  Bowel sounds normal, Soft, No tenderness, No masses, No pulsatile masses. : External genitalia appear normal, No masses or lesions. No discharge. No CVA tenderness. Musculoskeletal:  Intact distal pulses, No edema, No tenderness, No cyanosis, No clubbing. Good range of motion in all major joints. No tenderness to palpation or major deformities noted. Back- No tenderness. Integument:  Warm, Dry, No erythema, No rash. Lymphatic:  No lymphadenopathy noted. Neurologic:  Alert & oriented x 3, Normal motor function, Normal sensory function, No focal deficits noted.    Psychiatric:  Affect normal, Judgment normal, Mood normal.     Medications:    folic acid-pyridoxine-cyancobalamin  1 tablet Oral Daily    atorvastatin  80 mg Oral Nightly    aspirin  81 mg Oral Daily    Or    aspirin  300 mg Rectal Daily    insulin lispro  0-6 Units Subcutaneous 4x Daily AC & HS    amLODIPine  5 mg Oral Daily    clopidogrel  75 mg Oral Daily    insulin glargine  50 Units Subcutaneous Nightly    insulin lispro  16 Units Subcutaneous TID WC    metoprolol tartrate  50 mg Oral BID    pantoprazole  40 mg Oral QAM AC    sodium chloride flush  5-40 mL Intravenous BID    enoxaparin  40 mg Subcutaneous Daily    trospium  20 mg Oral BID AC      dextrose      sodium chloride       glucose, dextrose, glucagon (rDNA), dextrose, labetalol, sodium chloride flush, sodium chloride, ondansetron **OR** ondansetron, polyethylene glycol    Lab Data:  CBC:   Recent Labs     06/28/21  1349   WBC 6.0   HGB 16.6*   HCT 51.9*   MCV 93.2        BMP:   Recent Labs     06/28/21  1349      K 3.7      CO2 23   BUN 14   CREATININE 0.9     LIVER PROFILE: No results for input(s): AST, ALT, LIPASE, BILIDIR, BILITOT, ALKPHOS in the last 72 hours. Invalid input(s): AMYLASE,  ALB  PT/INR: No results for input(s): PROTIME, INR in the last 72 hours. APTT: No results for input(s): APTT in the last 72 hours. BNP:  No results for input(s): BNP in the last 72 hours. TROPONIN: @TROPONINI:3@      Assessment:  70 y. o.year old who is admitted for          Plan:  No afib noted, SENA results are above  DM: stable  HTN: relatively high, she had Stoke recently, will not be very aggressive at this time to continue cerebral perfusion, once she recovers, will start to aggresively control her blood pressure/.  RT TEMPORAL LOBE ACUTE INFARCTION: as per neurolog  All labs, medications and tests reviewed, continue all other medications of all above medical condition listed as is.       Adolfo Fernandez MD, MD 6/29/2021 1:44 PM

## 2021-06-30 LAB
GLUCOSE BLD-MCNC: 138 MG/DL (ref 70–99)
GLUCOSE BLD-MCNC: 151 MG/DL (ref 70–99)
GLUCOSE BLD-MCNC: 160 MG/DL (ref 70–99)
GLUCOSE BLD-MCNC: 201 MG/DL (ref 70–99)
GLUCOSE BLD-MCNC: 204 MG/DL (ref 70–99)
GLUCOSE BLD-MCNC: 222 MG/DL (ref 70–99)

## 2021-06-30 PROCEDURE — 99232 SBSQ HOSP IP/OBS MODERATE 35: CPT | Performed by: PHYSICAL MEDICINE & REHABILITATION

## 2021-06-30 PROCEDURE — 97535 SELF CARE MNGMENT TRAINING: CPT

## 2021-06-30 PROCEDURE — 82962 GLUCOSE BLOOD TEST: CPT

## 2021-06-30 PROCEDURE — 94150 VITAL CAPACITY TEST: CPT

## 2021-06-30 PROCEDURE — 6370000000 HC RX 637 (ALT 250 FOR IP): Performed by: PHYSICAL MEDICINE & REHABILITATION

## 2021-06-30 PROCEDURE — 6370000000 HC RX 637 (ALT 250 FOR IP): Performed by: INTERNAL MEDICINE

## 2021-06-30 PROCEDURE — 97542 WHEELCHAIR MNGMENT TRAINING: CPT

## 2021-06-30 PROCEDURE — 97166 OT EVAL MOD COMPLEX 45 MIN: CPT

## 2021-06-30 PROCEDURE — 92523 SPEECH SOUND LANG COMPREHEN: CPT

## 2021-06-30 PROCEDURE — 6360000002 HC RX W HCPCS: Performed by: INTERNAL MEDICINE

## 2021-06-30 PROCEDURE — 94010 BREATHING CAPACITY TEST: CPT

## 2021-06-30 PROCEDURE — 97163 PT EVAL HIGH COMPLEX 45 MIN: CPT

## 2021-06-30 PROCEDURE — 97530 THERAPEUTIC ACTIVITIES: CPT

## 2021-06-30 PROCEDURE — 1280000000 HC REHAB R&B

## 2021-06-30 RX ADMIN — POLYETHYLENE GLYCOL (3350) 17 G: 17 POWDER, FOR SOLUTION ORAL at 05:51

## 2021-06-30 RX ADMIN — ENOXAPARIN SODIUM 40 MG: 40 INJECTION SUBCUTANEOUS at 08:45

## 2021-06-30 RX ADMIN — ASPIRIN 81 MG: 81 TABLET, COATED ORAL at 08:44

## 2021-06-30 RX ADMIN — METOPROLOL TARTRATE 50 MG: 50 TABLET, FILM COATED ORAL at 20:04

## 2021-06-30 RX ADMIN — ATORVASTATIN CALCIUM 80 MG: 40 TABLET, FILM COATED ORAL at 20:04

## 2021-06-30 RX ADMIN — CLOPIDOGREL BISULFATE 75 MG: 75 TABLET ORAL at 08:44

## 2021-06-30 RX ADMIN — INSULIN GLARGINE 45 UNITS: 100 INJECTION, SOLUTION SUBCUTANEOUS at 20:10

## 2021-06-30 RX ADMIN — Medication 1 TABLET: at 08:45

## 2021-06-30 RX ADMIN — ACETAMINOPHEN 650 MG: 325 TABLET ORAL at 05:51

## 2021-06-30 RX ADMIN — AMLODIPINE BESYLATE 5 MG: 5 TABLET ORAL at 08:44

## 2021-06-30 RX ADMIN — METOPROLOL TARTRATE 50 MG: 50 TABLET, FILM COATED ORAL at 08:44

## 2021-06-30 ASSESSMENT — PAIN SCALES - GENERAL
PAINLEVEL_OUTOF10: 0
PAINLEVEL_OUTOF10: 0

## 2021-06-30 NOTE — PROGRESS NOTES
Speech Language Pathology  Facility/Department: [facility] ARU  Initial Speech/Language/Cognitive Assessment    NAME: Jacob Dukes  : 1950   MRN: [id number]  ADMISSION DATE: 2021  ADMITTING DIAGNOSIS: has Vitamin D deficiency; Hyperlipidemia; Back pain, chronic; Hemiparesis of right dominant side as late effect of cerebral infarction ([address] Facial droop; Hypokalemia; Essential hypertension; Hyperglycemia; TIA (transient ischemic attack); Class 3 severe obesity due to excess calories with body mass index (BMI) of 45.0 to 49.9 in adult [facility]); Cerebrovascular accident (CVA) ([address] Dysarthria due to acute stroke ([address] Dysphagia due to recent cerebral infarction; Uncontrolled type 2 diabetes mellitus with peripheral neuropathy ([address] Morbid obesity with BMI of 40.0-44.9, adult ([address] Incontinence in female; Symptomatic stenosis of right carotid artery; Acute CVA (cerebrovascular accident) ([address] Ataxia;  Acute cystitis without hematuria; and History of atrial fibrillation on their problem list.  DATE ONSET:     Date of Eval: 2021   Evaluating Therapist: SHAGGY Coleman    RECENT RESULTS  CT OF HEAD/MRI:  EXAMINATION:   MRI OF THE BRAIN WITHOUT CONTRAST  2021 6:18 pm       TECHNIQUE:   Multiplanar multisequence MRI of the brain was performed without the   administration of intravenous contrast.       COMPARISON:   10/07/2020.       HISTORY:   ORDERING SYSTEM PROVIDED HISTORY: cva   TECHNOLOGIST PROVIDED HISTORY:   Reason for exam:->cva   Decision Support Exception - unselect if not a suspected or confirmed   emergency medical condition->Emergency Medical Condition (MA)   Reason for Exam: cva   Acuity: Acute   Type of Exam: Initial   Relevant Medical/Surgical History: none       FINDINGS:   Motion degrades images limiting evaluation.       INTRACRANIAL STRUCTURES/VENTRICLES: Acute infarct is seen involving the left   paramedian ian.  Acute infarct is also seen involving the right temporal lobe within the right MCA territory.  Minimal sulcal effacement is seen   involving the right temporal lobe infarct.  No mass effect or midline shift. Areas of T2 FLAIR hyperintensity are seen in the periventricular and   subcortical white matter, which are nonspecific, but may represent chronic   microvascular ischemic change.  There is prominence of the ventricles and   sulci due to global parenchymal volume loss.  No convincing acute   intracranial hemorrhage.  Loss of the normal signal void again seen in the   left vertebral artery.       ORBITS: The visualized portion of the orbits demonstrate no acute abnormality.       SINUSES: The visualized paranasal sinuses and mastoid air cells are well   aerated.       BONES/SOFT TISSUES: The bone marrow signal intensity appears normal. The soft   tissues demonstrate no acute abnormality.           Impression   1. Patient motion limits evaluation. 2. There is a moderate to large acute infarct involving the right temporal   lobe within the right MCA territory. 3. An acute infarct is also seen involving the left paramedian ian. 4. No significant mass effect or midline shift. 5. Loss of the normal signal void is again seen within the proximal left V4   segment, which can be seen with slow flow versus occlusion. 6. Mild global parenchymal volume loss with chronic microvascular ischemic   changes. These results were sent to the [employer] [address] ([address] [place] [address]) on   6/25/2021 at 7:00 pm to be communicated to the referring/covering health care   provider/office.             Primary Complaint:  Pt is a 71 yo woman with a PMH significant for CVA (10/2020 L paramedian ian with residual R hemiparesis) with ARU stay, uncontrolled DM2 with hyperglycemia, HTN, hx afib, BMI 42 who was recently admitted to ARU following admission to ED on 6/23 with fatigue, slurred speech, visual hallucinations, communication difficulty.  Pt with previously known R internal carotid artery stenosis Oct 2020, MRI brain on 6/25 ID with mod-large acute infarct involving R temporal lobe within R MCA territory. Pain:  Pain Assessment  Pain Assessment: 0-10  Pain Level: 0 (premedicate for therapy)  Patient's Stated Pain Goal: No pain    Assessment:  Communication Diagnosis: Moderate cognitive communication impairment. Cognitive impairment characterized by dificulty with sustained attention, thought organization, delayed/slow processing, insight to difficulties. Language difficulty characterized by difficulty with 2+ step directions, L/R confusion, perseveration within category (day of week vs month vs year), difficulty with self advocating during moments of confusion, story retelling, following complex conversational tasks. Noted also poor breath support for speech affecting intelligibility throughout functional conversation tasks. Recommendations:  Requires SLP Intervention: Yes  Duration/Frequency of Treatment: 3xwk x2wks 30 mins minimum  D/C Recommendations: To be determined  Referral To: Dysphagia evaluation    Plan:   Goals:  Short-term Goals  Goal 1: Pt will participate in vocal strength/breath support exercises for improved intelligbility to 100% in nonstructured conversational tasks. Goal 2: Pt will problem solve/self advocate during times of confusion/difficulty with processing/understanding in 9/10 opportunities in order to improve participation with functional/instruction based tasks. Goal 3: Pt will participate in receptive/expressive category/association tasks with appropriate self corection in 95% opportunities occasional cues in order to improve topic maintenance. Goal 4: Pt will improve L/R discrimination with 2 step directions in 9/10 opportunities in order facilitate completion of functional tasks.   Goal 5: Pt will independently respond to written environmental supports r/t safety/medical information in 95% opportunities in order to reduce risk of further medical complication/repeat hospitalization. Long-term Goals  Goal 1: Pt will improve cognitive communication from mod to set up/supervision in order to return home to [facility]. Patient/family involved in developing goals and treatment plan: Family not in room. Subjective:   Previous level of function and limitations: Prior L paramedian ian infarct with associated R hemiparesis. General  Chart Reviewed: Yes  Patient assessed for rehabilitation services?: Yes  Subjective  Subjective: Pt pleasant and cooperative, some difficulty with attention to task. Social/Functional History  Lives With: Significant other  Type of Home: Apartment  Home Layout: One level  Home Access: Ramped entrance;Elevator  Entrance Stairs - Number of Steps: 3rd floor  Bathroom Shower/Tub: Tub only  Bathroom Toilet: Handicap height  Bathroom Equipment: Grab bars in shower; Tub transfer bench  Bathroom Accessibility: Accessible  Home Equipment: 4 wheeled walker; [address] Help From: Family  ADL Assistance: Independent  Homemaking Assistance: Needs assistance  Homemaking Responsibilities: No  Transfer Assistance: Needs assistance  Active : No  Patient's  Info: Pt's significant other Pavan Martinez  Occupation: On disability  Additional Comments: Pt appears to be a poor historian and inconsistent with some information provided  Vision  Vision: Impaired  Vision Exceptions: Wears glasses for reading  Hearing  Hearing: Within functional limits           Objective:     Oral/Motor  Oral Motor: Exceptions to [facility]  Lingual Strength: Reduced  Lingual Coordination: Reduced    Auditory Comprehension  Comprehension: Exceptions  Yes/No Questions: Mild  Complex Questions: Moderate  Two Step Basic Commands: Mild  Multistep Basic Commands: Moderate  Complex/Abstract Commands: Moderate  L/R Discrimination: Mild  Conversation: Mild  Interfering Components: Attention to detail;Processing speed; Attention - selective  Effective Techniques: Repetition;Visual/Gestural cues; Extra processing time    Reading Comprehension  Reading Status: Exceptions to [facility]  Scanning/Tracking Impairment Severity: Mild  Interfering Components: Attention to detail;Processing time  Effective Techniques: Prescription glasses/contact lenses; Tactile/visual cueing    Expression  Primary Mode of Expression: Verbal    Verbal Expression  Verbal Expression: Exceptions to functional limits  Repetition: Mild  Conversation: Mild  Interfering Components: Impaired thought organization;Perseverations;Limited error awareness  Effective Techniques: Decreased rate;Provide extra time         Motor Speech  Motor Speech: Exceptions to [facility]  Intelligibility: Conversation-90%  Dysarthria : Mild-low energy/lethargy vs true weakness         Cognition:      Orientation  Overall Orientation Status: Impaired- language perseveration vs poor orientation   Attention  Attention: Exceptions to [facility]  Alternating Attention: Mild  Memory  Memory: Exceptions to [facility]  Working Memory: Moderate (processing vs WM)  Problem Solving  Problem Solving: Exceptions to [facility]  Simple Functional Tasks: Mild  Safety/Judgement  Safety/Judgement: Exceptions to [facility]  Novel Situations: Mild  Unable to Self-monitor and Self-correct Consistently: Mild  Insight: Mild    Impulsive: Mild  Flexibility of Thought: Mild    Additional Assessments:    Pt was assessed with the [address]  Screening Test (MAST) is a brief, repeatable screening measure for individuals with severity impaired communication/language skills. The MAST assesses expressive and receptive language.     Expressive Index:                                                        Recepetive Index:    Naming:   10/10                                                                Yes/No:   17/10  Automatic speech:   9/10                                                Object Recognition:   10/10  Repetition       6/10 Following Instructions:    8/10  Writin/10                                                                  Reading Instructions:     7/10  Verbal fluency:   5/10       Total Index  Expressive:    32/50  Receptive:     3250    Total Score   64/100      The Brief Cognitive Assessment Tool (BCAT®) Short Form was administered 2021 to assess the following cognitive domains: orientation, verbal recall,  attention, abstraction, language, and executive functions. . The emphasis of the tool is assessing contextual memory, executive functions, and attentional capacity. Cognitive Impairment Scale:  NORMAL: 21  CUT SCORE 16 COGNITIVE IMPAIRMENT  15 SHOWS SIGNS OF DEMENTIA      Pt achieved a score of 7/21 indicating moderate cognitive impairment at this time. Ability to immediately recall a word list of 4 items: banana/justice/Felicita/bridge. Pt able to complete immediate recall 3/4 and delayed recall 1/4  Recall previously presented words over a time delay and recall elements of a story and previously presented pictures/story  Story specifics included:  Brian Grow / borrowed / $9 / from her brother / Bridget Champagne / last week. / She couldn't pay him back / because she bought /a delicious / ice cream cone / at the circus instead. Pts immediate response was  and delayed response:   Strategies that improved performance included: ? repetition  ? practical application  ? spaced retrieval   X choice cues    Prognosis:  Speech Therapy Prognosis  Prognosis: Good  Prognosis Considerations: Previous Level of Function  Individuals consulted  Consulted and agree with results and recommendations: Patient    Education:  Patient Education: recommendations/POC  Patient Education Response: Verbalizes understanding;Needs reinforcement  Safety Devices in place: Yes  Type of devices:  All fall risk precautions in place    Therapy Time:   Individual   Time In 952   Time Out 1037   Minutes 45      Timed Code Treatment Minutes: 45 Minutes  Total Treatment Time: [address] [facility] Viola Monroy  6/30/2021 11:26 AM

## 2021-06-30 NOTE — PROGRESS NOTES
Physical Therapy    [facility] ARU PHYSICAL THERAPY EVALUATION    Chart Review:  Past Medical History:   Diagnosis Date    Arthritis     [address]erebral artery occlusion with [address] [address] [address]     Diabetes mellitus ([facility] [address]     Hyperlipidemia     Hypertension      Past Surgical History:   Procedure Laterality Date    BLADDER SUSPENSION      COLONOSCOPY  3/23/15    polyps, int hem    DILATION AND CURETTAGE OF UTERUS      HYSTERECTOMY       Fall History: 1 requiring this hospitalization   Social History:  Social/Functional History  Lives With: Significant other (Duane)  Type of Home: Apartment  Home Layout: One level  Home Access: Ramped entrance, Elevator  Entrance Stairs - Number of Steps: 3rd floor  Bathroom Shower/Tub: Tub only  Bathroom Toilet: Handicap height  Bathroom Equipment: Grab bars in shower, Tub transfer bench  Bathroom Accessibility: Accessible  Home Equipment: 4 wheeled walker, [address] Help From: Family  ADL Assistance: Independent  Homemaking Assistance: Needs assistance  Homemaking Responsibilities: No  Ambulation Assistance:  (was using w/c for mobility at baseline)  Transfer Assistance: Needs assistance  Active : No  Patient's  Info: Pt's significant other Duane  Mode of Transportation: Car  Occupation: On disability  Additional Comments: Pt appears to be a poor historian and inconsistent with some information provided; sleeps in regular, flat bed; 1 recent fall requring this hospitalization    Restrictions:  Restrictions/Precautions  Restrictions/Precautions: General Precautions, Fall Risk  Position Activity Restriction  Other position/activity restrictions: , IV access RUE    Subjective: Pt in bed, trying to call her fiance and required assist to use the hospital phone, alert. Pt hesitant about standing and walking stating \"I'll do it next week. \"    Pain Level: None reported at rest and with activities        Objective:  Orientation  Overall Orientation Status: Within Functional Limits  Orientation Level: Oriented X4        Vision  Vision: Impaired  Vision Exceptions: Wears glasses for reading  Hearing  Hearing: Within functional limits    ROM:      AROM RLE (degrees)  RLE AROM: WFL     AROM LLE (degrees)  LLE AROM : WFL                 Strength:    Strength RLE  Comment: at least 3/5;had inconsistent command following when manual resistance was applied  Strength LLE  Comment: at least 3/5;had inconsistent command following when manual resistance was applied              Bed Mobility:   Lying to Sitting on Side of Bed  Assistance Needed: Supervision or touching assistance  Physical Assistance Level: Less than 25%  Comment: CGA without use of bed features  CARE Score: 4  Discharge Goal: Independent  Roll Left and Right  Assistance Needed: Supervision or touching assistance  Comment: SBA without use of bed features  CARE Score: 4  Discharge Goal: Independent  Sit to Lying  Assistance Needed: Supervision or touching assistance  Comment: SBA without use of bed features  CARE Score: 4  Discharge Goal: Independent    Transfers:    Sit to Stand  Assistance Needed: Partial/moderate assistance  Comment: Min A using RW  CARE Score: 3  Discharge Goal: Supervision or touching assistance  Chair/Bed-to-Chair Transfer  Assistance Needed: Partial/moderate assistance  Comment: Min A using stand pivot with/without use of RW  CARE Score: 3  Discharge Goal: Supervision or touching assistance  Toilet Transfer  Assistance Needed: Partial/moderate assistance  Comment: Min A using grab bars  CARE Score: 3  Car Transfer  Comment: will require continued assessment due to time constraint today    Ambulation:   Device used PTA: manual w/c, rollator    Walking Ability  Does the Patient Walk?: Yes     Walk 10 Feet  Comment: pt was only able to ambulate 4 small steps using RW with 2-person assist today (Min A + SBA for close w/c follow due to pt's unpredictable behavior/insight to safety, balance, strength, and endurance)  Discharge Goal: Supervision or touching assistance     Walk 50 Feet with Two Turns  Reason if not Attempted: Not attempted due to medical condition or safety concerns  CARE Score: 88  Discharge Goal: Supervision or touching assistance     Walk 150 Feet  Comment: pt was not performing this at baseline and does not demonstrate potential to improve on this mobility task  Reason if not Attempted: Not applicable  CARE Score: 9  Discharge Goal: Not Applicable     Walking 10 Feet on Uneven Surfaces  Reason if not Attempted: Not attempted due to medical condition or safety concerns  CARE Score: 88  Discharge Goal: Supervision or touching assistance     1 Step (Curb)  Comment: pt was not performing this at baseline, however anticipating that pt can be trained in a controlled environment  Reason if not Attempted: Not attempted due to medical condition or safety concerns  CARE Score: 88  Discharge Goal: Partial/moderate assistance     4 Steps  Comment: pt was not performing this at baseline, however anticipating that pt can be trained in a controlled environment  Reason if not Attempted: Not attempted due to medical condition or safety concerns  CARE Score: 88  Discharge Goal: Partial/moderate assistance     12 Steps  Comment: pt was not performing this at baseline and does not demonstrate potential to improve on this mobility task  Reason if not Attempted: Not applicable  CARE Score: 9  Discharge Goal: Not Applicable    Gait Deviations: []None [x]Slow tello  [] Increased LILLIAN  [x] Staggers []Deviated Path  [x] Decreased step length  [x] Decreased step height  []Decreased arm swing  [] Shuffles  [] Decreased head and trunk rotation  []other:        Wheelchair:  w/c Ability: Wheelchair Ability  Uses a Wheelchair and/or Scooter?: Yes  Wheel 50 Feet with Two Turns  Assistance Needed: Partial/moderate assistance  Comment: pt was able to propel 32 ft with 1 turn with SBA-Sup using BUE (limited by fatigue)  CARE Score: 3  Discharge Goal: Independent  Wheel 150 Feet  Assistance Needed: Substantial/maximal assistance  Comment: pt was able to propel 32 ft with 1 turn with SBA-Sup using BUE (limited by fatigue)  CARE Score: 2  Discharge Goal: Independent          Balance:        Object: Picking Up Object  Comment: will need continued assessment due to time constraint today    Assessment:   The patient is a 70year old female admitted onto ARU after hospitalization for dysarthria with confusion and generalized clumsiness. Pt had hallucinations as well prior to admission but was likely due to UTI. Imaging revealed moderate to large acute infarct involving the right temporal lobe within the right MCA territory and an acute infarct involving the left paramedian ian. Pt with Hx of CVA with R sided deficit, DM, and HTN. Today, pt had active movements on BUE and BLE, had inconsistent command following, had poor safety awareness during toileting and toilet transfer that she had an intercepted fall when she proceeded with toilet->w/c transfer impulsively despite PT's redirection, and decreased motivation to attempt mobility tasks requiring constant encouragement. Pt's PLOF is unclear requiring verification from family to determine appropriateness of PT goals established and for discharge planning eventually. It is anticipated that pt will have variable participation/performance in mobility tasks due to her behavioral and cognitive deficits.        Body structures, Functions, Activity limitations: Decreased functional mobility , Decreased cognition, Decreased ADL status, Decreased endurance, Decreased strength, Decreased balance, Decreased safe awareness     Prognosis: Good, Guarded  Decision Making: High Complexity  Clinical Presentation: unstable/unpredictable characteristics      Patient education:   ARU schedule, ARU expectations for participation, plan of care  Treatment Initiated:  Functional mobility training, gait training, patient education  Barriers to Improvement: cognition, behavior, limited PLOF, status of DM and HTN  Discharge Recommendations:  [facility] PT  Equipment Recommendations:  Has manual w/c, will need verified status of RW that pt received during previous ARU stay     Goals:  Patient Goals   Patient goals : to go home  Short term goals  Time Frame for Short term goals: 10-12 tx days:  Short term goal 1: Pt will complete bed mobility tasks (rolling R/L, scooting, sup<->sit) Ind. Short term goal 2: Pt will complete OOB transfers using RW with SBA-Sup. Short term goal 3: Pt will ambulate 50 ft with turns overl level surface and 10 ft of uneven surface using RW with CGA. Short term goal 4: Pt will ascend/descend curb step using RW and 4-5 steps using railings with Min A. Short term goal 5: Pt will propel manual w/c 150 ft Mod Ind.      Plan:    REQUIRES PT FOLLOW UP: Yes  Pt will be seen at least 60 minutes per day for a minimum of 5 days per week, plus group therapy as appropriate  Plan  Times per day: Daily  Current Treatment Recommendations: Strengthening, Gait Training, Patient/Caregiver Education & Training, Stair training, Equipment Evaluation, Education, & procurement, Balance Training, Cognitive/Perceptual Training, Functional Mobility Training, Endurance Training, Home Exercise Program, Transfer Training, Wheelchair Mobility Training, Safety Education & Training    PT Individual Minutes  Time In: 1300  Time Out: 1408  Minutes: 68   Variance: +8  Reason: extra time required to complete tasks and to encourage pt         Timed Code Treatment Minutes: 53 Minutes    Number of Minutes/Billable Intervention      PT Evaluation 15   Gait Training    Therapeutic Exercise    Neuro Re-Ed    Therapeutic Activity 38   Wheelchair Propulsion 15   Group    Other:    TOTAL 68       Electronically signed by:    Priyanka Rosas, PT  6/30/2021, 16:50

## 2021-06-30 NOTE — H&P
Pablito Berrios    : 1950  Acct #: [de-identified]  MRN: [id number]              History and physical      Admitting diagnosis: Acute cerebrovascular accident ([address])    Comorbid diagnoses impacting rehabilitation: Ataxia, dysarthria, gait disturbance, uncontrolled diabetes type 2 with peripheral neuropathy, essential hypertension, history of atrial fibrillation, acute urinary tract infection, morbid obesity (BMI 44.5)    Chief complaint: Generalized weakness and dizziness when arising. History of present illness: The patient is a 77-year-old right-hand-dominant female with multiple stroke risk factors including a prior stroke with chronic right hemiparesis. She was in her usual state of fair health until this past week. Her family reports an increased incidence of hallucinations, difficulty speaking and clumsiness. Eventually she suffered another fall and her family became concerned. On 2020 when she was brought to our ED where the patient was dysarthric with confusion and generalized clumsiness. She did not have localizing weakness but brain imaging did show a mature right temporal lobe infarction. Her primary manifestation has been clumsiness of both her arms and her legs. There have been no seizure activities noted but she has had a urinary tract infection that may have been contributing to her hallucinations. She has been treated with IV antibiotics, fluid resuscitation and occupational physical therapy. She has been unable to do her own toileting, transfers and self-care and is not safe to return home at this time. She requires inpatient rehabilitation at this time. Review of systems: Dizziness with position changes. Poor sleep. Fair appetite. No nausea, chills or cough. Clumsiness in her hands and feet. Generally, she feels tired. The remainder of their review of systems was negative except as mentioned in the history of present illness.     Social History: Lives With: Significant other  Type of Home: Apartment  Home Layout: One level  Home Access: Stairs to enter with rails (3rd floor, elevator used)  Entrance Stairs - Number of Steps: 3rd floor  Bathroom Shower/Tub: Walk-in shower  Bathroom Toilet: Handicap height  Bathroom Equipment: Shower chair, Grab bars in shower  Bathroom Accessibility: Accessible  Home Equipment: 4 wheeled walker, [employer] (rollator)  Receives Help From: Family  ADL Assistance: Needs assistance (significant other helps with transfers)  Homemaking Assistance: Needs assistance (significant other performs all)  Homemaking Responsibilities: No  Transfer Assistance: Needs assistance  Active : No  Patient's  Info: Pt's significant other Luz Freedman  Occupation: On disability  Additional Comments: Pt reports recent falls      She reports that she quit smoking about 4 years ago. Her smoking use included cigarettes. She started smoking about 50 years ago. She has a 20.00 pack-year smoking history. She has never used smokeless tobacco. She reports that she does not drink alcohol and does not use drugs. Prior (baseline) level of function: Contact-guard assistance with most mobility and self-care. Current level of function: Maximum physical assistance needed for mobility and self-care. Allergies:  Patient has no known allergies.     Past Medical History:   Past Medical History:   Diagnosis Date    Arthritis     [facility] occlusion [facility]     Diabetes mellitus ([facility]     Hyperlipidemia     Hypertension         Past Surgical History:     Past Surgical History:   Procedure Laterality Date    BLADDER SUSPENSION      COLONOSCOPY  3/23/15    polyps, int hem    DILATION AND CURETTAGE OF UTERUS      HYSTERECTOMY         Current Medications:     Current Facility-Administered Medications:     acetaminophen (TYLENOL) tablet 650 mg, 650 mg, Oral, Q4H PRN, LIZBET Gomes MD    polyethylene glycol [facility]) packet 17 g, 17 g, Oral, Daily PRN, LIZBET Simon MD    magnesium hydroxide (MILK OF MAGNESIA) 400 MG/5ML suspension 30 mL, 30 mL, Oral, Daily PRN, MD Karolyn Ortega ON 6/30/2021] enoxaparin (LOVENOX) injection 40 mg, 40 mg, Subcutaneous, Daily, Bell Wagner MD    atorvastatin (LIPITOR) tablet 80 mg, 80 mg, Oral, Nightly, Bell Wagner MD, 80 mg at 06/29/21 2001    polyethylene glycol (GLYCOLAX) packet 17 g, 17 g, Oral, Daily PRN, MD Karolyn Martell  [START ON 6/30/2021] aspirin EC tablet 81 mg, 81 mg, Oral, Daily **OR** [START ON 6/30/2021] aspirin suppository 300 mg, 300 mg, Rectal, Daily, Bell Wagner MD    glucagon (rDNA) injection 1 mg, 1 mg, Intramuscular, PRN, Bell Wagner MD    glucose (GLUTOSE) 40 % oral gel 15 g, 15 g, Oral, PRN, MD Annette Martell ON 6/30/2021] amLODIPine (NORVASC) tablet 5 mg, 5 mg, Oral, Daily, Bell Wagner MD    insulin lispro (HUMALOG) injection vial 16 Units, 16 Units, Subcutaneous, TID WC, KETTY Wang MD    metoprolol tartrate (LOPRESSOR) tablet 50 mg, 50 mg, Oral, BID, Bell Wagner MD, 50 mg at 06/29/21 2001    [START ON 6/30/2021] clopidogrel (PLAVIX) tablet 75 mg, 75 mg, Oral, Daily, MD Karolyn Martell ON 2/48/6823] folic acid-pyridoxine-cyancobalamin (FOLTX) 1.13-25-2 MG TABS 1 tablet, 1 tablet, Oral, Daily, Bell Wagner MD    insulin glargine (LANTUS) injection vial 45 Units, 45 Units, Subcutaneous, Nightly, KETTY Wang MD    insulin lispro (HUMALOG) injection vial 0-6 Units, 0-6 Units, Subcutaneous, TID WC, M Pradeep Wang MD    insulin lispro (HUMALOG) injection vial 0-3 Units, 0-3 Units, Subcutaneous, 2 times per day, Beatriz Kelly MD, 1 Units at 06/29/21 2014    Family History:   Family History   Problem Relation Age of Onset    No Known Problems Mother     No Known Problems Father        Exam:    Blood pressure 135/62, pulse 67, temperature 98.8 °F (37.1 °C), temperature source Oral, resp. rate 16, height 5' 2\" (1.575 m), weight 239 lb (108.4 kg), SpO2 90 %. General: Patient was observed semiupright in bed. She was able to answer some questions. Her insight and reasoning was poor. She was easily distracted. She had mild dysarthria. In no distress. HEENT: Mild right facial droop with dysarthria. Tongue was midline moist.  Neck was supple. No adenopathy or JVD. Full visual fields. Pulmonary: A bit labored under her girth. Distant breath sounds with blunted breath sounds inferiorly. No wheezes. Cardiac: Regular rate and rhythm. Abdomen: Patient's abdomen was soft and nondistended. Bowel sounds were present throughout. There was no rebound, guarding or masses noted. Spinal exam: No percussion tenderness. Guarded trunk rotation. Her skin was moist.    Upper extremities: Clumsy movements of both upper limbs with poor dexterity. No localizing weakness. Increased tone bilaterally. Lower extremities: Increased tone. No reflexes. Blunted sensation in the feet. Clumsiness bilaterally. Sitting balance was fair. Standing balance was poor.     Lab Results   Component Value Date    WBC 6.0 06/28/2021    HGB 16.6 (H) 06/28/2021    HCT 51.9 (H) 06/28/2021    MCV 93.2 06/28/2021     06/28/2021     Lab Results   Component Value Date    INR 1.14 06/23/2021    INR 1.19 10/06/2020    PROTIME 13.8 06/23/2021    PROTIME 14.4 10/06/2020     Lab Results   Component Value Date    CREATININE 0.9 06/28/2021    BUN 14 06/28/2021     06/28/2021    K 3.7 06/28/2021     06/28/2021    CO2 23 06/28/2021     Lab Results   Component Value Date    ALT 12 06/24/2021    AST 13 (L) 06/24/2021    ALKPHOS 110 06/24/2021    BILITOT 0.8 06/24/2021         Impression: 61-year-old right-hand-dominant female with prior history of stroke with mild right-sided weakness, diabetes, hypertension, atrial fibrillation, morbid obesity and acute neurologic change suggesting new stroke with ataxia. She has a UTI with hallucinations. Strengths for the patient: Prior exams with rehabilitation, some use of adaptive equipment and with supportive significant other. Limitations/barriers for the patient: The generality of her clumsiness, her difficulty communicating, her obesity and her age. Recommendation: Acute inpatient rehabilitation with occupational and physical therapy 180 minutes 5 out of every 7 days. Will address basic and  advancing mobility with self-care instruction and adaptive equipment training. Caregiver education will be offered. Expected length of stay  prior to a supervised level of function for discharge home with a walker and [facility] OT/PT is 2-1/2 weeks. Additional recommendation:    1. Acute CVA with ataxia: Patient requires daily occupational and physical therapy with speech-language pathology. She requires antiplatelet therapy with baby aspirin and Plavix. She is on a statin with Lipitor. She requires blood sugar and blood pressure treatment. She needs aggressive pulmonary hygiene, DVT prophylaxis and nutritional support. Bowel and bladder retraining. Adaptive equipment training. Caregiver education. 2. DVT prophylaxis: Lovenox 40 mg subcu daily. I must monitor her hemoglobin and platelet count periodically while on this medication. Weightbearing activities are pursued daily. GI prophylaxis offered. 3. Urinary tract infection: The patient has completed IV antibiotic therapy for her UTI. Will encourage oral hydration and monitor her urinary control and quality. 4. Uncontrolled diabetes type 2 with peripheral neuropathy: The patient requires a diet modified for carbohydrates. She requires scheduled Lantus and Humalog with each meal as well as a Humalog sliding scale. Endocrinology will be monitoring her recovery. Blood sugars are checked at mealtime and bedtime.   5. Uncontrolled hypertension: Norvasc and Lopressor are required for blood pressure regulation. Target systolic blood pressure is 120140. Vital signs are checked at rest and with activity. Encouraging oral hydration. 6. Atrial fibrillation: Cardiology is recently evaluated that she does not have any active atrial fibrillation nor needing anticoagulation for it. Lopressor is helping control her rhythm. Monitoring vital signs at rest and with activity and daily weights to detect any decompensation of CHF. I personally performed a history and physical on this patient within 24 hours of admission to the rehab unit. I have reviewed the preadmission screening and concur with its findings without change. A detailed plan of care will be established by hospital day 4 and I attest the patient is appropriate for inpatient rehabilitation at this time. I have compared the patient's current functional status noted during my history and physical with that of the preadmission screen and I have found no significant differences.

## 2021-06-30 NOTE — PROGRESS NOTES
Progress Note( Dr. Liana Carlisle)  6/30/2021  Subjective:   Admit Date: 6/29/2021  PCP: Elda Anaya MD    Admitted For :On June 23, 2021 for slurred speech aphasia hallucination and confusion possibly UTI  Was transferred to acute rehab unit today 6/29/2021    Consulted For: Better control of blood glucose    Interval History: Feels okay not talking much    Denies any chest pains,   Denies SOB . Denies nausea or vomiting. No new bowel or bladder symptoms.        Intake/Output Summary (Last 24 hours) at 6/30/2021 0537  Last data filed at 6/30/2021 0229  Gross per 24 hour   Intake 300 ml   Output 0 ml   Net 300 ml       DATA    CBC:   Recent Labs     06/28/21  1349   WBC 6.0   HGB 16.6*       CMP:  Recent Labs     06/28/21  1349      K 3.7      CO2 23   BUN 14   CREATININE 0.9   CALCIUM 9.3     Lipids:   Lab Results   Component Value Date    CHOL 175 06/24/2021    CHOL 188 12/15/2014    HDL 39 06/24/2021    TRIG 144 06/24/2021     Glucose:  Recent Labs     06/29/21  1713 06/29/21  1938 06/30/21  0232   POCGLU 112* 148* 138*     [id number]:  Lab Results   Component Value Date    LABA1C 11.5 06/23/2021     High Sensitivity TSH:   Lab Results   Component Value Date    TSHHS 2.010 06/25/2021     Free T3: No results found for: FT3  Free T4:  Lab Results   Component Value Date    T4FREE 0.93 05/09/2016       Echocardiogram transesophageal    Result Date: 6/28/2021  Transesophageal Echocardiography Report (SENA)   Demographics   Patient Name       Patricia Dos Santos   Date of Study       06/28/2021   Date of Birth      1950         Gender              Female   Age                70 year(s)         Race                   Patient Number     [id number]         Room Number         2015   Visit Number       [id number]   Corporate ID       [id number]   Accession Number   [id number]         [address] Ordering Physician Linette Couch MD                 Physician           Dora Albright MD  Procedure Type of Study   SENA procedure:ECHOCARDIOGRAM TRANSESOPHAGEAL. Procedure Date Date: 06/28/2021 Start: 10:36 AM Study Location: [facility] - Echo Lab Technical Quality: Good visualization Indications:CVA. Patient Status: Routine Height: 62 inches Weight: 230 pounds BSA: 2.03 m2 BMI: 42.07 kg/m2 HR: 65 bpm BP: 151/82 mmHg SENA Performed By: the attending and the sonographer  Type of Anesthesia: Moderate sedation   Conclusions   Summary  Left ventricular systolic function is normal.  Ejection fraction is visually estimated at 55-60%. No evidence of thrombus within the left atrial appendage. Negative bubble study; no ASD or PFO noted. Mild mitral regurgitation. Signature   ------------------------------------------------------------------  Electronically signed by Gemma Krabbe MD  (Interpreting physician) on 06/28/2021 at 02:23 PM      ECHO Complete 2D W Doppler W Color    Result Date: 6/24/2021  Transthoracic Echocardiography Report (TTE)  Demographics   Patient Name       Orbie Quale   Date of Study       06/24/2021   Date of Birth      1950         Gender              Female   Age                70 year(s)         Race                   Patient Number     [id number]         Room Number         2015   Visit Number       [id number]   Corporate ID       [id number]   Accession Number   [id number]         [address] [place] [address]   Ordering Physician Vinay Little MD           Physician           MD  Procedure Type of Study   TTE procedure:ECHOCARDIOGRAM COMPLETE 2D W DOPPLER W COLOR.   Procedure Date Date: 06/24/2021 Start: 08:44 AM Study Location: Portable Technical Quality: Technically °C) (Oral)   Resp 14   Ht 5' 2\" (1.575 m)   Wt 237 lb (107.5 kg)   SpO2 92%   BMI 43.35 kg/m²   General appearance: alert and cooperative with exam  Neck: no JVD or bruit  Thyroid : Normal lobes   Lungs: Has Vesicular Breath sounds   Heart:  regular rate and rhythm  Abdomen: soft, non-tender; bowel sounds normal; no masses,  no organomegaly  Musculoskeletal: Normal  Extremities: extremities normal, , no edema  Neurologic:  Awake, alert, oriented to name, place and time. Cranial nerves II-XII are grossly intact. Motor is  intact. Sensory paresthesia t.,  and gait is ab unstable. CVA acute    Assessment:     Patient Active Problem List:     Vitamin D deficiency     Hyperlipidemia     Back pain, chronic     Hemiparesis of right dominant side as late effect of cerebral infarction ([address]     Facial droop     Hypokalemia     Essential hypertension     Hyperglycemia     TIA (transient ischemic attack)     Class 3 severe obesity due to excess calories with body mass index (BMI) of 45.0 to 49.9 in adult [facility])     Cerebrovascular accident (CVA) ([address]     Dysarthria due to acute stroke ([address]     Dysphagia due to recent cerebral infarction     Uncontrolled type 2 diabetes mellitus with hyperglycemia ([address]     Morbid obesity with BMI of 45.0-49.9, adult ([address]     Incontinence in female     Symptomatic stenosis of right carotid artery     Acute CVA (cerebrovascular accident) ([address]      Plan:     1. Reviewed POC blood glucose . Labs and X ray results   2. Reviewed Current Medicines   3. On meal/ Correction bolus Humalog/ Basal Lantus Insulin regime s   4. Monitor Blood glucose frequently   5. Modified  the dose of Insulin/ other medicines as needed   6. [Depending on physical activities of acute rehab unit  7. Will follow     .      Chace Orellana MD, MD

## 2021-06-30 NOTE — CARE COORDINATION
Patient requesting her sig other's phone number per Pat COON. Case mgt wrote down number and place it bedside as patient now sleeping soundly.

## 2021-06-30 NOTE — PATIENT CARE CONFERENCE
ACUTE REHAB TEAM CONFERENCE SUMMARY   [address] [facility]    NAME: Jacob Dukes  : 1950 ADMIT DATE: 2021    Rehab Admitting Dx:CVA (cerebral vascular accident) [facility]) [I63.9]  Acute CVA (cerebrovascular accident) ([facility] [I63.9]  Patient Comorbid Conditions: Active Hospital Problems    Diagnosis Date Noted    Ataxia [R27.0]     Acute cystitis without hematuria [N30.00]     History of atrial fibrillation [Z86.79]     Acute CVA (cerebrovascular accident) ([facility] [address] [I63.9] 2021    Uncontrolled type 2 diabetes mellitus with peripheral neuropathy ([facility] [E11.42, E11.65]     Morbid obesity with BMI of 40.0-44.9, adult ([facility] [E66.01, Z68.41]      Date: 2021    CASE MANAGEMENT  Current issues/needs regarding patient and family discharge status:   Patient plnas d/c 1-level apt with sig other. Elevator in building. DME from prior ARU stay. Note in chart requesting VPN at discharge - unlikely there is an in network provider. PHYSICAL THERAPY (Updated in QI)  Short term goals  Time Frame for Short term goals: 10-12 tx days:  Short term goal 1: Pt will complete bed mobility tasks (rolling R/L, scooting, sup<->sit) Ind. Short term goal 2: Pt will complete OOB transfers using RW with SBA-Sup. Short term goal 3: Pt will ambulate 50 ft with turns overl level surface and 10 ft of uneven surface using RW with CGA. Short term goal 4: Pt will ascend/descend curb step using RW and 4-5 steps using railings with Min A. Short term goal 5: Pt will propel manual w/c 150 ft Mod Ind. Short term goal 6: Pt will complete object retrieval from the floor with 1UE support on RW with SBA-Sup. Impairments/deficits, barriers:     Body structures, Functions, Activity limitations: Decreased functional mobility , Decreased cognition, Decreased ADL status, Decreased endurance, Decreased strength, Decreased balance, Decreased safe awareness     Prognosis: Good, Guarded  Decision Making: High Complexity  Clinical Presentation: unstable/unpredictable characteristics  Equipment needed at discharge: Has manual w/c, will need verified status of RW that pt received during previous ARU stay       PT IRF-KRISTYN scores since initial assessment  Bed Mobility:   Sit to Lying  Assistance Needed: Supervision or touching assistance  Comment: SBA without use of bed features  CARE Score: 4  Discharge Goal: Independent    Roll Left and Right  Assistance Needed: Supervision or touching assistance  Comment: SBA without use of bed features  CARE Score: 4  Discharge Goal: Independent    Lying to Sitting on Side of Bed  Assistance Needed: Supervision or touching assistance  Comment: CGA without use of bed features  CARE Score: 4  Discharge Goal: Independent    Transfers:    Sit to Stand  Assistance Needed: Partial/moderate assistance  Comment: Min A using RW  CARE Score: 3  Discharge Goal: Supervision or touching assistance    Chair/Bed-to-Chair Transfer  Assistance Needed: Partial/moderate assistance  Comment: Min A using stand pivot with/without use of RW  CARE Score: 3  Discharge Goal: Supervision or touching assistance    Toilet Transfer  Assistance Needed: Partial/moderate assistance  Comment: Min A using grab bars; pt with tendency to sit prematurely while turning  CARE Score: 3    Car Transfer  Assistance Needed: Partial/moderate assistance  Comment: Min A using RW  CARE Score: 3  Discharge Goal: Supervision or touching assistance    Ambulation:    Walking Ability  Does the Patient Walk?: Yes     Walk 10 Feet  Comment: pt was able to complete this distance with 2-person assist today using RW with Min A and close w/c follow of 2nd person due to pt's unpredictable behavior related to safety, balance, strength, and endurance; pt is not able to usually perform this activity with different staff members  Discharge Goal: Supervision or touching assistance     Walk 50 Feet with Two Turns  Reason if not Attempted: Not attempted due to medical condition or safety concerns  CARE Score: 88  Discharge Goal: Supervision or touching assistance     Walk 150 Feet  Comment: pt was not performing this at baseline and does not demonstrate potential to improve on this mobility task  Reason if not Attempted: Not applicable  CARE Score: 9  Discharge Goal: Not Applicable     Walking 10 Feet on Uneven Surfaces  Reason if not Attempted: Not attempted due to medical condition or safety concerns  CARE Score: 88  Discharge Goal: Supervision or touching assistance     1 Step (Curb)  Comment: pt was not performing this at baseline, however anticipating that pt can be trained in a controlled environment  Reason if not Attempted: Not attempted due to medical condition or safety concerns  CARE Score: 88  Discharge Goal: Partial/moderate assistance     4 Steps  Comment: pt was not performing this at baseline, however anticipating that pt can be trained in a controlled environment  Reason if not Attempted: Not attempted due to medical condition or safety concerns  CARE Score: 88  Discharge Goal: Partial/moderate assistance     12 Steps  Comment: pt was not performing this at baseline and does not demonstrate potential to improve on this mobility task  Reason if not Attempted: Not applicable  CARE Score: 9  Discharge Goal: Not Applicable    Gait Deviations: []None []Slow tello  [] Increased LILLIAN  [] Staggers []Deviated Path  [] Decreased step length  [] Decreased step height  []Decreased arm swing  [] Shuffles  [] Decreased head and trunk rotation  []other:        Wheelchair:  w/c Ability: Wheelchair Ability  Uses a Wheelchair and/or Scooter?: Yes    Wheel 50 Feet with Two Turns  Assistance Needed: Partial/moderate assistance  Comment: pt was able to propel 32 ft with 1 turn with SBA-Sup using BUE (limited by fatigue)  CARE Score: 3  Discharge Goal: Independent    Wheel 150 Feet  Assistance Needed: Substantial/maximal assistance  Comment: pt was able to propel 32 ft with 1 turn with SBA-Sup using BUE (limited by fatigue)  CARE Score: 2  Discharge Goal: Independent              Balance:        Object: Picking Up Object  Comment: pt refused to attempt this activity when offered at 2 different times during this tx session stating feeling \"fuzzy\" or her head hurts and states \"'l'll do it next week. \"  Reason if not Attempted: Patient refused  CARE Score: 7  Discharge Goal: Supervision or touching assistance    Fall Risk: []  Yes  []  No    OCCUPATIONAL THERAPY  (Updated in QI)    :   Long term goals  Time Frame for Long term goals : 7-10 days or until d/c  Long term goal 1: Pt will complete feeding/grooming/oral care task c MOD I by d/c  Long term goal 2: Pt will complete total body bathing c CGA c use of SBA by d/c  Long term goal 3: Pt will complete UB/LB dressing c SBA by d/c  Long term goal 4: Pt will complete toileting c SBA by d/c  Long term goal 5: Pt will complete functional transfer (toilet, tub, shower) c SBA by d/c. Long term goals 6: Pt will perform therex/therax to facilitate increased strength/endurance/ax tolerance (c emphasis on dynamic standing balance/tolerance >8 mins and BUE endurance) c SBA in order to complete ADLs. Long term goal 7: Pt will complete footwear dressing c MOD I by d/c :                                       OT IRF-KRISTYN scores and goals since initial assessment:    ADLs:    Eating: Eating  Discharge Goal: Independent       Oral Hygiene: Oral Hygiene  Assistance Needed: Independent  Comment: Pt demo application of toothpaste on toothbrush. Pt able to complete oral care seated in w/c.   CARE Score: 6  Discharge Goal: Independent    UB/LB Bathing: Shower/Bathe Self  Assistance Needed: Partial/moderate assistance  Comment: Min A c LB washing/drying  CARE Score: 3  Discharge Goal: Supervision or touching assistance    UB Dressing: Upper Body Dressing  Assistance Needed: Partial/moderate assistance  Comment: Min A c threading shirt down back  CARE Score: 3  Discharge Goal: Supervision or touching assistance         LB Dressing: Lower Body Dressing  Assistance Needed: Partial/moderate assistance  Comment: Min A c donning depends over hips, pt demo threading BLE in seated position. Required Min A to maintain stance when donning over hips. CARE Score: 3  Discharge Goal: Supervision or touching assistance    Donning and [place] Footwear: Putting On/Taking Off Footwear  Assistance Needed: Supervision or touching assistance  Comment: SUP for safety as pt completed task seated in w/c, required v/c for figure four position  CARE Score: 4  Discharge Goal: Independent      Toileting: Toileting Hygiene  Reason if not Attempted: Patient refused  CARE Score: 7  Discharge Goal: Supervision or touching assistance      Toilet Transfers: Toilet Transfer  Assistance Needed: Partial/moderate assistance  Comment: Min A using grab bars; pt with tendency to sit prematurely while turning  Reason if not Attempted: Patient refused  CARE Score: 3  Discharge Goal: Supervision or touching assistance      Impairments/deficits, barriers:  Decreased safe awareness, self-limiting behaviors  Assessment  Performance deficits / Impairments: Decreased functional mobility , Decreased strength, Decreased ADL status, Decreased safe awareness, Decreased balance, Decreased posture, Decreased endurance  Decision Making: Medium Complexity  REQUIRES OT FOLLOW UP: Yes  Equipment needed at discharge:none      COGNITIVE FUNCTION/SPEECH THERAPY (AS INDICATED)  LTG Goal 1: Pt will improve cognitive communication from mod to set up/supervision in order to return home to [facility]. Duration/Frequency of Treatment 3x/week x1 week 30 mins min  Duration/Frequency of Treatment: No swallow tx recommended  Short-term Goals  Timeframe for Short-term Goals: No swallow tx                  Short-term Goals Pt familiar to this therapist--not much different from ARU stay in  10/2020.  Will see if pt able to progress during stay. Swallow eval completed with diet advanced to dental soft with thins. Goal 1: Pt will participate in vocal strength/breath support exercises for improved intelligbility to 100% in nonstructured conversational tasks. Goal 2: Pt will problem solve/self advocate during times of confusion/difficulty with processing/understanding in 9/10 opportunities in order to improve participation with functional/instruction based tasks. Goal 3: Pt will participate in receptive/expressive category/association tasks with appropriate self corection in 95% opportunities occasional cues in order to improve topic maintenance. Goal 4: Pt will improve L/R discrimination with 2 step directions in 9/10 opportunities in order facilitate completion of functional tasks. Goal 5: Pt will independently respond to written environmental supports r/t safety/medical information in 95% opportunities in order to reduce risk of further medical complication/repeat hospitalization. Pt with limited reading abilities--utilize pictured stim as needed.                        Ongoing impairments or deficits or barriers: cognition and memory  Areas where progress has been made:swallowing  Strategies that improve performance:    Nursing Current Medical Status:   [] Is continent of bowel and bladder     [] Is incontinent of bowel and bladder    [] Has had an adequate number of bowel movements   [] Urinates with no urinary retention >300ml in bladder   [] Targeting bladder protocol with mckoy removal   [x] Maintaining O2 SATs at 92% or greater   [] Has pain managed while on ARU         [] Has had no skin breakdown while on ARU   [] Has improved skin integrity via wound measurements   [] Has no signs/symptoms of infection at the wound site   [] Pressure wounds Stage/Location:    [] Arrived on unit with pressure wound  [x] Has been free from injury during hospitalization   [] Has experienced a fall during hospitalization  [] Ongoing education with patient/family with understanding demonstrated for:  [] Receives IV Fluids  [x] Complusive at times, active LIZBET in room        NUTRITION  Weight: 236 lb (107 kg) / Body mass index is 43.16 kg/m². Current diet: ADULT DIET; Easy to Chew; 4 carb choices (60 gm/meal)  Intake: Starting to improve intake, very recent meals %      Medical improvements/barriers: ongoing cognitive deficits, self limiting behaviors, safety concerns, impulsive        Team goals for next treatment period/Intervention for current barriers:   [x] Pt will increase activity tolerance for daily tasks. [] Pt will improve bed mobility with reduced assist.  [] Pt will improve safety in fx tasks with reduced cues/assist  [x] Pt will improve transfers with reduced assist  [x] Pt will improve toileting with reduced assist  [x] Pt will improve ADL's with use of adaptive equipment with reduced assist  [] Pt will improve pain mgmt for maximum participation in tx program  [] Pt will improve communication to get basic needs met on unit  [] Pt will improve swallowing for safe diet advancement with use of strategies  []  Plan for discharge to home. Patient Strengths: Motivated and Pleasant    Justification for Continued Stay  Based on my medical assessment of the patient and review of information from the interdisciplinary team as part of this weekly team conference, the patient continues to meet the following criteria for IRF level of care:   The patient requires active and ongoing intervention of multiple therapy disciplines   The patient requires and intensive rehabilitation therapy program   The patient requires continued physician supervision by a rehabilitation physician   The patient requires 24 hours rehab nursing care   The patient requires an intensive and coordinated interdisciplinary team approach to the delivery of rehabilitative care.      Assessment/Plan   [x]  The patient is making good progression towards their long term goals and is actively participating in and has a reasonable expectation to continue to benefit from the intensive rehabilitation therapy program   []  The estimated discharge date has been changed from initial team conference due to:   []  The estimated discharge destination has been changed from initial team conference due to:         Ongoing tx following discharge: [x]C OT, PT    []OUTPATIENT     [] No Further Treatment      [x] Family/Caregiver Training  []  Transitional Living Arrangement   [] Home Assessment (date  )     [] Family Conference   []  Therapeutic Pass       []  Other: (specify)    Estimated Discharge Date: 7/9/21    Estimated Discharge Destination: []home alone   []home alone with assist prn  []Continuous supervision [x]Return home with spouse/family   [] Assisted living  []SNF     Team members participating in today's conference. [x] Kendall Gustafson, Medical Director  [x] Chantale Tejada,    [] Monica Coley, Nurse Mgr    [x]  Cristobal Lo, PT   [x] Lucas Yeboah, OT   []  Kierra Solano, PT  [] Natividad Purdy, OT      [x]  Alejandro Marsh, SLP    []  Patricia García, SHAGGY   [] Yves Severino, SHAGGY   []  Rigo Interiano, KATELYN     [x] Richelle Calle,     [x]Ruthann Rand,    [] Maximo Lemus RN    [] Wendy Sanders RN  [] Isma Lazaro RN    [x] Tyrese Perry RN    [] Lorne Talbert RN     []     I have led this Team Conference and agree with the plan, Nai Mejias MD, 7/1/2021, 12:42 PM  Goals have been updated to reflect recent status.     Team conference note transcribed this date by: Yumiko Gale MA, [place], Therapy Coordinator'

## 2021-06-30 NOTE — PLAN OF CARE
Weight Bearing Precautions   [x] Swallowing Precautions   [x] Monitoring of Risks of Complications   [x] DVT Prophylaxis    [x] Fluid/electrolyte/Nutrition Management    [x] Complete education with patient/family with understanding demonstrated for          in-room safety with transfers to bed, toilet, wheelchair, shower as well as                bathroom activities and hygiene. [] Adjustment   [] Other:   Nursing interventions may include bowel/bladder training, education for medical assistive devices, medication education, O2 saturation management, energy conservation, stress management techniques, fall prevention, alarms protocol, seating and positioning, skin/wound care, pressure relief instruction,dressing changes,  infection protection, DVT prophylaxis, and/or assistance with in room safety with transfers to bed, toilet, wheelchair, shower as well as bathroom activities and hygiene. Patient/caregiver education for:   [x] Disease/sustained injury/management      [x] Medication Use   [] Surgical intervention   [x] Safety/Precautions   [] Body mechanics and or joint protection   [x] Health maintenance         PHYSICAL THERAPY:  Goals:                  Short term goals  Time Frame for Short term goals: 10-12 tx days:  Short term goal 1: Pt will complete bed mobility tasks (rolling R/L, scooting, sup<->sit) Ind. Short term goal 2: Pt will complete OOB transfers using RW with SBA-Sup. Short term goal 3: Pt will ambulate 50 ft with turns overl level surface and 10 ft of uneven surface using RW with CGA. Short term goal 4: Pt will ascend/descend curb step using RW and 4-5 steps using railings with Min A. Short term goal 5: Pt will propel manual w/c 150 ft Mod Ind. Short term goal 6: Pt will complete object retrieval from the floor with 1UE support on RW with SBA-Sup. These goals were reviewed with this patient at the time of assessment and Judy Mcclelland is in agreement.      Plan of Care: Pt to be seen 5 days per week for a minimum of 60 minutes for 10-12 days. Current Treatment Recommendations: Strengthening, Gait Training, Patient/Caregiver Education & Training, Stair training, Equipment Evaluation, Education, & procurement, Balance Training, Cognitive/Perceptual Training, Functional Mobility Training, Endurance Training, Home Exercise Program, Transfer Training, Wheelchair Mobility Training, Safety Education & Training community reintegration,animal assisted therapy, and concurrent/group therapy.     PT IRF-KRISTYN scores and goals for initial assessment:   Bed Mobility:   Sit to Lying  Assistance Needed: Supervision or touching assistance  Comment: SBA without use of bed features  CARE Score: 4  Discharge Goal: Independent  Roll Left and Right  Assistance Needed: Supervision or touching assistance  Comment: SBA without use of bed features  CARE Score: 4  Discharge Goal: Independent  Lying to Sitting on Side of Bed  Assistance Needed: Supervision or touching assistance  Comment: CGA without use of bed features  CARE Score: 4  Discharge Goal: Independent    Transfers:    Sit to Stand  Assistance Needed: Partial/moderate assistance  Comment: Min A using RW  CARE Score: 3  Discharge Goal: Supervision or touching assistance  Chair/Bed-to-Chair Transfer  Assistance Needed: Partial/moderate assistance  Comment: Min A using stand pivot with/without use of RW  CARE Score: 3  Discharge Goal: Supervision or touching assistance  Toilet Transfer  Assistance Needed: Partial/moderate assistance  Comment: Min A using grab bars; pt with tendency to sit prematurely while turning  CARE Score: 3  Car Transfer  Assistance Needed: Partial/moderate assistance  Comment: Min A using RW  CARE Score: 3  Discharge Goal: Supervision or touching assistance    Ambulation:    Walking Ability  Does the Patient Walk?: Yes     Walk 10 Feet  Comment: pt was able to complete this distance with 2-person assist today using RW with Min A and close w/c follow of 2nd person due to pt's unpredictable behavior related to safety, balance, strength, and endurance; pt is not able to usually perform this activity with different staff members  Discharge Goal: Supervision or touching assistance     Walk 50 Feet with Two Turns  Reason if not Attempted: Not attempted due to medical condition or safety concerns  CARE Score: 88  Discharge Goal: Supervision or touching assistance     Walk 150 Feet  Comment: pt was not performing this at baseline and does not demonstrate potential to improve on this mobility task  Reason if not Attempted: Not applicable  CARE Score: 9  Discharge Goal: Not Applicable     Walking 10 Feet on Uneven Surfaces  Reason if not Attempted: Not attempted due to medical condition or safety concerns  CARE Score: 88  Discharge Goal: Supervision or touching assistance     1 Step (Curb)  Comment: pt was not performing this at baseline, however anticipating that pt can be trained in a controlled environment  Reason if not Attempted: Not attempted due to medical condition or safety concerns  CARE Score: 88  Discharge Goal: Partial/moderate assistance     4 Steps  Comment: pt was not performing this at baseline, however anticipating that pt can be trained in a controlled environment  Reason if not Attempted: Not attempted due to medical condition or safety concerns  CARE Score: 88  Discharge Goal: Partial/moderate assistance     12 Steps  Comment: pt was not performing this at baseline and does not demonstrate potential to improve on this mobility task  Reason if not Attempted: Not applicable  CARE Score: 9  Discharge Goal: Not Applicable    Gait Deviations: []None []Slow tello  [] Increased LILLIAN  [] Staggers []Deviated Path  [] Decreased step length  [] Decreased step height  []Decreased arm swing  [] Shuffles  [] Decreased head and trunk rotation  []other:        Wheelchair:  w/c Ability: Wheelchair Ability  Uses a Wheelchair and/or Scooter?: Yes  Wheel 50 Feet with Two Turns  Assistance Needed: Partial/moderate assistance  Comment: pt was able to propel 32 ft with 1 turn with SBA-Sup using BUE (limited by fatigue)  CARE Score: 3  Discharge Goal: Independent  Wheel 150 Feet  Assistance Needed: Substantial/maximal assistance  Comment: pt was able to propel 32 ft with 1 turn with SBA-Sup using BUE (limited by fatigue)  CARE Score: 2  Discharge Goal: Independent          Balance:        Object: Picking Up Object  Comment: pt refused to attempt this activity when offered at 2 different times during this tx session stating feeling \"fuzzy\" or her head hurts and states \"'l'll do it next week. \"  Reason if not Attempted: Patient refused  CARE Score: 7  Discharge Goal: Supervision or touching assistance  OCCUPATIONAL THERAPY:  Goals:               :  Long term goals  Time Frame for Long term goals : 7-10 days or until d/c  Long term goal 1: Pt will complete feeding/grooming/oral care task c MOD I by d/c  Long term goal 2: Pt will complete total body bathing c CGA c use of SBA by d/c  Long term goal 3: Pt will complete UB/LB dressing c SBA by d/c  Long term goal 4: Pt will complete toileting c SBA by d/c  Long term goal 5: Pt will complete functional transfer (toilet, tub, shower) c SBA by d/c. Long term goals 6: Pt will perform therex/therax to facilitate increased strength/endurance/ax tolerance (c emphasis on dynamic standing balance/tolerance >8 mins and BUE endurance) c SBA in order to complete ADLs. Long term goal 7: Pt will complete footwear dressing c MOD I by d/c :    These goals were reviewed with this patient at the time of assessment and [employer] is in agreement    Plan of Care:  Pt to be seen 5 days per week for a minimum of 60 minutes for 7-10 days.           Plan  Times per day: Daily  Current Treatment Recommendations: Strengthening, Functional Mobility Training, Patient/Caregiver Education & Training, Endurance Training, Equipment Evaluation, Education, & procurement, Balance Training, Safety Education & Training, Self-Care / ADL         cognitive training, home management, energy conservation training, community reintegration, splint fabrication, patient/caregiver education and training, animal assisted therapy, and concurrent and/or group therapy. OT IRF-KRISTYN scores and goals for initial assessment:    ADLs:    Eating: Eating  Discharge Goal: Independent       Oral Hygiene: Oral Hygiene  Assistance Needed: Independent  Comment: Pt demo application of toothpaste on toothbrush. Pt able to complete oral care seated in w/c. CARE Score: 6  Discharge Goal: Independent    UB/LB Bathing: Shower/Bathe Self  Assistance Needed: Partial/moderate assistance  Comment: Min A c LB washing/drying  CARE Score: 3  Discharge Goal: Supervision or touching assistance    UB Dressing: Upper Body Dressing  Assistance Needed: Partial/moderate assistance  Comment: Min A c threading shirt down back  CARE Score: 3  Discharge Goal: Supervision or touching assistance         LB Dressing: Lower Body Dressing  Assistance Needed: Partial/moderate assistance  Comment: Min A c donning depends over hips, pt demo threading BLE in seated position. Required Min A to maintain stance when donning over hips. CARE Score: 3  Discharge Goal: Supervision or touching assistance    Donning and [employer]: Putting On/Taking Off Footwear  Assistance Needed: Supervision or touching assistance  Comment: SUP for safety as pt completed task seated in w/c, required v/c for figure four position  CARE Score: 4  Discharge Goal: Independent      Toileting: Toileting Hygiene  Reason if not Attempted: Patient refused  CARE Score: 7  Discharge Goal: Supervision or touching assistance      Toilet Transfers:     Toilet Transfer  Assistance Needed: Partial/moderate assistance  Comment: Min A using grab bars; pt with tendency to sit prematurely while turning  Reason if not Attempted: Patient refused  CARE Score: 3  Discharge Goal: Supervision or touching assistance      SPEECH THERAPY: (If ordered)  Plan of Care and Goals:     Cognitive Communication:3xwk x2wks 30 mins minimum  Short-term Goals  Goal 1: Pt will participate in vocal strength/breath support exercises for improved intelligbility to 100% in nonstructured conversational tasks. Goal 2: Pt will problem solve/self advocate during times of confusion/difficulty with processing/understanding in 9/10 opportunities in order to improve participation with functional/instruction based tasks. Goal 3: Pt will participate in receptive/expressive category/association tasks with appropriate self corection in 95% opportunities occasional cues in order to improve topic maintenance. Goal 4: Pt will improve L/R discrimination with 2 step directions in 9/10 opportunities in order facilitate completion of functional tasks. Goal 5: Pt will independently respond to written environmental supports r/t safety/medical information in 95% opportunities in order to reduce risk of further medical complication/repeat hospitalization. Long-term Goals  Goal 1: Pt will improve cognitive communication from mod to set up/supervision in order to return home to [facility]. Treatments may include speech/language/communication therapy, cognitive training, animal assisted therapy, group therapy, education, and/or dysphagia therapy based on the above goals. Co-treats where appropriate with PT or OT to facilitate patient goals in functional tasks. These goals were reviewed with this patient at the time of assessment and Connie Yung is in agreement. CASE MANAGEMENT:  Goals:   Assist patient/family with discharge planning, patient/family counseling, assistance in obtaining recommended equipment and other services, and coordination with insurance during ARU stay. Patient Goals:   Return to maximum level of independent function.       Activities Prior to Admit: Homemaking Responsibilities: No  Active : No  Mode of Transportation: Car  Occupation: On disability  Leisure & Hobbies: Spouse manages meds and bills, 1 kitten, outings are limited to doctors visits, watches tv         Intensity of [address] will be seen a minimum of 3 hours of therapy per day/a minimum of 5 out of 7 days per week. [] In this rare instance due to the nature of this patient's medical involvement, this patient will be seen 15 hours per week (900 minutes within a 7 day period). Treatments may include therapeutic exercises, gait training, neuromuscular re-ed, transfer training, community reintegration, bed mobility, w/c mobility and training, self care, home mgmt, cognitive training, energy conservation,dysphagia tx, speech/language/communication therapy, group therapy, and patient/family education. In addition, dietician/nutritionist may monitor calorie count as well as intake and collaboratively work with SLP on dietary upgrades. Neuropsychology/Psychology may evaluate and provide necessary support. Group therapy as appropriate to facilitate improved endurance, STR, COORD, function, safety, transfers, awareness and insight into deficits, problem solving, memory, and social interaction and engagement.     Medical issues being managed closely and that require 24 hour availability of a physician:   [x] Swallowing Precautions                                     [] Weight bearing precautions   [] Wound Care                             [x] Infection Prevention   [x] DVT Prophylaxis/assessment              [x] Monitoring for complications    [x] Fall Precautions/Prevention                         [x] Fluid/Electrolyte/Nutrition Balance   [x] Voice Protection                           [x] Medication Management   [x] Respiratory                   [x] Pain Mgmt   [x] Bowel/Bladder Fx    Medical Prognosis: [] Good  [x] Fair    [] Guarded   Total expected IRF days 18

## 2021-07-01 LAB
GLUCOSE BLD-MCNC: 111 MG/DL (ref 70–99)
GLUCOSE BLD-MCNC: 147 MG/DL (ref 70–99)
GLUCOSE BLD-MCNC: 165 MG/DL (ref 70–99)
GLUCOSE BLD-MCNC: 215 MG/DL (ref 70–99)
GLUCOSE BLD-MCNC: 236 MG/DL (ref 70–99)

## 2021-07-01 PROCEDURE — 97110 THERAPEUTIC EXERCISES: CPT

## 2021-07-01 PROCEDURE — 99233 SBSQ HOSP IP/OBS HIGH 50: CPT | Performed by: PHYSICAL MEDICINE & REHABILITATION

## 2021-07-01 PROCEDURE — 94150 VITAL CAPACITY TEST: CPT

## 2021-07-01 PROCEDURE — 97530 THERAPEUTIC ACTIVITIES: CPT

## 2021-07-01 PROCEDURE — 82962 GLUCOSE BLOOD TEST: CPT

## 2021-07-01 PROCEDURE — 97116 GAIT TRAINING THERAPY: CPT

## 2021-07-01 PROCEDURE — 1280000000 HC REHAB R&B

## 2021-07-01 PROCEDURE — 94761 N-INVAS EAR/PLS OXIMETRY MLT: CPT

## 2021-07-01 PROCEDURE — 92610 EVALUATE SWALLOWING FUNCTION: CPT

## 2021-07-01 PROCEDURE — 6370000000 HC RX 637 (ALT 250 FOR IP): Performed by: PHYSICAL MEDICINE & REHABILITATION

## 2021-07-01 PROCEDURE — 6360000002 HC RX W HCPCS: Performed by: INTERNAL MEDICINE

## 2021-07-01 PROCEDURE — 6370000000 HC RX 637 (ALT 250 FOR IP): Performed by: INTERNAL MEDICINE

## 2021-07-01 RX ADMIN — INSULIN GLARGINE 45 UNITS: 100 INJECTION, SOLUTION SUBCUTANEOUS at 20:46

## 2021-07-01 RX ADMIN — METOPROLOL TARTRATE 50 MG: 50 TABLET, FILM COATED ORAL at 20:46

## 2021-07-01 RX ADMIN — ASPIRIN 81 MG: 81 TABLET, COATED ORAL at 08:54

## 2021-07-01 RX ADMIN — Medication 1 TABLET: at 08:54

## 2021-07-01 RX ADMIN — ACETAMINOPHEN 650 MG: 325 TABLET ORAL at 05:47

## 2021-07-01 RX ADMIN — ATORVASTATIN CALCIUM 80 MG: 40 TABLET, FILM COATED ORAL at 20:45

## 2021-07-01 RX ADMIN — AMLODIPINE BESYLATE 5 MG: 5 TABLET ORAL at 08:54

## 2021-07-01 RX ADMIN — MAGNESIUM HYDROXIDE 30 ML: 400 SUSPENSION ORAL at 08:54

## 2021-07-01 RX ADMIN — POLYETHYLENE GLYCOL (3350) 17 G: 17 POWDER, FOR SOLUTION ORAL at 05:47

## 2021-07-01 RX ADMIN — METOPROLOL TARTRATE 50 MG: 50 TABLET, FILM COATED ORAL at 08:55

## 2021-07-01 RX ADMIN — ENOXAPARIN SODIUM 40 MG: 40 INJECTION SUBCUTANEOUS at 08:55

## 2021-07-01 RX ADMIN — ACETAMINOPHEN 650 MG: 325 TABLET ORAL at 20:45

## 2021-07-01 RX ADMIN — ENOXAPARIN SODIUM 40 MG: 40 INJECTION SUBCUTANEOUS at 08:56

## 2021-07-01 RX ADMIN — CLOPIDOGREL BISULFATE 75 MG: 75 TABLET ORAL at 08:54

## 2021-07-01 ASSESSMENT — PAIN SCALES - GENERAL
PAINLEVEL_OUTOF10: 0
PAINLEVEL_OUTOF10: 2
PAINLEVEL_OUTOF10: 0

## 2021-07-01 NOTE — PROGRESS NOTES
[place] [facility]   CLINICAL BEDSIDE SWALLOW EVALUATION    NAME: Junior Triana  : 1950  MRN: [id number]    ADMISSION DATE: 2021  ADMITTING DIAGNOSIS: has Vitamin D deficiency; Hyperlipidemia; Back pain, chronic; Hemiparesis of right dominant side as late effect of cerebral infarction ([address] Facial droop; Hypokalemia; Essential hypertension; Hyperglycemia; TIA (transient ischemic attack); Class 3 severe obesity due to excess calories with body mass index (BMI) of 45.0 to 49.9 in adult [facility]); Cerebrovascular accident (CVA) ([address] Dysarthria due to acute stroke ([facility] [address] Dysphagia due to recent cerebral infarction; Uncontrolled type 2 diabetes mellitus with peripheral neuropathy ([facility] [address] Morbid obesity with BMI of 40.0-44.9, adult ([facility] [address] Incontinence in female; Symptomatic stenosis of right carotid artery; Acute CVA (cerebrovascular accident) ([address] Ataxia;  Acute cystitis without hematuria; and History of atrial fibrillation on their problem list.  ONSET DATE: 21    Recent Chest Xray/CT of Chest: RECENT RESULTS  CT OF HEAD/MRI:  EXAMINATION:   MRI OF THE BRAIN WITHOUT CONTRAST  2021 6:18 pm       TECHNIQUE:   Multiplanar multisequence MRI of the brain was performed without the   administration of intravenous contrast.       COMPARISON:   10/07/2020.       HISTORY:   ORDERING SYSTEM PROVIDED HISTORY: cva   TECHNOLOGIST PROVIDED HISTORY:   Reason for exam:->cva   Decision Support Exception - unselect if not a suspected or confirmed   emergency medical condition->Emergency Medical Condition (MA)   Reason for Exam: cva   Acuity: Acute   Type of Exam: Initial   Relevant Medical/Surgical History: none       FINDINGS:   Motion degrades images limiting evaluation.       INTRACRANIAL STRUCTURES/VENTRICLES: Acute infarct is seen involving the left   paramedian ian.  Acute infarct is also seen involving the right temporal   lobe within the right MCA territory.  Minimal sulcal effacement is seen environment. This 71 yo female was admitted with [facility] CVA. She has been experiencing increased hallucinations, changes in speech, and clumsiness. She experienced a fall as well. Imaging showed a R temporal lobe infarct. Pt also dx with UTI, AC Metabolic Enceph with visual hallucinations, Lactic acidosis, AC cystitis with hematuria, Afib. PMH significant for CVA (10/2020 L paramedian ian with residual R hemiparesis) with ARU stay, uncontrolled DM2 with hyperglycemia, HTN, hx afib,   Impression  Dysphagia Diagnosis: Swallow function appears grossly intact  Dysphagia Outcome Severity Scale: Level 6: Within functional limits/Modified independence     Treatment Plan  Requires SLP Intervention: No  Duration/Frequency of Treatment: No swallow tx recommended+  D/C Recommendations: To be determined  Referral To: Dietician    Recommended Diet and Intervention  Diet Solids Recommendation: Dental Soft  Liquid Consistency Recommendation: Thin  Recommended Form of Meds: Whole with water          Compensatory Swallowing Strategies  Compensatory Swallowing Strategies: Eat/Feed slowly;Upright as possible for all oral intake;Small bites/sips    Treatment/Goals  Short-term Goals  Timeframe for Short-term Goals: No swallow tx    General  Chart Reviewed: Yes  Behavior/Cognition: Alert; Cooperative;Pleasant mood  Respiratory Status: Room air  O2 Device: None (Room air)  Communication Observation: Functional  Follows Directions: Simple  Dentition: Poor dental/oral hygeine  Patient Positioning: Upright in chair  Baseline Vocal Quality: Normal  Volitional Cough: Strong  Prior Dysphagia History: Pt denies hx of dysphagia  Consistencies Administered: Reg solid; Dysphagia Minced and Moist (Dysphagia II); Dysphagia Pureed (Dysphagia I); Thin - straw; Thin - cup           Vision/Hearing  Vision  Vision Exceptions: Wears glasses for reading  Hearing  Hearing: Within functional limits    Oral Motor Deficits  Oral/Motor  Oral Motor:  Within functional limits    Oral Phase Dysfunction  Oral Phase  Oral Phase: WFL  Oral Phase Dysfunction  Impaired Mastication:  (slow mastication of solids due to poor dentition)  Oral Phase  Oral Phase - Comment: WFL     Indicators of Pharyngeal Phase Dysfunction   Pharyngeal Phase  Pharyngeal Phase: WFL  Pharyngeal Phase   Pharyngeal: WFL    Prognosis  Prognosis  Prognosis for safe diet advancement: good  Individuals consulted  Consulted and agree with results and recommendations: Patient    Education  Patient Education Response: Needs reinforcement     Type of devices: All fall risk precautions in place; Chair alarm in place; Left in chair;Call light within reach       Therapy Time  SLP Individual Minutes  Time In: 2808  Time Out: [address]  Minutes: 17 Delano Lozada Laurann Sly  7/1/2021 11:50 AM

## 2021-07-01 NOTE — PROGRESS NOTES
Patient instructed and educated on Smarty Ring. Patient able to do 300 ml. Vital capacity  Patient's goal is 1800ml.  Electronically signed by Matt Harrison RCP on 7/1/2021 at 11:35 AM

## 2021-07-01 NOTE — PROGRESS NOTES
Occupational Therapy  Physical Rehabilitation: OCCUPATIONAL THERAPY     [x] daily progress note       [] discharge       Patient Name:  Kt Pleitez   :  1950 MRN: [id number]  Room:  [address] [place] [address] Date of Admission: 2021  Rehabilitation Diagnosis:   CVA (cerebral vascular accident) [facility]) [I63.9]  Acute CVA (cerebrovascular accident) ([facility] [address] [I63.9]       Date 2021       Day of ARU Week:  3   Time IN/OUT 9321-4203   Individual Tx Minutes 27   Group Tx Minutes    Co-Treat Minutes    Concurrent Tx Minutes    TOTAL Tx Time Mins 27   Variance Time Per Deidre Fleming pt received +3 in session. Variance Time []   Refusal due to:     []   Medical hold/reason:    []   Illness   []   Off Unit for test/procedure  []   Extra time needed to complete task  []   Therapeutic need  []   Other (specify):   Restrictions Restrictions/Precautions: General Precautions, Fall Risk         Communication with other providers: [x]   OK to see per nursing:     []   Spoke with team member regarding:      Subjective observations and cognitive status: Pt seated in w/c in therapy gym. Pt required max encouragement for participation throughout therapy session. Pt continuously requested for therapist to complete exercises and activities.    Pain level/location:    /10       Location:    Discharge recommendations  Anticipated discharge date:  21  Destination: []home alone   []home alone w assist prn   [x] home w/ family    [] Continuous supervision       []SNF    [] Assisted living     [] Other:   Continued therapy: [x]HHC OT  []OUTPATIENT  OT   [] No Further OT    Equipment needs: none   Transfers:    Sit--> Stand:  SBA  Stand --> Sit:   CGA  Stand-Pivot:   CGA  Other:    Assistive device required for transfer:   No device, stand pivot from w/c      Functional Mobility:    Assistance:  From therapy gym to dining room area c x6 rest breaks, max cues for propulsions, and hand over hand assistance to increasing functional status    Safety:       []  bed alarm set    [x]  chair alarm set    []  Pt refused alarms                []  Telesitter activated      [x]  Gait belt used during tx session      []other:       Number of Minutes/Billable Intervention  Therapeutic Exercise 27   ADL Self-care    Neuro Re-Ed    Therapeutic Activity    Group    Other:    TOTAL 27       Social History  Social/Functional History  Lives With: Significant other (Duane)  Type of Home: Apartment  Home Layout: One level  Home Access: Ramped entrance, Elevator  Entrance Stairs - Number of Steps: 3rd floor  Bathroom Shower/Tub: Tub only  Bathroom Toilet: Handicap height  Bathroom Equipment: Grab bars in shower, Tub transfer bench  Bathroom Accessibility: Santos Hugo: 4 wheeled walker, [address] Help From: Family  ADL Assistance: Independent  Homemaking Assistance: Needs assistance  Homemaking Responsibilities: No  Ambulation Assistance:  (was using w/c for mobility at baseline)  Transfer Assistance: Needs assistance  Active : No  Patient's  Info: Pt's significant other Duane  Mode of Transportation: Car  Education: Very low education level  Occupation: On disability  Leisure & Hobbies: Spouse manages meds and bills, 1 kitten, outings are limited to doctors visits, watches tv  Additional Comments: Pt appears to be a poor historian and inconsistent with some information provided; sleeps in regular, flat bed; 1 recent fall requring this hospitalization    Objective                                                                                    Goals:  (Update in navigator)   :   Long term goals  Time Frame for Long term goals : 7-10 days or until d/c  Long term goal 1: Pt will complete feeding/grooming/oral care task c MOD I by d/c  Long term goal 2: Pt will complete total body bathing c CGA c use of SBA by d/c  Long term goal 3: Pt will complete UB/LB dressing c SBA by d/c  Long term goal 4: Pt will complete toileting c SBA by d/c  Long term goal 5: Pt will complete functional transfer (toilet, tub, shower) c SBA by d/c. Long term goals 6: Pt will perform therex/therax to facilitate increased strength/endurance/ax tolerance (c emphasis on dynamic standing balance/tolerance >8 mins and BUE endurance) c SBA in order to complete ADLs. Long term goal 7: Pt will complete footwear dressing c MOD I by d/c:        Plan of Care                                                                              Times per week: 5 days per week for a minimum of 60 minutes/day plus group as appropriate for 60 minutes.   Treatment to include Plan  Times per day: Daily  Current Treatment Recommendations: Strengthening, Functional Mobility Training, Patient/Caregiver Education & Training, Endurance Training, Equipment Evaluation, Education, & procurement, Balance Training, Safety Education & Training, Self-Care / ADL    Electronically signed by   Jade Guaman OT,  7/1/2021, 2:07 PM

## 2021-07-01 NOTE — PROGRESS NOTES
Physical Rehabilitation: OCCUPATIONAL THERAPY     [x] daily progress note       [] discharge       Patient Name:  Denisse Alonso   :  1950 MRN: [id number]  Room:  [address] [place] [address] Date of Admission: 2021  Rehabilitation Diagnosis:   CVA (cerebral vascular accident) [facility]) [I63.9]  Acute CVA (cerebrovascular accident) ([facility] [address] [I63.9]       Date 2021       Day of ARU Week:  3   Time IN/OUT 1300/1403   Individual Tx Minutes 61   Group Tx Minutes    Co-Treat Minutes    Concurrent Tx Minutes    TOTAL Tx Time Mins 63   Variance Time +3   Variance Time []   Refusal due to:     []   Medical hold/reason:    []   Illness   []   Off Unit for test/procedure  [x]   Extra time needed to complete task  []   Therapeutic need  []   Other (specify):   Restrictions Restrictions/Precautions: General Precautions, Fall Risk         Communication with other providers: [x]   OK to see per nursing:     []   Spoke with team member regarding:      Subjective observations and cognitive status:  Patient in recliner upon therapist arrival. Patient is resistant to therapy and states, \"I'll do therapy tomorrow. \" Patient requires max vc to participate in therapy.       Pain level/location:    /10       Location: no pain noted per patient    Discharge recommendations  Anticipated discharge date:  TBD  Destination: []home alone   []home alone w assist prn   [x] home w/ family    [] Continuous supervision       []SNF    [] Assisted living     [] Other:   Continued therapy: []HHC OT  []OUTPATIENT  OT   [] No Further OT  Equipment needs: None        Bed Mobility:           [x]   Pt received out of bed   Rolling R/L:    Scooting:    Supine --> Sit:    Sit --> Supine:      Transfers:    Sit--> Stand:  CGA  Stand --> Sit:   CGA  Stand-Pivot:   CGA  Other:    Assistive device required for transfer:   RW      Functional Mobility:  Throughout ARU hallway at wc level with Sup and from end of bed to doorway room at RW level with CGA  Assistance:  Sup in wc and CGA in RW   Device:   [x]   Rolling Walker     []   Standard Ronnald Real [x]   Wheelchair        []   [address] [facility]       []   4-Wheeled Ronnald Real         []   Cardiac Ronnald Real       []   Other:           Additional Therapeutic activities/exercises completed this date:     []   ADL Training   [x]   Balance/Postural training     [x]   Bed/Transfer Training   []   Endurance Training   []   Neuromuscular Re-ed   []   Nu-step:  Time:        Level:         #Steps:       []   Rebounder:    []  Seated     []  Standing        []   Supine Ther Ex (reps/sets):     [x]   Seated Ther Ex (reps/sets):  Patient instructed in seated exercises with ring toss game and disc golf game  reaching and tossing to increase core strengthening for increase sitting and standing posture to facilitate ADL    []   Standing Ther Ex (reps/sets):     []   Other:      Comments:  Patient requires max encouragement to participate stating several times throughout session that she would do it tomorrow, patient educated on benefits of skilled therapy services and importance of participating and professing in order to dc hoe safely with spouse      Patient/Caregiver Education and Training:   [x]   Adaptive Equipment Use  [x]   Bed Mobility/Transfer Technique/Safety  [x]   Energy Conservation Tips  []   Family training  [x]   Postural Awareness  [x]   Safety During Functional Activities  []   Reinforced Patient's Precautions   []   Progress was updated and reviewed in Rehabtracker with patient and/or family this         date.     Treatment Plan for Next Session: POC to continue as tolerated          Treatment/Activity Tolerance:   [x] Tolerated treatment with no adverse effects    [] Patient limited by fatigue  [] Patient limited by pain   [] Patient limited by medical complications:    [] Adverse reaction to Tx:   [] Significant change in status    Safety:       []  bed alarm set    []  chair alarm set    []  Pt refused alarms                []  Telesitter activated [x]  Gait belt used during tx session      [x]other:  Passed to OT      Number of Minutes/Billable Intervention  Therapeutic Exercise 30   ADL Self-care    Neuro Re-Ed    Therapeutic Activity 33   Group    Other:    TOTAL 63       Social History  Social/Functional History  Lives With: Significant other (Duane)  Type of Home: Apartment  Home Layout: One level  Home Access: Ramped entrance, Elevator  Entrance Stairs - Number of Steps: 3rd floor  Bathroom Shower/Tub: Tub only  Bathroom Toilet: Handicap height  Bathroom Equipment: Grab bars in shower, Tub transfer bench  Bathroom Accessibility: Accessible  Home Equipment: 4 wheeled walker, [address] Help From: Family  ADL Assistance: Independent  Homemaking Assistance: Needs assistance  Homemaking Responsibilities: No  Ambulation Assistance:  (was using w/c for mobility at baseline)  Transfer Assistance: Needs assistance  Active : No  Patient's  Info: Pt's significant other Duane  Mode of Transportation: Car  Occupation: On disability  Additional Comments: Pt appears to be a poor historian and inconsistent with some information provided; sleeps in regular, flat bed; 1 recent fall requring this hospitalization    Objective                                                                                    Goals:  (Update in navigator)   : Long term goals  Time Frame for Long term goals : 7-10 days or until d/c  Long term goal 1: Pt will complete feeding/grooming/oral care task c MOD I by d/c  Long term goal 2: Pt will complete total body bathing c CGA c use of SBA by d/c  Long term goal 3: Pt will complete UB/LB dressing c SBA by d/c  Long term goal 4: Pt will complete toileting c SBA by d/c  Long term goal 5: Pt will complete functional transfer (toilet, tub, shower) c SBA by d/c.   Long term goals 6: Pt will perform therex/therax to facilitate increased strength/endurance/ax tolerance (c emphasis on dynamic standing balance/tolerance >8 mins and BUE endurance) c SBA in order to complete ADLs. Long term goal 7: Pt will complete footwear dressing c MOD I by d/c:        Plan of Care                                                                              Times per week: 5 days per week for a minimum of 60 minutes/day plus group as appropriate for 60 minutes.   Treatment to include Plan  Times per day: Daily  Current Treatment Recommendations: Strengthening, Functional Mobility Training, Patient/Caregiver Education & Training, Endurance Training, Equipment Evaluation, Education, & procurement, Balance Training, Safety Education & Training, Self-Care / ADL    Electronically signed by   PETE Vaughn,  7/1/2021, 7:34 AM

## 2021-07-01 NOTE — PROGRESS NOTES
Noris Galeano    : 1950  Acct #: [de-identified]  MRN: [id number]              PM&R Progress Note      Admitting diagnosis: Acute cerebrovascular accident ( [address])     Comorbid diagnoses impacting rehabilitation: Ataxia, dysarthria, gait disturbance, uncontrolled diabetes type 2 with peripheral neuropathy, essential hypertension, history of atrial fibrillation, acute urinary tract infection, morbid obesity (BMI 44.5)     Chief complaint: Very sleepy and tired today. Prior (baseline) level of function: Independent. Current level of function:         Current  IRF-KRISTYN and Goals:   Occupational Therapy:      :   Long term goals  Time Frame for Long term goals : 7-10 days or until d/c  Long term goal 1: Pt will complete feeding/grooming/oral care task c MOD I by d/c  Long term goal 2: Pt will complete total body bathing c CGA c use of SBA by d/c  Long term goal 3: Pt will complete UB/LB dressing c SBA by d/c  Long term goal 4: Pt will complete toileting c SBA by d/c  Long term goal 5: Pt will complete functional transfer (toilet, tub, shower) c SBA by d/c. Long term goals 6: Pt will perform therex/therax to facilitate increased strength/endurance/ax tolerance (c emphasis on dynamic standing balance/tolerance >8 mins and BUE endurance) c SBA in order to complete ADLs. Long term goal 7: Pt will complete footwear dressing c MOD I by d/c :                                       Eating: Eating  Discharge Goal: Independent       Oral Hygiene: Oral Hygiene  Assistance Needed: Independent  Comment: Pt demo application of toothpaste on toothbrush. Pt able to complete oral care seated in w/c.   CARE Score: 6  Discharge Goal: Independent    UB/LB Bathing: Shower/Bathe Self  Assistance Needed: Partial/moderate assistance  Comment: Min A c LB washing/drying  CARE Score: 3  Discharge Goal: Supervision or touching assistance    UB Dressing: Upper Body Dressing  Assistance Needed: Partial/moderate assistance  Comment: Min A c threading shirt down back  CARE Score: 3  Discharge Goal: Supervision or touching assistance         LB Dressing: Lower Body Dressing  Assistance Needed: Partial/moderate assistance  Comment: Min A c donning depends over hips, pt demo threading BLE in seated position. Required Min A to maintain stance when donning over hips. CARE Score: 3  Discharge Goal: Supervision or touching assistance    Donning and Gooding Footwear: Putting On/Taking Off Footwear  Assistance Needed: Supervision or touching assistance  Comment: SUP for safety as pt completed task seated in w/c, required v/c for figure four position  CARE Score: 4  Discharge Goal: Independent      Toileting: Toileting Hygiene  Reason if not Attempted: Patient refused  CARE Score: 7  Discharge Goal: Supervision or touching assistance      Toilet Transfers: Toilet Transfer  Assistance Needed: Partial/moderate assistance  Comment: Min A using grab bars  Reason if not Attempted: Patient refused  CARE Score: 7  Discharge Goal: Supervision or touching assistance    Physical Therapy:   Short term goals  Time Frame for Short term goals: 10-12 tx days:  Short term goal 1: Pt will complete bed mobility tasks (rolling R/L, scooting, sup<->sit) Ind. Short term goal 2: Pt will complete OOB transfers using RW with SBA-Sup. Short term goal 3: Pt will ambulate 50 ft with turns overl level surface and 10 ft of uneven surface using RW with CGA. Short term goal 4: Pt will ascend/descend curb step using RW and 4-5 steps using railings with Min A. Short term goal 5: Pt will propel manual w/c 150 ft Mod Ind.             Bed Mobility:   Sit to Lying  Assistance Needed: Supervision or touching assistance  Comment: SBA without use of bed features  CARE Score: 4  Discharge Goal: Independent  Roll Left and Right  Assistance Needed: Supervision or touching assistance  Comment: SBA without use of bed features  CARE Score: 4  Discharge Goal: Independent  Lying to Sitting on Side of Bed  Assistance Needed: Supervision or touching assistance  Comment: CGA without use of bed features  CARE Score: 4  Discharge Goal: Independent    Transfers:    Sit to Stand  Assistance Needed: Partial/moderate assistance  Comment: Min A using RW  CARE Score: 3  Discharge Goal: Supervision or touching assistance  Chair/Bed-to-Chair Transfer  Assistance Needed: Partial/moderate assistance  Comment: Min A using stand pivot with/without use of RW  CARE Score: 3  Discharge Goal: Supervision or touching assistance  Toilet Transfer  Assistance Needed: Partial/moderate assistance  Comment: Min A using grab bars  CARE Score: 3  Car Transfer  Comment: will require continued assessment due to time constraint today    Ambulation:    Walking Ability  Does the Patient Walk?: Yes     Walk 10 Feet  Comment: pt was only able to ambulate 4 small steps using RW with 2-person assist today (Min A + SBA for close w/c follow due to pt's unpredictable behavior/insight to safety, balance, strength, and endurance)  Discharge Goal: Supervision or touching assistance     Walk 50 Feet with Two Turns  Reason if not Attempted: Not attempted due to medical condition or safety concerns  CARE Score: 88  Discharge Goal: Supervision or touching assistance     Walk 150 Feet  Comment: pt was not performing this at baseline and does not demonstrate potential to improve on this mobility task  Reason if not Attempted: Not applicable  CARE Score: 9  Discharge Goal: Not Applicable     Walking 10 Feet on Uneven Surfaces  Reason if not Attempted: Not attempted due to medical condition or safety concerns  CARE Score: 88  Discharge Goal: Supervision or touching assistance     1 Step (Curb)  Comment: pt was not performing this at baseline, however anticipating that pt can be trained in a controlled environment  Reason if not Attempted: Not attempted due to medical condition or safety concerns  CARE Score: 88  Discharge Goal: Partial/moderate assistance     4 Steps  Comment: pt was not performing this at baseline, however anticipating that pt can be trained in a controlled environment  Reason if not Attempted: Not attempted due to medical condition or safety concerns  CARE Score: 88  Discharge Goal: Partial/moderate assistance     12 Steps  Comment: pt was not performing this at baseline and does not demonstrate potential to improve on this mobility task  Reason if not Attempted: Not applicable  CARE Score: 9  Discharge Goal: Not Applicable       Wheelchair:  w/c Ability: Wheelchair Ability  Uses a Wheelchair and/or Scooter?: Yes  Wheel 50 Feet with Two Turns  Assistance Needed: Partial/moderate assistance  Comment: pt was able to propel 32 ft with 1 turn with SBA-Sup using BUE (limited by fatigue)  CARE Score: 3  Discharge Goal: Independent  Wheel 150 Feet  Assistance Needed: Substantial/maximal assistance  Comment: pt was able to propel 32 ft with 1 turn with SBA-Sup using BUE (limited by fatigue)  CARE Score: 2  Discharge Goal: Independent          Balance:        Object: Picking Up Object  Comment: will need continued assessment due to time constraint today    I      Exam:    Blood pressure 134/67, pulse 68, temperature 99.1 °F (37.3 °C), temperature source Oral, resp. rate 18, height 5' 2\" (1.575 m), weight 237 lb (107.5 kg), SpO2 91 %. General: Observed lying in bed. Snoring. Was very sluggish to awaken. HEENT: Slurred speech. Neck supple. MMM. Pulmonary: Unlabored breathing without rhonchi. Cardiac: Regular rate and rhythm. Abdomen: Patient's abdomen is soft and nondistended. Bowel sounds were present throughout. There was no rebound, guarding or masses noted. Upper extremities: Clumsy movements bilaterally. No bruising or swelling. Lower extremities: Trace edema. No signs of DVT. Clumsiness. Sitting balance was fair. Standing balance was poor.     Lab Results   Component Value Date    WBC 6.0 06/28/2021    HGB 16.6 (H)

## 2021-07-01 NOTE — PROGRESS NOTES
Physical Therapy    [x] daily progress note       [] discharge       Patient Name:  Missy Gonzalez   :  1950 MRN: [id number]  Room:  [address] [place] [address] Date of Admission: 2021  Rehabilitation Diagnosis:   CVA (cerebral vascular accident) [facility]) [I63.9]  Acute CVA (cerebrovascular accident) ([facility] [address] [I63.9]       Date 2021       Day of ARU Week:  3   Time IN//1045   Individual Tx Minutes 60   Group Tx Minutes    Co-Treat Minutes    Concurrent Tx Minutes    TOTAL Tx Time Mins 60   Variance Time    Variance Time []   Refusal due to:     []   Medical hold/reason:    []   Illness   []   Off Unit for test/procedure  []   Extra time needed to complete task  []   Therapeutic need  []   Other (specify):   Restrictions Restrictions/Precautions  Restrictions/Precautions: General Precautions, Fall Risk  Position Activity Restriction  Other position/activity restrictions: , IV access RUE   Interdisciplinary communication [x]   Cleared for therapy per nursing     []   RN notified about issues during session  []   RN updated on pt performance  []   Spoke with   []   Spoke with OT  []   Spoke with MD  []   Other:    Subjective observations and cognitive status: Pt in bed, easily awakens when name is called, slurred speech but responses were appropriate, required redirection as pt can become tangential during conversation.       Pain level/location: 0/10       Location:    Discharge recommendations  Anticipated discharge date:  TBD  Destination: []home alone   []home alone with assist PRN     [x] home w/ family      [x] Continuous supervision  []SNF    [] Assisted living     [] Other:  Continued therapy: [x]HHC PT  []OUTPATIENT PT   [] No Further PT  []SNF PT  Caregiver training recommended: []Yes  [] No   Equipment needs: Has manual w/c, will need verified status of RW that pt received during previous ARU stay      Bed Mobility:           []   Pt received out of bed   Lying --> Sit:  SBA  Bed features used: [x] Yes    [x] HOB elevated      [] Bed rail                                    [] No     Transfers:    Sit--> Stand:  CGA  Stand --> Sit:   Min A due to impulsivity   Chair-->Bed/Bed --> Chair:   Min A  Car Transfers:  Min A (required steadying assist, was able to actively lift BLE in and out of car)   Toilet Transfer (if applicable): Min A due to pt's tendency to sit prematurely when turning; used grab bars to complete transfer   Assistive device used for transfer:   RW    Gait:    Distance:  10 ft with 2 turns (max; limited by decreased motivation)   Assistance: Total A (Min A + SBA of 2nd person due to pt's unpredictable behavior related to safety, strength, balance, and endurance)  Device:  RW, close w/c follow  Gait Quality:  Slow tello, step-through but lacks fluidity of movement     Picking up of object from the floor: Pt refused to attempt despite task being offered to try at 2 separate times during this tx session            Additional Therapeutic activities/exercises completed this date:     []   Nu-step:  Time:        Level:         #Steps:       []   Rebounder:    []  Seated     []  Standing        []   Balance training         []   Postural training    []   Supine ther ex (reps/sets):     []   Seated ther ex (reps/sets):     []   Standing ther ex (reps/sets):     [x]   Other:  Toileting activity completed with Min A (required steadying assist for LB management in standing)     []   Other:    Comments:      Patient/Caregiver Education and Training:   []   Role of PT  []   Education about Dx  [x]   Use of call light for assist   []   HEP provided and explained   [x]   Treatment plan reviewed  []   Home safety  []   Wheelchair mobility/management   []   Body mechanics  [x]   Bed Mobility/Transfer technique  []   Gait technique/sequencing  []   Proper use of assistive device/adaptive equipment  []   Stair training/Advanced mobility safety and technique  []   Reinforced patient's disability  Additional Comments: Pt appears to be a poor historian and inconsistent with some information provided; sleeps in regular, flat bed; 1 recent fall requring this hospitalization    Objective                                                                                    Goals:  (Update in navigator)  Short term goals  Time Frame for Short term goals: 10-12 tx days:  Short term goal 1: Pt will complete bed mobility tasks (rolling R/L, scooting, sup<->sit) Ind. Short term goal 2: Pt will complete OOB transfers using RW with SBA-Sup. Short term goal 3: Pt will ambulate 50 ft with turns overl level surface and 10 ft of uneven surface using RW with CGA. Short term goal 4: Pt will ascend/descend curb step using RW and 4-5 steps using railings with Min A. Short term goal 5: Pt will propel manual w/c 150 ft Mod Ind.:   :        Plan of Care                                                                              Times per week: 5 days per week for a minimum of 60 minutes/day plus group as appropriate for 60 minutes.   Treatment to include Current Treatment Recommendations: Strengthening, Gait Training, Patient/Caregiver Education & Training, Stair training, Equipment Evaluation, Education, & procurement, Balance Training, Cognitive/Perceptual Training, Functional Mobility Training, Endurance Training, Home Exercise Program, Transfer Training, Wheelchair Mobility Training, Safety Education & Training    Electronically signed by   Vinay Rajan PT  7/1/2021, 8:29 AM

## 2021-07-01 NOTE — PROGRESS NOTES
Progress Note( Dr. Sharmin Walters)  7/1/2021  Subjective:   Admit Date: 6/29/2021  PCP: Shereen Colon MD    Admitted For :On June 23, 2021 for slurred speech aphasia hallucination and confusion possibly UTI  Was transferred to acute rehab unit today 6/29/2021    Consulted For: Better control of blood glucose    Interval History: Feels okay not talking much    Denies any chest pains,   Denies SOB . Denies nausea or vomiting. No new bowel or bladder symptoms.        Intake/Output Summary (Last 24 hours) at 7/1/2021 0542  Last data filed at 6/30/2021 2003  Gross per 24 hour   Intake 960 ml   Output    Net 960 ml       DATA    CBC:   Recent Labs     06/28/21  1349   WBC 6.0   HGB 16.6*       CMP:  Recent Labs     06/28/21  1349      K 3.7      CO2 23   BUN 14   CREATININE 0.9   CALCIUM 9.3     Lipids:   Lab Results   Component Value Date    CHOL 175 06/24/2021    CHOL 188 12/15/2014    HDL 39 06/24/2021    TRIG 144 06/24/2021     Glucose:  Recent Labs     06/30/21  1640 06/30/21  1934 07/01/21  0133   POCGLU 201* 204* 111*     FlchgihispR2G:  Lab Results   Component Value Date    LABA1C 11.5 06/23/2021     High Sensitivity TSH:   Lab Results   Component Value Date    TSHHS 2.010 06/25/2021     Free T3: No results found for: FT3  Free T4:  Lab Results   Component Value Date    T4FREE 0.93 05/09/2016       Echocardiogram transesophageal    Result Date: 6/28/2021  Transesophageal Echocardiography Report (SENA)   Demographics   Patient Name       Gavin Burroughs   Date of Study       06/28/2021   Date of Birth      1950         Gender              Female   Age                70 year(s)         Race                   Patient Number     [id number]         Room Number         2015   Visit Number       [id number]   Corporate ID       [id number]   Accession Number   [id number]         [address] [place] [address] Physician Landon Murphy MD                 Physician           Rosy Paul MD  Procedure Type of Study   SENA procedure:ECHOCARDIOGRAM TRANSESOPHAGEAL. Procedure Date Date: 06/28/2021 Start: 10:36 AM Study Location: [facility] - Echo Lab Technical Quality: Good visualization Indications:CVA. Patient Status: Routine Height: 62 inches Weight: 230 pounds BSA: 2.03 m2 BMI: 42.07 kg/m2 HR: 65 bpm BP: 151/82 mmHg SENA Performed By: the attending and the sonographer  Type of Anesthesia: Moderate sedation   Conclusions   Summary  Left ventricular systolic function is normal.  Ejection fraction is visually estimated at 55-60%. No evidence of thrombus within the left atrial appendage. Negative bubble study; no ASD or PFO noted. Mild mitral regurgitation. Signature   ------------------------------------------------------------------  Electronically signed by Graham Solorzano MD  (Interpreting physician) on 06/28/2021 at 02:23 PM      ECHO Complete 2D W Doppler W Color    Result Date: 6/24/2021  Transthoracic Echocardiography Report (TTE)  Demographics   Patient Name       Lindsey Lyon   Date of Study       06/24/2021   Date of Birth      1950         Gender              Female   Age                70 year(s)         Race                   Patient Number     [id number]         Room Number         2015   Visit Number       [id number]   Corporate ID       [id number]   Accession Number   [id number]         Raciel Chau RDMS   Ordering Physician Corey Woods MD           Physician           MD  Procedure Type of Study   TTE procedure:ECHOCARDIOGRAM COMPLETE 2D W DOPPLER W COLOR.   Procedure Date Date: 06/24/2021 Start: 08:44 AM Study Location: Portable Technical Quality: Technically difficult the right MCA M2 branches. Otherwise no large vessel occlusion or hemodynamic stenosis involving remainder of the vessels intracranially. Stable appearance of the age-indeterminate occlusion proximal V2 segment left vertebral artery with retrograde opacification of the V4 segment. 75% narrowing right proximal ICA and 50% narrowing left proximal ICA per NASCET criteria The findings were sent to the Radiology Results [address] at 11:44 pm on 6/23/2021to be communicated to a licensed caregiver. MRI brain without contrast    Result Date: 6/25/2021  EXAMINATION: MRI OF THE BRAIN WITHOUT CONTRAST  6/25/2021 6:18 pm     1. Patient motion limits evaluation. 2. There is a moderate to large acute infarct involving the right temporal lobe within the right MCA territory. 3. An acute infarct is also seen involving the left paramedian ian. 4. No significant mass effect or midline shift. 5. Loss of the normal signal void is again seen within the proximal left V4 segment, which can be seen with slow flow versus occlusion. 6. Mild global parenchymal volume loss with chronic microvascular ischemic changes. These results were sent to the Results [address] ([address] [place] [address]) on 6/25/2021 at 7:00 pm to be communicated to the referring/covering health care provider/office.        Scheduled Medicines   Medications:    enoxaparin  40 mg Subcutaneous Daily    atorvastatin  80 mg Oral Nightly    aspirin  81 mg Oral Daily    amLODIPine  5 mg Oral Daily    insulin lispro  16 Units Subcutaneous TID WC    metoprolol tartrate  50 mg Oral BID    clopidogrel  75 mg Oral Daily    folic acid-pyridoxine-cyancobalamin  1 tablet Oral Daily    insulin glargine  45 Units Subcutaneous Nightly    insulin lispro  0-6 Units Subcutaneous TID     insulin lispro  0-3 Units Subcutaneous 2 times per day      Infusions:       Objective:   Vitals: BP (!) 161/78   Pulse 64   Temp 98.1 °F (36.7 °C) (Oral)   Resp 18   Ht 5' 2\" (1.575 m)

## 2021-07-02 LAB
GLUCOSE BLD-MCNC: 213 MG/DL (ref 70–99)
GLUCOSE BLD-MCNC: 220 MG/DL (ref 70–99)
GLUCOSE BLD-MCNC: 68 MG/DL (ref 70–99)
GLUCOSE BLD-MCNC: 77 MG/DL (ref 70–99)
GLUCOSE BLD-MCNC: 97 MG/DL (ref 70–99)
GLUCOSE BLD-MCNC: 97 MG/DL (ref 70–99)

## 2021-07-02 PROCEDURE — 99232 SBSQ HOSP IP/OBS MODERATE 35: CPT | Performed by: PHYSICAL MEDICINE & REHABILITATION

## 2021-07-02 PROCEDURE — 97110 THERAPEUTIC EXERCISES: CPT

## 2021-07-02 PROCEDURE — 1280000000 HC REHAB R&B

## 2021-07-02 PROCEDURE — 6370000000 HC RX 637 (ALT 250 FOR IP): Performed by: PHYSICAL MEDICINE & REHABILITATION

## 2021-07-02 PROCEDURE — 6370000000 HC RX 637 (ALT 250 FOR IP): Performed by: INTERNAL MEDICINE

## 2021-07-02 PROCEDURE — 6360000002 HC RX W HCPCS: Performed by: INTERNAL MEDICINE

## 2021-07-02 PROCEDURE — 94761 N-INVAS EAR/PLS OXIMETRY MLT: CPT

## 2021-07-02 PROCEDURE — 82962 GLUCOSE BLOOD TEST: CPT

## 2021-07-02 PROCEDURE — 97530 THERAPEUTIC ACTIVITIES: CPT

## 2021-07-02 PROCEDURE — 97542 WHEELCHAIR MNGMENT TRAINING: CPT

## 2021-07-02 PROCEDURE — 97535 SELF CARE MNGMENT TRAINING: CPT

## 2021-07-02 PROCEDURE — 97130 THER IVNTJ EA ADDL 15 MIN: CPT

## 2021-07-02 PROCEDURE — 94150 VITAL CAPACITY TEST: CPT

## 2021-07-02 PROCEDURE — 97116 GAIT TRAINING THERAPY: CPT

## 2021-07-02 PROCEDURE — 97129 THER IVNTJ 1ST 15 MIN: CPT

## 2021-07-02 RX ORDER — INSULIN GLARGINE 100 [IU]/ML
40 INJECTION, SOLUTION SUBCUTANEOUS NIGHTLY
Status: DISCONTINUED | OUTPATIENT
Start: 2021-07-02 | End: 2021-07-07

## 2021-07-02 RX ADMIN — POLYETHYLENE GLYCOL (3350) 17 G: 17 POWDER, FOR SOLUTION ORAL at 21:06

## 2021-07-02 RX ADMIN — ASPIRIN 81 MG: 81 TABLET, COATED ORAL at 09:06

## 2021-07-02 RX ADMIN — ACETAMINOPHEN 650 MG: 325 TABLET ORAL at 06:37

## 2021-07-02 RX ADMIN — AMLODIPINE BESYLATE 5 MG: 5 TABLET ORAL at 09:07

## 2021-07-02 RX ADMIN — CLOPIDOGREL BISULFATE 75 MG: 75 TABLET ORAL at 09:07

## 2021-07-02 RX ADMIN — Medication 1 TABLET: at 09:06

## 2021-07-02 RX ADMIN — METOPROLOL TARTRATE 50 MG: 50 TABLET, FILM COATED ORAL at 21:06

## 2021-07-02 RX ADMIN — DEXTROSE 15 G: 15 GEL ORAL at 20:51

## 2021-07-02 RX ADMIN — ENOXAPARIN SODIUM 40 MG: 40 INJECTION SUBCUTANEOUS at 09:07

## 2021-07-02 RX ADMIN — METOPROLOL TARTRATE 50 MG: 50 TABLET, FILM COATED ORAL at 09:06

## 2021-07-02 RX ADMIN — ATORVASTATIN CALCIUM 80 MG: 40 TABLET, FILM COATED ORAL at 21:06

## 2021-07-02 ASSESSMENT — PAIN SCALES - GENERAL
PAINLEVEL_OUTOF10: 0
PAINLEVEL_OUTOF10: 2

## 2021-07-02 NOTE — CARE COORDINATION
Patient's davis  is Lauren Norman @[phone]    3705:  Néstor obregont spoke at length with cannon  Lauren Norman. She is unaware of any visiting physician agencies that are in network with [facility]. Reviewed patient's current LOF and goals. She suggested discharge to SNF so family can prepare for dc home . Case mgt advised that family will be no more ready to care for patients in six weeks than they will be . There is a record that at patient's 10/20 ARU discharge Venancio Campos was arranged, but it's not in patient's THE [facility] record, so likely services weren't started. Lauren Norman can't start a passport referral for the patient. 1300:  Case mgt met with patient in room. Reviewed dc date and plan. Patient is agreeable. Case mgt reviewed passport program with patient. She doesn't want to agree to the referral without Duane's agreement. Patient states she has all needed DME at home. Case mgt called number in chart for Duane. Wrong number    Case mgt spoke with patient's daughter [facility]. She and her sister live in [place]. They visit their mother as their schedule's allow, but they can't provide a lot of assistance at discharge. Address and and phone numbers for patient & Richard Keen are incorrect in the chart. Patient lives at [facility]:  [address] [place] [address] Drive [address]. Sarai:  [phone]  [phone]  [facility] requests that Venancio Campos followup with her if needed. Reports prior [address]. [facility] will provide dc transport Friday afternoon. She hopes to visit next week but won't commit to caregiver training as she's current sick.   [facility] requests patient be setup with a [place] PCP

## 2021-07-02 NOTE — PROGRESS NOTES
Physical Rehabilitation: OCCUPATIONAL THERAPY     [x] daily progress note       [] discharge       Patient Name:  Mechelle Alex   :  1950 MRN: [id number]  Room:  [address] [place] [address] Date of Admission: 2021  Rehabilitation Diagnosis:   CVA (cerebral vascular accident) [facility]) [I63.9]  Acute CVA (cerebrovascular accident) ([facility] [address] [I63.9]       Date 2021       Day of ARU Week:  4   Time IN/OUT 1010/1110   Individual Tx Minutes 60   Group Tx Minutes    Co-Treat Minutes    Concurrent Tx Minutes    TOTAL Tx Time Mins 60   Variance Time    Variance Time []   Refusal due to:     []   Medical hold/reason:    []   Illness   []   Off Unit for test/procedure  []   Extra time needed to complete task  []   Therapeutic need  []   Other (specify):   Restrictions Restrictions/Precautions: General Precautions, Fall Risk         Communication with other providers: [x]   OK to see per nursing:     []   Spoke with team member regarding:      Subjective observations and cognitive status: Patient in [facility] upon therapist arrival. Patient agreeable to therapy     Pain level/location:    /10       Location: No pain per patient    Discharge recommendations  Anticipated discharge date:    Destination: []home alone   []home alone w assist prn   [x] home w/ family    [] Continuous supervision       []SNF    [] Assisted living     [] Other:   Continued therapy: [x]HHC OT  []OUTPATIENT  OT   [] No Further OT  Equipment needs: None        ADLs:  Oral Hygiene: Oral Hygiene  Assistance Needed: Supervision or touching assistance  Comment: Sup- vc for task completion  CARE Score: 4  Discharge Goal: Independent    UB/LB Bathing: Shower/Bathe Self  Assistance Needed: Supervision or touching assistance  Comment: Sup in stance.  Max VC to continue c and to the task  CARE Score: 4  Discharge Goal: Supervision or touching assistance    UB Dressing: Upper Body Dressing  Assistance Needed: Independent  Comment: X  CARE Score: 6  Discharge Goal: Supervision or touching assistance         LB Dressing: Lower Body Dressing  Assistance Needed: Supervision or touching assistance  Comment: Sup for safety  CARE Score: 4  Discharge Goal: Supervision or touching assistance    Donning and Tamms Footwear: Putting On/Taking Off Footwear  Assistance Needed: Independent  Comment: Alfa I uses figure 4 to don socks  CARE Score: 6  Discharge Goal: Independent      Toileting: Toileting Hygiene  Assistance Needed: Supervision or touching assistance  Comment: Sup   CARE Score: 4  Discharge Goal: Supervision or touching assistance      Toilet Transfers: Toilet Transfer  Assistance Needed: Supervision or touching assistance  Comment: SBA  CARE Score: 4  Discharge Goal: Supervision or touching assistance  Device Used:    [x]   Standard Toilet         []   Grab Bars           []  Bedside Commode       []   Elevated Toilet          []   Other:        Bed Mobility:           [x]   Pt received out of bed   Rolling R/L:    Scooting:    Supine --> Sit:    Sit --> Supine:      Transfers:    Sit--> Stand:  SBA  Stand --> Sit:   SBA  Stand-Pivot:   SBA  Other:    Assistive device required for transfer:   Grab bars      Functional Mobility:  Throughout room/bathroom, ARU gym  Assistance:  Sup cues for maneuvering around obstacles and through doorways (slight veer to R side)  Device:   []   Rolling Walker     []   Standard Walker [x]   Wheelchair        []   [employer]       []   4-Wheeled [employer]         []   Cardiac Walker       []   Other:              Additional Therapeutic activities/exercises completed this date:     [x]   ADL Training   [x]   Balance/Postural training- Patient participated in balloon toss ax seated to increase core strength c reaching outside LILLIAN, balance, and posture. Patient required 2 rest breaks. Patient instructed to engage L and R UE in ax, ec, and posture.     [x]   Bed/Transfer Training   [x]   Endurance Training   []   Neuromuscular Re-ed   []   Nu-step:  Time: Level:         #Steps:       []   Rebounder:    []  Seated     []  Standing        []   Supine Ther Ex (reps/sets):     []   Seated Ther Ex (reps/sets):     []   Standing Ther Ex (reps/sets):     []   Other:      Comments:      Patient/Caregiver Education and Training:   [x]   [employer] Equipment Use  [x]   Bed Mobility/Transfer Technique/Safety  [x]   Energy Conservation Tips  []   Family training  [x]   Postural Awareness  [x]   Safety During Functional Activities  []   Reinforced Patient's Precautions   []   Progress was updated and reviewed in Rehabtracker with patient and/or family this         date.     Treatment Plan for Next Session: POC to continue as tolerated         Treatment/Activity Tolerance:   [x] Tolerated treatment with no adverse effects    [] Patient limited by fatigue  [] Patient limited by pain   [] Patient limited by medical complications:    [] Adverse reaction to Tx:   [] Significant change in status    Safety:       []  bed alarm set    []  chair alarm set    []  Pt refused alarms                []  Telesitter activated      [x]  Gait belt used during tx session      []other:       Number of Minutes/Billable Intervention  Therapeutic Exercise    ADL Self-care 30   Neuro Re-Ed    Therapeutic Activity 30   Group    Other:    TOTAL 60       Social History  Social/Functional History  Lives With: Significant other (Duane)  Type of Home: Apartment  Home Layout: One level  Home Access: Ramped entrance, Elevator  Entrance Stairs - Number of Steps: 3rd floor  Bathroom Shower/Tub: Tub only  Bathroom Toilet: Handicap height  Bathroom Equipment: Grab bars in shower, Tub transfer bench  Bathroom Accessibility: Accessible  Home Equipment: 4 wheeled walkerTomás 41 Help From: Family  ADL Assistance: Independent  Homemaking Assistance: Needs assistance  Homemaking Responsibilities: No  Ambulation Assistance:  (was using w/c for mobility at baseline)  Transfer Assistance: Needs assistance  Active : No  Patient's  Info: Pt's significant other Duane  Mode of Transportation: Car  Education: Very low education level  Occupation: On disability  Leisure & Hobbies: Spouse manages meds and bills, 1 kitten, outings are limited to doctors visits, watches tv  Additional Comments: Pt appears to be a poor historian and inconsistent with some information provided; sleeps in regular, flat bed; 1 recent fall requring this hospitalization    Objective                                                                                    Goals:  (Update in navigator)   : Long term goals  Time Frame for Long term goals : 7-10 days or until d/c  Long term goal 1: Pt will complete feeding/grooming/oral care task c MOD I by d/c  Long term goal 2: Pt will complete total body bathing c CGA c use of SBA by d/c  Long term goal 3: Pt will complete UB/LB dressing c SBA by d/c  Long term goal 4: Pt will complete toileting c SBA by d/c  Long term goal 5: Pt will complete functional transfer (toilet, tub, shower) c SBA by d/c. Long term goals 6: Pt will perform therex/therax to facilitate increased strength/endurance/ax tolerance (c emphasis on dynamic standing balance/tolerance >8 mins and BUE endurance) c SBA in order to complete ADLs. Long term goal 7: Pt will complete footwear dressing c MOD I by d/c:        Plan of Care                                                                              Times per week: 5 days per week for a minimum of 60 minutes/day plus group as appropriate for 60 minutes.   Treatment to include Plan  Times per day: Daily  Current Treatment Recommendations: Strengthening, Functional Mobility Training, Patient/Caregiver Education & Training, Endurance Training, Equipment Evaluation, Education, & procurement, Balance Training, Safety Education & Training, Self-Care / ADL    Electronically signed by   PETE Valle,  7/2/2021, 11:00 AM

## 2021-07-02 NOTE — PROGRESS NOTES
Comprehensive Nutrition Assessment    Type and Reason for Visit:  Initial, Consult (oral nutrition supplement)    Nutrition Recommendations/Plan:   Continue carb controlled diet, easy to chew   Will continue to follow up during stay     Nutrition Assessment:  Admit to rehab with recent eval for CVA, hx DM not well controlled. Currently on carb controlled diet with easy to chew foods. Meal intake % at recent meals. Reasonable intake, low nutrition risk at this time. Malnutrition Assessment:  Malnutrition Status:  Insufficient data    Context:  Acute Illness       Estimated Daily Nutrient Needs:  Energy (kcal):  6494-3591; Weight Used for Energy Requirements:  Current     Protein (g):  50-60 (1-1.2 g/kg);  Weight Used for Protein Requirements:  Ideal        Fluid (ml/day):  1600; Method Used for Fluid Requirements:  1 ml/kcal      Nutrition Related Findings:  asleep in bed on visit, HbA1c 11.5%, not patient not following with PCP or taking meds prior to hospital admit, hx of DM ed/diet ed      Wounds:  None       Current Nutrition Therapies:    ADULT DIET; Easy to Chew; 4 carb choices (60 gm/meal)    Anthropometric Measures:  · Height: 5' 2\" (157.5 cm)  · Current Body Weight: 235 lb 14.3 oz (107 kg)   · Admission Body Weight: 236 lb 15.9 oz (107.5 kg)     · Ideal Body Weight: 110 lbs; % Ideal Body Weight 214.4 %   · BMI: 43.1  · Adjusted Body Weight:  ; No Adjustment   · BMI Categories: Obese Class 3 (BMI 40.0 or greater)       Nutrition Diagnosis:   No nutrition diagnosis at this time     Nutrition Interventions:   Food and/or Nutrient Delivery:  Continue Current Diet  Nutrition Education/Counseling:  No recommendation at this time   Coordination of Nutrition Care:  Continue to monitor while inpatient    Goals:  Patient will consume at least 75% at meals during stay       Nutrition Monitoring and Evaluation:   Behavioral-Environmental Outcomes:  None Identified   Food/Nutrient Intake Outcomes:  Diet Advancement/Tolerance, Food and Nutrient Intake  Physical Signs/Symptoms Outcomes:  Biochemical Data, Meal Time Behavior, Skin, Weight     Discharge Planning:    Continue current diet     Electronically signed by Yaneth Correa RD, LD on 7/2/21 at 2:58 PM EDT    Contact: [phone]

## 2021-07-02 NOTE — CARE COORDINATION
Patient reviewed at today's care conference. Patient has self limiting behaviors. Patient will need a RW at discharge. States she has one at home, but doesn't know where it is. Venancio 78 pt/ot at discharge. Discharge home 7/9/21. Caregiver training with Aki Akhtar, sig other, requested. Case mgt recalls that he didn't return calls to schedule caregiver training during previous ARU stay.

## 2021-07-02 NOTE — PROGRESS NOTES
[facility] UNIT  SPEECH/LANGUAGE PATHOLOGY      [x] Daily           [] Discharge    Patient: Mechelle Alex      :1950  ZGA:[id number]  Rehab Dx/Hx: CVA (cerebral vascular accident) ([facility] [address] [I63.9]  Acute CVA (cerebrovascular accident) ([facility] [address] [I63.9]   No Known Allergies  Precautions:  Restrictions/Precautions: General Precautions, Fall Risk          Home Situation/IADL:   Social/Functional History  Lives With: Significant other (Duane)  Type of Home: Apartment  Home Layout: One level  Home Access: Ramped entrance, Elevator  Entrance Stairs - Number of Steps: 3rd floor  Bathroom Shower/Tub: Tub only  Bathroom Toilet: Handicap height  Bathroom Equipment: Grab bars in shower, Tub transfer bench  Bathroom Accessibility: [facility]: 4 wheeled walker, [address] Help From: Family  ADL Assistance: Independent  Homemaking Assistance: Needs assistance  Homemaking Responsibilities: No  Ambulation Assistance:  (was using w/c for mobility at baseline)  Transfer Assistance: Needs assistance  Active : No  Patient's  Info: Pt's significant other Duane  Mode of Transportation: Car  Education: Very low education level  Occupation: On disability  Leisure & Hobbies: Spouse manages meds and bills, 1 kitten, outings are limited to doctors visits, watches tv  Additional Comments: Pt appears to be a poor historian and inconsistent with some information provided; sleeps in regular, flat bed; 1 recent fall requring this hospitalization      Date of Admit: 2021  Room #: 1012/1012-A     ST Number of Minutes/Billable Intervention  Cog/Memory Deficits  30   Aphasia/Language     Dysarthria/Speech     Apraxia/Speech     Dysphagia/Swallowing     Group     Other    TOTAL Minutes Billed  30    Variance          Date: 2021  Day of ARU Week:  4       SLP Individual Minutes  Time In: 9404  Time Out: 6102  Minutes: 30         Variance/Reason:  [] Refusal due to   [] Medical hold/reason  [] Illness   [] Off Unit for test/procedure  [] Extra time needed to complete task  [] Other (specify)    Activity completed: Targeted multistep structured tasks, R/L discrim, recall, and spatial relationships for tabletop tasks    Pain: denies  Current Diet: ADULT DIET; Easy to Chew; 4 carb choices (60 gm/meal)  Subjective: alert and cooperative; cues to redirect x2      Goals and POC: Co-treats where appropriate with PT or OT to facilitate patient goals in functional tasks. LTG Goal 1: Pt will improve cognitive communication from mod to set up/supervision in order to return home to [facility]. Short-term Goals  Goal 1: Pt will participate in vocal strength/breath support exercises for improved intelligbility to 100% in nonstructured conversational tasks. Meeting goal. Incentive spirometer completed x10 at 250 ml. Speech is 100% intelligible in conversation. Goal 2: Pt will problem solve/self advocate during times of confusion/difficulty with processing/understanding in 9/10 opportunities in order to improve participation with functional/instruction based tasks. Mod cues  Goal 3: Pt will participate in receptive/expressive category/association tasks with appropriate self corection in 95% opportunities occasional cues in order to improve topic maintenance. Category association tasks with min cues expressive 80% acc  Receptive 100%. Cues to redirect 2 in 30 mins. Goal 4: Pt will improve L/R discrimination with 2 step directions in 9/10 opportunities in order facilitate completion of functional tasks. R/L discrim for spatial 2 step commands 80% acc. Pt gets distracted. Goal 5: Pt will independently respond to written environmental supports r/t safety/medical information in 95% opportunities in order to reduce risk of further medical complication/repeat hospitalization. Reading comprehension is limited.   Best functions with demonstration and repetition due to attn.       Short-term Goals  Timeframe for Short-term Goals: No swallow tx                            The patient demonstrated a positive response to todays treatment and is making fair progress toward established goals as evidenced by improved participation. Ongoing deficits are observed in the areas of cognition and attn and continued focus on structured tasks is recommended. Alarm placed: []bed [x]chair   []other:   []LIZBET activated        Barriers to progress:   [] Fatigue        [x] Cognitive Deficits   [x] Memory Deficits   [x] Reduced Attn   [] Self Limiting Behaviors    [] Reduced insight/awareness     [] Visual Deficits   [] Premorbid Conditions  [] Impulsivity     [] Other      Education/Interventions used this date:     []   Progress was updated and reviewed in Rehabtracker with patient and/or family this         date. [] Attending Care Conference for pt this date. See Team Patient Care Conference Note for updates.     Interventions used this date:  [] Speech/Language Treatment    [] Instruction in HEP    Group   [] Dysphagia Treatment   [x] Cognitive Skill Alberto    Other:         Assessment / Impression                                                          Treatment/Activity Tolerance:   [x] Tolerated Treatment well:     [] Patient limited by fatigue/pain:       [] Patient limited by medical complications:    [] Adverse Reaction to Tx:   [] Significant change in status:      Electronically Signed by  Eric Daniel, SLP, MA, CCC-SLP    7/2/2021  12:15 PM

## 2021-07-02 NOTE — FLOWSHEET NOTE
07/02/21 0940   Encounter Summary   Services provided to: Patient   Referral/Consult From: [facility]; Other (Comment)   Volunteer Visit Yes  (for pastoral presence)   Length of Encounter 15 minutes   Routine   Type Follow up   Assessment Approachable; Loneliness; Helplessness   Intervention Active listening;Explored feelings, thoughts, concerns;Explored coping resources;Prayer;Nurtured hope;Sustaining presence/ Ministry of presence   Outcome Expressed gratitude;Encouraged; Hopeful   Spiritual/Rastafari   Type Spiritual support

## 2021-07-02 NOTE — PROGRESS NOTES
[facility]   Ordering Physician Kai Amin MD                 Physician           Baron Barry MD  Procedure Type of Study   SENA procedure:ECHOCARDIOGRAM TRANSESOPHAGEAL. Procedure Date Date: 06/28/2021 Start: 10:36 AM Study Location: [facility] - Echo Lab Technical Quality: Good visualization Indications:CVA. Patient Status: Routine Height: 62 inches Weight: 230 pounds BSA: 2.03 m2 BMI: 42.07 kg/m2 HR: 65 bpm BP: 151/82 mmHg SENA Performed By: the attending and the sonographer  Type of Anesthesia: Moderate sedation   Conclusions   Summary  Left ventricular systolic function is normal.  Ejection fraction is visually estimated at 55-60%. No evidence of thrombus within the left atrial appendage. Negative bubble study; no ASD or PFO noted. Mild mitral regurgitation. Signature   ------------------------------------------------------------------  Electronically signed by Richa Taylor MD  (Interpreting physician) on 06/28/2021 at 02:23 PM      ECHO Complete 2D W Doppler W Color    Result Date: 6/24/2021  Transthoracic Echocardiography Report (TTE)  Demographics   Patient Name       Pat Willett   Date of Study       06/24/2021   Date of Birth      1950         Gender              Female   Age                70 year(s)         Race                   Patient Number     [id number]         Room Number         2015   Visit Number       [id number]   Corporate ID       [id number]   Accession Number   [id number]         Anjelica Moffett RDMS   Ordering Physician Sherwin Gómez MD           Physician           MD  Procedure Type of Study   TTE procedure:ECHOCARDIOGRAM COMPLETE 2D W DOPPLER W COLOR.   Procedure Date Date: 06/24/2021 Start: 08:44 AM Study Location: Portable Technical Quality: Technically difficult study Indications:CVA. Patient Status: Routine Height: 62 inches Weight: 230 pounds BSA: 2.03 m2 BMI: 42.07 kg/m2 HR: 55 bpm BP: 146/124 mmHg  Conclusions   Summary  Technically difficult examination. Left ventricular systolic function is normal.  Ejection fraction is visually estimated at 50-55%. Moderate left ventricular hypertrophy. Mildly dilated right ventricle. No evidence of right heart strain. Sclerotic, but non-stenotic aortic valve. No evidence of any pericardial effusion. Signature   ------------------------------------------------------------------  Electronically signed by Sarai Valdes MD (Interpreting  physician) on 06/24/2021 at 05:26 PM  -       CT HEAD WO CONTRAST    Result Date: 6/28/2021  EXAMINATION: CT OF THE HEAD WITHOUT CONTRAST  6/23/2021 2:40 pm     Moderate-sized low-attenuation in the right temporal lobe concerning for acute infarct. MRI brain recommended to confirm. No acute intracranial hemorrhage. CT CERVICAL SPINE WO CONTRAST    Result Date: 6/23/2021  EXAMINATION: CT OF THE CERVICAL SPINE WITHOUT CONTRAST 6/23/2021 3:40 pm     Multilevel degenerate changes. No acute fracture traumatic malalignment     XR CHEST PORTABLE    Result Date: 6/23/2021  EXAMINATION: ONE XRAY VIEW OF THE CHEST 6/23/2021 3:34 pm COMPARISON: None. HISTORY: ORDERING SYSTEM PROVIDED HISTORY: other, slurrred speech, hypertension TECHNOLOGIST PROVIDED HISTORY: Reason for exam:->other, slurrred speech, hypertension Reason for Exam: other, slurrred speech, hypertension Acuity: Acute Type of Exam: Initial Additional signs and symptoms: na Relevant Medical/Surgical History: diabetes, hypertension FINDINGS: The lungs are clear. The mediastinum and cardiac silhouette are unremarkable. No acute abnormality.      CTA HEAD NECK W CONTRAST    Result Date: 6/23/2021  EXAMINATION: CTA OF THE HEAD AND NECK WITH CONTRAST 6/23/2021 11:17 pm: T    Mild tapering versus mild to moderate areas stenosis involving the right MCA M2 branches. Otherwise no large vessel occlusion or hemodynamic stenosis involving remainder of the vessels intracranially. Stable appearance of the age-indeterminate occlusion proximal V2 segment left vertebral artery with retrograde opacification of the V4 segment. 75% narrowing right proximal ICA and 50% narrowing left proximal ICA per NASCET criteria The findings were sent to the Radiology Results [id number] at 11:44 pm on 6/23/2021to be communicated to a licensed caregiver. MRI brain without contrast    Result Date: 6/25/2021  EXAMINATION: MRI OF THE BRAIN WITHOUT CONTRAST  6/25/2021 6:18 pm     1. Patient motion limits evaluation. 2. There is a moderate to large acute infarct involving the right temporal lobe within the right MCA territory. 3. An acute infarct is also seen involving the left paramedian ian. 4. No significant mass effect or midline shift. 5. Loss of the normal signal void is again seen within the proximal left V4 segment, which can be seen with slow flow versus occlusion. 6. Mild global parenchymal volume loss with chronic microvascular ischemic changes. These results were sent to the Results [id number] ([address] [facility]) on 6/25/2021 at 7:00 pm to be communicated to the referring/covering health care provider/office.        Scheduled Medicines   Medications:    insulin glargine  40 Units Subcutaneous Nightly    insulin lispro  15 Units Subcutaneous TID     enoxaparin  40 mg Subcutaneous Daily    atorvastatin  80 mg Oral Nightly    aspirin  81 mg Oral Daily    amLODIPine  5 mg Oral Daily    metoprolol tartrate  50 mg Oral BID    clopidogrel  75 mg Oral Daily    folic acid-pyridoxine-cyancobalamin  1 tablet Oral Daily    insulin lispro  0-6 Units Subcutaneous TID     insulin lispro  0-3 Units Subcutaneous 2 times per day      Infusions:       Objective:   Vitals: BP (!) 145/67   Pulse 62   Temp 98.5 °F (36.9 °C) (Oral)   Resp 16   Ht 5' 2\" (1.575 m)   Wt 236 lb (107 kg)   SpO2 94%   BMI 43.16 kg/m²   General appearance: alert and cooperative with exam  Neck: no JVD or bruit  Thyroid : Normal lobes   Lungs: Has Vesicular Breath sounds   Heart:  regular rate and rhythm  Abdomen: soft, non-tender; bowel sounds normal; no masses,  no organomegaly  Musculoskeletal: Normal  Extremities: extremities normal, , no edema  Neurologic:  Awake, alert, oriented to name, place and time. Cranial nerves II-XII are grossly intact. Motor is  intact. Sensory paresthesia t.,  and gait is ab unstable. CVA acute    Assessment:     Patient Active Problem List:     Vitamin D deficiency     Hyperlipidemia     Back pain, chronic     Hemiparesis of right dominant side as late effect of cerebral infarction ([address]     Facial droop     Hypokalemia     Essential hypertension     Hyperglycemia     TIA (transient ischemic attack)     Class 3 severe obesity due to excess calories with body mass index (BMI) of 45.0 to 49.9 in adult [facility])     Cerebrovascular accident (CVA) ([address]     Dysarthria due to acute stroke ([address]     Dysphagia due to recent cerebral infarction     Uncontrolled type 2 diabetes mellitus with hyperglycemia ([address]     Morbid obesity with BMI of 45.0-49.9, adult ([address]     Incontinence in female     Symptomatic stenosis of right carotid artery     Acute CVA (cerebrovascular accident) ([address]      Plan:     1. Reviewed POC blood glucose . Labs and X ray results   2. Reviewed Current Medicines   3. On meal/ Correction bolus Humalog/ Basal Lantus Insulin regime s   4. Monitor Blood glucose frequently   5. Modified  the dose of Insulin/ other medicines as needed   6. Patient is participating in physical activities of rehab unit  7. Will follow     .      Jero Infante MD, MD

## 2021-07-02 NOTE — FLOWSHEET NOTE
[x] daily progress note       [] discharge       Patient Name:  Cam Dalal   :  1950 MRN: [id number]  Room:  [address] [place] [address] Date of Admission: 2021  Rehabilitation Diagnosis:   CVA (cerebral vascular accident) [facility]) [I63.9]  Acute CVA (cerebrovascular accident) ([facility] [I63.9]       Date 2021       Day of ARU Week:  4   Time IN/-1005  2918-0198   Individual Tx Minutes 90   TOTAL Tx Time Mins 90   Restrictions Restrictions/Precautions  Restrictions/Precautions: General Precautions, Fall Risk  Position Activity Restriction  Other position/activity restrictions: , IV access RUE   Communication with other providers: [x]   OK to see per nursing:     []   Spoke with team member regarding:      Subjective observations and cognitive status: AM session: pt seen in semi-ortiz's position in bed at beginning of treatment. Agreeable to therapy. Some encouragement required during the session. PM session: pt seen in semi-ortiz's position in bed at beginning of treatment. Pt adamantly refusing to get OOB to participate in therapy despite max education and max encouragement. Pt stating \"I'm going to stay here, rest, and watch [place] and then I'm going to take a nap. \" Pt agreeable to bed exercises after max encouragement. Pain level/location: 0/10        Discharge recommendations  Anticipated discharge date:  Expected Discharge Date: 21  Destination: []?home alone   []?home alone with assist PRN     [x]? home w/ family      [x]? Continuous supervision  []? SNF    []? Assisted living     []? Other:  Continued therapy: [x]? Venancio Cam[facility] PT  []? OUTPATIENT PT   []? No Further PT  []? SNF PT  Caregiver training recommended: []? Yes  []? No   Equipment needs: Has manual w/c, will need verified status of RW that pt received during previous ARU stay     Bed Mobility:        Scooting:  Supervision with bed rails and bed in trendelenburg position.    Lying --> Sit:  Supervision with bed rail     Transfers: Sit--> Stand: SBA  Stand --> Sit:  SBA  Bed --> Chair:  SBA  Toilet Transfer: min A with bed rail   Assistive device required for transfer: RW  vcs for proper hand placement. Gait:    Distance:  AM session: 10'+10'+25'  Assistance:  CGA  Device:  RW  Gait Quality:  Reciprocal pattern     Wheelchair Propulsion:  Distance:  AM session: 70'  Assistance: Mod I   Extremities Used:   BUEs  Type:    [x]  Manual        []  Electric    Additional Therapeutic activities/exercises completed this date:     []   Nu-step:  Time:        Level:         #Steps:       []   Rebounder:    []  Seated     []  Standing        []   Balance training         []   Postural training    [x]   Supine ther ex (reps/sets):  PM session: ankle pumps, quad sets, buttock squeezes, heel slides, SLRs, concentric leg press (manual resist) x 20 reps each for strengthening. [x]   Seated ther ex (reps/sets): AM session: LAQs, marching, ankle pumps (with 1 Ib ankle weights on BLEs) x 20 reps each for strengthening. []   Standing ther ex (reps/sets):     []   Picking up object from floor (standing):                   []   Reacher used   []   Other:   []   Other:    Patient/Caregiver Education and Training:   [x]   Bed Mobility/Transfer technique/safety  [x]   Gait technique/sequencing  [x]   Proper use of assistive device  []   Advanced mobility safety and technique  []   Reinforced patient's precautions/mobility while maintaining precautions  []   Postural awareness  []   Family training    Treatment Plan for Next Session: gait; transfers; advanced gait; stair training    Assessment: This pt demonstrated a positive response to today's treatment as evidenced by improved mobility. The patient is making progress toward established goals as evidenced by QI scores. Ongoing deficits are observed in the areas of strength, balance, and endurance and continued focus on strengthening, balance training, and endurance training is recommended. Treatment/Activity Tolerance:   [x] Tolerated treatment with no adverse effects    [] Patient limited by fatigue  [] Patient limited by pain   [] Patient limited by medical complications:    [] Adverse reaction to Tx:   [] Significant change in status    Safety:       [x]  bed alarm set    [x]  chair alarm set    []  Pt refused alarms                [x]  Telesitter activated      [x]  Gait belt used during tx session      [x]other: After AM session: pt left sitting up in w/c with call light and passed to [facility] at end of PT session. After PM session: pt left in semi-ortiz's position in bed with call light at end of treatment.           Number of Minutes/Billable Intervention  Gait Training 30   Therapeutic Exercise 45   Neuro Re-Ed    Therapeutic Activity    Wheelchair Propulsion 15   Group    Other:    TOTAL 90         Social History  Social/Functional History  Lives With: Significant other (Duane)  Type of Home: Apartment  Home Layout: One level  Home Access: Ramped entrance, Elevator  Entrance Stairs - Number of Steps: 3rd floor  Bathroom Shower/Tub: Tub only  Bathroom Toilet: Handicap height  Bathroom Equipment: Grab bars in shower, Tub transfer bench  Bathroom Accessibility: Accessible  Home Equipment: 4 wheeled walker, [address] Help From: Family  ADL Assistance: Independent  Homemaking Assistance: Needs assistance  Homemaking Responsibilities: No  Ambulation Assistance:  (was using w/c for mobility at baseline)  Transfer Assistance: Needs assistance  Active : No  Patient's  Info: Pt's significant other Duane  Mode of Transportation: Car  Education: Very low education level  Occupation: On disability  Leisure & Hobbies: Spouse manages meds and bills, 1 kitten, outings are limited to doctors visits, watches tv  Additional Comments: Pt appears to be a poor historian and inconsistent with some information provided; sleeps in regular, flat bed; 1 recent fall requring this hospitalization    Objective                                                                                    Goals:  (Update in navigator)  Short term goals  Time Frame for Short term goals: 10-12 tx days:  Short term goal 1: Pt will complete bed mobility tasks (rolling R/L, scooting, sup<->sit) Ind. Short term goal 2: Pt will complete OOB transfers using RW with SBA-Sup. Short term goal 3: Pt will ambulate 50 ft with turns overl level surface and 10 ft of uneven surface using RW with CGA. Short term goal 4: Pt will ascend/descend curb step using RW and 4-5 steps using railings with Min A. Short term goal 5: Pt will propel manual w/c 150 ft Mod Ind. Short term goal 6: Pt will complete object retrieval from the floor with 1UE support on RW with SBA-Sup.:   :        Plan of Care                                                                              Times per week: 5 days per week for a minimum of 60 minutes/day plus group as appropriate for 60 minutes.   Treatment to include Current Treatment Recommendations: Strengthening, Gait Training, Patient/Caregiver Education & Training, Stair training, Equipment Evaluation, Education, & procurement, Balance Training, Cognitive/Perceptual Training, Functional Mobility Training, Endurance Training, Home Exercise Program, Transfer Training, Wheelchair Mobility Training, Safety Education & Training    Electronically signed by   Melanie Hale, [id number]  7/2/2021, 9:54 AM

## 2021-07-02 NOTE — PROGRESS NOTES
Jeff Rivers    : 1950  Acct #: [de-identified]  MRN: [id number]              PM&R Progress Note      Admitting diagnosis: Acute cerebrovascular accident ( [address])     Comorbid diagnoses impacting rehabilitation: Ataxia, dysarthria, gait disturbance, uncontrolled diabetes type 2 with peripheral neuropathy, essential hypertension, history of atrial fibrillation, acute urinary tract infection, morbid obesity (BMI 44.5)    Chief complaint: Vague complaints of nausea and fatigue. Prior (baseline) level of function: Independent. Current level of function:         Current  IRF-KRISTYN and Goals:   Occupational Therapy:      :   Long term goals  Time Frame for Long term goals : 7-10 days or until d/c  Long term goal 1: Pt will complete feeding/grooming/oral care task c MOD I by d/c  Long term goal 2: Pt will complete total body bathing c CGA c use of SBA by d/c  Long term goal 3: Pt will complete UB/LB dressing c SBA by d/c  Long term goal 4: Pt will complete toileting c SBA by d/c  Long term goal 5: Pt will complete functional transfer (toilet, tub, shower) c SBA by d/c. Long term goals 6: Pt will perform therex/therax to facilitate increased strength/endurance/ax tolerance (c emphasis on dynamic standing balance/tolerance >8 mins and BUE endurance) c SBA in order to complete ADLs. Long term goal 7: Pt will complete footwear dressing c MOD I by d/c :                                       Eating: Eating  Discharge Goal: Independent       Oral Hygiene: Oral Hygiene  Assistance Needed: Independent  Comment: Pt demo application of toothpaste on toothbrush. Pt able to complete oral care seated in w/c.   CARE Score: 6  Discharge Goal: Independent    UB/LB Bathing: Shower/Bathe Self  Assistance Needed: Partial/moderate assistance  Comment: Min A c LB washing/drying  CARE Score: 3  Discharge Goal: Supervision or touching assistance    UB Dressing: Upper Body Dressing  Assistance Needed: Partial/moderate Left and Right  Assistance Needed: Supervision or touching assistance  Comment: SBA without use of bed features  CARE Score: 4  Discharge Goal: Independent  Lying to Sitting on Side of Bed  Assistance Needed: Supervision or touching assistance  Comment: CGA without use of bed features  CARE Score: 4  Discharge Goal: Independent    Transfers:    Sit to Stand  Assistance Needed: Partial/moderate assistance  Comment: Min A using RW  CARE Score: 3  Discharge Goal: Supervision or touching assistance  Chair/Bed-to-Chair Transfer  Assistance Needed: Partial/moderate assistance  Comment: Min A using stand pivot with/without use of RW  CARE Score: 3  Discharge Goal: Supervision or touching assistance  Toilet Transfer  Assistance Needed: Partial/moderate assistance  Comment: Min A using grab bars; pt with tendency to sit prematurely while turning  CARE Score: 3  Car Transfer  Assistance Needed: Partial/moderate assistance  Comment: Min A using RW  CARE Score: 3  Discharge Goal: Supervision or touching assistance    Ambulation:    Walking Ability  Does the Patient Walk?: Yes     Walk 10 Feet  Comment: pt was able to complete this distance with 2-person assist today using RW with Min A and close w/c follow of 2nd person due to pt's unpredictable behavior related to safety, balance, strength, and endurance; pt is not able to usually perform this activity with different staff members  Discharge Goal: Supervision or touching assistance     Walk 50 Feet with Two Turns  Reason if not Attempted: Not attempted due to medical condition or safety concerns  CARE Score: 88  Discharge Goal: Supervision or touching assistance     Walk 150 Feet  Comment: pt was not performing this at baseline and does not demonstrate potential to improve on this mobility task  Reason if not Attempted: Not applicable  CARE Score: 9  Discharge Goal: Not Applicable     Walking 10 Feet on Uneven Surfaces  Reason if not Attempted: Not attempted due to medical condition or safety concerns  CARE Score: 88  Discharge Goal: Supervision or touching assistance     1 Step (Curb)  Comment: pt was not performing this at baseline, however anticipating that pt can be trained in a controlled environment  Reason if not Attempted: Not attempted due to medical condition or safety concerns  CARE Score: 88  Discharge Goal: Partial/moderate assistance     4 Steps  Comment: pt was not performing this at baseline, however anticipating that pt can be trained in a controlled environment  Reason if not Attempted: Not attempted due to medical condition or safety concerns  CARE Score: 88  Discharge Goal: Partial/moderate assistance     12 Steps  Comment: pt was not performing this at baseline and does not demonstrate potential to improve on this mobility task  Reason if not Attempted: Not applicable  CARE Score: 9  Discharge Goal: Not Applicable       Wheelchair:  w/c Ability: Wheelchair Ability  Uses a Wheelchair and/or Scooter?: Yes  Wheel 50 Feet with Two Turns  Assistance Needed: Partial/moderate assistance  Comment: pt was able to propel 32 ft with 1 turn with SBA-Sup using BUE (limited by fatigue)  CARE Score: 3  Discharge Goal: Independent  Wheel 150 Feet  Assistance Needed: Substantial/maximal assistance  Comment: pt was able to propel 32 ft with 1 turn with SBA-Sup using BUE (limited by fatigue)  CARE Score: 2  Discharge Goal: Independent          Balance:        Object: Picking Up Object  Comment: pt refused to attempt this activity when offered at 2 different times during this tx session stating feeling \"fuzzy\" or her head hurts and states \"'l'll do it next week. \"  Reason if not Attempted: Patient refused  CARE Score: 7  Discharge Goal: Supervision or touching assistance    I      Exam:    Blood pressure (!) 145/64, pulse 65, temperature 98.7 °F (37.1 °C), temperature source Oral, resp. rate 16, height 5' 2\" (1.575 m), weight 236 lb (107 kg), SpO2 93 %.     General: Fairly alert. Easily distracted. Slow and deliberate with speech. HEENT: MMM. Dysarthric. No JVD. Pulmonary: Shallow respirations with blunted breath sounds inferiorly. Cardiac: Infrequent premature beats. The rate is controlled. Abdomen: Patient's abdomen is soft and nondistended. Bowel sounds were present throughout. There was no rebound, guarding or masses noted. Upper extremities: Slow and deliberate with arm movements. Normal strength but poor coordination. Lower extremities: 1+ edema across the ankles and feet. Heels clear. No signs of DVT. Sitting balance was fair. Standing balance was poor. Lab Results   Component Value Date    WBC 6.0 06/28/2021    HGB 16.6 (H) 06/28/2021    HCT 51.9 (H) 06/28/2021    MCV 93.2 06/28/2021     06/28/2021     Lab Results   Component Value Date    INR 1.14 06/23/2021    INR 1.19 10/06/2020    PROTIME 13.8 06/23/2021    PROTIME 14.4 10/06/2020     Lab Results   Component Value Date    CREATININE 0.9 06/28/2021    BUN 14 06/28/2021     06/28/2021    K 3.7 06/28/2021     06/28/2021    CO2 23 06/28/2021     Lab Results   Component Value Date    ALT 12 06/24/2021    AST 13 (L) 06/24/2021    ALKPHOS 110 06/24/2021    BILITOT 0.8 06/24/2021       Expected length of stay  prior to a supervised level of function for discharge home with a walker and [facility] OT/PT is 7/9/2021. Recommendations:    1. Acute CVA with ataxia:  Quick to fatigue in her daily occupational and physical therapy with speech-language pathology. Verbal cues needed for her to engage in sequence tasks. Tolerating the antiplatelet therapy with baby aspirin and Plavix. Also receiving a statin with Lipitor.  She requires blood sugar and blood pressure treatment. Benefiting from aggressive pulmonary hygiene, DVT prophylaxis and nutritional support.  Bowel and bladder retraining.  Adaptive equipment training. [place] education.   Verbal cues and moderate physical assistance for transfers today again. 2. DVT prophylaxis: Lovenox 40 mg subcu daily.  I must monitor her hemoglobin and platelet count periodically while on this medication.  Weightbearing activities are limited but are pursued daily.  GI prophylaxis offered. No new bruising or swelling. 3. Urinary tract infection: The patient has completed IV antibiotic therapy for her UTI.    Monitoring her urinary control and quality. Current incontinence mimics her baseline. 4. Uncontrolled diabetes type 2 with peripheral neuropathy: The patient requires a diet modified for carbohydrates.  She requires scheduled Lantus and Humalog with each meal as well as a Humalog sliding scale.  Endocrinology will be monitoring her recovery.  Blood sugars are checked at mealtime and bedtime. 5. Uncontrolled hypertension: Norvasc and Lopressor are required for blood pressure regulation.  Target systolic blood pressure is 120140.  Vital signs are checked at rest and with activity.  Encouraging oral hydration. Blood pressure within target range again today. 6. Atrial fibrillation: Cardiology has recently decided that she does not have any active atrial fibrillation and does not require anticoagulation.  Lopressor is helping control her rhythm.  Monitoring vital signs at rest and with activity and daily weights to detect any decompensation of CHF.        Counseling and Coordination of Care: In care conference today I met with the patient's OT, PT, RN and . We discussed the patient's problems, progress and prognosis. Disposition issues were clarified and plans were established for ongoing rehabilitation efforts beyond the ARU stay. I reviewed this information with the patient during a second distinct visit with the patient. More than half of the total time of 35 minutes spent with the patient involved counseling and coordination of care.

## 2021-07-03 LAB
GLUCOSE BLD-MCNC: 114 MG/DL (ref 70–99)
GLUCOSE BLD-MCNC: 134 MG/DL (ref 70–99)
GLUCOSE BLD-MCNC: 156 MG/DL (ref 70–99)
GLUCOSE BLD-MCNC: 164 MG/DL (ref 70–99)
GLUCOSE BLD-MCNC: 193 MG/DL (ref 70–99)

## 2021-07-03 PROCEDURE — 97542 WHEELCHAIR MNGMENT TRAINING: CPT

## 2021-07-03 PROCEDURE — 97530 THERAPEUTIC ACTIVITIES: CPT

## 2021-07-03 PROCEDURE — 6370000000 HC RX 637 (ALT 250 FOR IP): Performed by: INTERNAL MEDICINE

## 2021-07-03 PROCEDURE — 82962 GLUCOSE BLOOD TEST: CPT

## 2021-07-03 PROCEDURE — 97112 NEUROMUSCULAR REEDUCATION: CPT

## 2021-07-03 PROCEDURE — 6360000002 HC RX W HCPCS: Performed by: INTERNAL MEDICINE

## 2021-07-03 PROCEDURE — 94761 N-INVAS EAR/PLS OXIMETRY MLT: CPT

## 2021-07-03 PROCEDURE — 6370000000 HC RX 637 (ALT 250 FOR IP): Performed by: PHYSICAL MEDICINE & REHABILITATION

## 2021-07-03 PROCEDURE — 97535 SELF CARE MNGMENT TRAINING: CPT

## 2021-07-03 PROCEDURE — 94150 VITAL CAPACITY TEST: CPT

## 2021-07-03 PROCEDURE — 94664 DEMO&/EVAL PT USE INHALER: CPT

## 2021-07-03 PROCEDURE — 1280000000 HC REHAB R&B

## 2021-07-03 RX ADMIN — ACETAMINOPHEN 650 MG: 325 TABLET ORAL at 19:55

## 2021-07-03 RX ADMIN — INSULIN GLARGINE 40 UNITS: 100 INJECTION, SOLUTION SUBCUTANEOUS at 21:31

## 2021-07-03 RX ADMIN — AMLODIPINE BESYLATE 5 MG: 5 TABLET ORAL at 08:30

## 2021-07-03 RX ADMIN — METOPROLOL TARTRATE 50 MG: 50 TABLET, FILM COATED ORAL at 08:30

## 2021-07-03 RX ADMIN — METOPROLOL TARTRATE 50 MG: 50 TABLET, FILM COATED ORAL at 21:25

## 2021-07-03 RX ADMIN — MAGNESIUM HYDROXIDE 30 ML: 400 SUSPENSION ORAL at 19:55

## 2021-07-03 RX ADMIN — ASPIRIN 81 MG: 81 TABLET, COATED ORAL at 08:30

## 2021-07-03 RX ADMIN — Medication 1 TABLET: at 08:30

## 2021-07-03 RX ADMIN — POLYETHYLENE GLYCOL (3350) 17 G: 17 POWDER, FOR SOLUTION ORAL at 19:55

## 2021-07-03 RX ADMIN — CLOPIDOGREL BISULFATE 75 MG: 75 TABLET ORAL at 08:30

## 2021-07-03 RX ADMIN — ENOXAPARIN SODIUM 40 MG: 40 INJECTION SUBCUTANEOUS at 08:30

## 2021-07-03 RX ADMIN — ATORVASTATIN CALCIUM 80 MG: 40 TABLET, FILM COATED ORAL at 21:25

## 2021-07-03 ASSESSMENT — PAIN SCALES - GENERAL
PAINLEVEL_OUTOF10: 5
PAINLEVEL_OUTOF10: 2
PAINLEVEL_OUTOF10: 0
PAINLEVEL_OUTOF10: 5

## 2021-07-03 ASSESSMENT — PAIN DESCRIPTION - LOCATION: LOCATION: ABDOMEN

## 2021-07-03 ASSESSMENT — PAIN DESCRIPTION - ORIENTATION: ORIENTATION: RIGHT

## 2021-07-03 ASSESSMENT — PAIN DESCRIPTION - PAIN TYPE: TYPE: ACUTE PAIN

## 2021-07-03 NOTE — PROGRESS NOTES
Pt would not rouse for ADLs. Will try again later. 0715 - Pt agreeable to washing face, brushing teeth, and brushing hair. Refused to change into a clean gown.

## 2021-07-03 NOTE — PROGRESS NOTES
Occupational Therapy  Physical Rehabilitation: OCCUPATIONAL THERAPY     [x] daily progress note       [] discharge       Patient Name:  Amaya Conde   :  1950 MRN: [id number]  Room:  [address] [place] [address] Date of Admission: 2021  Rehabilitation Diagnosis:   CVA (cerebral vascular accident) [facility]) [I63.9]  Acute CVA (cerebrovascular accident) ([facility] [address] [I63.9]       Date 7/3/2021       Day of ARU Week:  5   Time IN/-929   Individual Tx Minutes 60   Group Tx Minutes    Co-Treat Minutes    Concurrent Tx Minutes    TOTAL Tx Time Mins 60   Variance Time    Variance Time []   Refusal due to:     []   Medical hold/reason:    []   Illness   []   Off Unit for test/procedure  []   Extra time needed to complete task  []   Therapeutic need  []   Other (specify):   Restrictions Restrictions/Precautions: General Precautions, Fall Risk         Communication with other providers: [x]   OK to see per nursing:     []   Spoke with team member regarding:      Subjective observations and cognitive status: Pt in ortiz's position upon arrival. Pt reported increase of dizziness this date. All vitals WNL. Pain level/location:  5  /10       Location: [facility]   Discharge recommendations  Anticipated discharge date:    Destination: []?home alone   []?home alone w assist prn   [x]? home w/ family    []? Continuous supervision       []? SNF    []? Assisted living     []? Other:   Continued therapy: [x]? [facility] [address] OT  []? OUTPATIENT  OT   []? No Further OT  Equipment needs: None     Toileting:  Denied the need      Toilet Transfers:   SBA/CGA due to safety awareness with positioning utilizing AD.  Focus on repetitive to improve safety without FWW and w/c  Device Used:    [x]   Standard Toilet         [x]   Grab Bars           []  Bedside Commode       []   Elevated Toilet          []   Other:        Bed Mobility:           []   Pt received out of bed   Rolling R/L:  IND  Scooting:  Sup due to verbal cues to utilize bed features to increase IND  Supine --> Sit:  Mod I utilizing bed features  Sit --> Supine: Mod I utilizing bed features    Transfers:  Increase dizziness this date  Sit--> Stand:  SBA  Stand --> Sit:   Sup with verbal cues to reach back with SANDY UE  Stand-Pivot:   CGA  Other:    Assistive device required for transfer:   RW and w/c      Functional Mobility:  long-term down to therapy gym from room  Assistance: Mod I due to increase time  Device:   []   Rolling Walker     []   Standard Walker [x]   Wheelchair        []   [employer]       []   Johnny Brine         []   Cardiac Michelle Rigoberto       []   Other:        Homemaking Tasks: Additional Therapeutic activities/exercises completed this date:     [x]   ADL Training Focus on standing balance during personal hygiene and grooming task at sink with 1 UE support at all times. Due to fatigue and dizziness pt leans onto elbows due to fatigue. [x]   Balance/Postural training     [x]   Bed/Transfer Training Emphasis on body/hand placement with AD during functional transfers to increase IND and safety awareness carryover.    []   Endurance Training   []   Neuromuscular Re-ed   []   Nu-step:  Time:        Level:         #Steps:       []   Rebounder:    []  Seated     []  Standing        []   Supine Ther Ex (reps/sets):     []   Seated Ther Ex (reps/sets):  Engaged in dyn sitting balance utilizing PNF patterns picking items up off the floor and placing on countertop. Engaged in shoulder arc activity due to notable increase of fatigue with standing. [x]   Standing Ther Ex (reps/sets):  Engaged in functional reaching activity in therapy kitchen cabinets by crossing midline to improve standing activity tolerance and balance during ADL task. Demo CGA and verbal cues for tech and increase standing tolerance. []   Other:      Comments: Tolerates 1-4 mins of standing tolerance this date. Reported increase of dizziness. Notify nursing staff. Pt refuse to do any ambulation.  Inconsistent with unlocking and locking w/c prior to do any transfers. Pt keeps unlocking brakes prior to standing. Patient/Caregiver Education and Training:   [x]   Adaptive Equipment Use  [x]   Bed Mobility/Transfer Technique/Safety  []   Energy Conservation Tips  []   Family training  [x]   Postural Awareness  [x]   Safety During Functional Activities  []   Reinforced Patient's Precautions   []   Progress was updated and reviewed in Rehabtracker with patient and/or family this         date. Treatment Plan for Next Session:Continue OT POC      Assessment: This pt demonstrated a positive response to today's treatment as evidenced by actively participating. The patient is making progress toward established goals as evidenced by QI scores. Ongoing deficits are observed in the areas of standing tolerance, balance, strength, F safety awaress and continued focus on these is recommended.        Treatment/Activity Tolerance:   [x] Tolerated treatment with no adverse effects    [x] Patient limited by fatigue  [] Patient limited by pain   [] Patient limited by medical complications:    [] Adverse reaction to Tx:   [] Significant change in status    Safety:       [x]  bed alarm set    []  chair alarm set    []  Pt refused alarms                [x]  Telesitter activated      [x]  Gait belt used during tx session      []other:       Number of Minutes/Billable Intervention  Therapeutic Exercise    ADL Self-care 25   Neuro Re-Ed 15   Therapeutic Activity 20   Group    Other:    TOTAL 60       Social History  Social/Functional History  Lives With: Significant other (Duane)  Type of Home: Apartment  Home Layout: One level  Home Access: Ramped entrance, Elevator  Entrance Stairs - Number of Steps: 3rd floor  Bathroom Shower/Tub: Tub only  Bathroom Toilet: Handicap height  Bathroom Equipment: Grab bars in shower, Tub transfer bench  Bathroom Accessibility: Accessible  Home Equipment: 4 wheeled walker, [address] Help From: Family  ADL Assistance: Independent  Homemaking Assistance: Needs assistance  Homemaking Responsibilities: No  Ambulation Assistance:  (was using w/c for mobility at baseline)  Transfer Assistance: Needs assistance  Active : No  Patient's  Info: Pt's significant other Duane  Mode of Transportation: Car  Education: Very low education level  Occupation: On disability  Leisure & Hobbies: Spouse manages meds and bills, 1 kitten, outings are limited to doctors visits, watches tv  Additional Comments: Pt appears to be a poor historian and inconsistent with some information provided; sleeps in regular, flat bed; 1 recent fall requring this hospitalization    Objective                                                                                    Goals:  (Update in navigator)   : Long term goals  Time Frame for Long term goals : 7-10 days or until d/c  Long term goal 1: Pt will complete feeding/grooming/oral care task c MOD I by d/c  Long term goal 2: Pt will complete total body bathing c CGA c use of SBA by d/c  Long term goal 3: Pt will complete UB/LB dressing c SBA by d/c  Long term goal 4: Pt will complete toileting c SBA by d/c  Long term goal 5: Pt will complete functional transfer (toilet, tub, shower) c SBA by d/c. Long term goals 6: Pt will perform therex/therax to facilitate increased strength/endurance/ax tolerance (c emphasis on dynamic standing balance/tolerance >8 mins and BUE endurance) c SBA in order to complete ADLs. Long term goal 7: Pt will complete footwear dressing c MOD I by d/c:        Plan of Care                                                                              Times per week: 5 days per week for a minimum of 60 minutes/day plus group as appropriate for 60 minutes.   Treatment to include Plan  Times per day: Daily  Current Treatment Recommendations: Strengthening, Functional Mobility Training, Patient/Caregiver Education & Training, Endurance Training, Equipment Evaluation, Education, & procurement, Balance Training, Safety Education & Training, Self-Care / ADL    Electronically signed by   [employer],  7/3/2021, 8:27 AM

## 2021-07-03 NOTE — PROGRESS NOTES
Progress Note( Dr. Ramone Page)  7/3/2021  Subjective:   Admit Date: 6/29/2021  PCP: Shahzad Rivera MD    Admitted For :On June 23, 2021 for slurred speech aphasia hallucination and confusion possibly UTI  Was transferred to acute rehab unit today 6/29/2021    Consulted For: Better control of blood glucose    Interval History: Feels okay  Denies any chest pains,   Denies SOB . Denies nausea or vomiting. No new bowel or bladder symptoms. Intake/Output Summary (Last 24 hours) at 7/3/2021 0617  Last data filed at 7/2/2021 1240  Gross per 24 hour   Intake 390 ml   Output    Net 390 ml       DATA    CBC:   No results for input(s): WBC, HGB, PLT in the last 72 hours. CMP:  No results for input(s): NA, K, CL, CO2, BUN, CREATININE, CALCIUM, PROT, LABALBU, BILITOT, ALKPHOS, AST, ALT in the last 72 hours.     Invalid input(s): GLU  Lipids:   Lab Results   Component Value Date    CHOL 175 06/24/2021    CHOL 188 12/15/2014    HDL 39 06/24/2021    TRIG 144 06/24/2021     Glucose:  Recent Labs     07/02/21  2048 07/02/21  2110 07/03/21  0236   POCGLU 68* 77 114*     [id number]:  Lab Results   Component Value Date    LABA1C 11.5 06/23/2021     High Sensitivity TSH:   Lab Results   Component Value Date    TSHHS 2.010 06/25/2021     Free T3: No results found for: FT3  Free T4:  Lab Results   Component Value Date    T4FREE 0.93 05/09/2016       Echocardiogram transesophageal    Result Date: 6/28/2021  Transesophageal Echocardiography Report (SENA)   Demographics   Patient Name       [place] [facility]   Date of Study       06/28/2021   Date of Birth      1950         Gender              Female   Age                70 year(s)         Race                   Patient Number     [id number]         Room Number         2015   Visit Number       [id number]   Corporate ID       [id number]   Accession Number   [id number]         Formerly [facility], [facility] Sammy TYRONE GAY   Ordering Physician Miranda Aguayo MD                 Physician           Jorge Sow MD  Procedure Type of Study   SENA procedure:ECHOCARDIOGRAM TRANSESOPHAGEAL. Procedure Date Date: 06/28/2021 Start: 10:36 AM Study Location: [facility] - Echo Lab Technical Quality: Good visualization Indications:CVA. Patient Status: Routine Height: 62 inches Weight: 230 pounds BSA: 2.03 m2 BMI: 42.07 kg/m2 HR: 65 bpm BP: 151/82 mmHg SENA Performed By: the attending and the sonographer  Type of Anesthesia: Moderate sedation   Conclusions   Summary  Left ventricular systolic function is normal.  Ejection fraction is visually estimated at 55-60%. No evidence of thrombus within the left atrial appendage. Negative bubble study; no ASD or PFO noted. Mild mitral regurgitation. Signature   ------------------------------------------------------------------  Electronically signed by Mike Chauhan MD  (Interpreting physician) on 06/28/2021 at 02:23 PM      ECHO Complete 2D W Doppler W Color    Result Date: 6/24/2021  Transthoracic Echocardiography Report (TTE)  Demographics   Patient Name       Marlin Gibson   Date of Study       06/24/2021   Date of Birth      1950         Gender              Female   Age                70 year(s)         Race                   Patient Number     [id number]         Room Number         2015   Visit Number       [id number]   Corporate ID       [id number]   Accession Number   [id number]         Matheus Camacho RDMS   Ordering Physician Parminder Borrero MD           Physician           MD  Procedure Type of Study   TTE procedure:ECHOCARDIOGRAM COMPLETE 2D W DOPPLER W COLOR.   Procedure Date Date: 06/24/2021 Start: 08:44 AM Study Location: Portable Technical Quality: Technically difficult study Indications:CVA. Patient Status: Routine Height: 62 inches Weight: 230 pounds BSA: 2.03 m2 BMI: 42.07 kg/m2 HR: 55 bpm BP: 146/124 mmHg  Conclusions   Summary  Technically difficult examination. Left ventricular systolic function is normal.  Ejection fraction is visually estimated at 50-55%. Moderate left ventricular hypertrophy. Mildly dilated right ventricle. No evidence of right heart strain. Sclerotic, but non-stenotic aortic valve. No evidence of any pericardial effusion. Signature   ------------------------------------------------------------------  Electronically signed by Otilia George MD (Interpreting  physician) on 06/24/2021 at 05:26 PM  -       CT HEAD WO CONTRAST    Result Date: 6/28/2021  EXAMINATION: CT OF THE HEAD WITHOUT CONTRAST  6/23/2021 2:40 pm     Moderate-sized low-attenuation in the right temporal lobe concerning for acute infarct. MRI brain recommended to confirm. No acute intracranial hemorrhage. CT CERVICAL SPINE WO CONTRAST    Result Date: 6/23/2021  EXAMINATION: CT OF THE CERVICAL SPINE WITHOUT CONTRAST 6/23/2021 3:40 pm     Multilevel degenerate changes. No acute fracture traumatic malalignment     XR CHEST PORTABLE    Result Date: 6/23/2021  EXAMINATION: ONE XRAY VIEW OF THE CHEST 6/23/2021 3:34 pm COMPARISON: None. HISTORY: ORDERING SYSTEM PROVIDED HISTORY: other, slurrred speech, hypertension TECHNOLOGIST PROVIDED HISTORY: Reason for exam:->other, slurrred speech, hypertension Reason for Exam: other, slurrred speech, hypertension Acuity: Acute Type of Exam: Initial Additional signs and symptoms: na Relevant Medical/Surgical History: diabetes, hypertension FINDINGS: The lungs are clear. The mediastinum and cardiac silhouette are unremarkable. No acute abnormality.      CTA HEAD NECK W CONTRAST    Result Date: 6/23/2021  EXAMINATION: CTA OF THE HEAD AND NECK WITH CONTRAST 6/23/2021 11:17 pm: T    Mild tapering versus mild to moderate areas stenosis involving the right MCA M2 branches. Otherwise no large vessel occlusion or hemodynamic stenosis involving remainder of the vessels intracranially. Stable appearance of the age-indeterminate occlusion proximal V2 segment left vertebral artery with retrograde opacification of the V4 segment. 75% narrowing right proximal ICA and 50% narrowing left proximal ICA per NASCET criteria The findings were sent to the Radiology Results [address] at 11:44 pm on 6/23/2021to be communicated to a licensed caregiver. MRI brain without contrast    Result Date: 6/25/2021  EXAMINATION: MRI OF THE BRAIN WITHOUT CONTRAST  6/25/2021 6:18 pm     1. Patient motion limits evaluation. 2. There is a moderate to large acute infarct involving the right temporal lobe within the right MCA territory. 3. An acute infarct is also seen involving the left paramedian ian. 4. No significant mass effect or midline shift. 5. Loss of the normal signal void is again seen within the proximal left V4 segment, which can be seen with slow flow versus occlusion. 6. Mild global parenchymal volume loss with chronic microvascular ischemic changes. These results were sent to the Results [address] ([address] [facility]) on 6/25/2021 at 7:00 pm to be communicated to the referring/covering health care provider/office.        Scheduled Medicines   Medications:    insulin lispro  10 Units Subcutaneous TID WC    insulin glargine  40 Units Subcutaneous Nightly    enoxaparin  40 mg Subcutaneous Daily    atorvastatin  80 mg Oral Nightly    aspirin  81 mg Oral Daily    amLODIPine  5 mg Oral Daily    metoprolol tartrate  50 mg Oral BID    clopidogrel  75 mg Oral Daily    folic acid-pyridoxine-cyancobalamin  1 tablet Oral Daily    insulin lispro  0-6 Units Subcutaneous TID WC    insulin lispro  0-3 Units Subcutaneous 2 times per day      Infusions:       Objective:   Vitals: BP (!) 151/72   Pulse 69   Temp 99.2 °F (37.3 °C) (Oral)   Resp 18   Ht 5' 2\" (1.575 m)   Wt 233 lb 9.6 oz (106 kg)   SpO2 93%   BMI 42.73 kg/m²   General appearance: alert and cooperative with exam  Neck: no JVD or bruit  Thyroid : Normal lobes   Lungs: Has Vesicular Breath sounds   Heart:  regular rate and rhythm  Abdomen: soft, non-tender; bowel sounds normal; no masses,  no organomegaly  Musculoskeletal: Normal  Extremities: extremities normal, , no edema  Neurologic:  Awake, alert, oriented to name, place and time. Cranial nerves II-XII are grossly intact. Motor is  intact. Sensory paresthesia t.,  and gait is ab unstable. CVA acute    Assessment:     Patient Active Problem List:     Vitamin D deficiency     Hyperlipidemia     Back pain, chronic     Hemiparesis of right dominant side as late effect of cerebral infarction ([address]     Facial droop     Hypokalemia     Essential hypertension     Hyperglycemia     TIA (transient ischemic attack)     Class 3 severe obesity due to excess calories with body mass index (BMI) of 45.0 to 49.9 in adult [facility])     Cerebrovascular accident (CVA) ([address]     Dysarthria due to acute stroke ([address]     Dysphagia due to recent cerebral infarction     Uncontrolled type 2 diabetes mellitus with hyperglycemia ([address]     Morbid obesity with BMI of 45.0-49.9, adult ([address]     Incontinence in female     Symptomatic stenosis of right carotid artery     Acute CVA (cerebrovascular accident) ([address]      Plan:     1. Reviewed POC blood glucose . Labs and X ray results   2. Reviewed Current Medicines   3. On meal/ Correction bolus Humalog/ Basal Lantus Insulin regime s   4. Monitor Blood glucose frequently   5. Modified  the dose of Insulin/ other medicines as needed   6. Patient is participating in physical activities of rehab unit  7. Will follow     .      Loc Ann MD, MD

## 2021-07-03 NOTE — PROGRESS NOTES
Cam Dalal    : 1950  Acct #: [de-identified]  MRN: [id number]              PM&R Progress Note      Admitting diagnosis: Acute cerebrovascular accident ([address])     Comorbid diagnoses impacting rehabilitation: Ataxia, dysarthria, gait disturbance, uncontrolled diabetes type 2 with peripheral neuropathy, essential hypertension, history of atrial fibrillation, acute urinary tract infection, morbid obesity (BMI 44.5)    Chief complaint: Didn't sleep well. Poor appetite. Some dizziness with position changes. Prior (baseline) level of function: Independent. Current level of function:         Current  IRF-KRISTYN and Goals:   Occupational Therapy:      :   Long term goals  Time Frame for Long term goals : 7-10 days or until d/c  Long term goal 1: Pt will complete feeding/grooming/oral care task c MOD I by d/c  Long term goal 2: Pt will complete total body bathing c CGA c use of SBA by d/c  Long term goal 3: Pt will complete UB/LB dressing c SBA by d/c  Long term goal 4: Pt will complete toileting c SBA by d/c  Long term goal 5: Pt will complete functional transfer (toilet, tub, shower) c SBA by d/c. Long term goals 6: Pt will perform therex/therax to facilitate increased strength/endurance/ax tolerance (c emphasis on dynamic standing balance/tolerance >8 mins and BUE endurance) c SBA in order to complete ADLs. Long term goal 7: Pt will complete footwear dressing c MOD I by d/c :                                       Eating: Eating  Discharge Goal: Independent       Oral Hygiene: Oral Hygiene  Assistance Needed: Supervision or touching assistance  Comment: Sup- vc for task completion  CARE Score: 4  Discharge Goal: Independent    UB/LB Bathing: Shower/Bathe Self  Assistance Needed: Supervision or touching assistance  Comment: Sup in stance.  Max VC to continue c and to the task  CARE Score: 4  Discharge Goal: Supervision or touching assistance    UB Dressing: Upper Body Dressing  Assistance Needed: Independent  Comment: X  CARE Score: 6  Discharge Goal: Supervision or touching assistance         LB Dressing: Lower Body Dressing  Assistance Needed: Supervision or touching assistance  Comment: Sup for safety  CARE Score: 4  Discharge Goal: Supervision or touching assistance    Donning and [place] Footwear: Putting On/Taking Off Footwear  Assistance Needed: Independent  Comment: Mod I uses figure 4 to don socks  CARE Score: 6  Discharge Goal: Independent      Toileting: Toileting Hygiene  Assistance Needed: Supervision or touching assistance  Comment: Sup for safety  Reason if not Attempted: Patient refused  CARE Score: 4  Discharge Goal: Supervision or touching assistance      Toilet Transfers: Toilet Transfer  Assistance Needed: Supervision or touching assistance  Comment: SBA for safety in stance  Reason if not Attempted: Patient refused  CARE Score: 4  Discharge Goal: Supervision or touching assistance    Physical Therapy:   Short term goals  Time Frame for Short term goals: 10-12 tx days:  Short term goal 1: Pt will complete bed mobility tasks (rolling R/L, scooting, sup<->sit) Ind. Short term goal 2: Pt will complete OOB transfers using RW with SBA-Sup. Short term goal 3: Pt will ambulate 50 ft with turns overl level surface and 10 ft of uneven surface using RW with CGA. Short term goal 4: Pt will ascend/descend curb step using RW and 4-5 steps using railings with Min A. Short term goal 5: Pt will propel manual w/c 150 ft Mod Ind. Short term goal 6: Pt will complete object retrieval from the floor with 1UE support on RW with SBA-Sup.             Bed Mobility:   Sit to Lying  Assistance Needed: Supervision or touching assistance  Comment: SBA without use of bed features  CARE Score: 4  Discharge Goal: Independent  Roll Left and Right  Assistance Needed: Supervision or touching assistance  Comment: SBA without use of bed features  CARE Score: 4  Discharge Goal: Independent  Lying to Sitting on Side of Bed  Assistance Needed: Supervision or touching assistance  Comment: CGA without use of bed features  CARE Score: 4  Discharge Goal: Independent    Transfers:    Sit to Stand  Assistance Needed: Partial/moderate assistance  Comment: Min A using RW  CARE Score: 3  Discharge Goal: Supervision or touching assistance  Chair/Bed-to-Chair Transfer  Assistance Needed: Partial/moderate assistance  Comment: Min A using stand pivot with/without use of RW  CARE Score: 3  Discharge Goal: Supervision or touching assistance  Toilet Transfer  Assistance Needed: Partial/moderate assistance  Comment: Min A using grab bars; pt with tendency to sit prematurely while turning  CARE Score: 3  Car Transfer  Assistance Needed: Partial/moderate assistance  Comment: Min A using RW  CARE Score: 3  Discharge Goal: Supervision or touching assistance    Ambulation:    Walking Ability  Does the Patient Walk?: Yes     Walk 10 Feet  Comment: pt was able to complete this distance with 2-person assist today using RW with Min A and close w/c follow of 2nd person due to pt's unpredictable behavior related to safety, balance, strength, and endurance; pt is not able to usually perform this activity with different staff members  Discharge Goal: Supervision or touching assistance     Walk 50 Feet with Two Turns  Reason if not Attempted: Not attempted due to medical condition or safety concerns  CARE Score: 88  Discharge Goal: Supervision or touching assistance     Walk 150 Feet  Comment: pt was not performing this at baseline and does not demonstrate potential to improve on this mobility task  Reason if not Attempted: Not applicable  CARE Score: 9  Discharge Goal: Not Applicable     Walking 10 Feet on Uneven Surfaces  Reason if not Attempted: Not attempted due to medical condition or safety concerns  CARE Score: 88  Discharge Goal: Supervision or touching assistance     1 Step (Curb)  Comment: pt was not performing this at baseline, however anticipating that pt can be trained in a controlled environment  Reason if not Attempted: Not attempted due to medical condition or safety concerns  CARE Score: 88  Discharge Goal: Partial/moderate assistance     4 Steps  Comment: pt was not performing this at baseline, however anticipating that pt can be trained in a controlled environment  Reason if not Attempted: Not attempted due to medical condition or safety concerns  CARE Score: 88  Discharge Goal: Partial/moderate assistance     12 Steps  Comment: pt was not performing this at baseline and does not demonstrate potential to improve on this mobility task  Reason if not Attempted: Not applicable  CARE Score: 9  Discharge Goal: Not Applicable       Wheelchair:  w/c Ability: Wheelchair Ability  Uses a Wheelchair and/or Scooter?: Yes  Wheel 50 Feet with Two Turns  Assistance Needed: Partial/moderate assistance  Comment: pt was able to propel 32 ft with 1 turn with SBA-Sup using BUE (limited by fatigue)  CARE Score: 3  Discharge Goal: Independent  Wheel 150 Feet  Assistance Needed: Substantial/maximal assistance  Comment: pt was able to propel 32 ft with 1 turn with SBA-Sup using BUE (limited by fatigue)  CARE Score: 2  Discharge Goal: Independent          Balance:        Object: Picking Up Object  Comment: pt refused to attempt this activity when offered at 2 different times during this tx session stating feeling \"fuzzy\" or her head hurts and states \"'l'll do it next week. \"  Reason if not Attempted: Patient refused  CARE Score: 7  Discharge Goal: Supervision or touching assistance    I      Exam:    Blood pressure (!) 157/66, pulse 70, temperature 98.8 °F (37.1 °C), temperature source Oral, resp. rate 16, height 5' 2\" (1.575 m), weight 236 lb (107 kg), SpO2 92 %. General: Reclined in a bedside chair. Legs elevated. Easily distracted. HEENT: Dysarthric. Neck supple. MMM. Pulmonary: Symmetric air exchange but respirations are somewhat shallow.     Cardiac: Occasional premature beat. Rate controlled. Abdomen: Patient's abdomen is soft and nondistended. Bowel sounds were present throughout. There was no rebound, guarding or masses noted. Upper extremities: No new bruising or swelling. Lower extremities: No signs of DVT. Sitting balance was fair. Standing balance was poor. Lab Results   Component Value Date    WBC 6.0 06/28/2021    HGB 16.6 (H) 06/28/2021    HCT 51.9 (H) 06/28/2021    MCV 93.2 06/28/2021     06/28/2021     Lab Results   Component Value Date    INR 1.14 06/23/2021    INR 1.19 10/06/2020    PROTIME 13.8 06/23/2021    PROTIME 14.4 10/06/2020     Lab Results   Component Value Date    CREATININE 0.9 06/28/2021    BUN 14 06/28/2021     06/28/2021    K 3.7 06/28/2021     06/28/2021    CO2 23 06/28/2021     Lab Results   Component Value Date    ALT 12 06/24/2021    AST 13 (L) 06/24/2021    ALKPHOS 110 06/24/2021    BILITOT 0.8 06/24/2021       Expected length of stay  prior to a supervised level of function for discharge home with a walker and [address] OT/PT is 7/9/2021. Recommendations:    1. Acute CVA with ataxia:  Quick to fatigue in her daily occupational and physical therapy with speech-language pathology.  Verbal cues needed for her to engage in sequence tasks. Tolerating the antiplatelet therapy with baby aspirin and Plavix. Also receiving a statin with Lipitor.  She requires blood sugar and blood pressure treatment. Benefiting from aggressive pulmonary hygiene, DVT prophylaxis and nutritional support.  Bowel and bladder retraining.  Adaptive equipment training. [employer] Mouse education.  Verbal cues and moderate physical assistance for transfers today again. 2. DVT prophylaxis: Lovenox 40 mg subcu daily.  I must monitor her hemoglobin and platelet count periodically while on this medication.  Weightbearing activities are limited but are pursued daily.  GI prophylaxis offered.  No clinical signs of blood loss.   3. Urinary tract infection: The patient has completed IV antibiotic therapy for her UTI.    Monitoring her urinary control and quality. Current incontinence mimics her baseline. 4. Uncontrolled diabetes type 2 with peripheral neuropathy: The patient requires a diet modified for carbohydrates.  She requires scheduled Lantus and Humalog with each meal as well as a Humalog sliding scale.  Endocrinology will be monitoring her recovery.  Blood sugars are checked at mealtime and bedtime. Blood sugars occasionally greater than 200.  5. Uncontrolled hypertension: Norvasc and Lopressor are required for blood pressure regulation.  Target systolic blood pressure is 120140.  Vital signs are checked at rest and with activity.  Encouraging oral hydration.  Blood pressure is just above the target range again today. Monitoring closely. 6. Atrial fibrillation: Cardiology has recently decided that she does not have any active atrial fibrillation and does not require anticoagulation.  Lopressor is helping control her rate.  Monitoring vital signs at rest and with activity. Daily weights do not reveal any decompensation of CHF.

## 2021-07-03 NOTE — FLOWSHEET NOTE
[x] daily progress note       [] discharge       Patient Name:  Leslie Chauhan   :  1950 MRN: [id number]  Room:  [address] [place], [place] [address] Date of Admission: 2021  Rehabilitation Diagnosis:   CVA (cerebral vascular accident) [facility]) [I63.9]  Acute CVA (cerebrovascular accident) ([facility] [address] [I63.9]       Date 7/3/2021       Day of ARU Week:  5   Time IN/OUT 8702-2788  [id number]   Individual Tx Minutes 120   TOTAL Tx Time Mins 120   Restrictions Restrictions/Precautions  Restrictions/Precautions: General Precautions, Fall Risk  Position Activity Restriction  Other position/activity restrictions: LIZBET, IV access RUE   Communication with other providers: [x]   OK to see per nursing:     []   Spoke with team member regarding:      Subjective observations and cognitive status: AM session: pt seen sidelying in bed at beginning of session. Pt reported increased dizziness today. Vitals: /78; O2 sats 92%; HR 52 bpm. Pt required max encouragement to participate in therapy. Pt only agreeable to w/c propulsion. Pt adamantly refused gait training 50'. PM session: pt seen in semi-ortiz's position in bed at beginning of treatment. Pt required max encouragement to participate. Pt also attempting to call multiple people on her cell phone to delay initiative with transferring out of bed to participate in her therapy session. Pt still adamantly refusing gait training despite multiple attempts and education. Pt constantly unlocking w/c brakes after this PTA locking w/c brakes to have patient stand and walk. Pt constantly saying \"We'll do it tomorrow. Take me back to my room. \" pt also taking mask off, and PTA placing mask back on patient multiple times. Discussed with patient that Dr. Harriet Greene needs an update on how she's progressing in tasks such as walking. Pt stated \"I don't care what the doctor thinks. \"    Pain level/location: 0/10         Discharge recommendations  Anticipated discharge date:  Expected Discharge Date: 07/09/21  Destination: []??home alone   []? ?home alone with assist PRN     [x]? ? home w/ family      [x]? ? Continuous supervision  []? ?SNF    []? ? Assisted living     []? ? Other:  Continued therapy: [x]? ?C PT  []??OUTPATIENT PT   []? ? No Further PT  []? ?SNF PT  Caregiver training recommended: []? ?Yes  []? ? No   Equipment needs: Has manual w/c, will need verified status of RW that pt received during previous ARU stay     Bed Mobility:             Rolling R/L: mod I with bed rails   Scooting: Mod I with bed rails   Lying --> Sit: mod I with bed rails   Sit --> lying: mod I with bed rails   Use of bed rails     Transfers:    Sit--> Stand:  SBA  Stand --> Sit:   SBA  Chair-->Bed/Bed --> Chair:  SBA  Assistive device required for transfer:  RW    Gait:    Distance: AM session: 10'; PM session: pt adamantly refused all walks in PM despite max encouragement and education. Assistance:  SBA  Device:  RW  Gait Quality:  Reciprocal  pattern  Pt adamantly refused 48' walk. Wheelchair Propulsion:  Distance:  AM session: 173'; PM session: 211'   Assistance:  Supervision for obstacles   Extremities Used:   BUEs   Type:    [x]  Manual        []  Electric  Slow movement; vcs to use BUEs. Pt required max encouragement at times. Pt stating \"Just push me back to the room. \"     Additional Therapeutic activities/exercises completed this date:     []   Nu-step:  Time:        Level:         #Steps:       []   Rebounder:    []  Seated     []  Standing        []   Balance training         []   Postural training    []   Supine ther ex (reps/sets):     []   Seated ther ex (reps/sets):     []   Standing ther ex (reps/sets):     []   Picking up object from floor (standing):                   []   Reacher used   [x]   Other: Max education on importance of participating in therapy, protocol of ARU.     []   Other:    Patient/Caregiver Education and Training:   [x]   Bed Mobility/Transfer technique/safety  [x]   Gait technique/sequencing  [x]   Proper use of assistive device  []   Advanced mobility safety and technique  []   Reinforced patient's precautions/mobility while maintaining precautions  []   Postural awareness  []   Family training  [x]   Progress was updated in Rehabtracker this date. Treatment Plan for Next Session: gait; transfers; exercises   Assessment: This pt demonstrated a negative response to today's treatment as evidenced by pt's increased self-limiting behavior despite max encouragement and education. The patient is not making progress toward established goals as evidenced by QI scores. Ongoing deficits are observed in the areas of strength, balance, and endurance and continued focus on strengthening, balance training, and endurance training is recommended. Treatment/Activity Tolerance:   [] Tolerated treatment with no adverse effects    [x] Patient limited by self-limiting behavior. [] Patient limited by pain   [] Patient limited by medical complications:    [] Adverse reaction to Tx:   [] Significant change in status    Safety:       [x]  bed alarm set    []  chair alarm set    []  Pt refused alarms                [x]  Telesitter activated      [x]  Gait belt used during tx session      [x]other: pt left in semi-otriz's position in bed with call light at end of treatment sessions.          Number of Minutes/Billable Intervention  Gait Training    Therapeutic Exercise    Neuro Re-Ed    Therapeutic Activity 45   Wheelchair Propulsion 75   Group    Other:    TOTAL 120         Social History  Social/Functional History  Lives With: Significant other (Duane)  Type of Home: Apartment  Home Layout: One level  Home Access: Ramped entrance, Elevator  Entrance Stairs - Number of Steps: 3rd floor  Bathroom Shower/Tub: Tub only  Bathroom Toilet: Handicap height  Bathroom Equipment: Grab bars in shower, Tub transfer bench  Bathroom Accessibility: Accessible  Home Equipment: 4 wheeled walker, [address] Help From: Family  ADL Assistance: Independent  Homemaking Assistance: Needs assistance  Homemaking Responsibilities: No  Ambulation Assistance:  (was using w/c for mobility at baseline)  Transfer Assistance: Needs assistance  Active : No  Patient's  Info: Pt's significant other Duane  Mode of Transportation: Car  Education: Very low education level  Occupation: On disability  Leisure & Hobbies: Spouse manages meds and bills, 1 kitten, outings are limited to doctors visits, watches tv  Additional Comments: Pt appears to be a poor historian and inconsistent with some information provided; sleeps in regular, flat bed; 1 recent fall requring this hospitalization    Objective                                                                                    Goals:  (Update in navigator)  Short term goals  Time Frame for Short term goals: 10-12 tx days:  Short term goal 1: Pt will complete bed mobility tasks (rolling R/L, scooting, sup<->sit) Ind. Short term goal 2: Pt will complete OOB transfers using RW with SBA-Sup. Short term goal 3: Pt will ambulate 50 ft with turns overl level surface and 10 ft of uneven surface using RW with CGA. Short term goal 4: Pt will ascend/descend curb step using RW and 4-5 steps using railings with Min A. Short term goal 5: Pt will propel manual w/c 150 ft Mod Ind. Short term goal 6: Pt will complete object retrieval from the floor with 1UE support on RW with SBA-Sup.:   :        Plan of Care                                                                              Times per week: 5 days per week for a minimum of 60 minutes/day plus group as appropriate for 60 minutes.   Treatment to include Current Treatment Recommendations: Strengthening, Gait Training, Patient/Caregiver Education & Training, Stair training, Equipment Evaluation, Education, & procurement, Balance Training, Cognitive/Perceptual Training, Functional Mobility Training, Endurance Training, Home Exercise Program, Transfer Training, Wheelchair Mobility Training, Safety Education & Training    Electronically signed by   Rea Angel, [id number]  7/3/2021, 10:07 AM

## 2021-07-04 LAB
GLUCOSE BLD-MCNC: 117 MG/DL (ref 70–99)
GLUCOSE BLD-MCNC: 135 MG/DL (ref 70–99)
GLUCOSE BLD-MCNC: 141 MG/DL (ref 70–99)
GLUCOSE BLD-MCNC: 167 MG/DL (ref 70–99)
GLUCOSE BLD-MCNC: 205 MG/DL (ref 70–99)

## 2021-07-04 PROCEDURE — 94761 N-INVAS EAR/PLS OXIMETRY MLT: CPT

## 2021-07-04 PROCEDURE — 94150 VITAL CAPACITY TEST: CPT

## 2021-07-04 PROCEDURE — 1280000000 HC REHAB R&B

## 2021-07-04 PROCEDURE — 6370000000 HC RX 637 (ALT 250 FOR IP): Performed by: INTERNAL MEDICINE

## 2021-07-04 PROCEDURE — 82962 GLUCOSE BLOOD TEST: CPT

## 2021-07-04 PROCEDURE — 6360000002 HC RX W HCPCS: Performed by: INTERNAL MEDICINE

## 2021-07-04 RX ADMIN — ASPIRIN 81 MG: 81 TABLET, COATED ORAL at 08:10

## 2021-07-04 RX ADMIN — Medication 1 TABLET: at 08:11

## 2021-07-04 RX ADMIN — CLOPIDOGREL BISULFATE 75 MG: 75 TABLET ORAL at 08:10

## 2021-07-04 RX ADMIN — ATORVASTATIN CALCIUM 80 MG: 40 TABLET, FILM COATED ORAL at 22:21

## 2021-07-04 RX ADMIN — ENOXAPARIN SODIUM 40 MG: 40 INJECTION SUBCUTANEOUS at 08:11

## 2021-07-04 RX ADMIN — METOPROLOL TARTRATE 50 MG: 50 TABLET, FILM COATED ORAL at 22:20

## 2021-07-04 RX ADMIN — AMLODIPINE BESYLATE 5 MG: 5 TABLET ORAL at 08:10

## 2021-07-04 RX ADMIN — INSULIN GLARGINE 40 UNITS: 100 INJECTION, SOLUTION SUBCUTANEOUS at 22:24

## 2021-07-04 ASSESSMENT — PAIN SCALES - GENERAL: PAINLEVEL_OUTOF10: 0

## 2021-07-04 NOTE — PROGRESS NOTES
Pt refuses to use walker to ambulate to the bathroom. She insists on using the wheelchair at all times. Pt refuses to change into a clean gown.

## 2021-07-04 NOTE — PROGRESS NOTES
Progress Note( Dr. Ariel Ware)  7/4/2021  Subjective:   Admit Date: 6/29/2021  PCP: Marybel Guzman MD    Admitted For :On June 23, 2021 for slurred speech aphasia hallucination and confusion possibly UTI  Was transferred to acute rehab unit today 6/29/2021    Consulted For: Better control of blood glucose    Interval History: Feels okay  Denies any chest pains,   Denies SOB . Denies nausea or vomiting. No new bowel or bladder symptoms. Intake/Output Summary (Last 24 hours) at 7/4/2021 0915  Last data filed at 7/4/2021 0844  Gross per 24 hour   Intake 960 ml   Output    Net 960 ml       DATA    CBC:   No results for input(s): WBC, HGB, PLT in the last 72 hours. CMP:  No results for input(s): NA, K, CL, CO2, BUN, CREATININE, CALCIUM, PROT, LABALBU, BILITOT, ALKPHOS, AST, ALT in the last 72 hours.     Invalid input(s): GLU  Lipids:   Lab Results   Component Value Date    CHOL 175 06/24/2021    CHOL 188 12/15/2014    HDL 39 06/24/2021    TRIG 144 06/24/2021     Glucose:  Recent Labs     07/03/21  2127 07/04/21  0219 07/04/21  0743   POCGLU 156* 135* 141*     [id number]:  Lab Results   Component Value Date    LABA1C 11.5 06/23/2021     High Sensitivity TSH:   Lab Results   Component Value Date    TSHHS 2.010 06/25/2021     Free T3: No results found for: FT3  Free T4:  Lab Results   Component Value Date    T4FREE 0.93 05/09/2016       Echocardiogram transesophageal    Result Date: 6/28/2021  Transesophageal Echocardiography Report (SENA)   Demographics   Patient Name       Farida Gallego   Date of Study       06/28/2021   Date of Birth      1950         Gender              Female   Age                70 year(s)         Race                   Patient Number     [id number]         Room Number         2015   Visit Number       [id number]   Corporate ID       [id number]   Accession Number   [id number]         Ruth Aranda TYRONE GAY   Ordering Physician Toma Leyden MD                 Physician           Rito Velázquez MD  Procedure Type of Study   SENA procedure:ECHOCARDIOGRAM TRANSESOPHAGEAL. Procedure Date Date: 06/28/2021 Start: 10:36 AM Study Location: [facility] - Echo Lab Technical Quality: Good visualization Indications:CVA. Patient Status: Routine Height: 62 inches Weight: 230 pounds BSA: 2.03 m2 BMI: 42.07 kg/m2 HR: 65 bpm BP: 151/82 mmHg SENA Performed By: the attending and the sonographer  Type of Anesthesia: Moderate sedation   Conclusions   Summary  Left ventricular systolic function is normal.  Ejection fraction is visually estimated at 55-60%. No evidence of thrombus within the left atrial appendage. Negative bubble study; no ASD or PFO noted. Mild mitral regurgitation. Signature   ------------------------------------------------------------------  Electronically signed by Rodman Habermann MD  (Interpreting physician) on 06/28/2021 at 02:23 PM      ECHO Complete 2D W Doppler W Color    Result Date: 6/24/2021  Transthoracic Echocardiography Report (TTE)  Demographics   Patient Name       Keke Bowen   Date of Study       06/24/2021   Date of Birth      1950         Gender              Female   Age                70 year(s)         Race                   Patient Number     [id number]         Room Number         2015   Visit Number       [id number]   Corporate ID       [id number]   Accession Number   [id number]         Eric Upton RDMS   Ordering Physician Janina Gutierrez MD           Physician           MD  Procedure Type of Study   TTE procedure:ECHOCARDIOGRAM COMPLETE 2D W DOPPLER W COLOR.   Procedure Date Date: 06/24/2021 Start: 08:44 AM Study Location: Portable Technical Quality: Technically difficult study Indications:CVA. Patient Status: Routine Height: 62 inches Weight: 230 pounds BSA: 2.03 m2 BMI: 42.07 kg/m2 HR: 55 bpm BP: 146/124 mmHg  Conclusions   Summary  Technically difficult examination. Left ventricular systolic function is normal.  Ejection fraction is visually estimated at 50-55%. Moderate left ventricular hypertrophy. Mildly dilated right ventricle. No evidence of right heart strain. Sclerotic, but non-stenotic aortic valve. No evidence of any pericardial effusion. Signature   ------------------------------------------------------------------  Electronically signed by Sravani Evans MD (Interpreting  physician) on 06/24/2021 at 05:26 PM  -       CT HEAD WO CONTRAST    Result Date: 6/28/2021  EXAMINATION: CT OF THE HEAD WITHOUT CONTRAST  6/23/2021 2:40 pm     Moderate-sized low-attenuation in the right temporal lobe concerning for acute infarct. MRI brain recommended to confirm. No acute intracranial hemorrhage. CT CERVICAL SPINE WO CONTRAST    Result Date: 6/23/2021  EXAMINATION: CT OF THE CERVICAL SPINE WITHOUT CONTRAST 6/23/2021 3:40 pm     Multilevel degenerate changes. No acute fracture traumatic malalignment     XR CHEST PORTABLE    Result Date: 6/23/2021  EXAMINATION: ONE XRAY VIEW OF THE CHEST 6/23/2021 3:34 pm COMPARISON: None. HISTORY: ORDERING SYSTEM PROVIDED HISTORY: other, slurrred speech, hypertension TECHNOLOGIST PROVIDED HISTORY: Reason for exam:->other, slurrred speech, hypertension Reason for Exam: other, slurrred speech, hypertension Acuity: Acute Type of Exam: Initial Additional signs and symptoms: na Relevant Medical/Surgical History: diabetes, hypertension FINDINGS: The lungs are clear. The mediastinum and cardiac silhouette are unremarkable. No acute abnormality.      CTA HEAD NECK W CONTRAST    Result Date: 6/23/2021  EXAMINATION: CTA OF THE HEAD AND NECK WITH CONTRAST 6/23/2021 11:17 pm: T    Mild tapering versus mild to moderate areas stenosis involving the right MCA M2 branches. Otherwise no large vessel occlusion or hemodynamic stenosis involving remainder of the vessels intracranially. Stable appearance of the age-indeterminate occlusion proximal V2 segment left vertebral artery with retrograde opacification of the V4 segment. 75% narrowing right proximal ICA and 50% narrowing left proximal ICA per NASCET criteria The findings were sent to the Radiology Results [address] at 11:44 pm on 6/23/2021to be communicated to a licensed caregiver. MRI brain without contrast    Result Date: 6/25/2021  EXAMINATION: MRI OF THE BRAIN WITHOUT CONTRAST  6/25/2021 6:18 pm     1. Patient motion limits evaluation. 2. There is a moderate to large acute infarct involving the right temporal lobe within the right MCA territory. 3. An acute infarct is also seen involving the left paramedian ian. 4. No significant mass effect or midline shift. 5. Loss of the normal signal void is again seen within the proximal left V4 segment, which can be seen with slow flow versus occlusion. 6. Mild global parenchymal volume loss with chronic microvascular ischemic changes. These results were sent to the Results [address] ([address] [facility]) on 6/25/2021 at 7:00 pm to be communicated to the referring/covering health care provider/office.        Scheduled Medicines   Medications:    insulin lispro  10 Units Subcutaneous TID WC    insulin glargine  40 Units Subcutaneous Nightly    enoxaparin  40 mg Subcutaneous Daily    atorvastatin  80 mg Oral Nightly    aspirin  81 mg Oral Daily    amLODIPine  5 mg Oral Daily    metoprolol tartrate  50 mg Oral BID    clopidogrel  75 mg Oral Daily    folic acid-pyridoxine-cyancobalamin  1 tablet Oral Daily    insulin lispro  0-6 Units Subcutaneous TID WC    insulin lispro  0-3 Units Subcutaneous 2 times per day      Infusions:       Objective:   Vitals: BP (!) 135/93   Pulse 57   Temp 98.8 °F (37.1 °C) (Oral)   Resp 16   Ht 5' 2\" (1.575 m)   Wt 237 lb 9.6 oz (107.8 kg)   SpO2 94%   BMI 43.46 kg/m²   General appearance: alert and cooperative with exam  Neck: no JVD or bruit  Thyroid : Normal lobes   Lungs: Has Vesicular Breath sounds   Heart:  regular rate and rhythm  Abdomen: soft, non-tender; bowel sounds normal; no masses,  no organomegaly  Musculoskeletal: Normal  Extremities: extremities normal, , no edema  Neurologic:  Awake, alert, oriented to name, place and time. Cranial nerves II-XII are grossly intact. Motor is  intact. Sensory paresthesia t.,  and gait is ab unstable. CVA acute    Assessment:     Patient Active Problem List:     Vitamin D deficiency     Hyperlipidemia     Back pain, chronic     Hemiparesis of right dominant side as late effect of cerebral infarction ([address]     Facial droop     Hypokalemia     Essential hypertension     Hyperglycemia     TIA (transient ischemic attack)     Class 3 severe obesity due to excess calories with body mass index (BMI) of 45.0 to 49.9 in adult [facility])     Cerebrovascular accident (CVA) ([address]     Dysarthria due to acute stroke ([address]     Dysphagia due to recent cerebral infarction     Uncontrolled type 2 diabetes mellitus with hyperglycemia ([address]     Morbid obesity with BMI of 45.0-49.9, adult ([address]     Incontinence in female     Symptomatic stenosis of right carotid artery     Acute CVA (cerebrovascular accident) ([address]      Plan:     1. Reviewed POC blood glucose . Labs and X ray results   2. Reviewed Current Medicines   3. On meal/ Correction bolus Humalog/ Basal Lantus Insulin regime s   4. Monitor Blood glucose frequently   5. Modified  the dose of Insulin/ other medicines as needed   6. Patient is participating in physical activities of rehab unit  7. Will follow     .      Osbaldo Cooper MD, MD

## 2021-07-05 LAB
GLUCOSE BLD-MCNC: 145 MG/DL (ref 70–99)
GLUCOSE BLD-MCNC: 159 MG/DL (ref 70–99)
GLUCOSE BLD-MCNC: 164 MG/DL (ref 70–99)
GLUCOSE BLD-MCNC: 182 MG/DL (ref 70–99)
GLUCOSE BLD-MCNC: 88 MG/DL (ref 70–99)

## 2021-07-05 PROCEDURE — 97129 THER IVNTJ 1ST 15 MIN: CPT

## 2021-07-05 PROCEDURE — 82962 GLUCOSE BLOOD TEST: CPT

## 2021-07-05 PROCEDURE — 6360000002 HC RX W HCPCS: Performed by: INTERNAL MEDICINE

## 2021-07-05 PROCEDURE — 94150 VITAL CAPACITY TEST: CPT

## 2021-07-05 PROCEDURE — 97110 THERAPEUTIC EXERCISES: CPT

## 2021-07-05 PROCEDURE — 92507 TX SP LANG VOICE COMM INDIV: CPT

## 2021-07-05 PROCEDURE — 6370000000 HC RX 637 (ALT 250 FOR IP): Performed by: INTERNAL MEDICINE

## 2021-07-05 PROCEDURE — 1280000000 HC REHAB R&B

## 2021-07-05 PROCEDURE — 97116 GAIT TRAINING THERAPY: CPT

## 2021-07-05 PROCEDURE — 99232 SBSQ HOSP IP/OBS MODERATE 35: CPT | Performed by: PHYSICAL MEDICINE & REHABILITATION

## 2021-07-05 PROCEDURE — 94761 N-INVAS EAR/PLS OXIMETRY MLT: CPT

## 2021-07-05 PROCEDURE — 6370000000 HC RX 637 (ALT 250 FOR IP): Performed by: PHYSICAL MEDICINE & REHABILITATION

## 2021-07-05 PROCEDURE — 97530 THERAPEUTIC ACTIVITIES: CPT

## 2021-07-05 PROCEDURE — 97535 SELF CARE MNGMENT TRAINING: CPT

## 2021-07-05 RX ADMIN — ACETAMINOPHEN 650 MG: 325 TABLET ORAL at 22:40

## 2021-07-05 RX ADMIN — ENOXAPARIN SODIUM 40 MG: 40 INJECTION SUBCUTANEOUS at 09:21

## 2021-07-05 RX ADMIN — METOPROLOL TARTRATE 50 MG: 50 TABLET, FILM COATED ORAL at 22:41

## 2021-07-05 RX ADMIN — ASPIRIN 81 MG: 81 TABLET, COATED ORAL at 09:21

## 2021-07-05 RX ADMIN — METOPROLOL TARTRATE 50 MG: 50 TABLET, FILM COATED ORAL at 09:21

## 2021-07-05 RX ADMIN — AMLODIPINE BESYLATE 5 MG: 5 TABLET ORAL at 09:21

## 2021-07-05 RX ADMIN — CLOPIDOGREL BISULFATE 75 MG: 75 TABLET ORAL at 09:21

## 2021-07-05 RX ADMIN — ACETAMINOPHEN 650 MG: 325 TABLET ORAL at 06:30

## 2021-07-05 RX ADMIN — Medication 1 TABLET: at 09:21

## 2021-07-05 RX ADMIN — INSULIN GLARGINE 40 UNITS: 100 INJECTION, SOLUTION SUBCUTANEOUS at 22:42

## 2021-07-05 RX ADMIN — ATORVASTATIN CALCIUM 80 MG: 40 TABLET, FILM COATED ORAL at 22:40

## 2021-07-05 ASSESSMENT — PAIN SCALES - GENERAL
PAINLEVEL_OUTOF10: 0
PAINLEVEL_OUTOF10: 2
PAINLEVEL_OUTOF10: 0
PAINLEVEL_OUTOF10: 0

## 2021-07-05 NOTE — PROGRESS NOTES
Physical Rehabilitation: OCCUPATIONAL THERAPY     [x] daily progress note       [] discharge       Patient Name:  Mechelle Alex   :  1950 MRN: [id number]  Room:  [address] [place] [address] Date of Admission: 2021  Rehabilitation Diagnosis:   CVA (cerebral vascular accident) [facility]) [I63.9]  Acute CVA (cerebrovascular accident) ([facility] [I63.9]       Date 2021       Day of ARU Week:  7   Time IN/OUT 1100/1210; 1300/1327   Individual Tx Minutes 79 + 27   Group Tx Minutes    Co-Treat Minutes    Concurrent Tx Minutes    TOTAL Tx Time Mins 97   Variance Time +7   Variance Time []   Refusal due to:     []   Medical hold/reason:    []   Illness   []   Off Unit for test/procedure  [x]   Extra time needed to complete task  []   Therapeutic need  []   Other (specify):   Restrictions Restrictions/Precautions: General Precautions, Fall Risk         Communication with other providers: [x]   OK to see per nursing:     []   Spoke with team member regarding:      Subjective observations and cognitive status:  patient supine in bed pleasant and agreeable to washing up for therapy this date      Pain level/location:    /10       Location: per patient no pain noted    Discharge recommendations  Anticipated discharge date:    Destination: []home alone   []home alone w assist prn   [x] home w/ family    [] Continuous supervision       []SNF    [] Assisted living     [] Other:   Continued therapy: [x]HHC OT  []OUTPATIENT  OT   [] No Further OT  Equipment needs: None        ADLs:    Eating: Eating  Assistance Needed: Independent  Comment: X  CARE Score: 6  Discharge Goal: Independent       Oral Hygiene: Oral Hygiene  Assistance Needed: Independent  Comment: X  CARE Score: 6  Discharge Goal: Independent    UB/LB Bathing: Shower/Bathe Self  Assistance Needed: Supervision or touching assistance  Comment: Sup, cues to lock brakes when standing from wc to wash LB (patient bathes sinkside, refusing shower)  CARE Score: 4  Discharge Goal: Supervision or touching assistance    UB Dressing: Upper Body Dressing  Assistance Needed: Independent  Comment: X  CARE Score: 6  Discharge Goal: Supervision or touching assistance         LB Dressing: Lower Body Dressing  Assistance Needed: Supervision or touching assistance  Comment: Sup c  safety when in stance, patient requires uniateral support to countertop throughout clothing management  CARE Score: 4  Discharge Goal: Supervision or touching assistance    Donning and [place] Footwear: Putting On/Taking Off Footwear  Assistance Needed: Independent  Comment: X  CARE Score: 6  Discharge Goal: Independent      Toileting: Toileting Hygiene  Assistance Needed: Supervision or touching assistance  Comment: Sup for safety  CARE Score: 4  Discharge Goal: Supervision or touching assistance      Toilet Transfers: Toilet Transfer  Assistance Needed: Supervision or touching assistance  Comment: Sup c cues for proper hand and body placement within RW and safety wth directional turns  CARE Score: 4  Discharge Goal: Supervision or touching assistance  Device Used:    [x]   Standard Toilet         [x]   Grab Bars           []  Bedside Commode       []   Elevated Toilet          []   Other:        Bed Mobility:           []   Pt received out of bed   Rolling R/L:  Mod I   Scooting:   Mod I   Supine --> Sit:  Mod I   Sit --> Supine:      Transfers:    Sit--> Stand:  Sup  Stand --> Sit:   Sup  Stand-Pivot:   SBA  Other:  Cues for safety awareness  and body placement within RW   Assistive device required for transfer:   RW      Functional Mobility:  Throughout room/Bathroom at RW level ; throughout ARU with several rest breaks to increase endurance and strenth to B UE  At wc level   Assistance:  SBA  Device:   [x]   Rolling Walker     []   Standard Walker [x]   Wheelchair        []   [employer]       []   4-Wheeled Debon Soteroward         []   Cardiac Walker       []   Other:             Additional Therapeutic activities/exercises completed this date:     [x]   ADL Training   [x]   Balance/Postural training     [x]   Bed/Transfer Training   []   Endurance Training   []   Neuromuscular Re-ed   []   Nu-step:  Time:        Level:         #Steps:       []   Rebounder:    []  Seated     []  Standing        []   Supine Ther Ex (reps/sets):     [x]   Seated Ther Ex (reps/sets):  Patient instructed in B UE strengthening with 2.5# dowel and orange theraband all planes and all joints 2 sets of ten to facilitate ADL and mobility tasks    []   Standing Ther Ex (reps/sets):     []   Other:      Comments:      Patient/Caregiver Education and Training:   [x]   [employer] Equipment Use  [x]   Bed Mobility/Transfer Technique/Safety  [x]   Energy Conservation Tips  []   Family training  [x]   Postural Awareness  [x]   Safety During Functional Activities  []   Reinforced Patient's Precautions   []   Progress was updated and reviewed in Rehabtracker with patient and/or family this         date.     Treatment Plan for Next Session: POC to continue as tolerated       Treatment/Activity Tolerance:   [x] Tolerated treatment with no adverse effects    [] Patient limited by fatigue  [] Patient limited by pain   [] Patient limited by medical complications:    [] Adverse reaction to Tx:   [] Significant change in status    Safety:       []  bed alarm set    [x]  chair alarm set    []  Pt refused alarms                []  Telesitter activated      [x]  Gait belt used during tx session      []other:       Number of Minutes/Billable Intervention  Therapeutic Exercise 27   ADL Self-care 45   Neuro Re-Ed    Therapeutic Activity 25   Group    Other:    TOTAL 97       Social History  Social/Functional History  Lives With: Significant other (Duane)  Type of Home: Apartment  Home Layout: One level  Home Access: Ramped entrance, Elevator  Entrance Stairs - Number of Steps: 3rd floor  Bathroom Shower/Tub: Tub only  Bathroom Toilet: Handicap height  Bathroom Equipment: Grab bars in shower, Tub transfer bench  Bathroom Accessibility: Accessible  Home Equipment: 4 wheeled walker, [address] Help From: Family  ADL Assistance: Independent  Homemaking Assistance: Needs assistance  Homemaking Responsibilities: No  Ambulation Assistance:  (was using w/c for mobility at baseline)  Transfer Assistance: Needs assistance  Active : No  Patient's  Info: Pt's significant other Duane  Mode of Transportation: Car  Education: Very low education level  Occupation: On disability  Leisure & Hobbies: Spouse manages meds and bills, 1 kitten, outings are limited to doctors visits, watches tv  Additional Comments: Pt appears to be a poor historian and inconsistent with some information provided; sleeps in regular, flat bed; 1 recent fall requring this hospitalization    Objective                                                                                    Goals:  (Update in navigator)   : Long term goals  Time Frame for Long term goals : 7-10 days or until d/c  Long term goal 1: Pt will complete feeding/grooming/oral care task c MOD I by d/c  Long term goal 2: Pt will complete total body bathing c CGA c use of SBA by d/c  Long term goal 3: Pt will complete UB/LB dressing c SBA by d/c  Long term goal 4: Pt will complete toileting c SBA by d/c  Long term goal 5: Pt will complete functional transfer (toilet, tub, shower) c SBA by d/c. Long term goals 6: Pt will perform therex/therax to facilitate increased strength/endurance/ax tolerance (c emphasis on dynamic standing balance/tolerance >8 mins and BUE endurance) c SBA in order to complete ADLs. Long term goal 7: Pt will complete footwear dressing c MOD I by d/c:        Plan of Care                                                                              Times per week: 5 days per week for a minimum of 60 minutes/day plus group as appropriate for 60 minutes.   Treatment to include Plan  Times per day: Daily  Current Treatment Recommendations: Strengthening, Functional Mobility Training, Patient/Caregiver Education & Training, Endurance Training, Equipment Evaluation, Education, & procurement, Balance Training, Safety Education & Training, Self-Care / ADL    Electronically signed by   PETE Winkler,  7/5/2021, 1:07 PM

## 2021-07-05 NOTE — FLOWSHEET NOTE
[x] daily progress note       [] discharge       Patient Name:  Nimo Damon   :  1950 MRN: [id number]  Room:  [address] [place], [place] [address] Date of Admission: 2021  Rehabilitation Diagnosis:   CVA (cerebral vascular accident) [facility]) [I63.9]  Acute CVA (cerebrovascular accident) ([facility] [I63.9]       Date 2021       Day of ARU Week:  7   Time IN/OUT 8014-3074   Individual Tx Minutes 53   TOTAL Tx Time Mins 53   Variance Time -7 minutes (MALLY (Araseli Hazel) reported having 7 extra minutes during OT session)    Restrictions Restrictions/Precautions  Restrictions/Precautions: General Precautions, Fall Risk  Position Activity Restriction  Other position/activity restrictions: , IV access RUE   Communication with other providers: [x]   OK to see per nursing:     [x]   BALMILO Marquez) reported having 7 extra minutes during OT session. Subjective observations and cognitive status: Pt seen sitting up in recliner at beginning of treatment. Agreeable to gait training. Pt stated \"I have to stay out of that bed. They said that I will get pneumonia if I stay in bed. I want to walk today. \" pt did well with participating in therapy during 1st half of session, but then started to refuse other aspects of session adamantly, specifically car transfers. Pt repeatedly stated \"I'll do the car tomorrow. I need to lay down in that bed. I'm tired. \" pt was agreeable to participating in seated exercises in recliner prior to returning to bed. Pain level/location:    0/10        Discharge recommendations  Anticipated discharge date:  Expected Discharge Date: 21  Destination: []???home alone   []? ??home alone with assist PRN     [x]? ?? home w/ family      [x]? ?? Continuous supervision  []? ??SNF    []??? Assisted living     []? ?? Other:  Continued therapy: [x]? ? ? C PT  []???OUTPATIENT PT   []??? No Further PT  []???SNF PT  Caregiver training recommended: []? ? ? Yes  []??? No   Equipment needs: Has manual w/c, will need verified status of RW that pt received during previous ARU stay     Bed Mobility:           [x]   Pt received out of bed   Rolling Left/Right: mod I with bed rails   Sit --> lying: Mod I with bed rails     Transfers:    Sit--> Stand: SBA   Stand --> Sit:   SBA  Chair-->Bed:   SBA  Car Transfers: Adamantly refused. Pt stated repeatedly \"I'll do it tomorrow,\" despite max encouragement that the doctor needs to be updated on her progress with car transfers. Toilet Transfer: SBA   Assistive device required for transfer: RW    Gait:    Distance: 20'+36'+28'+17'+15'   Assistance:  SBA-CGA   Device:  RW  Gait Quality:  Reciprocal pattern with short stride length; pt became more unsteady and impulsively needing to sit at times. Pt's bilateral knees were flexed when fatigued. Wheelchair Propulsion:  Distance:  103'   Assistance:  Supervision for obstacles on right side   Extremities Used:   BUEs  Type:    [x]  Manual        []  Electric    Additional Therapeutic activities/exercises completed this date:     []   Nu-step:  Time:        Level:         #Steps:       []   Rebounder:    []  Seated     []  Standing        [x]   Balance training: pt stood at toilet and performed pull-up and pull-down of briefs with SBA for balance with RW.          []   Postural training    []   Supine ther ex (reps/sets):     [x]   Seated ther ex (reps/sets):  LAQs, marching (with 1 Ib ankle weights), ankle pumps, hip adduction isometric pillow squeezes, hip abduction (manual resist) x 20 reps each for strengthening.     []   Standing ther ex (reps/sets):     []   Picking up object from floor (standing):                   []   Reacher used   []   Other:   []   Other:    Patient/Caregiver Education and Training:   [x]   Bed Mobility/Transfer technique/safety  [x]   Gait technique/sequencing  [x]   Proper use of assistive device  []   Advanced mobility safety and technique  []   Reinforced patient's precautions/mobility while maintaining precautions  []   Postural bars in shower, Tub transfer bench  Bathroom Accessibility: Accessible  Home Equipment: 4 wheeled walker, [address] Help From: Family  ADL Assistance: Independent  Homemaking Assistance: Needs assistance  Homemaking Responsibilities: No  Ambulation Assistance:  (was using w/c for mobility at baseline)  Transfer Assistance: Needs assistance  Active : No  Patient's  Info: Pt's significant other Duane  Mode of Transportation: Car  Education: Very low education level  Occupation: On disability  Leisure & Hobbies: Spouse manages meds and bills, 1 kitten, outings are limited to doctors visits, watches tv  Additional Comments: Pt appears to be a poor historian and inconsistent with some information provided; sleeps in regular, flat bed; 1 recent fall requring this hospitalization    Objective                                                                                    Goals:  (Update in navigator)  Short term goals  Time Frame for Short term goals: 10-12 tx days:  Short term goal 1: Pt will complete bed mobility tasks (rolling R/L, scooting, sup<->sit) Ind. Short term goal 2: Pt will complete OOB transfers using RW with SBA-Sup. Short term goal 3: Pt will ambulate 50 ft with turns overl level surface and 10 ft of uneven surface using RW with CGA. Short term goal 4: Pt will ascend/descend curb step using RW and 4-5 steps using railings with Min A. Short term goal 5: Pt will propel manual w/c 150 ft Mod Ind. Short term goal 6: Pt will complete object retrieval from the floor with 1UE support on RW with SBA-Sup.:   :        Plan of Care                                                                              Times per week: 5 days per week for a minimum of 60 minutes/day plus group as appropriate for 60 minutes.   Treatment to include Current Treatment Recommendations: Strengthening, Gait Training, Patient/Caregiver Education & Training, Stair training, Equipment Evaluation, Education, & procurement, Balance Training, Cognitive/Perceptual Training, Functional Mobility Training, Endurance Training, Home Exercise Program, Transfer Training, Wheelchair Mobility Training, Safety Education & Training    Electronically signed by   Mahogany Villareal, [id number]  7/5/2021, 4:39 PM

## 2021-07-05 NOTE — PROGRESS NOTES
Progress Note( Dr. Arslan Khan)  7/5/2021  Subjective:   Admit Date: 6/29/2021  PCP: Isabel De La Rosa MD    Admitted For :On June 23, 2021 for slurred speech aphasia hallucination and confusion possibly UTI  Was transferred to acute rehab unit today 6/29/2021    Consulted For: Better control of blood glucose    Interval History: Feels okay  Denies any chest pains,   Denies SOB . Denies nausea or vomiting. No new bowel or bladder symptoms. Intake/Output Summary (Last 24 hours) at 7/5/2021 0643  Last data filed at 7/5/2021 0634  Gross per 24 hour   Intake 820 ml   Output    Net 820 ml       DATA    CBC:   No results for input(s): WBC, HGB, PLT in the last 72 hours. CMP:  No results for input(s): NA, K, CL, CO2, BUN, CREATININE, CALCIUM, PROT, LABALBU, BILITOT, ALKPHOS, AST, ALT in the last 72 hours.     Invalid input(s): GLU  Lipids:   Lab Results   Component Value Date    CHOL 175 06/24/2021    CHOL 188 12/15/2014    HDL 39 06/24/2021    TRIG 144 06/24/2021     Glucose:  Recent Labs     07/04/21  1700 07/04/21  2222 07/05/21  0228   POCGLU 117* 167* 159*     AniasfrpuqI0Y:  Lab Results   Component Value Date    LABA1C 11.5 06/23/2021     High Sensitivity TSH:   Lab Results   Component Value Date    TSHHS 2.010 06/25/2021     Free T3: No results found for: FT3  Free T4:  Lab Results   Component Value Date    T4FREE 0.93 05/09/2016       Echocardiogram transesophageal    Result Date: 6/28/2021  Transesophageal Echocardiography Report (SENA)   Demographics   Patient Name       Gregory Mckinney   Date of Study       06/28/2021   Date of Birth      1950         Gender              Female   Age                70 year(s)         Race                   Patient Number     [id number]         Room Number         2015   Visit Number       [id number]   Corporate ID       [id number]   Accession Number   [id number]         [facility]   Ordering Physician Zackary Patel MD                 Physician           Bud Landa MD  Procedure Type of Study   SENA procedure:ECHOCARDIOGRAM TRANSESOPHAGEAL. Procedure Date Date: 06/28/2021 Start: 10:36 AM Study Location: [facility] - Echo Lab Technical Quality: Good visualization Indications:CVA. Patient Status: Routine Height: 62 inches Weight: 230 pounds BSA: 2.03 m2 BMI: 42.07 kg/m2 HR: 65 bpm BP: 151/82 mmHg SENA Performed By: the attending and the sonographer  Type of Anesthesia: Moderate sedation   Conclusions   Summary  Left ventricular systolic function is normal.  Ejection fraction is visually estimated at 55-60%. No evidence of thrombus within the left atrial appendage. Negative bubble study; no ASD or PFO noted. Mild mitral regurgitation. Signature   ------------------------------------------------------------------  Electronically signed by Claudia Wills MD  (Interpreting physician) on 06/28/2021 at 02:23 PM      ECHO Complete 2D W Doppler W Color    Result Date: 6/24/2021  Transthoracic Echocardiography Report (TTE)  Demographics   Patient Name       Gregory Mckinney   Date of Study       06/24/2021   Date of Birth      1950         Gender              Female   Age                70 year(s)         Race                   Patient Number     [id number]         Room Number         2015   Visit Number       [id number]   Corporate ID       [id number]   Accession Number   [id number]         [address] [place] [address]   Ordering Physician Jose Luis Thacker MD           Physician           MD  Procedure Type of Study   TTE procedure:ECHOCARDIOGRAM COMPLETE 2D W DOPPLER W COLOR.   Procedure Date Date: 06/24/2021 Start: 08:44 AM Study Location: Portable Technical Quality: Technically difficult study Indications:CVA. Patient Status: Routine Height: 62 inches Weight: 230 pounds BSA: 2.03 m2 BMI: 42.07 kg/m2 HR: 55 bpm BP: 146/124 mmHg  Conclusions   Summary  Technically difficult examination. Left ventricular systolic function is normal.  Ejection fraction is visually estimated at 50-55%. Moderate left ventricular hypertrophy. Mildly dilated right ventricle. No evidence of right heart strain. Sclerotic, but non-stenotic aortic valve. No evidence of any pericardial effusion. Signature   ------------------------------------------------------------------  Electronically signed by Andre Monaco MD (Interpreting  physician) on 06/24/2021 at 05:26 PM  -       CT HEAD WO CONTRAST    Result Date: 6/28/2021  EXAMINATION: CT OF THE HEAD WITHOUT CONTRAST  6/23/2021 2:40 pm     Moderate-sized low-attenuation in the right temporal lobe concerning for acute infarct. MRI brain recommended to confirm. No acute intracranial hemorrhage. CT CERVICAL SPINE WO CONTRAST    Result Date: 6/23/2021  EXAMINATION: CT OF THE CERVICAL SPINE WITHOUT CONTRAST 6/23/2021 3:40 pm     Multilevel degenerate changes. No acute fracture traumatic malalignment     XR CHEST PORTABLE    Result Date: 6/23/2021  EXAMINATION: ONE XRAY VIEW OF THE CHEST 6/23/2021 3:34 pm COMPARISON: None. HISTORY: ORDERING SYSTEM PROVIDED HISTORY: other, slurrred speech, hypertension TECHNOLOGIST PROVIDED HISTORY: Reason for exam:->other, slurrred speech, hypertension Reason for Exam: other, slurrred speech, hypertension Acuity: Acute Type of Exam: Initial Additional signs and symptoms: na Relevant Medical/Surgical History: diabetes, hypertension FINDINGS: The lungs are clear. The mediastinum and cardiac silhouette are unremarkable. No acute abnormality.      CTA HEAD NECK W CONTRAST    Result Date: 6/23/2021  EXAMINATION: CTA OF THE HEAD AND NECK WITH CONTRAST 6/23/2021 11:17 pm: T    Mild tapering versus mild to moderate 98.6 °F (37 °C) (Oral)   Resp 16   Ht 5' 2\" (1.575 m)   Wt 234 lb (106.1 kg)   SpO2 94%   BMI 42.80 kg/m²   General appearance: alert and cooperative with exam  Neck: no JVD or bruit  Thyroid : Normal lobes   Lungs: Has Vesicular Breath sounds   Heart:  regular rate and rhythm  Abdomen: soft, non-tender; bowel sounds normal; no masses,  no organomegaly  Musculoskeletal: Normal  Extremities: extremities normal, , no edema  Neurologic:  Awake, alert, oriented to name, place and time. Cranial nerves II-XII are grossly intact. Motor is  intact. Sensory paresthesia t.,  and gait is ab unstable. CVA acute    Assessment:     Patient Active Problem List:     Vitamin D deficiency     Hyperlipidemia     Back pain, chronic     Hemiparesis of right dominant side as late effect of cerebral infarction ([address]     Facial droop     Hypokalemia     Essential hypertension     Hyperglycemia     TIA (transient ischemic attack)     Class 3 severe obesity due to excess calories with body mass index (BMI) of 45.0 to 49.9 in adult [facility])     Cerebrovascular accident (CVA) ([address]     Dysarthria due to acute stroke ([address]     Dysphagia due to recent cerebral infarction     Uncontrolled type 2 diabetes mellitus with hyperglycemia ([address]     Morbid obesity with BMI of 45.0-49.9, adult ([address]     Incontinence in female     Symptomatic stenosis of right carotid artery     Acute CVA (cerebrovascular accident) ([address]      Plan:     1. Reviewed POC blood glucose . Labs and X ray results   2. Reviewed Current Medicines   3. On meal/ Correction bolus Humalog/ Basal Lantus Insulin regime s   4. Monitor Blood glucose frequently   5. Modified  the dose of Insulin/ other medicines as needed   6. Patient is participating in physical activities of rehab unit  7. Will follow     .      Osbaldo Cooper MD, MD

## 2021-07-05 NOTE — PROGRESS NOTES
due to   [] Medical hold/reason  [] Illness   [] Off Unit for test/procedure  [] Extra time needed to complete task  [] Other (specify)    Activity completed: Pt participated in visual discrimination/picture description tasks with problem solving element. Pain: no  Current Diet: ADULT DIET; Easy to Chew; 4 carb choices (60 gm/meal)  Subjective: Pt pleasant and cooperative. Needing cues for attention to task. Goals and POC: Co-treats where appropriate with PT or OT to facilitate patient goals in functional tasks. LTG Goal 1: Pt will improve cognitive communication from mod to set up/supervision in order to return home to [facility]. Short-term Goals  Goal 1: Pt will participate in vocal strength/breath support exercises for improved intelligbility to 100% in nonstructured conversational tasks. Goal 2: Pt will problem solve/self advocate during times of confusion/difficulty with processing/understanding in 9/10 opportunities in order to improve participation with functional/instruction based tasks. Pt participated in problem solving tasks for ID of \"what's wrong\" in picture with mod cues and 75% success with sequencing of 1. Intention of pictured activity 2. Verbal description of picture elements, L-R scanning 3. ID of incorrect item/action. Goal 3: Pt will participate in receptive/expressive category/association tasks with appropriate self corection in 95% opportunities occasional cues in order to improve topic maintenance. Pt participated in category/association tasks r/t picture description tasks with semantic cue (pairs, opposites, etc.) for improved description of actions. Pt was able to utilize hand gestures/nonverbal descriptors for communication breakdown on this date. 85% success mod cues. Goal 4: Pt will improve L/R discrimination with 2 step directions in 9/10 opportunities in order facilitate completion of functional tasks.   Pt participated in L/R discrimination within picture description tasks, including L-R scanning for full visualization of pictured items with mod cues decreased to min in 80% opportunities. Goal 5: Pt will independently respond to written environmental supports r/t safety/medical information in 95% opportunities in order to reduce risk of further medical complication/repeat hospitalization. Short-term Goals  Timeframe for Short-term Goals: No swallow tx                            The patient demonstrated a positive response to todays treatment and is making good progress toward established goals as evidenced by improved recall of goals for treatment (pt continued to perseverate on needing to walk to get stronger to go home on this date). Ongoing deficits are observed in the areas of language/processing/functional attention and continued focus on insight/application of learned safety strategies within environment is recommended. Alarm placed: [x]bed []chair   []other:   [] activated        Barriers to progress:   [] Fatigue        [] Cognitive Deficits   [] Memory Deficits   [] Reduced Attn   [] Self Limiting Behaviors    [] Reduced insight/awareness     [] Visual Deficits   [] Premorbid Conditions  [] Impulsivity     [] Other      Education/Interventions used this date: Safety awareness/need for support for transfers, etc.    []   Progress was updated and reviewed in Rehab[facility]er with patient and/or family this         date. [] Attending Care Conference for pt this date. See Team Patient Care Conference Note for updates.     Interventions used this date:  [x] Speech/Language Treatment    [] Instruction in HEP    Group   [] Dysphagia Treatment   [] Cognitive Skill Alberto    Other:         Assessment / Impression                                                          Treatment/Activity Tolerance:   [x] Tolerated Treatment well:     [] Patient limited by fatigue/pain:       [] Patient limited by medical complications:    [] Adverse Reaction to Tx:   [] Significant change in status:      Electronically Signed by  Juliana Barroso , [place], [place], [id number] [facility]    7/5/2021  12:47 PM

## 2021-07-06 LAB
GLUCOSE BLD-MCNC: 119 MG/DL (ref 70–99)
GLUCOSE BLD-MCNC: 121 MG/DL (ref 70–99)
GLUCOSE BLD-MCNC: 142 MG/DL (ref 70–99)
GLUCOSE BLD-MCNC: 226 MG/DL (ref 70–99)
GLUCOSE BLD-MCNC: 96 MG/DL (ref 70–99)

## 2021-07-06 PROCEDURE — 94761 N-INVAS EAR/PLS OXIMETRY MLT: CPT

## 2021-07-06 PROCEDURE — 97129 THER IVNTJ 1ST 15 MIN: CPT

## 2021-07-06 PROCEDURE — 94150 VITAL CAPACITY TEST: CPT

## 2021-07-06 PROCEDURE — 6360000002 HC RX W HCPCS: Performed by: INTERNAL MEDICINE

## 2021-07-06 PROCEDURE — 99233 SBSQ HOSP IP/OBS HIGH 50: CPT | Performed by: PHYSICAL MEDICINE & REHABILITATION

## 2021-07-06 PROCEDURE — 6370000000 HC RX 637 (ALT 250 FOR IP): Performed by: INTERNAL MEDICINE

## 2021-07-06 PROCEDURE — 1280000000 HC REHAB R&B

## 2021-07-06 PROCEDURE — 97535 SELF CARE MNGMENT TRAINING: CPT

## 2021-07-06 PROCEDURE — 97530 THERAPEUTIC ACTIVITIES: CPT

## 2021-07-06 PROCEDURE — 97130 THER IVNTJ EA ADDL 15 MIN: CPT

## 2021-07-06 PROCEDURE — 6370000000 HC RX 637 (ALT 250 FOR IP): Performed by: PHYSICAL MEDICINE & REHABILITATION

## 2021-07-06 PROCEDURE — 82962 GLUCOSE BLOOD TEST: CPT

## 2021-07-06 PROCEDURE — 97110 THERAPEUTIC EXERCISES: CPT

## 2021-07-06 RX ORDER — INSULIN GLARGINE 100 [IU]/ML
20 INJECTION, SOLUTION SUBCUTANEOUS ONCE
Status: COMPLETED | OUTPATIENT
Start: 2021-07-06 | End: 2021-07-06

## 2021-07-06 RX ADMIN — ACETAMINOPHEN 650 MG: 325 TABLET ORAL at 21:56

## 2021-07-06 RX ADMIN — METOPROLOL TARTRATE 50 MG: 50 TABLET, FILM COATED ORAL at 22:10

## 2021-07-06 RX ADMIN — AMLODIPINE BESYLATE 5 MG: 5 TABLET ORAL at 09:28

## 2021-07-06 RX ADMIN — ACETAMINOPHEN 650 MG: 325 TABLET ORAL at 16:21

## 2021-07-06 RX ADMIN — ATORVASTATIN CALCIUM 80 MG: 40 TABLET, FILM COATED ORAL at 21:56

## 2021-07-06 RX ADMIN — INSULIN GLARGINE 20 UNITS: 100 INJECTION, SOLUTION SUBCUTANEOUS at 22:36

## 2021-07-06 RX ADMIN — ACETAMINOPHEN 650 MG: 325 TABLET ORAL at 05:51

## 2021-07-06 RX ADMIN — ASPIRIN 81 MG: 81 TABLET, COATED ORAL at 09:28

## 2021-07-06 RX ADMIN — CLOPIDOGREL BISULFATE 75 MG: 75 TABLET ORAL at 09:28

## 2021-07-06 RX ADMIN — ENOXAPARIN SODIUM 40 MG: 40 INJECTION SUBCUTANEOUS at 09:26

## 2021-07-06 RX ADMIN — Medication 1 TABLET: at 09:27

## 2021-07-06 ASSESSMENT — PAIN DESCRIPTION - LOCATION: LOCATION: ARM;LEG

## 2021-07-06 ASSESSMENT — PAIN SCALES - GENERAL
PAINLEVEL_OUTOF10: 5
PAINLEVEL_OUTOF10: 0
PAINLEVEL_OUTOF10: 5
PAINLEVEL_OUTOF10: 5
PAINLEVEL_OUTOF10: 0
PAINLEVEL_OUTOF10: 0

## 2021-07-06 ASSESSMENT — PAIN DESCRIPTION - PAIN TYPE: TYPE: ACUTE PAIN

## 2021-07-06 ASSESSMENT — PAIN - FUNCTIONAL ASSESSMENT: PAIN_FUNCTIONAL_ASSESSMENT: PREVENTS OR INTERFERES SOME ACTIVE ACTIVITIES AND ADLS

## 2021-07-06 ASSESSMENT — PAIN DESCRIPTION - DESCRIPTORS: DESCRIPTORS: ACHING;SORE

## 2021-07-06 ASSESSMENT — PAIN DESCRIPTION - ONSET: ONSET: ON-GOING

## 2021-07-06 ASSESSMENT — PAIN DESCRIPTION - ORIENTATION: ORIENTATION: RIGHT;LEFT

## 2021-07-06 ASSESSMENT — PAIN DESCRIPTION - FREQUENCY: FREQUENCY: INTERMITTENT

## 2021-07-06 ASSESSMENT — PAIN DESCRIPTION - PROGRESSION: CLINICAL_PROGRESSION: GRADUALLY WORSENING

## 2021-07-06 NOTE — FLOWSHEET NOTE
[x] daily progress note       [] discharge       Patient Name:  Connie Yung   :  1950 MRN: [id number]  Room:  [address] [place], [place] [address] Date of Admission: 2021  Rehabilitation Diagnosis:   CVA (cerebral vascular accident) [facility]) [I63.9]  Acute CVA (cerebrovascular accident) ([facility] [address] [I63.9]       Date 2021       Day of ARU Week:  1   Time IN/OUT [id number]   Individual Tx Minutes 20   TOTAL Tx Time Mins 20   Variance Time -40 minutes   Variance Time [x]   Refusal due to: pt repeatedly reporting \"I've had a headache all morning, and I want to go back to the room and rest.\"      []   Medical hold/reason:    []   Illness   []   Off Unit for test/procedure  []   Extra time needed to complete task  []   Therapeutic need  []   Other (specify):   Restrictions Restrictions/Precautions  Restrictions/Precautions: General Precautions, Fall Risk  Position Activity Restriction  Other position/activity restrictions: , IV access RUE   Communication with other providers: [x]   OK to see per nursing:     [x]   Spoke with Shereen Patricia) regarding pt reporting that she had a headache all morning. MALLY Collazo reported that patient did not report having a headache during the OT session. Also MALLY reported that patient has been verbalizing that she would like to practice car transfers. Spoke with rehab aide Manolo Capellan) regarding pt's refusal and variance of 40 minutes. Subjective observations and cognitive status: Pt seen sitting up in w/c at beginning of treatment. Pt passed from Shereen Collazo. Pt initially agreeable to practice the car transfer. Pt stated \"After we get done with the car, take me back to my room. \"  Upon arriving near the practice car, pt began repeatedly saying \"Let's do the car tomorrow. I have had a headache all morning, and I need to go back to my room to rest.\" Max education for 15 minutes on importance of practicing transferring into the car and participating in therapy session.  Pt still adamantly refusing and was unable to redirect. Pt was returned to room, and transferred to recliner. Pain level/location:  Headache (pt did not rate)    Discharge recommendations  Anticipated discharge date:  Expected Discharge Date: 07/09/21  Destination: []? ???home alone   []? ???home alone with assist PRN     [x]???? home w/ family      [x]? ??? Continuous supervision  []????SNF    []???? Assisted living     []???? Other:  Continued therapy: [x]????[facility] PT  []????OUTPATIENT PT   []???? No Further PT  []????SNF PT  Caregiver training recommended: []?? ??Yes  []???? No   Equipment needs: Has manual w/c, will need verified status of RW that pt received during previous ARU stay      [x]   Pt received out of bed     Transfers:    Sit--> Stand:  SBA  Stand --> Sit:   SBA  W/c->recliner:  SBA  Car Transfer: Adamantly refused despite max encouragement and education. Assistive device required for transfer: RW    Gait:    Adamantly refused despite max encouragement and education. Other: Max education on importance of participating in therapy and practicing car transfers. Patient/Caregiver Education and Training:   [x]   Transfer technique/safety  []   Gait technique/sequencing  [x]   Proper use of assistive device  []   Advanced mobility safety and technique  []   Reinforced patient's precautions/mobility while maintaining precautions  []   Postural awareness  []   Family training    Treatment Plan for Next Session: Attempt QI     Assessment: This pt demonstrated a negative response to today's treatment as evidenced by increased self-limiting behaviors today. The patient is not making progress toward established goals as evidenced by QI scores. Ongoing deficits are observed in the areas of strength, balance, endurance, safety, and poor motivation to participate and continued focus on strengthening, balance training, endurance training, safety training, and education is recommended.      Treatment/Activity Tolerance:   [] Tolerated treatment with no adverse effects    [x] Patient limited by self-limiting behaviors. [] Patient limited by pain   [] Patient limited by medical complications:    [] Adverse reaction to Tx:   [] Significant change in status    Safety:       []  bed alarm set    [x]  chair alarm set    []  Pt refused alarms                [x]  Telesitter activated      [x]  Gait belt used during tx session      [x]other: pt left sitting up in recliner with call light at end of treatment.          Number of Minutes/Billable Intervention  Gait Training    Therapeutic Exercise    Neuro Re-Ed    Therapeutic Activity 20   Wheelchair Propulsion    Group    Other:    TOTAL 20         Social History  Social/Functional History  Lives With: Significant other (Duane)  Type of Home: Apartment  Home Layout: One level  Home Access: Ramped entrance, Elevator  Entrance Stairs - Number of Steps: 3rd floor  Bathroom Shower/Tub: Tub only  Bathroom Toilet: Handicap height  Bathroom Equipment: Grab bars in shower, Tub transfer bench  Bathroom Accessibility: [facility]: 4 wheeled walker, [address] Help From: Family  ADL Assistance: Independent  Homemaking Assistance: Needs assistance  Homemaking Responsibilities: No  Ambulation Assistance:  (was using w/c for mobility at baseline)  Transfer Assistance: Needs assistance  Active : No  Patient's  Info: Pt's significant other Duane  Mode of Transportation: Car  Education: Very low education level  Occupation: On disability  Leisure & Hobbies: Spouse manages meds and bills, 1 kitten, outings are limited to doctors visits, watches tv  Additional Comments: Pt appears to be a poor historian and inconsistent with some information provided; sleeps in regular, flat bed; 1 recent fall requring this hospitalization    Objective                                                                                    Goals:  (Update in navigator)  Short term goals  Time Frame for Short term goals: 10-12 tx days:  Short term goal 1: Pt will complete bed mobility tasks (rolling R/L, scooting, sup<->sit) Ind. Short term goal 2: Pt will complete OOB transfers using RW with SBA-Sup. Short term goal 3: Pt will ambulate 50 ft with turns overl level surface and 10 ft of uneven surface using RW with CGA. Short term goal 4: Pt will ascend/descend curb step using RW and 4-5 steps using railings with Min A. Short term goal 5: Pt will propel manual w/c 150 ft Mod Ind. Short term goal 6: Pt will complete object retrieval from the floor with 1UE support on RW with SBA-Sup.:   :        Plan of Care                                                                              Times per week: 5 days per week for a minimum of 60 minutes/day plus group as appropriate for 60 minutes.   Treatment to include Current Treatment Recommendations: Strengthening, Gait Training, Patient/Caregiver Education & Training, Stair training, Equipment Evaluation, Education, & procurement, Balance Training, Cognitive/Perceptual Training, Functional Mobility Training, Endurance Training, Home Exercise Program, Transfer Training, Wheelchair Mobility Training, Safety Education & Training    Electronically signed by   Lena Alejandre, [id number]  7/6/2021, 2:11 PM

## 2021-07-06 NOTE — PROGRESS NOTES
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 Prior (baseline) level of function: Independent. Current level of function:         Current  IRF-KRISTYN and Goals:   Occupational Therapy:      :   Long term goals  Time Frame for Long term goals : 7-10 days or until d/c  Long term goal 1: Pt will complete feeding/grooming/oral care task c MOD I by d/c  Long term goal 2: Pt will complete total body bathing c CGA c use of SBA by d/c  Long term goal 3: Pt will complete UB/LB dressing c SBA by d/c  Long term goal 4: Pt will complete toileting c SBA by d/c  Long term goal 5: Pt will complete functional transfer (toilet, tub, shower) c SBA by d/c. Long term goals 6: Pt will perform therex/therax to facilitate increased strength/endurance/ax tolerance (c emphasis on dynamic standing balance/tolerance >8 mins and BUE endurance) c SBA in order to complete ADLs.   Long term goal 7: Pt will complete footwear dressing c MOD I by d/c :                                       Eating: Eating  Assistance Needed: Independent  Comment: X  CARE Score: 6  Discharge Goal: Independent       Oral Hygiene: Oral Hygiene  Assistance Needed: Independent  Comment: X  CARE Score: 6  Discharge Goal: Independent    UB/LB Bathing: Shower/Bathe Self  Assistance Needed: Supervision or touching assistance  Comment: Sup, cues to lock brakes when standing from wc to wash LB (patient bathes sinkside, refusing shower)  CARE Score: 4  Discharge Goal: Supervision or touching assistance    UB Dressing: Upper Body Dressing  Assistance Needed: Independent  Comment: X  CARE Score: 6  Discharge Goal: Supervision or touching assistance         LB Dressing: Lower Body Dressing  Assistance Needed: Supervision or touching assistance  Comment: Sup c  safety when in stance, patient requires uniateral support to countertop throughout clothing management  CARE Score: 4  Discharge Goal: Supervision or touching assistance    Donning and [place] Footwear: Putting On/Taking Off Footwear  Assistance Needed: Independent  Comment: X  CARE Score: 6  Discharge Goal: Independent      Toileting: Toileting Hygiene  Assistance Needed: Independent  Comment: Mod I using grab bars for intermittent unilateral support in stance managing clothing over hips  Reason if not Attempted: Patient refused  CARE Score: 6  Discharge Goal: Supervision or touching assistance      Toilet Transfers: Toilet Transfer  Assistance Needed: Supervision or touching assistance  Comment: SBA using grab bars   Reason if not Attempted: Patient refused  CARE Score: 4  Discharge Goal: Supervision or touching assistance    Physical Therapy:   Short term goals  Time Frame for Short term goals: 10-12 tx days:  Short term goal 1: Pt will complete bed mobility tasks (rolling R/L, scooting, sup<->sit) Ind. Short term goal 2: Pt will complete OOB transfers using RW with SBA-Sup. Short term goal 3: Pt will ambulate 50 ft with turns overl level surface and 10 ft of uneven surface using RW with CGA. Short term goal 4: Pt will ascend/descend curb step using RW and 4-5 steps using railings with Min A. Short term goal 5: Pt will propel manual w/c 150 ft Mod Ind. Short term goal 6: Pt will complete object retrieval from the floor with 1UE support on RW with SBA-Sup.             Bed Mobility:   Sit to Lying  Assistance Needed: Supervision or touching assistance  Comment: SBA without use of bed features  CARE Score: 4  Discharge Goal: Independent  Roll Left and Right  Assistance Needed: Supervision or touching assistance  Comment: SBA without use of bed features  CARE Score: 4  Discharge Goal: Independent  Lying to Sitting on Side of Bed  Assistance Needed: Supervision or touching assistance  Comment: CGA without use of bed features  CARE Score: 4  Discharge Goal: Independent    Transfers:    Sit to Stand  Assistance Needed: Partial/moderate assistance  Comment: Min A using RW  CARE Score: 3  Discharge Goal: Supervision or touching assistance  Chair/Bed-to-Chair Transfer  Assistance Needed: Partial/moderate assistance  Comment: Min A using stand pivot with/without use of RW  CARE Score: 3  Discharge Goal: Supervision or touching assistance  Toilet Transfer  Assistance Needed: Partial/moderate assistance  Comment: Min A using grab bars; pt with tendency to sit prematurely while turning  CARE Score: 3  Car Transfer  Assistance Needed: Partial/moderate assistance  Comment: Min A using RW  CARE Score: 3  Discharge Goal: Supervision or touching assistance    Ambulation:    Walking Ability  Does the Patient Walk?: Yes     Walk 10 Feet  Comment: pt was able to complete this distance with 2-person assist today using RW with Min A and close w/c follow of 2nd person due to pt's unpredictable behavior related to safety, balance, strength, and endurance; pt is not able to usually perform this activity with different staff members  Discharge Goal: Supervision or touching assistance     Walk 50 Feet with Two Turns  Reason if not Attempted: Not attempted due to medical condition or safety concerns  CARE Score: 88  Discharge Goal: Supervision or touching assistance     Walk 150 Feet  Comment: pt was not performing this at baseline and does not demonstrate potential to improve on this mobility task  Reason if not Attempted: Not applicable  CARE Score: 9  Discharge Goal: Not Applicable     Walking 10 Feet on Uneven Surfaces  Reason if not Attempted: Not attempted due to medical condition or safety concerns  CARE Score: 88  Discharge Goal: Supervision or touching assistance     1 Step (Curb)  Comment: pt was not performing this at baseline, however anticipating that pt can be trained in a controlled environment  Reason if not Attempted: Not attempted due to medical condition or safety concerns  CARE Score: 88  Discharge Goal: Partial/moderate assistance     4 Steps  Comment: pt was not performing this at baseline, however anticipating that pt can be trained in a controlled environment  Reason if not Attempted: Not attempted due to medical condition or safety concerns  CARE Score: 88  Discharge Goal: Partial/moderate assistance     12 Steps  Comment: pt was not performing this at baseline and does not demonstrate potential to improve on this mobility task  Reason if not Attempted: Not applicable  CARE Score: 9  Discharge Goal: Not Applicable       Wheelchair:  w/c Ability: Wheelchair Ability  Uses a Wheelchair and/or Scooter?: Yes  Wheel 50 Feet with Two Turns  Assistance Needed: Partial/moderate assistance  Comment: pt was able to propel 32 ft with 1 turn with SBA-Sup using BUE (limited by fatigue)  CARE Score: 3  Discharge Goal: Independent  Wheel 150 Feet  Assistance Needed: Substantial/maximal assistance  Comment: pt was able to propel 32 ft with 1 turn with SBA-Sup using BUE (limited by fatigue)  CARE Score: 2  Discharge Goal: Independent          Balance:        Object: Picking Up Object  Comment: pt refused to attempt this activity when offered at 2 different times during this tx session stating feeling \"fuzzy\" or her head hurts and states \"'l'll do it next week. \"  Reason if not Attempted: Patient refused  CARE Score: 7  Discharge Goal: Supervision or touching assistance    I      Exam:    Blood pressure (!) 158/96, pulse 71, temperature 99.5 °F (37.5 °C), temperature source Oral, resp. rate 16, height 5' 2\" (1.575 m), weight 234 lb (106.1 kg), SpO2 92 %.     General: Observed sitting up in a bedside chair with therapy. Engaging in conversation with dysarthria. HEENT: MMM. Neck supple. No JVD. Pulmonary: Symmetric air exchange without coughing. Cardiac: Occasional premature beat. Rate controlled. Abdomen: Patient's abdomen is soft and nondistended. Bowel sounds were present throughout. There was no rebound, guarding or masses noted. Upper extremities: Clumsy movements of both upper limbs. Limited initiation of activities. Lower extremities: Poor engagement with legs during transfers. No swelling or signs of DVT. Sitting balance was fair. Standing balance was poor. Lab Results   Component Value Date    WBC 6.0 06/28/2021    HGB 16.6 (H) 06/28/2021    HCT 51.9 (H) 06/28/2021    MCV 93.2 06/28/2021     06/28/2021     Lab Results   Component Value Date    INR 1.14 06/23/2021    INR 1.19 10/06/2020    PROTIME 13.8 06/23/2021    PROTIME 14.4 10/06/2020     Lab Results   Component Value Date    CREATININE 0.9 06/28/2021    BUN 14 06/28/2021     06/28/2021    K 3.7 06/28/2021     06/28/2021    CO2 23 06/28/2021     Lab Results   Component Value Date    ALT 12 06/24/2021    AST 13 (L) 06/24/2021    ALKPHOS 110 06/24/2021    BILITOT 0.8 06/24/2021       Expected length of stay  prior to a supervised level of function for discharge home with a walker and [address] OT/PT is 7/9/2021. Recommendations:    1. Acute CVA with ataxia:  Inconsistent follow-through with therapy despite significant verbal cueing for sequencing. Overall she is a little more tolerant of the daily occupational and physical therapy with speech-language pathology. No obvious side effects with her antiplatelet therapy with baby aspirin and Plavix.  Continue with Lipitor.  Benefiting from aggressive pulmonary hygiene, DVT prophylaxis and nutritional support.  Bowel and bladder retraining, but she is still incontinent.  Adaptive equipment training. [facility].   Verbal cues and minimum physical assistance for transfers today. 2. DVT prophylaxis: Lovenox 40 mg subcu daily.  I must monitor her hemoglobin and platelet count periodically while on this medication.  Weightbearing activities slowly improving daily.  GI prophylaxis offered. No new bruising or swelling. 3. Urinary tract infection: The patient has completed IV antibiotic therapy for her UTI.    Monitoring her urinary control and quality. She has incontinence which is her baseline but no dysuria or foul quality to the urine. 4. Uncontrolled diabetes type 2 with peripheral neuropathy: The patient requires a diet modified for carbohydrates.  She requires scheduled Lantus and Humalog with each meal as well as a Humalog sliding scale.  Endocrinology continues to monitor her recovery.  Blood sugars are checked at mealtime and bedtime. 5. Uncontrolled hypertension: Norvasc and Lopressor are required for blood pressure regulation.  Target systolic blood pressure is 120140.  Vital signs are checked at rest and with activity.  Encouraging oral hydration. Blood pressure just above the target range today. Monitoring closely  6. Atrial fibrillation: Cardiology has recently decided that she does not have any current active atrial fibrillation and does not require anticoagulation.  Lopressor is helping control her rhythm.  Monitoring vital signs at rest and with activity. Daily weights do not show any decompensation of CHF.

## 2021-07-06 NOTE — PROGRESS NOTES
[facility] - [facility] UNIT  SPEECH/LANGUAGE PATHOLOGY      [x] Daily           [] Discharge    Patient: Pablito Berrios      :1950  DIV:[id number]  Rehab Dx/Hx: CVA (cerebral vascular accident) ([facility] [address] [I63.9]  Acute CVA (cerebrovascular accident) ([facility] [address] [I63.9]   No Known Allergies  Precautions: ; Restrictions/Precautions: General Precautions, Fall Risk          Home Situation/IADL:   Social/Functional History  Lives With: Significant other (Duane)  Type of Home: Apartment  Home Layout: One level  Home Access: Ramped entrance, Elevator  Entrance Stairs - Number of Steps: 3rd floor  Bathroom Shower/Tub: Tub only  Bathroom Toilet: Handicap height  Bathroom Equipment: Grab bars in shower, Tub transfer bench  Bathroom Accessibility: [facility]: 4 wheeled walker, [address] Help From: Family  ADL Assistance: Independent  Homemaking Assistance: Needs assistance  Homemaking Responsibilities: No  Ambulation Assistance:  (was using w/c for mobility at baseline)  Transfer Assistance: Needs assistance  Active : No  Patient's  Info: Pt's significant other Duane  Mode of Transportation: Car  Education: Very low education level  Occupation: On disability  Leisure & Hobbies: Spouse manages meds and bills, 1 kitten, outings are limited to doctors visits, watches tv  Additional Comments: Pt appears to be a poor historian and inconsistent with some information provided; sleeps in regular, flat bed; 1 recent fall requring this hospitalization      Date of Admit: 2021  Room #: 1012/1012-A     ST Number of Minutes/Billable Intervention  Cog/Memory Deficits 30    Aphasia/Language     Dysarthria/Speech     Apraxia/Speech     Dysphagia/Swallowing     Group     Other    TOTAL Minutes Billed  30    Variance          Date: 2021  Day of ARU Week:  1       SLP Individual Minutes  Time In: 1143  Time Out: 6141  Minutes: 30         Variance/Reason:  [] Refusal due to   [] Medical hold/reason  [] Illness   [] Off Unit for test/procedure  [] Extra time needed to complete task  [] Other (specify)    Activity completed: Pt completed cognitive linguistic tasks at bedside    Pain: denies  Current Diet: ADULT DIET; Easy to Chew; 4 carb choices (60 gm/meal)  Subjective: alert. Remains easily distracted but will redirect with cues. Goals and POC: Co-treats where appropriate with PT or OT to facilitate patient goals in functional tasks. LTG Goal 1: Pt will improve cognitive communication from mod to set up/supervision in order to return home to [facility]. Pt likely at cognitive baseline based on observations and notes from prior rehab stay. Short-term Goals  Goal 1: Pt will participate in vocal strength/breath support exercises for improved intelligbility to 100% in nonstructured conversational tasks. Spontaneously resolved--goal met. Goal 2: Pt will problem solve/self advocate during times of confusion/difficulty with processing/understanding in 9/10 opportunities in order to improve participation with functional/instruction based tasks. Ongoing cues needed 50% acc. Goal 3: Pt will participate in receptive/expressive category/association tasks with appropriate self corection in 95% opportunities occasional cues in order to improve topic maintenance. Pt self correcting--needs extra processing time. Min cues 75% acc. Goal 4: Pt will improve L/R discrimination with 2 step directions in 9/10 opportunities in order facilitate completion of functional tasks. 2 step directions 100%. R/L discrim 50%. Goal 5: Pt will independently respond to written environmental supports r/t safety/medical information in 95% opportunities in order to reduce risk of further medical complication/repeat hospitalization. Pt remains easily distracted and perseverates on topics of interest.  Problem solving requires mod cues.        Pt with ongoing moderate cognitive deficits that do not appear changed from prior admit. Pt requires active cues due to processing delays, poor attn, topic perseveration, and STM deficits. Overall communication has improved for breath support and phrasing with no cues. No further ST recommended. Prognosis for improvement is fair due to poor insight and awareness. Short-term Goals  Timeframe for Short-term Goals: No swallow tx                            The patient demonstrated a positive response to todays treatment and is making fair progress toward established goals. Ongoing cues are needed for processing and attn   Ongoing deficits are observed in the areas of cognition and memory and continued focus on fx safety is recommended. Alarm placed: []bed [x]chair   []other:   [x]LIZBET activated        Barriers to progress:   [] Fatigue        [x] Cognitive Deficits   [] Memory Deficits   [x] Reduced Attn   [] Self Limiting Behaviors    [x] Reduced insight/awareness     [] Visual Deficits   [] Premorbid Conditions  [] Impulsivity     [] Other      Education/Interventions used this date: 2 step directions and R/L discrim. Problem solving    []   Progress was updated and reviewed in Rehabtracker with patient and/or family this         date. [x] Attending Care Conference for pt this date. See Team Patient Care Conference Note for updates.     Interventions used this date:  [] Speech/Language Treatment    [] Instruction in HEP    Group   [] Dysphagia Treatment   [x] Cognitive Skill Alberto    Other:         Assessment / Impression                                                          Treatment/Activity Tolerance:   [x] Tolerated Treatment well:     [] Patient limited by fatigue/pain:       [] Patient limited by medical complications:    [] Adverse Reaction to Tx:   [] Significant change in status:      Electronically Signed by  Denis Montalvo SLP, MA, CCC-SLP    7/6/2021  12:16 PM

## 2021-07-06 NOTE — PLAN OF CARE
Problem: Skin Integrity:  Goal: Will show no infection signs and symptoms  Description: Will show no infection signs and symptoms  Outcome: Ongoing  Goal: Absence of new skin breakdown  Description: Absence of new skin breakdown  Outcome: Ongoing     Problem: Falls - Risk of:  Goal: Will remain free from falls  Description: Will remain free from falls  Outcome: Ongoing  Goal: Absence of physical injury  Description: Absence of physical injury  Outcome: Ongoing     Problem: Infection:  Goal: Will remain free from infection  Description: Will remain free from infection  Outcome: Ongoing     Problem: Safety:  Goal: Free from accidental physical injury  Description: Free from accidental physical injury  Outcome: Ongoing  Goal: Free from intentional harm  Description: Free from intentional harm  Outcome: Ongoing     Problem: Daily Care:  Goal: Daily care needs are met  Description: Daily care needs are met  Outcome: Ongoing     Problem: Pain:  Goal: Patient's pain/discomfort is manageable  Description: Patient's pain/discomfort is manageable  Outcome: Ongoing

## 2021-07-06 NOTE — PROGRESS NOTES
Physical Rehabilitation: OCCUPATIONAL THERAPY     [x] daily progress note       [] discharge       Patient Name:  Judy Mcclelland   :  1950 MRN: [id number]  Room:  [address] [place] [address] Date of Admission: 2021  Rehabilitation Diagnosis:   CVA (cerebral vascular accident) [facility]) [I63.9]  Acute CVA (cerebrovascular accident) ([facility] [address] [I63.9]       Date 2021       Day of ARU Week:  1   Time IN//1000; 1325/1335   Individual Tx Minutes [de-identified] +  10   Group Tx Minutes    Co-Treat Minutes    Concurrent Tx Minutes    TOTAL Tx Time Mins 90   Variance Time    Variance Time []   Refusal due to:     []   Medical hold/reason:    []   Illness   []   Off Unit for test/procedure  []   Extra time needed to complete task  []   Therapeutic need  []   Other (specify):   Restrictions Restrictions/Precautions: General Precautions, Fall Risk         Communication with other providers: [x]   OK to see per nursing:     []   Spoke with team member regarding:      Subjective observations and cognitive status: Patient supine in bed upon entering pleasant and agreeable to therapy    Pain level/location:    /10       Location: per patient no pain noted    Discharge recommendations  Anticipated discharge date:    Destination: []home alone   []home alone w assist prn   [x] home w/ family    [] Continuous supervision       []SNF    [] Assisted living     [] Other:   Continued therapy: [x][facility]HC OT  []OUTPATIENT  OT   [] No Further OT  Equipment needs: None      Brushes hair and teeth with Ind  Washes hand at sink while seated with Ind            Toileting:   Sup in stance while managing pants over hips       Toilet Transfers:   Sup  Heavily relying on  grab bars, cues for body placement inside RW with directional change   Device Used:    [x]   Standard Toilet         [x]   Grab Bars           []  Bedside Commode       []   Elevated Toilet          []   Other:        Bed Mobility:           []   Pt received out of bed   Rolling R/L:  Mod I Scooting: Mod I   Supine --> Sit:  Mod I   Sit --> Supine:      Transfers:    Sit--> Stand: SUp   Stand --> Sit:   SUp  Stand-Pivot:   Sup  Other:  Cues for hand placement, locking wc with stand   Assistive device required for transfer:  RW        Functional Mobility:  Throughout room/bathroom at RW with SBA ; down hallway of ARU at   With Sup for maneuvering through obstacles and doorways   Assistance:  SBA   Device:   [x]   Rolling Walker     []   Standard Walker []   Wheelchair        []   [employer]       []   4-Wheeled Walker         []   Cardiac Walker       []   Other:        Homemaking Tasks:   Patient completes folding task while seated reaching out of LILLIAN for towels and pillow cases  and folding and putting towels away       Additional Therapeutic activities/exercises completed this date:     [x]   ADL Training   [x]   Balance/Postural training     [x]   Bed/Transfer Training   [x]   Endurance Training Patient instructed in bilateral reciprocal patterned movement with min resistance for  4  Minutes to facilitate ADL and transfer tasks    []   Neuromuscular Re-ed   []   Nu-step:  Time:        Level:         #Steps:       []   Rebounder:    []  Seated     []  Standing        []   Supine Ther Ex (reps/sets):     [x]   Seated Ther Ex (reps/sets):  Patient instructed in core strengthening activity with tossing frisbee whiles seated to increase posture and strength to increase independence and endurance for facilitate ADL and mobility tasks    []   Standing Ther Ex (reps/sets):     []   Other:      Comments:      Patient/Caregiver Education and Training:   [x]   [employer] Equipment Use  [x]   Bed Mobility/Transfer Technique/Safety  [x]   Energy Conservation Tips  []   Family training  [x]   Postural Awareness  [x]   Safety During Functional Activities  []   Reinforced Patient's Precautions   []   Progress was updated and reviewed in Rehabtracker with patient and/or family this         date.     Treatment Plan for Next Session: POC to continue as tolerated         Treatment/Activity Tolerance:   [x] Tolerated treatment with no adverse effects    [] Patient limited by fatigue  [] Patient limited by pain   [] Patient limited by medical complications:    [] Adverse reaction to Tx:   [] Significant change in status    Safety:       []  bed alarm set    []  chair alarm set    []  Pt refused alarms                []  Telesitter activated      [x]  Gait belt used during tx session      [x]other:  Handed pff to PTA for PT session      Number of Minutes/Billable Intervention  Therapeutic Exercise 25   ADL Self-care 25   Neuro Re-Ed    Therapeutic Activity 30 + 10   Group    Other:    TOTAL 90       Social History  Social/Functional History  Lives With: Significant other (Duane)  Type of Home: Apartment  Home Layout: One level  Home Access: Ramped entrance, Elevator  Entrance Stairs - Number of Steps: 3rd floor  Bathroom Shower/Tub: Tub only  Bathroom Toilet: Handicap height  Bathroom Equipment: Grab bars in shower, Tub transfer bench  Bathroom Accessibility: Accessible  Home Equipment: 4 wheeled walker, [address] Help From: Family  ADL Assistance: Independent  Homemaking Assistance: Needs assistance  Homemaking Responsibilities: No  Ambulation Assistance:  (was using w/c for mobility at baseline)  Transfer Assistance: Needs assistance  Active : No  Patient's  Info: Pt's significant other Duane  Mode of Transportation: Car  Education: Very low education level  Occupation: On disability  Leisure & Hobbies: Spouse manages meds and bills, 1 kitten, outings are limited to doctors visits, watches tv  Additional Comments: Pt appears to be a poor historian and inconsistent with some information provided; sleeps in regular, flat bed; 1 recent fall requring this hospitalization    Objective                                                                                    Goals:  (Update in navigator)   :   Long term goals  Time Frame for Long term goals : 7-10 days or until d/c  Long term goal 1: Pt will complete feeding/grooming/oral care task c MOD I by d/c  Long term goal 2: Pt will complete total body bathing c CGA c use of SBA by d/c  Long term goal 3: Pt will complete UB/LB dressing c SBA by d/c  Long term goal 4: Pt will complete toileting c SBA by d/c  Long term goal 5: Pt will complete functional transfer (toilet, tub, shower) c SBA by d/c. Long term goals 6: Pt will perform therex/therax to facilitate increased strength/endurance/ax tolerance (c emphasis on dynamic standing balance/tolerance >8 mins and BUE endurance) c SBA in order to complete ADLs. Long term goal 7: Pt will complete footwear dressing c MOD I by d/c:        Plan of Care                                                                              Times per week: 5 days per week for a minimum of 60 minutes/day plus group as appropriate for 60 minutes.   Treatment to include Plan  Times per day: Daily  Current Treatment Recommendations: Strengthening, Functional Mobility Training, Patient/Caregiver Education & Training, Endurance Training, Equipment Evaluation, Education, & procurement, Balance Training, Safety Education & Training, Self-Care / ADL    Electronically signed by   PETE Dominguez,  7/6/2021, 7:28 AM

## 2021-07-07 LAB
GLUCOSE BLD-MCNC: 116 MG/DL (ref 70–99)
GLUCOSE BLD-MCNC: 122 MG/DL (ref 70–99)
GLUCOSE BLD-MCNC: 132 MG/DL (ref 70–99)
GLUCOSE BLD-MCNC: 154 MG/DL (ref 70–99)
GLUCOSE BLD-MCNC: 199 MG/DL (ref 70–99)

## 2021-07-07 PROCEDURE — 97530 THERAPEUTIC ACTIVITIES: CPT

## 2021-07-07 PROCEDURE — 6370000000 HC RX 637 (ALT 250 FOR IP): Performed by: INTERNAL MEDICINE

## 2021-07-07 PROCEDURE — 6370000000 HC RX 637 (ALT 250 FOR IP): Performed by: PHYSICAL MEDICINE & REHABILITATION

## 2021-07-07 PROCEDURE — 97116 GAIT TRAINING THERAPY: CPT

## 2021-07-07 PROCEDURE — 97542 WHEELCHAIR MNGMENT TRAINING: CPT

## 2021-07-07 PROCEDURE — 82962 GLUCOSE BLOOD TEST: CPT

## 2021-07-07 PROCEDURE — 1280000000 HC REHAB R&B

## 2021-07-07 PROCEDURE — 99232 SBSQ HOSP IP/OBS MODERATE 35: CPT | Performed by: PHYSICAL MEDICINE & REHABILITATION

## 2021-07-07 PROCEDURE — 97535 SELF CARE MNGMENT TRAINING: CPT

## 2021-07-07 PROCEDURE — 6360000002 HC RX W HCPCS: Performed by: INTERNAL MEDICINE

## 2021-07-07 RX ORDER — INSULIN GLARGINE 100 [IU]/ML
20 INJECTION, SOLUTION SUBCUTANEOUS NIGHTLY
Status: DISCONTINUED | OUTPATIENT
Start: 2021-07-07 | End: 2021-07-09 | Stop reason: HOSPADM

## 2021-07-07 RX ADMIN — Medication 1 TABLET: at 08:23

## 2021-07-07 RX ADMIN — METOPROLOL TARTRATE 50 MG: 50 TABLET, FILM COATED ORAL at 21:43

## 2021-07-07 RX ADMIN — AMLODIPINE BESYLATE 5 MG: 5 TABLET ORAL at 08:23

## 2021-07-07 RX ADMIN — ACETAMINOPHEN 650 MG: 325 TABLET ORAL at 06:23

## 2021-07-07 RX ADMIN — INSULIN GLARGINE 20 UNITS: 100 INJECTION, SOLUTION SUBCUTANEOUS at 21:44

## 2021-07-07 RX ADMIN — METOPROLOL TARTRATE 50 MG: 50 TABLET, FILM COATED ORAL at 08:23

## 2021-07-07 RX ADMIN — CLOPIDOGREL BISULFATE 75 MG: 75 TABLET ORAL at 08:23

## 2021-07-07 RX ADMIN — ENOXAPARIN SODIUM 40 MG: 40 INJECTION SUBCUTANEOUS at 08:23

## 2021-07-07 RX ADMIN — ASPIRIN 81 MG: 81 TABLET, COATED ORAL at 08:23

## 2021-07-07 RX ADMIN — ACETAMINOPHEN 650 MG: 325 TABLET ORAL at 15:36

## 2021-07-07 RX ADMIN — ATORVASTATIN CALCIUM 80 MG: 40 TABLET, FILM COATED ORAL at 21:43

## 2021-07-07 ASSESSMENT — PAIN DESCRIPTION - PROGRESSION
CLINICAL_PROGRESSION: NOT CHANGED
CLINICAL_PROGRESSION: GRADUALLY WORSENING

## 2021-07-07 ASSESSMENT — PAIN DESCRIPTION - ORIENTATION
ORIENTATION: RIGHT
ORIENTATION: LEFT

## 2021-07-07 ASSESSMENT — PAIN DESCRIPTION - DESCRIPTORS
DESCRIPTORS: ACHING
DESCRIPTORS: ACHING

## 2021-07-07 ASSESSMENT — PAIN DESCRIPTION - FREQUENCY
FREQUENCY: CONTINUOUS
FREQUENCY: CONTINUOUS

## 2021-07-07 ASSESSMENT — PAIN DESCRIPTION - LOCATION
LOCATION: LEG
LOCATION: HAND

## 2021-07-07 ASSESSMENT — PAIN DESCRIPTION - PAIN TYPE
TYPE: ACUTE PAIN
TYPE: ACUTE PAIN

## 2021-07-07 ASSESSMENT — PAIN SCALES - GENERAL
PAINLEVEL_OUTOF10: 3
PAINLEVEL_OUTOF10: 5
PAINLEVEL_OUTOF10: 5
PAINLEVEL_OUTOF10: 0
PAINLEVEL_OUTOF10: 4

## 2021-07-07 ASSESSMENT — PAIN DESCRIPTION - ONSET
ONSET: ON-GOING
ONSET: ON-GOING

## 2021-07-07 NOTE — PROGRESS NOTES
Marty Jeanchayito    : 1950  Acct #: [de-identified]  MRN: [id number]              PM&R Progress Note      Admitting diagnosis: Acute cerebrovascular accident ([address])     Comorbid diagnoses impacting rehabilitation: Ataxia, dysarthria, gait disturbance, uncontrolled diabetes type 2 with peripheral neuropathy, essential hypertension, history of atrial fibrillation, acute urinary tract infection, morbid obesity (BMI 44.5)    Chief complaint: Frequently declines to participate in therapy. No specific explanation or reasoning. Prior (baseline) level of function: Independent. Current level of function:         Current  IRF-KRISTYN and Goals:   Occupational Therapy:      :   Long term goals  Time Frame for Long term goals : 7-10 days or until d/c  Long term goal 1: Pt will complete feeding/grooming/oral care task c MOD I by d/c  Long term goal 2: Pt will complete total body bathing c CGA c use of SBA by d/c  Long term goal 3: Pt will complete UB/LB dressing c SBA by d/c  Long term goal 4: Pt will complete toileting c SBA by d/c  Long term goal 5: Pt will complete functional transfer (toilet, tub, shower) c SBA by d/c. Long term goals 6: Pt will perform therex/therax to facilitate increased strength/endurance/ax tolerance (c emphasis on dynamic standing balance/tolerance >8 mins and BUE endurance) c SBA in order to complete ADLs.   Long term goal 7: Pt will complete footwear dressing c MOD I by d/c :                                       Eating: Eating  Assistance Needed: Independent  Comment: X  CARE Score: 6  Discharge Goal: Independent       Oral Hygiene: Oral Hygiene  Assistance Needed: Independent  Comment: X  CARE Score: 6  Discharge Goal: Independent    UB/LB Bathing: Shower/Bathe Self  Assistance Needed: Supervision or touching assistance  Comment: Sup, cues to lock brakes when standing from wc to wash LB (patient bathes sinkside, refusing shower)  CARE Score: 4  Discharge Goal: Supervision or touching assistance    UB Dressing: Upper Body Dressing  Assistance Needed: Independent  Comment: X  CARE Score: 6  Discharge Goal: Supervision or touching assistance         LB Dressing: Lower Body Dressing  Assistance Needed: Supervision or touching assistance  Comment: Sup c  safety when in stance, patient requires uniateral support to countertop throughout clothing management  CARE Score: 4  Discharge Goal: Supervision or touching assistance    Donning and [place] Footwear: Putting On/Taking Off Footwear  Assistance Needed: Independent  Comment: X  CARE Score: 6  Discharge Goal: Independent      Toileting: Toileting Hygiene  Assistance Needed: Supervision or touching assistance  Comment: Sup for safety in stance when managing clothing  Reason if not Attempted: Patient refused  CARE Score: 4  Discharge Goal: Supervision or touching assistance      Toilet Transfers: Toilet Transfer  Assistance Needed: Supervision or touching assistance  Comment: Sup/SBA max cues for locking brakes to wc and hand placement with transfer from toilet to wc  Reason if not Attempted: Patient refused  CARE Score: 4  Discharge Goal: Supervision or touching assistance    Physical Therapy:   Short term goals  Time Frame for Short term goals: 10-12 tx days:  Short term goal 1: Pt will complete bed mobility tasks (rolling R/L, scooting, sup<->sit) Ind. Short term goal 2: Pt will complete OOB transfers using RW with SBA-Sup. Short term goal 3: Pt will ambulate 50 ft with turns overl level surface and 10 ft of uneven surface using RW with CGA. Short term goal 4: Pt will ascend/descend curb step using RW and 4-5 steps using railings with Min A. Short term goal 5: Pt will propel manual w/c 150 ft Mod Ind. Short term goal 6: Pt will complete object retrieval from the floor with 1UE support on RW with SBA-Sup.             Bed Mobility:   Sit to Lying  Assistance Needed: Supervision or touching assistance  Comment: SBA without use of bed features  CARE Score: 4  Discharge Goal: Independent  Roll Left and Right  Assistance Needed: Supervision or touching assistance  Comment: SBA without use of bed features  CARE Score: 4  Discharge Goal: Independent  Lying to Sitting on Side of Bed  Assistance Needed: Supervision or touching assistance  Comment: CGA without use of bed features  CARE Score: 4  Discharge Goal: Independent    Transfers:    Sit to Stand  Assistance Needed: Partial/moderate assistance  Comment: Min A using RW  CARE Score: 3  Discharge Goal: Supervision or touching assistance  Chair/Bed-to-Chair Transfer  Assistance Needed: Partial/moderate assistance  Comment: Min A using stand pivot with/without use of RW  CARE Score: 3  Discharge Goal: Supervision or touching assistance  Toilet Transfer  Assistance Needed: Partial/moderate assistance  Comment: Min A using grab bars; pt with tendency to sit prematurely while turning  CARE Score: 3  Car Transfer  Assistance Needed: Partial/moderate assistance  Comment: Min A using RW  CARE Score: 3  Discharge Goal: Supervision or touching assistance    Ambulation:    Walking Ability  Does the Patient Walk?: Yes     Walk 10 Feet  Comment: pt was able to complete this distance with 2-person assist today using RW with Min A and close w/c follow of 2nd person due to pt's unpredictable behavior related to safety, balance, strength, and endurance; pt is not able to usually perform this activity with different staff members  Discharge Goal: Supervision or touching assistance     Walk 50 Feet with Two Turns  Reason if not Attempted: Not attempted due to medical condition or safety concerns  CARE Score: 88  Discharge Goal: Supervision or touching assistance     Walk 150 Feet  Comment: pt was not performing this at baseline and does not demonstrate potential to improve on this mobility task  Reason if not Attempted: Not applicable  CARE Score: 9  Discharge Goal: Not Applicable     Walking 10 Feet on Uneven Surfaces  Reason if not Attempted: Not attempted due to medical condition or safety concerns  CARE Score: 88  Discharge Goal: Supervision or touching assistance     1 Step (Curb)  Comment: pt was not performing this at baseline, however anticipating that pt can be trained in a controlled environment  Reason if not Attempted: Not attempted due to medical condition or safety concerns  CARE Score: 88  Discharge Goal: Partial/moderate assistance     4 Steps  Comment: pt was not performing this at baseline, however anticipating that pt can be trained in a controlled environment  Reason if not Attempted: Not attempted due to medical condition or safety concerns  CARE Score: 88  Discharge Goal: Partial/moderate assistance     12 Steps  Comment: pt was not performing this at baseline and does not demonstrate potential to improve on this mobility task  Reason if not Attempted: Not applicable  CARE Score: 9  Discharge Goal: Not Applicable       Wheelchair:  w/c Ability: Wheelchair Ability  Uses a Wheelchair and/or Scooter?: Yes  Wheel 50 Feet with Two Turns  Assistance Needed: Partial/moderate assistance  Comment: pt was able to propel 32 ft with 1 turn with SBA-Sup using BUE (limited by fatigue)  CARE Score: 3  Discharge Goal: Independent  Wheel 150 Feet  Assistance Needed: Substantial/maximal assistance  Comment: pt was able to propel 32 ft with 1 turn with SBA-Sup using BUE (limited by fatigue)  CARE Score: 2  Discharge Goal: Independent          Balance:        Object: Picking Up Object  Comment: pt refused to attempt this activity when offered at 2 different times during this tx session stating feeling \"fuzzy\" or her head hurts and states \"'l'll do it next week. \"  Reason if not Attempted: Patient refused  CARE Score: 7  Discharge Goal: Supervision or touching assistance    I      Exam:    Blood pressure (!) 145/77, pulse 67, temperature 98.2 °F (36.8 °C), temperature source Oral, resp.  rate 16, height 5' 2\" (1.575 m), weight 236 lb 6.4 and prognosis. Disposition issues were clarified and plans were established for ongoing rehabilitation efforts beyond the ARU stay. I reviewed this information with the patient during a second distinct visit with the patient. More than half of the total time of 35 minutes spent with the patient involved counseling and coordination of care.

## 2021-07-07 NOTE — PROGRESS NOTES
Physical Rehabilitation: OCCUPATIONAL THERAPY     [x] daily progress note       [] discharge       Patient Name:  Amaya Conde   :  1950 MRN: [id number]  Room:  [address] [place] [address] Date of Admission: 2021  Rehabilitation Diagnosis:   CVA (cerebral vascular accident) [facility]) [I63.9]  Acute CVA (cerebrovascular accident) ([facility] [address] [I63.9]       Date 2021       Day of ARU Week:  2   Time IN/OUT 1015/1115   Individual Tx Minutes 60   Group Tx Minutes    Co-Treat Minutes    Concurrent Tx Minutes    TOTAL Tx Time Mins 60   Variance Time    Variance Time []   Refusal due to:     []   Medical hold/reason:    []   Illness   []   Off Unit for test/procedure  []   Extra time needed to complete task  []   Therapeutic need  []   Other (specify):   Restrictions Restrictions/Precautions: General Precautions, Fall Risk         Communication with other providers: [x]   OK to see per nursing:     []   Spoke with team member regarding:      Subjective observations and cognitive status: Patient up in recliner, hesitant for shower, using therapeutic use of self this therapist acknowledges patients feeling educates patient on importance of personal hygiene patient agreeing to shower      Pain level/location:    /10       Location: per patient no pain noted    Discharge recommendations  Anticipated discharge date:    Destination: []home alone   []home alone w assist prn   [x] home w/ family    [] Continuous supervision       []SNF    [] Assisted living     [] Other:   Continued therapy: [x]HHC OT  []OUTPATIENT  OT   [] No Further OT  Equipment needs: None        ADLs:    Eating: Eating  Assistance Needed: Independent  Comment: X  CARE Score: 6  Discharge Goal: Independent       Oral Hygiene: Oral Hygiene  Assistance Needed: Independent  Comment: X  CARE Score: 6  Discharge Goal: Independent    UB/LB Bathing: Shower/Bathe Self  Assistance Needed: Supervision or touching assistance  Comment: Sup in stance to wash LB  CARE Score: 4  Discharge Goal: Supervision or touching assistance    UB Dressing: Upper Body Dressing  Assistance Needed: Independent  Comment: X  CARE Score: 6  Discharge Goal: Supervision or touching assistance         LB Dressing: Lower Body Dressing  Assistance Needed: Supervision or touching assistance  Comment: Sup in stance to manage over hips  CARE Score: 4  Discharge Goal: Supervision or touching assistance    Donning and Corinne Footwear: Putting On/Taking Off Footwear  Assistance Needed: Independent  Comment: X  CARE Score: 6  Discharge Goal: Independent      Toileting: Toileting Hygiene  Assistance Needed: Supervision or touching assistance  Comment: Sup for clothing management and safety in stance  Reason if not Attempted: Patient refused  CARE Score: 4  Discharge Goal: Supervision or touching assistance      Toilet Transfers:     Toilet Transfer  Assistance Needed: Supervision or touching assistance  Comment: Sup for safety  Reason if not Attempted: Patient refused  CARE Score: 4  Discharge Goal: Supervision or touching assistance  Device Used:    [x]   Standard Toilet         [x]   Grab Bars           []  Bedside Commode       []   Elevated Toilet          []   Other:        Bed Mobility:           [x]   Pt received out of bed   Rolling R/L:    Scooting:    Supine --> Sit:    Sit --> Supine:      Transfers:    Sit--> Stand:  Sup  Stand --> Sit:   Sup  Stand-Pivot:   SUp  Other:    Assistive device required for transfer:   RW      Functional Mobility:  Throughout room/bathroom during ADL  At RW level, down ARU hallway to practice car and back, at wc level with several rest breaks following shower at Sup   Assistance:  Sup/SBA cues for body placement within RW   Device:   [x]   Rolling Walker     []   Standard Walker []   Wheelchair        []   [employer]       []   4-Wheeled Duey Contes         []   Cardiac Duey Contes       []   Other:        Homemaking Tasks:   Patient retrieves clothing from closet and transports to bathroom at RW level       Additional Therapeutic activities/exercises completed this date:     [x]   ADL Training   [x]   Balance/Postural training     [x]   Bed/Transfer Training   []   Endurance Training   []   Neuromuscular Re-ed   []   Nu-step:  Time:        Level:         #Steps:       []   Rebounder:    []  Seated     []  Standing        []   Supine Ther Ex (reps/sets):     []   Seated Ther Ex (reps/sets):     []   Standing Ther Ex (reps/sets):     []   Other:      Comments:  Patient requires frequent rest breaks and extra time to complete all tasks given, requires encouragement to participate     Patient/Caregiver Education and Training:   [x]   Adaptive Equipment Use  [x]   Bed Mobility/Transfer Technique/Safety  [x]   Energy Conservation Tips  []   Family training  [x]   Postural Awareness  [x]   Safety During Functional Activities  []   Reinforced Patient's Precautions   []   Progress was updated and reviewed in Rehabtracker with patient and/or family this         date.     Treatment Plan for Next Session: POC to continue as tolerated         Treatment/Activity Tolerance:   [x] Tolerated treatment with no adverse effects    [] Patient limited by fatigue  [] Patient limited by pain   [] Patient limited by medical complications:    [] Adverse reaction to Tx:   [] Significant change in status    Safety:       []  bed alarm set    [x]  chair alarm set    []  Pt refused alarms                []  Telesitter activated      [x]  Gait belt used during tx session      []other:       Number of Minutes/Billable Intervention  Therapeutic Exercise    ADL Self-care 35   Neuro Re-Ed    Therapeutic Activity 25   Group    Other:    TOTAL 60       Social History  Social/Functional History  Lives With: Significant other (Duane)  Type of Home: Apartment  Home Layout: One level  Home Access: Ramped entrance, Elevator  Entrance Stairs - Number of Steps: 3rd floor  Bathroom Shower/Tub: Tub only  Bathroom Toilet: Handicap height  Bathroom Equipment: Grab bars in shower, Tub transfer bench  Bathroom Accessibility: Accessible  Home Equipment: 4 wheeled walker, [address] Help From: Family  ADL Assistance: Independent  Homemaking Assistance: Needs assistance  Homemaking Responsibilities: No  Ambulation Assistance:  (was using w/c for mobility at baseline)  Transfer Assistance: Needs assistance  Active : No  Patient's  Info: Pt's significant other Duane  Mode of Transportation: Car  Education: Very low education level  Occupation: On disability  Leisure & Hobbies: Spouse manages meds and bills, 1 kitten, outings are limited to doctors visits, watches tv  Additional Comments: Pt appears to be a poor historian and inconsistent with some information provided; sleeps in regular, flat bed; 1 recent fall requring this hospitalization    Objective                                                                                    Goals:  (Update in navigator)   : Long term goals  Time Frame for Long term goals : 7-10 days or until d/c  Long term goal 1: Pt will complete feeding/grooming/oral care task c MOD I by d/c  Long term goal 2: Pt will complete total body bathing c CGA c use of SBA by d/c  Long term goal 3: Pt will complete UB/LB dressing c SBA by d/c  Long term goal 4: Pt will complete toileting c SBA by d/c  Long term goal 5: Pt will complete functional transfer (toilet, tub, shower) c SBA by d/c. Long term goals 6: Pt will perform therex/therax to facilitate increased strength/endurance/ax tolerance (c emphasis on dynamic standing balance/tolerance >8 mins and BUE endurance) c SBA in order to complete ADLs. Long term goal 7: Pt will complete footwear dressing c MOD I by d/c:        Plan of Care                                                                              Times per week: 5 days per week for a minimum of 60 minutes/day plus group as appropriate for 60 minutes.   Treatment to include Plan  Times per day: Daily  Current Treatment Recommendations: Strengthening, Functional Mobility Training, Patient/Caregiver Education & Training, Endurance Training, Equipment Evaluation, Education, & procurement, Balance Training, Safety Education & Training, Self-Care / ADL    Electronically signed by   PETE Shoemaker,  7/7/2021, 11:01 AM

## 2021-07-07 NOTE — PROGRESS NOTES
Progress Note( Dr. Ihsan Ash)  7/6/2021  Subjective:   Admit Date: 6/29/2021  PCP: Eyad Fitzpatrick MD    Admitted For :On June 23, 2021 for slurred speech aphasia hallucination and confusion possibly UTI  Was transferred to acute rehab unit today 6/29/2021    Consulted For: Better control of blood glucose    Interval History: Feels okay  Denies any chest pains,   Denies SOB . Denies nausea or vomiting. No new bowel or bladder symptoms. Intake/Output Summary (Last 24 hours) at 7/6/2021 2016  Last data filed at 7/6/2021 1200  Gross per 24 hour   Intake 720 ml   Output    Net 720 ml       DATA    CBC:   No results for input(s): WBC, HGB, PLT in the last 72 hours. CMP:  No results for input(s): NA, K, CL, CO2, BUN, CREATININE, CALCIUM, PROT, LABALBU, BILITOT, ALKPHOS, AST, ALT in the last 72 hours.     Invalid input(s): GLU  Lipids:   Lab Results   Component Value Date    CHOL 175 06/24/2021    CHOL 188 12/15/2014    HDL 39 06/24/2021    TRIG 144 06/24/2021     Glucose:  Recent Labs     07/06/21  0726 07/06/21  1149 07/06/21  1631   POCGLU 119* 226* 96     [id number]:  Lab Results   Component Value Date    LABA1C 11.5 06/23/2021     High Sensitivity TSH:   Lab Results   Component Value Date    TSHHS 2.010 06/25/2021     Free T3: No results found for: FT3  Free T4:  Lab Results   Component Value Date    T4FREE 0.93 05/09/2016       Echocardiogram transesophageal    Result Date: 6/28/2021  Transesophageal Echocardiography Report (SENA)   Demographics   Patient Name       Milvia Socks   Date of Study       06/28/2021   Date of Birth      1950         Gender              Female   Age                70 year(s)         Race                   Patient Number     [id number]         Room Number         2015   Visit Number       [id number]   Corporate ID       [id number]   Accession Number   [id number]         Diana Tolentino TYRONE GAY   Ordering Physician Bernhard Hamman MD                 Physician           Toyin Gross MD  Procedure Type of Study   SENA procedure:ECHOCARDIOGRAM TRANSESOPHAGEAL. Procedure Date Date: 06/28/2021 Start: 10:36 AM Study Location: [address] [place] [address] - Echo Lab Technical Quality: Good visualization Indications:CVA. Patient Status: Routine Height: 62 inches Weight: 230 pounds BSA: 2.03 m2 BMI: 42.07 kg/m2 HR: 65 bpm BP: 151/82 mmHg SENA Performed By: the attending and the sonographer  Type of Anesthesia: Moderate sedation   Conclusions   Summary  Left ventricular systolic function is normal.  Ejection fraction is visually estimated at 55-60%. No evidence of thrombus within the left atrial appendage. Negative bubble study; no ASD or PFO noted. Mild mitral regurgitation. Signature   ------------------------------------------------------------------  Electronically signed by Dorina Murphy MD  (Interpreting physician) on 06/28/2021 at 02:23 PM      ECHO Complete 2D W Doppler W Color    Result Date: 6/24/2021  Transthoracic Echocardiography Report (TTE)  Demographics   Patient Name       Harris Mcardle   Date of Study       06/24/2021   Date of Birth      1950         Gender              Female   Age                70 year(s)         Race                   Patient Number     [id number]         Room Number         2015   Visit Number       [id number]   Corporate ID       [id number]   Accession Number   [id number]         Norman Umanzor RDMS   Ordering Physician Lizette Selby MD           Physician           MD  Procedure Type of Study   TTE procedure:ECHOCARDIOGRAM COMPLETE 2D W DOPPLER W COLOR.   Procedure Date Date: 06/24/2021 Start: 08:44 AM Study Location: Portable Technical Quality: Technically difficult study Indications:CVA. Patient Status: Routine Height: 62 inches Weight: 230 pounds BSA: 2.03 m2 BMI: 42.07 kg/m2 HR: 55 bpm BP: 146/124 mmHg  Conclusions   Summary  Technically difficult examination. Left ventricular systolic function is normal.  Ejection fraction is visually estimated at 50-55%. Moderate left ventricular hypertrophy. Mildly dilated right ventricle. No evidence of right heart strain. Sclerotic, but non-stenotic aortic valve. No evidence of any pericardial effusion. Signature   ------------------------------------------------------------------  Electronically signed by Lida Robb MD (Interpreting  physician) on 06/24/2021 at 05:26 PM  -       CT HEAD WO CONTRAST    Result Date: 6/28/2021  EXAMINATION: CT OF THE HEAD WITHOUT CONTRAST  6/23/2021 2:40 pm     Moderate-sized low-attenuation in the right temporal lobe concerning for acute infarct. MRI brain recommended to confirm. No acute intracranial hemorrhage. CT CERVICAL SPINE WO CONTRAST    Result Date: 6/23/2021  EXAMINATION: CT OF THE CERVICAL SPINE WITHOUT CONTRAST 6/23/2021 3:40 pm     Multilevel degenerate changes. No acute fracture traumatic malalignment     XR CHEST PORTABLE    Result Date: 6/23/2021  EXAMINATION: ONE XRAY VIEW OF THE CHEST 6/23/2021 3:34 pm COMPARISON: None. HISTORY: ORDERING SYSTEM PROVIDED HISTORY: other, slurrred speech, hypertension TECHNOLOGIST PROVIDED HISTORY: Reason for exam:->other, slurrred speech, hypertension Reason for Exam: other, slurrred speech, hypertension Acuity: Acute Type of Exam: Initial Additional signs and symptoms: na Relevant Medical/Surgical History: diabetes, hypertension FINDINGS: The lungs are clear. The mediastinum and cardiac silhouette are unremarkable. No acute abnormality.      CTA HEAD NECK W CONTRAST    Result Date: 6/23/2021  EXAMINATION: CTA OF THE HEAD AND NECK WITH CONTRAST 6/23/2021 11:17 pm: T    Mild tapering versus mild to moderate areas stenosis involving the right MCA M2 branches. Otherwise no large vessel occlusion or hemodynamic stenosis involving remainder of the vessels intracranially. Stable appearance of the age-indeterminate occlusion proximal V2 segment left vertebral artery with retrograde opacification of the V4 segment. 75% narrowing right proximal ICA and 50% narrowing left proximal ICA per NASCET criteria The findings were sent to the Radiology Results [id number] at 11:44 pm on 6/23/2021to be communicated to a licensed caregiver. MRI brain without contrast    Result Date: 6/25/2021  EXAMINATION: MRI OF THE BRAIN WITHOUT CONTRAST  6/25/2021 6:18 pm     1. Patient motion limits evaluation. 2. There is a moderate to large acute infarct involving the right temporal lobe within the right MCA territory. 3. An acute infarct is also seen involving the left paramedian ian. 4. No significant mass effect or midline shift. 5. Loss of the normal signal void is again seen within the proximal left V4 segment, which can be seen with slow flow versus occlusion. 6. Mild global parenchymal volume loss with chronic microvascular ischemic changes. These results were sent to the Results [id number] ([facility]) on 6/25/2021 at 7:00 pm to be communicated to the referring/covering health care provider/office.        Scheduled Medicines   Medications:    insulin lispro  10 Units Subcutaneous TID WC    insulin glargine  40 Units Subcutaneous Nightly    enoxaparin  40 mg Subcutaneous Daily    atorvastatin  80 mg Oral Nightly    aspirin  81 mg Oral Daily    amLODIPine  5 mg Oral Daily    metoprolol tartrate  50 mg Oral BID    clopidogrel  75 mg Oral Daily    folic acid-pyridoxine-cyancobalamin  1 tablet Oral Daily    insulin lispro  0-6 Units Subcutaneous TID WC    insulin lispro  0-3 Units Subcutaneous 2 times per day      Infusions:       Objective:   Vitals: /63   Pulse 78   Temp 99.1 °F (37.3 °C)   Resp 16   Ht 5' 2\" (1.575 m)   Wt 236 lb 6.4 oz (107.2 kg)   SpO2 92%   BMI 43.24 kg/m²   General appearance: alert and cooperative with exam  Neck: no JVD or bruit  Thyroid : Normal lobes   Lungs: Has Vesicular Breath sounds   Heart:  regular rate and rhythm  Abdomen: soft, non-tender; bowel sounds normal; no masses,  no organomegaly  Musculoskeletal: Normal  Extremities: extremities normal, , no edema  Neurologic:  Awake, alert, oriented to name, place and time. Cranial nerves II-XII are grossly intact. Motor is  intact. Sensory paresthesia t.,  and gait is ab unstable. CVA acute    Assessment:     Patient Active Problem List:     Vitamin D deficiency     Hyperlipidemia     Back pain, chronic     Hemiparesis of right dominant side as late effect of cerebral infarction ([address]     Facial droop     Hypokalemia     Essential hypertension     Hyperglycemia     TIA (transient ischemic attack)     Class 3 severe obesity due to excess calories with body mass index (BMI) of 45.0 to 49.9 in adult [facility])     Cerebrovascular accident (CVA) ([address]     Dysarthria due to acute stroke ([address]     Dysphagia due to recent cerebral infarction     Uncontrolled type 2 diabetes mellitus with hyperglycemia ([address]     Morbid obesity with BMI of 45.0-49.9, adult ([address]     Incontinence in female     Symptomatic stenosis of right carotid artery     Acute CVA (cerebrovascular accident) ([address]      Plan:     1. Reviewed POC blood glucose . Labs and X ray results   2. Reviewed Current Medicines   3. On meal/ Correction bolus Humalog/ Basal Lantus Insulin regime s   4. Monitor Blood glucose frequently   5. Modified  the dose of Insulin/ other medicines as needed   6. Patient is participating in physical activities of rehab unit  7. Will follow     .      Anitha Navarro MD, MD

## 2021-07-07 NOTE — CARE COORDINATION
Patient reviewed at today's care conference. Patient is showing little improvement r/t self limiting behaviors. Case mgt will invite family for caregiver training. Case mgt left VM for sig other Jerl Slice inviting him for caregiver training. Set two sessions for 7/7:  1000 & 1400. Ingrid De Anda therapy coordinator notified.

## 2021-07-07 NOTE — PROGRESS NOTES
Physical Therapy    [x] daily progress note       [] discharge       Patient Name:  Leslie Chauhan   :  1950 MRN: [id number]  Room:  [address] [place] [address] Date of Admission: 2021  Rehabilitation Diagnosis:   CVA (cerebral vascular accident) [facility]) [I63.9]  Acute CVA (cerebrovascular accident) ([facility] [address] [I63.9]       Date 2021       Day of ARU Week:  2   Time IN/OUT 4821-1284   Individual Tx Minutes 60   Group Tx Minutes    Co-Treat Minutes    Concurrent Tx Minutes    TOTAL Tx Time Mins 60   Variance Time    Variance Time []   Refusal due to:     []   Medical hold/reason:    []   Illness   []   Off Unit for test/procedure  []   Extra time needed to complete task  []   Therapeutic need  []   Other (specify):   Restrictions Restrictions/Precautions  Restrictions/Precautions: General Precautions, Fall Risk  Position Activity Restriction  Other position/activity restrictions: LIZBET, IV access RUE   Communication with other providers: [x]   OK to see per nursing:     []   Spoke with team member regarding:      Subjective observations and cognitive status: Pt sitting up in semi ortiz position finishing breakfast.     There is no CG present for  CGTing this date   Pain level/location:  =/10       Location:    Discharge recommendations  Anticipated discharge date:  21  Destination: []home alone   []home alone with assist PRN     [x] home w/ family      [x] Continuous supervision  []SNF    [] Assisted living     [] Other:   Continued therapy: [x]HHC PT  []OUTPATIENT  PT   [] No Further PT  Equipment needs: Has manual w/c, will need verified status of RW that pt received during previous ARU stay     PT  QI  Bed Mobility:   Sit to Lying  Assistance Needed: Independent  Comment: with rail use  CARE Score: 6  Discharge Goal: Independent  Roll Left and Right  Assistance Needed: Independent  Comment: with rail use  CARE Score: 6  Discharge Goal: Independent  Lying to Sitting on Side of Bed  Assistance Needed: Independent  Comment: with rail use  CARE Score: 6  Discharge Goal: Independent    Transfers:    Sit to Stand  Assistance Needed: Supervision or touching assistance  Comment: vc for hand placement  CARE Score: 4  Discharge Goal: Supervision or touching assistance  Chair/Bed-to-Chair Transfer  Assistance Needed: Supervision or touching assistance  Comment: vc for hand placement  CARE Score: 4  Discharge Goal: Supervision or touching assistance  Toilet Transfer  Assistance Needed: Supervision or touching assistance  Comment: vc for hand placement and knee ext  CARE Score: 4  Car Transfer  Assistance Needed: Supervision or touching assistance  Comment: vc for hand placement  CARE Score: 4  Discharge Goal: Supervision or touching assistance    Ambulation:    Walking Ability  Does the Patient Walk?: Yes     Walk 10 Feet  Assistance Needed: Supervision or touching assistance  Comment: small steps and knees flexed intermittently  CARE Score: 4  Discharge Goal: Supervision or touching assistance     Walk 50 Feet with Two Turns  Reason if not Attempted: Not attempted due to medical condition or safety concerns  CARE Score: 88  Discharge Goal: Supervision or touching assistance     Walk 150 Feet  Comment: pt was not performing this at baseline and does not demonstrate potential to improve on this mobility task  Reason if not Attempted: Not attempted due to medical condition or safety concerns  CARE Score: 88  Discharge Goal: Not Applicable     Walking 10 Feet on Uneven Surfaces  Comment: Pt declined d/t weakness  Reason if not Attempted: Not attempted due to medical condition or safety concerns  CARE Score: 88  Discharge Goal: Supervision or touching assistance     1 Step (Curb)  Assistance Needed: Supervision or touching assistance  Comment: mod vc for  proper technique with fww  Reason if not Attempted: Not attempted due to medical condition or safety concerns  CARE Score: 4  Discharge Goal: Partial/moderate assistance 4 Steps  Assistance Needed: Supervision or touching assistance  Comment: vc for  knee ext  Reason if not Attempted: Not attempted due to medical condition or safety concerns  CARE Score: 4  Discharge Goal: Partial/moderate assistance     12 Steps  Comment: pt was not performing this at baseline and does not demonstrate potential to improve on this mobility task  Reason if not Attempted: Not attempted due to medical condition or safety concerns  CARE Score: 88  Discharge Goal: Not Applicable    Gait Deviations: []None []Slow tello  [] Increased LILLIAN  [] Staggers []Deviated Path  [] Decreased step length  [] Decreased step height  []Decreased arm swing  [] Shuffles  [] Decreased head and trunk rotation  []other:   Uneven Surfaces:       Surfaces Completed:   []  Green mat with bean bags beneath      []  Throw rugs       []  Ramp       []  Outdoor pavements        []  Grass             []  Loose gravel        []  Other:         Wheelchair:  w/c Ability: Wheelchair Ability  Uses a Wheelchair and/or Scooter?: Yes  Wheel 50 Feet with Two Turns  Assistance Needed: Supervision or touching assistance  Comment: vc for   seq  CARE Score: 4  Discharge Goal: Independent  Wheel 150 Feet  Assistance Needed: Substantial/maximal assistance  Comment: pt was able to propel 32 ft with 1 turn with SBA-Sup using BUE (limited by fatigue)  CARE Score: 2  Discharge Goal: Independent          Balance:        Object: Picking Up Object  Comment: not attempted d/t fatigue  Reason if not Attempted: Not attempted due to medical condition or safety concerns  CARE Score: 88  Discharge Goal: Supervision or touching assistance       Patient/Caregiver Education and Training:   []   Bed Mobility/Transfer technique/safety  [x]   Gait technique/sequencing  [x]   Proper use of assistive device  []   Advanced mobility safety and technique  []   Reinforced patient's precautions/mobility while maintaining precautions  []   Postural awareness  [] Family training  []   Progress was updated and reviewed in Rehabtracker with patient and/or family this date. Treatment Plan for Next Session: Transfer training, gait training, mobility safety      Assessment: This pt demonstrated a positive  response to today's treatment as evidenced by good participation in therapy today. The patient is making progress toward established goals as evidenced by QI scores. Ongoing deficits are observed in the areas of strength and continued focus on mobility safety for transfers and gait training is recommended.      Treatment/Activity Tolerance:   [] Tolerated treatment with no adverse effects    [x] Patient limited by fatigue  [] Patient limited by pain   [] Patient limited by medical complications:    [] Adverse reaction to Tx:   [] Significant change in status    Safety:       []  bed alarm set    [x]  chair alarm set    []  Pt refused alarms                []  Telesitter activated      [x]  Gait belt used during tx session      []other:         Number of Minutes/Billable Intervention  Gait Training 30   Therapeutic Exercise    Neuro Re-Ed    Therapeutic Activity 15   Wheelchair Propulsion 15   Group    Other:    TOTAL 60         Social History  Social/Functional History  Lives With: Significant other (Duane)  Type of Home: Apartment  Home Layout: One level  Home Access: Ramped entrance, Elevator  Entrance Stairs - Number of Steps: 3rd floor  Bathroom Shower/Tub: Tub only  Bathroom Toilet: Handicap height  Bathroom Equipment: Grab bars in shower, Tub transfer bench  Bathroom Accessibility: Accessible  Home Equipment: 4 wheeled walker, [address] Help From: Family  ADL Assistance: Independent  Homemaking Assistance: Needs assistance  Homemaking Responsibilities: No  Ambulation Assistance:  (was using w/c for mobility at baseline)  Transfer Assistance: Needs assistance  Active : No  Patient's  Info: Pt's significant other Duane  Mode of Transportation: Car  Education: Very low education level  Occupation: On disability  Leisure & Hobbies: Spouse manages meds and bills, 1 kitten, outings are limited to doctors visits, watches tv  Additional Comments: Pt appears to be a poor historian and inconsistent with some information provided; sleeps in regular, flat bed; 1 recent fall requring this hospitalization    Objective                                                                                    Goals:  (Update in navigator)  Short term goals  Time Frame for Short term goals: 10-12 tx days:  Short term goal 1: Pt will complete bed mobility tasks (rolling R/L, scooting, sup<->sit) Ind. Short term goal 2: Pt will complete OOB transfers using RW with SBA-Sup. Short term goal 3: Pt will ambulate 50 ft with turns overl level surface and 10 ft of uneven surface using RW with CGA. Short term goal 4: Pt will ascend/descend curb step using RW and 4-5 steps using railings with Min A. Short term goal 5: Pt will propel manual w/c 150 ft Mod Ind. Short term goal 6: Pt will complete object retrieval from the floor with 1UE support on RW with SBA-Sup.:   :        Plan of Care                                                                              Times per week: 5 days per week for a minimum of 60 minutes/day plus group as appropriate for 60 minutes.   Treatment to include Current Treatment Recommendations: Strengthening, Gait Training, Patient/Caregiver Education & Training, Stair training, Equipment Evaluation, Education, & procurement, Balance Training, Cognitive/Perceptual Training, Functional Mobility Training, Endurance Training, Home Exercise Program, Transfer Training, Wheelchair Mobility Training, Safety Education & Training    Electronically signed by   EYAD Espinoza,[id number]  7/7/2021, 8:19 AM

## 2021-07-07 NOTE — PROGRESS NOTES
Progress Note( Dr. Lubna Trejo)  7/7/2021  Subjective:   Admit Date: 6/29/2021  PCP: Alissa Rai MD    Admitted For :On June 23, 2021 for slurred speech aphasia hallucination and confusion possibly UTI  Was transferred to acute rehab unit today 6/29/2021    Consulted For: Better control of blood glucose    Interval History: Feels okay  Denies any chest pains,   Denies SOB . Denies nausea or vomiting. No new bowel or bladder symptoms. Intake/Output Summary (Last 24 hours) at 7/7/2021 0718  Last data filed at 7/7/2021 0221  Gross per 24 hour   Intake 530 ml   Output 0 ml   Net 530 ml       DATA    CBC:   No results for input(s): WBC, HGB, PLT in the last 72 hours. CMP:  No results for input(s): NA, K, CL, CO2, BUN, CREATININE, CALCIUM, PROT, LABALBU, BILITOT, ALKPHOS, AST, ALT in the last 72 hours.     Invalid input(s): GLU  Lipids:   Lab Results   Component Value Date    CHOL 175 06/24/2021    CHOL 188 12/15/2014    HDL 39 06/24/2021    TRIG 144 06/24/2021     Glucose:  Recent Labs     07/06/21  1631 07/06/21  2151 07/07/21  0213   POCGLU 96 142* 116*     EqwpbzrllcY9B:  Lab Results   Component Value Date    LABA1C 11.5 06/23/2021     High Sensitivity TSH:   Lab Results   Component Value Date    TSHHS 2.010 06/25/2021     Free T3: No results found for: FT3  Free T4:  Lab Results   Component Value Date    T4FREE 0.93 05/09/2016       Echocardiogram transesophageal    Result Date: 6/28/2021  Transesophageal Echocardiography Report (SENA)   Demographics   Patient Name       Estella Horne   Date of Study       06/28/2021   Date of Birth      1950         Gender              Female   Age                70 year(s)         Race                   Patient Number     [id number]         Room Number         2015   Visit Number       [id number]   Corporate ID       [id number]   Accession Number   [id number]         Rosa Shearer TYRONE GAY   Ordering Physician Blayne Godinez MD                 Physician           Aliya Mendoza MD  Procedure Type of Study   SENA procedure:ECHOCARDIOGRAM TRANSESOPHAGEAL. Procedure Date Date: 06/28/2021 Start: 10:36 AM Study Location: [facility] - Echo Lab Technical Quality: Good visualization Indications:CVA. Patient Status: Routine Height: 62 inches Weight: 230 pounds BSA: 2.03 m2 BMI: 42.07 kg/m2 HR: 65 bpm BP: 151/82 mmHg SENA Performed By: the attending and the sonographer  Type of Anesthesia: Moderate sedation   Conclusions   Summary  Left ventricular systolic function is normal.  Ejection fraction is visually estimated at 55-60%. No evidence of thrombus within the left atrial appendage. Negative bubble study; no ASD or PFO noted. Mild mitral regurgitation. Signature   ------------------------------------------------------------------  Electronically signed by Tylor Marina MD  (Interpreting physician) on 06/28/2021 at 02:23 PM      ECHO Complete 2D W Doppler W Color    Result Date: 6/24/2021  Transthoracic Echocardiography Report (TTE)  Demographics   Patient Name       Sulema Pablo   Date of Study       06/24/2021   Date of Birth      1950         Gender              Female   Age                70 year(s)         Race                   Patient Number     [id number]         Room Number         2015   Visit Number       [id number]   Corporate ID       [id number]   Accession Number   [id number]         Jessika Cortés RDMS   Ordering Physician Janeth Santillan MD  Procedure Type of Study   TTE procedure:ECHOCARDIOGRAM COMPLETE 2D W DOPPLER W COLOR.   Procedure Date Date: 06/24/2021 Start: 08:44 AM Study Location: [facility] Technical moderate areas stenosis involving the right MCA M2 branches. Otherwise no large vessel occlusion or hemodynamic stenosis involving remainder of the vessels intracranially. Stable appearance of the age-indeterminate occlusion proximal V2 segment left vertebral artery with retrograde opacification of the V4 segment. 75% narrowing right proximal ICA and 50% narrowing left proximal ICA per NASCET criteria The findings were sent to the Radiology Results [address] at 11:44 pm on 6/23/2021to be communicated to a licensed caregiver. MRI brain without contrast    Result Date: 6/25/2021  EXAMINATION: MRI OF THE BRAIN WITHOUT CONTRAST  6/25/2021 6:18 pm     1. Patient motion limits evaluation. 2. There is a moderate to large acute infarct involving the right temporal lobe within the right MCA territory. 3. An acute infarct is also seen involving the left paramedian ian. 4. No significant mass effect or midline shift. 5. Loss of the normal signal void is again seen within the proximal left V4 segment, which can be seen with slow flow versus occlusion. 6. Mild global parenchymal volume loss with chronic microvascular ischemic changes. These results were sent to the Results [address] ([facility]) on 6/25/2021 at 7:00 pm to be communicated to the referring/covering health care provider/office.        Scheduled Medicines   Medications:    insulin glargine  20 Units Subcutaneous Nightly    insulin lispro  10 Units Subcutaneous TID     enoxaparin  40 mg Subcutaneous Daily    atorvastatin  80 mg Oral Nightly    aspirin  81 mg Oral Daily    amLODIPine  5 mg Oral Daily    metoprolol tartrate  50 mg Oral BID    clopidogrel  75 mg Oral Daily    folic acid-pyridoxine-cyancobalamin  1 tablet Oral Daily    insulin lispro  0-6 Units Subcutaneous TID WC    insulin lispro  0-3 Units Subcutaneous 2 times per day      Infusions:       Objective:   Vitals: BP (!) 153/67   Pulse 53   Temp 98 °F (36.7 °C) (Oral) Resp 16   Ht 5' 2\" (1.575 m)   Wt 241 lb 9.6 oz (109.6 kg)   SpO2 93%   BMI 44.19 kg/m²   General appearance: alert and cooperative with exam  Neck: no JVD or bruit  Thyroid : Normal lobes   Lungs: Has Vesicular Breath sounds   Heart:  regular rate and rhythm  Abdomen: soft, non-tender; bowel sounds normal; no masses,  no organomegaly  Musculoskeletal: Normal  Extremities: extremities normal, , no edema  Neurologic:  Awake, alert, oriented to name, place and time. Cranial nerves II-XII are grossly intact. Motor is  intact. Sensory paresthesia t.,  and gait is ab unstable. CVA acute    Assessment:     Patient Active Problem List:     Vitamin D deficiency     Hyperlipidemia     Back pain, chronic     Hemiparesis of right dominant side as late effect of cerebral infarction ([address]     Facial droop     Hypokalemia     Essential hypertension     Hyperglycemia     TIA (transient ischemic attack)     Class 3 severe obesity due to excess calories with body mass index (BMI) of 45.0 to 49.9 in adult [facility])     Cerebrovascular accident (CVA) ([address]     Dysarthria due to acute stroke ([address]     Dysphagia due to recent cerebral infarction     Uncontrolled type 2 diabetes mellitus with hyperglycemia ([address]     Morbid obesity with BMI of 45.0-49.9, adult ([address]     Incontinence in female     Symptomatic stenosis of right carotid artery     Acute CVA (cerebrovascular accident) ([address]      Plan:     1. Reviewed POC blood glucose . Labs and X ray results   2. Reviewed Current Medicines   3. On meal/ Correction bolus Humalog/ Basal Lantus Insulin regime s   4. Monitor Blood glucose frequently   5. Modified  the dose of Insulin/ other medicines as needed   6. Patient is participating in physical activities of rehab unit  7. Will follow     .      Amanda Davis MD, MD

## 2021-07-07 NOTE — PATIENT CARE CONFERENCE
ACUTE REHAB TEAM CONFERENCE SUMMARY   [address] [facility]    NAME: Heriberto Adorno  : 1950 ADMIT DATE: 2021    Rehab Admitting Dx:CVA (cerebral vascular accident) [facility]) [I63.9]  Acute CVA (cerebrovascular accident) ([facility] [I63.9]  Patient Comorbid Conditions: Active Hospital Problems    Diagnosis Date Noted    Ataxia [R27.0]     Acute cystitis without hematuria [N30.00]     History of atrial fibrillation [Z86.79]     Acute CVA (cerebrovascular accident) ([facility] [I63.9] 2021    Uncontrolled type 2 diabetes mellitus with peripheral neuropathy ([facility] [E11.42, E11.65]     Morbid obesity with BMI of 40.0-44.9, adult ([facility] [E66.01, Z68.41]      Date: 2021    CASE MANAGEMENT  Current issues/needs regarding patient and family discharge status:   Patient will dc home to apartment  with sig other. He didn't attend yesterday's caregiver training. PHYSICAL THERAPY (Updated in QI)  Short term goals  Time Frame for Short term goals: 10-12 tx days:  Short term goal 1: Pt will complete bed mobility tasks (rolling R/L, scooting, sup<->sit) Ind. Short term goal 2: Pt will complete OOB transfers using RW with SBA-Sup. Short term goal 3: Pt will ambulate 50 ft with turns overl level surface and 10 ft of uneven surface using RW with CGA. Short term goal 4: Pt will ascend/descend curb step using RW and 4-5 steps using railings with Min A. Short term goal 5: Pt will propel manual w/c 150 ft Mod Ind. Short term goal 6: Pt will complete object retrieval from the floor with 1UE support on RW with SBA-Sup. Impairments/deficits, barriers:     Body structures, Functions, Activity limitations: Decreased functional mobility , Decreased cognition, Decreased ADL status, Decreased endurance, Decreased strength, Decreased balance, Decreased safe awareness     Prognosis: Good, Guarded  Decision Making: High Complexity  Clinical Presentation: unstable/unpredictable characteristics  Equipment needed at discharge: Has manual w/c, will need verified status of RW that pt received during previous ARU stay      PT IRF-KRISTYN scores since initial assessment  Bed Mobility:   Sit to Lying  Assistance Needed: Independent  Comment: in ADL bed  CARE Score: 6  Discharge Goal: Independent    Roll Left and Right  Assistance Needed: Independent  Comment: in ADL bed  CARE Score: 6  Discharge Goal: Independent    Lying to Sitting on Side of Bed  Assistance Needed: Independent  Comment: from ADL bed  CARE Score: 6  Discharge Goal: Independent    Transfers:    Sit to Stand  Assistance Needed: Supervision or touching assistance  Comment: vc for hand placement  CARE Score: 4  Discharge Goal: Supervision or touching assistance    Chair/Bed-to-Chair Transfer  Assistance Needed: Supervision or touching assistance  Comment: vc for safety  CARE Score: 4  Discharge Goal: Supervision or touching assistance    Toilet Transfer  Assistance Needed: Supervision or touching assistance  Comment: vc for hand placement and knee ext  CARE Score: 4    Car Transfer  Assistance Needed: Supervision or touching assistance  Comment: vc for safety and seq  CARE Score: 4  Discharge Goal: Supervision or touching assistance    Ambulation:    Walking Ability  Does the Patient Walk?: Yes     Walk 10 Feet  Assistance Needed: Supervision or touching assistance  Comment: vc for  safety and  knee ext  CARE Score: 4  Discharge Goal: Supervision or touching assistance     Walk 50 Feet with Two Turns  Assistance Needed: Supervision or touching assistance  Comment: CGA->SUP with RW  Reason if not Attempted: Not attempted due to medical condition or safety concerns  CARE Score: 4  Discharge Goal: Supervision or touching assistance     Walk 150 Feet  Comment: pt was not performing this at baseline and does not demonstrate potential to improve on this mobility task  Reason if not Attempted: Patient refused  CARE Score: 7  Discharge Goal: Not Applicable     Walking 10 Feet on Uneven Surfaces  Assistance Needed: Supervision or touching assistance  Comment: vc for  posture and  staying close to fww  Reason if not Attempted: Not attempted due to medical condition or safety concerns  CARE Score: 4  Discharge Goal: Supervision or touching assistance     1 Step (Curb)  Assistance Needed: Supervision or touching assistance  Comment: CGA with VCs for seq. and safety  Reason if not Attempted: Not attempted due to medical condition or safety concerns  CARE Score: 4  Discharge Goal: Partial/moderate assistance     4 Steps  Assistance Needed: Supervision or touching assistance  Comment: SBA using railings  Reason if not Attempted: Not attempted due to medical condition or safety concerns  CARE Score: 4  Discharge Goal: Partial/moderate assistance     12 Steps  Comment: pt was not performing this at baseline and does not demonstrate potential to improve on this mobility task  Reason if not Attempted: Patient refused  CARE Score: 7  Discharge Goal: Not Applicable    Gait Deviations: []None []Slow tello  [] Increased LILLIAN  [] Staggers []Deviated Path  [] Decreased step length  [] Decreased step height  []Decreased arm swing  [] Shuffles  [] Decreased head and trunk rotation  []other:        Wheelchair:  w/c Ability: Wheelchair Ability  Uses a Wheelchair and/or Scooter?: Yes    Wheel 50 Feet with Two Turns  Assistance Needed: Supervision or touching assistance  Comment: vc to remain on task  CARE Score: 4  Discharge Goal: Independent    Wheel 150 Feet  Assistance Needed: Supervision or touching assistance  Comment: vc to remain on task as pt  easily distracted  CARE Score: 4  Discharge Goal: Independent              Balance:        Object: Picking Up Object  Assistance Needed: Supervision or touching assistance  Comment: SBA-Sup with RW and reacher  Reason if not Attempted: Not attempted due to medical condition or safety concerns  CARE Score: 4  Discharge Goal: Supervision or touching assistance    Fall Risk: []  Yes  []  No    OCCUPATIONAL THERAPY  (Updated in QI)    :   Long term goals  Time Frame for Long term goals : 7-10 days or until d/c  Long term goal 1: Pt will complete feeding/grooming/oral care task c MOD I by d/c  Long term goal 2: Pt will complete total body bathing c CGA c use of SBA by d/c  Long term goal 3: Pt will complete UB/LB dressing c SBA by d/c  Long term goal 4: Pt will complete toileting c SBA by d/c  Long term goal 5: Pt will complete functional transfer (toilet, tub, shower) c SBA by d/c. Long term goals 6: Pt will perform therex/therax to facilitate increased strength/endurance/ax tolerance (c emphasis on dynamic standing balance/tolerance >8 mins and BUE endurance) c SBA in order to complete ADLs. Long term goal 7: Pt will complete footwear dressing c MOD I by d/c :                                       OT IRF-KRISTYN scores and goals since initial assessment:    ADLs:    Eating: Eating  Assistance Needed: Independent  Comment: X  CARE Score: 6  Discharge Goal: Independent       Oral Hygiene: Oral Hygiene  Assistance Needed: Independent  Comment: X  CARE Score: 6  Discharge Goal: Independent    UB/LB Bathing: Shower/Bathe Self  Assistance Needed: Supervision or touching assistance  Comment: Sup in stance to wash LB  CARE Score: 4  Discharge Goal: Supervision or touching assistance    UB Dressing: Upper Body Dressing  Assistance Needed: Independent  Comment: X  CARE Score: 6  Discharge Goal: Supervision or touching assistance         LB Dressing: Lower Body Dressing  Assistance Needed: Supervision or touching assistance  Comment: Sup in stance to manage over hips  CARE Score: 4  Discharge Goal: Supervision or touching assistance    Donning and [place] Footwear: Putting On/Taking Off Footwear  Assistance Needed: Independent  Comment: X  CARE Score: 6  Discharge Goal: Independent      Toileting:  Toileting Hygiene  Assistance Needed: facilitate completion of functional tasks. 2 step directions 100%. R/L discrim 50%. Goal 5: Pt will independently respond to written environmental supports r/t safety/medical information in 95% opportunities in order to reduce risk of further medical complication/repeat hospitalization. Pt remains easily distracted and perseverates on topics of interest.  Problem solving requires mod cues.        Pt with ongoing moderate cognitive deficits that do not appear changed from prior admit. Pt requires active cues due to processing delays, poor attn, topic perseveration, and STM deficits. Overall communication has improved for breath support and phrasing with no cues. No further ST recommended. Prognosis for improvement is fair due to poor insight and awareness. Ongoing impairments or deficits or barriers: cognition and memory  Areas where progress has been made:speech/communication  Strategies that improve performance:repetition and visual cues, reduced distractions    Nursing Current Medical Status:   [x] Is continent of bowel and bladder     [] Is incontinent of bowel and bladder    [x] Has had an adequate number of bowel movements   [x] Urinates with no urinary retention >300ml in bladder   [] Targeting bladder protocol with mckoy removal   [x] Maintaining O2 SATs at 92% or greater   [x] Has pain managed while on ARU         [x] Has had no skin breakdown while on ARU   [] Has improved skin integrity via wound measurements   [] Has no signs/symptoms of infection at the wound site   [] Pressure wounds Stage/Location:    [] Arrived on unit with pressure wound  [x] Has been free from injury during hospitalization   [] Has experienced a fall during hospitalization  [x] Ongoing education with patient/family with understanding demonstrated for:  [] Receives IV Fluids  [] Other:        NUTRITION  Weight: 235 lb 12.8 oz (107 kg) / Body mass index is 43.13 kg/m².   Current diet: ADULT DIET; Easy to Chew; 4 carb choices (60 gm/meal)  Intake: Recent meals %      Medical improvements/barriers: likely at baseline cognitively, needs ongoing cues for attn, caregiver training not completed as s/o did not attend scheduled training. Team goals for next treatment period/Intervention for current barriers:   [x] Pt will increase activity tolerance for daily tasks. [] Pt will improve bed mobility with reduced assist.  [x] Pt will improve safety in fx tasks with reduced cues/assist  [] Pt will improve transfers with reduced assist  [x] Pt will improve toileting with reduced assist  [x] Pt will improve ADL's with use of adaptive equipment with reduced assist  [] Pt will improve pain mgmt for maximum participation in tx program  [] Pt will improve communication to get basic needs met on unit  [] Pt will improve swallowing for safe diet advancement with use of strategies  []  Plan for discharge to home. Patient Strengths: Pleasant    Justification for Continued Stay  Based on my medical assessment of the patient and review of information from the interdisciplinary team as part of this weekly team conference, the patient continues to meet the following criteria for IRF level of care:   The patient requires active and ongoing intervention of multiple therapy disciplines   The patient requires and intensive rehabilitation therapy program   The patient requires continued physician supervision by a rehabilitation physician   The patient requires 24 hours rehab nursing care   The patient requires an intensive and coordinated interdisciplinary team approach to the delivery of rehabilitative care.      Assessment/Plan   [x]  The patient is making good progression towards their long term goals and is actively participating in and has a reasonable expectation to continue to benefit from the intensive rehabilitation therapy program   []  The estimated discharge date has been changed from initial team conference due to:

## 2021-07-07 NOTE — PROGRESS NOTES
Physical Therapy      [x] daily progress note       [] discharge       Patient Name:  Gera Riggs   :  1950 MRN: [id number]  Room:  [address] [place], [place] [address] Date of Admission: 2021  Rehabilitation Diagnosis:   CVA (cerebral vascular accident) [facility]) [I63.9]  Acute CVA (cerebrovascular accident) ([facility] [address] [I63.9]       Date 2021       Day of ARU Week:  2   Time IN/OUT [id number]   Individual Tx Minutes 60   TOTAL Tx Time Mins 60   Variance Time    Variance Time []   Refusal due to:     []   Medical hold/reason:    []   Illness   []   Off Unit for test/procedure  []   Extra time needed to complete task  []   Therapeutic need  []   Other (specify):   Restrictions Restrictions/Precautions  Restrictions/Precautions: General Precautions, Fall Risk  Position Activity Restriction  Other position/activity restrictions: LIZBET, IV access RUE   Communication with other providers: [x]   OK to see per nursing:     []   Spoke with team member regarding:      Subjective observations and cognitive status: Pt sitting up in chair, agreeable to session. Expectations discussed at beginning of session with pt willing. Pt did get distracted easily req cues to stay on task. S.O. was not present for caregiver training. Pain level/location:    Discharge recommendations  Anticipated discharge date:  Expected Discharge Date: 21  Destination: []?????home alone   []?????home alone with assist PRN     [x]????? home w/ family      [x]????? Continuous supervision  []??? ??SNF    []????? Assisted living     []????? Other:  Continued therapy: [x]??? ? ?[facility] PT  []?????OUTPATIENT PT   []????? No Further PT  []??? ??SNF PT  Caregiver training recommended: []??? ? ? Yes  []????? No   Equipment needs: Has manual w/c, will need verified status of RW that pt received during previous ARU stay     Bed Mobility:           [x]   Pt received out of bed     Transfers:    Sit--> Stand:  SBA  Stand --> Sit:   CG-Min A, safety cues due to impulsivity, sitting in [facility] before completely locked  Stand-Pivot:   SB-CGA  Assistive device required for transfer:   RW    Gait:    Declined amb due to Jose's". Wheelchair Propulsion:  Distance:   183'   Assistance:   Supervision, very slow pace req extra time. Cues for obstacle management, efficient use of UEs, distractions in hallway. Req ~ 25' to propel from inside gym to recliner. Extremities Used:   B UEs     Uneven Surfaces:       Assistance:  CGA 90%, Min A 10% due to B knee buckling; min cues to stay closer to AD  Device:    RW  Surfaces Completed:   [] Green mat with bean bags beneath       [] Throw rugs          [] Outdoor pavements      [] Grass          [] Loose gravel   [x] Carpet      []  Other:       Additional Therapeutic activities/exercises completed this date:     []   Nu-step:  Time:        Level:         #Steps:       []   Rebounder:    []  Seated     []  Standing        []   Balance training         []   Postural training    []   Supine ther ex (reps/sets):     []   Seated ther ex (reps/sets):     []   Standing ther ex (reps/sets):     [x]   Picked up objects from floor at RW SBA. Pt initially in disagreement. Pt first used AE in sitting position, then finally agreed to trial in standing with max encouragement                       [x]   Reacher used   []   Other:   []   Other:   []   Other:       Patient/Caregiver Education and Training:   [x]   Bed Mobility/Transfer technique/safety  []   Gait technique/sequencing  [x]   Proper use of assistive device  [x]   Advanced mobility safety and technique  []   Reinforced patient's precautions with mobility/functional tasks  []   Postural awareness  []   Family training  []   Other:    Treatment Plan for Next Session: QI next session or caregiver training.          Treatment/Activity Tolerance:   [x] Tolerated treatment with no adverse effects    [] Patient limited by fatigue  [] Patient limited by pain   [] Patient limited by medical complications:    [] Adverse reaction to Tx:   [] Significant change in status    Safety:       []  bed alarm set    [x]  chair alarm set    []  Pt refused alarms                []  Telesitter activated      [x]  Gait belt used during tx session      []other:         Number of Minutes/Billable Intervention  Gait Training 8   Therapeutic Exercise    Neuro Re-Ed    Therapeutic Activity 22   Wheelchair Propulsion 30   Group    Other:    TOTAL 60         Social History  Social/Functional History  Lives With: Significant other (Duane)  Type of Home: Apartment  Home Layout: One level  Home Access: Ramped entrance, Elevator  Entrance Stairs - Number of Steps: 3rd floor  Bathroom Shower/Tub: Tub only  Bathroom Toilet: Handicap height  Bathroom Equipment: Grab bars in shower, Tub transfer bench  Bathroom Accessibility: Santos Hugo: 4 wheeled walker, [address] Help From: Family  ADL Assistance: Independent  Homemaking Assistance: Needs assistance  Homemaking Responsibilities: No  Ambulation Assistance:  (was using w/c for mobility at baseline)  Transfer Assistance: Needs assistance  Active : No  Patient's  Info: Pt's significant other Josi Melgar  Mode of Transportation: Car  Education: Very low education level  Occupation: On disability  Leisure & Hobbies: Spouse manages meds and bills, 1 kitten, outings are limited to doctors visits, watches tv  Additional Comments: Pt appears to be a poor historian and inconsistent with some information provided; sleeps in regular, flat bed; 1 recent fall requring this hospitalization    Objective                                                                                    Goals:  (Update in navigator)  Short term goals  Time Frame for Short term goals: 10-12 tx days:  Short term goal 1: Pt will complete bed mobility tasks (rolling R/L, scooting, sup<->sit) Ind. Short term goal 2: Pt will complete OOB transfers using RW with SBA-Sup.   Short term goal 3: Pt will

## 2021-07-08 LAB
GLUCOSE BLD-MCNC: 149 MG/DL (ref 70–99)
GLUCOSE BLD-MCNC: 150 MG/DL (ref 70–99)
GLUCOSE BLD-MCNC: 159 MG/DL (ref 70–99)
GLUCOSE BLD-MCNC: 177 MG/DL (ref 70–99)
GLUCOSE BLD-MCNC: 178 MG/DL (ref 70–99)

## 2021-07-08 PROCEDURE — 97535 SELF CARE MNGMENT TRAINING: CPT

## 2021-07-08 PROCEDURE — 94761 N-INVAS EAR/PLS OXIMETRY MLT: CPT

## 2021-07-08 PROCEDURE — 99233 SBSQ HOSP IP/OBS HIGH 50: CPT | Performed by: PHYSICAL MEDICINE & REHABILITATION

## 2021-07-08 PROCEDURE — 6370000000 HC RX 637 (ALT 250 FOR IP): Performed by: INTERNAL MEDICINE

## 2021-07-08 PROCEDURE — 97110 THERAPEUTIC EXERCISES: CPT

## 2021-07-08 PROCEDURE — 97116 GAIT TRAINING THERAPY: CPT

## 2021-07-08 PROCEDURE — 6370000000 HC RX 637 (ALT 250 FOR IP): Performed by: PHYSICAL MEDICINE & REHABILITATION

## 2021-07-08 PROCEDURE — 82962 GLUCOSE BLOOD TEST: CPT

## 2021-07-08 PROCEDURE — 97542 WHEELCHAIR MNGMENT TRAINING: CPT

## 2021-07-08 PROCEDURE — 97530 THERAPEUTIC ACTIVITIES: CPT

## 2021-07-08 PROCEDURE — 94150 VITAL CAPACITY TEST: CPT

## 2021-07-08 PROCEDURE — 1280000000 HC REHAB R&B

## 2021-07-08 PROCEDURE — 6360000002 HC RX W HCPCS: Performed by: INTERNAL MEDICINE

## 2021-07-08 RX ADMIN — METOPROLOL TARTRATE 50 MG: 50 TABLET, FILM COATED ORAL at 22:01

## 2021-07-08 RX ADMIN — ACETAMINOPHEN 650 MG: 325 TABLET ORAL at 05:06

## 2021-07-08 RX ADMIN — INSULIN GLARGINE 20 UNITS: 100 INJECTION, SOLUTION SUBCUTANEOUS at 22:02

## 2021-07-08 RX ADMIN — Medication 1 TABLET: at 08:49

## 2021-07-08 RX ADMIN — ACETAMINOPHEN 650 MG: 325 TABLET ORAL at 17:07

## 2021-07-08 RX ADMIN — ACETAMINOPHEN 650 MG: 325 TABLET ORAL at 01:10

## 2021-07-08 RX ADMIN — CLOPIDOGREL BISULFATE 75 MG: 75 TABLET ORAL at 08:46

## 2021-07-08 RX ADMIN — ACETAMINOPHEN 650 MG: 325 TABLET ORAL at 22:01

## 2021-07-08 RX ADMIN — ENOXAPARIN SODIUM 40 MG: 40 INJECTION SUBCUTANEOUS at 08:47

## 2021-07-08 RX ADMIN — ACETAMINOPHEN 650 MG: 325 TABLET ORAL at 08:42

## 2021-07-08 RX ADMIN — ASPIRIN 81 MG: 81 TABLET, COATED ORAL at 08:45

## 2021-07-08 RX ADMIN — ATORVASTATIN CALCIUM 80 MG: 40 TABLET, FILM COATED ORAL at 22:01

## 2021-07-08 RX ADMIN — AMLODIPINE BESYLATE 5 MG: 5 TABLET ORAL at 08:44

## 2021-07-08 ASSESSMENT — PAIN DESCRIPTION - PROGRESSION: CLINICAL_PROGRESSION: GRADUALLY WORSENING

## 2021-07-08 ASSESSMENT — PAIN SCALES - GENERAL
PAINLEVEL_OUTOF10: 5
PAINLEVEL_OUTOF10: 0
PAINLEVEL_OUTOF10: 3
PAINLEVEL_OUTOF10: 2
PAINLEVEL_OUTOF10: 1
PAINLEVEL_OUTOF10: 5
PAINLEVEL_OUTOF10: 0
PAINLEVEL_OUTOF10: 5

## 2021-07-08 ASSESSMENT — PAIN DESCRIPTION - ORIENTATION
ORIENTATION: RIGHT;LEFT;LOWER

## 2021-07-08 ASSESSMENT — PAIN DESCRIPTION - PAIN TYPE
TYPE: ACUTE PAIN
TYPE: ACUTE PAIN

## 2021-07-08 ASSESSMENT — PAIN DESCRIPTION - LOCATION
LOCATION: LEG

## 2021-07-08 ASSESSMENT — PAIN - FUNCTIONAL ASSESSMENT: PAIN_FUNCTIONAL_ASSESSMENT: PREVENTS OR INTERFERES SOME ACTIVE ACTIVITIES AND ADLS

## 2021-07-08 ASSESSMENT — PAIN DESCRIPTION - DESCRIPTORS: DESCRIPTORS: ACHING

## 2021-07-08 ASSESSMENT — PAIN DESCRIPTION - ONSET: ONSET: ON-GOING

## 2021-07-08 ASSESSMENT — PAIN DESCRIPTION - FREQUENCY: FREQUENCY: INTERMITTENT

## 2021-07-08 NOTE — CARE COORDINATION
Case mgt left VM for patient's daughter Lisa Cortez to discuss tomorrow's discharge. Return call requested. 1200:  Patient's SO Duane in room. He didn't participate in marques's therapy session. Both he and the patient don't seem worried about tomorrow's dc home. Patient is excited to see her cat. Jarvis Ponce is offering to arrange transport for tomorrow. Case mgt shared that Lisa Cortez previously stated she would provide dc transport, but if that falls through, case mgt will telephone Jarvsi Ponce. 1230:  Patient reviewed at today's care conference and is on track for a dc home tomorrow    1330:  [facility] notes and orders faxed to Vish Fowler 1. [phone]: LEft  for Dr Darek Mendiola office. Requested return call with appt details.

## 2021-07-08 NOTE — PROGRESS NOTES
Occupational Therapy  Physical Rehabilitation: OCCUPATIONAL THERAPY     [x] daily progress note       [] discharge       Patient Name:  Amaya Conde   :  1950 MRN: [id number]  Room:  [address] [place] [address] Date of Admission: 2021  Rehabilitation Diagnosis:   CVA (cerebral vascular accident) [facility]) [I63.9]  Acute CVA (cerebrovascular accident) ([facility] [address] [I63.9]       Date 2021       Day of ARU Week:  3   Time IN/OUT 1300-X  1405-X   Individual Tx Minutes 0   Group Tx Minutes    Co-Treat Minutes    Concurrent Tx Minutes    TOTAL Tx Time Mins 0   Variance Time    Variance Time [x]   Refusal due to:     []   Medical hold/reason:    []   Illness   []   Off Unit for test/procedure  []   Extra time needed to complete task  []   Therapeutic need  []   Other (specify):   Restrictions Restrictions/Precautions: General Precautions, Fall Risk         Communication with other providers: [x]   OK to see per nursing:     []   Spoke with team member regarding:      Subjective observations and cognitive status: Entered pt's room 2x; pt refused PM therapy session stating \"I've already done therapy today. I'll do it tomorrow. \" therapist educated that she would not be doing therapy tomorrow and that she needed to participate today. \" Pt continued to refuse therapy this date. Pain level/location:    /10       Location:    Discharge recommendations  Anticipated discharge date:    Destination: []??home alone   []? ?home alone w assist prn   [x]? ? home w/ family    []? ? Continuous supervision       []? ?SNF    []? ? Assisted living     []? ? Other:   Continued therapy: [x]? ?[facility] OT  []??OUTPATIENT  OT   []?? No Further OT  Equipment needs: None      Toileting:   NA       Toilet Transfers:   NA   Device Used:    []   Standard Toilet         []   Grab Bars           []  Bedside Commode       []   Elevated Toilet          []   Other:        Bed Mobility:           [x]   Pt received out of bed       Transfers:          Functional Mobility:  NA   Assistance:  NA   Device:   []   Rolling Walker     []   Standard Walker []   Wheelchair        []   [place]       []   4-Wheeled Stanislav Giron         []   Cardiac Walker       []   Other:            Additional Therapeutic activities/exercises completed this date:     []   ADL Training   []   Balance/Postural training     []   Bed/Transfer Training   []   Endurance Training   []   Neuromuscular Re-ed   []   Nu-step:  Time:        Level:         #Steps:       []   Rebounder:    []  Seated     []  Standing        []   Supine Ther Ex (reps/sets):     []   Seated Ther Ex (reps/sets):     []   Standing Ther Ex (reps/sets):     []   Other:      Comments:      Patient/Caregiver Education and Training:   []   [employer] Equipment Use  []   Bed Mobility/Transfer Technique/Safety  []   Energy Conservation Tips  []   Family training  []   Postural Awareness  []   Safety During Functional Activities  []   Reinforced Patient's Precautions   []   Progress was updated and reviewed in Rehabtracker with patient and/or family this         date. Treatment Plan for Next Session: D/C 07/09/2021      Assessment: This pt demonstrated a negative response to today's treatment as evidenced by not participating in therapy.         Treatment/Activity Tolerance:   [] Tolerated treatment with no adverse effects    [] Patient limited by fatigue  [] Patient limited by pain   [] Patient limited by medical complications:    [] Adverse reaction to Tx:   [] Significant change in status    Safety:       []  bed alarm set    [x]  chair alarm set    []  Pt refused alarms                []  Telesitter activated      []  Gait belt used during tx session      []other:       Number of Minutes/Billable Intervention  Therapeutic Exercise    ADL Self-care    Neuro Re-Ed    Therapeutic Activity    Group    Other:    TOTAL 0       Social History  Social/Functional History  Lives With: Significant other (Duane)  Type of Home: Apartment  Home Layout: One level  Home Access: Ramped entrance, Elevator  Entrance Stairs - Number of Steps: 3rd floor  Bathroom Shower/Tub: Tub only  Bathroom Toilet: Handicap height  Bathroom Equipment: Grab bars in shower, Tub transfer bench  Bathroom Accessibility: Accessible  Home Equipment: 4 wheeled walker, Tomás Keane Help From: Family  ADL Assistance: [address]: Needs assistance  Homemaking Responsibilities: No  Ambulation Assistance:  (was using w/c for mobility at baseline)  Transfer Assistance: Needs assistance  Active : No  Patient's  Info: Pt's significant other Duane  Mode of Transportation: Car  Education: Very low education level  Occupation: On disability  Leisure & Hobbies: Spouse manages meds and bills, 1 kitten, outings are limited to doctors visits, watches tv  Additional Comments: Pt appears to be a poor historian and inconsistent with some information provided; sleeps in regular, flat bed; 1 recent fall requring this hospitalization    Objective                                                                                    Goals:  (Update in navigator)   : Long term goals  Time Frame for Long term goals : 7-10 days or until d/c  Long term goal 1: Pt will complete feeding/grooming/oral care task c MOD I by d/c  Long term goal 2: Pt will complete total body bathing c CGA c use of SBA by d/c  Long term goal 3: Pt will complete UB/LB dressing c SBA by d/c  Long term goal 4: Pt will complete toileting c SBA by d/c  Long term goal 5: Pt will complete functional transfer (toilet, tub, shower) c SBA by d/c. Long term goals 6: Pt will perform therex/therax to facilitate increased strength/endurance/ax tolerance (c emphasis on dynamic standing balance/tolerance >8 mins and BUE endurance) c SBA in order to complete ADLs.   Long term goal 7: Pt will complete footwear dressing c MOD I by d/c:        Plan of Care Times per week: 5 days per week for a minimum of 60 minutes/day plus group as appropriate for 60 minutes.   Treatment to include Plan  Times per day: Daily  Current Treatment Recommendations: Strengthening, Functional Mobility Training, Patient/Caregiver Education & Training, Endurance Training, Equipment Evaluation, Education, & procurement, Balance Training, Safety Education & Training, Self-Care / ADL    Electronically signed by   PETE Perry,  7/8/2021, 2:26 PM

## 2021-07-08 NOTE — PROGRESS NOTES
Osorio Greenberg    : 1950  Acct #: [de-identified]  MRN: [id number]              PM&R Progress Note      Admitting diagnosis: Acute cerebrovascular accident ( [place] [address])     Comorbid diagnoses impacting rehabilitation: Ataxia, dysarthria, gait disturbance, uncontrolled diabetes type 2 with peripheral neuropathy, essential hypertension, history of atrial fibrillation, acute urinary tract infection, morbid obesity (BMI 44.5)    Chief complaint: Eating little better. Slept fair. No new pain. Prior (baseline) level of function: Independent. Current level of function:         Current  IRF-KRISTYN and Goals:   Occupational Therapy:      :   Long term goals  Time Frame for Long term goals : 7-10 days or until d/c  Long term goal 1: Pt will complete feeding/grooming/oral care task c MOD I by d/c  Long term goal 2: Pt will complete total body bathing c CGA c use of SBA by d/c  Long term goal 3: Pt will complete UB/LB dressing c SBA by d/c  Long term goal 4: Pt will complete toileting c SBA by d/c  Long term goal 5: Pt will complete functional transfer (toilet, tub, shower) c SBA by d/c. Long term goals 6: Pt will perform therex/therax to facilitate increased strength/endurance/ax tolerance (c emphasis on dynamic standing balance/tolerance >8 mins and BUE endurance) c SBA in order to complete ADLs.   Long term goal 7: Pt will complete footwear dressing c MOD I by d/c :                                       Eating: Eating  Assistance Needed: Independent  Comment: X  CARE Score: 6  Discharge Goal: Independent       Oral Hygiene: Oral Hygiene  Assistance Needed: Independent  Comment: X  CARE Score: 6  Discharge Goal: Independent    UB/LB Bathing: Shower/Bathe Self  Assistance Needed: Supervision or touching assistance  Comment: Sup in stance to wash LB  CARE Score: 4  Discharge Goal: Supervision or touching assistance    UB Dressing: Upper Body Dressing  Assistance Needed: Independent  Comment: X  CARE Score: 6  Discharge Goal: Supervision or touching assistance         LB Dressing: Lower Body Dressing  Assistance Needed: Supervision or touching assistance  Comment: Sup in stance to manage over hips  CARE Score: 4  Discharge Goal: Supervision or touching assistance    Donning and [place] Footwear: Putting On/Taking Off Footwear  Assistance Needed: Independent  Comment: X  CARE Score: 6  Discharge Goal: Independent      Toileting: Toileting Hygiene  Assistance Needed: Supervision or touching assistance  Comment: Sup for clothing management and safety in stance  Reason if not Attempted: Patient refused  CARE Score: 4  Discharge Goal: Supervision or touching assistance      Toilet Transfers: Toilet Transfer  Assistance Needed: Supervision or touching assistance  Comment: Sup for safety  Reason if not Attempted: Patient refused  CARE Score: 4  Discharge Goal: Supervision or touching assistance    Physical Therapy:   Short term goals  Time Frame for Short term goals: 10-12 tx days:  Short term goal 1: Pt will complete bed mobility tasks (rolling R/L, scooting, sup<->sit) Ind. Short term goal 2: Pt will complete OOB transfers using RW with SBA-Sup. Short term goal 3: Pt will ambulate 50 ft with turns overl level surface and 10 ft of uneven surface using RW with CGA. Short term goal 4: Pt will ascend/descend curb step using RW and 4-5 steps using railings with Min A. Short term goal 5: Pt will propel manual w/c 150 ft Mod Ind. Short term goal 6: Pt will complete object retrieval from the floor with 1UE support on RW with SBA-Sup.             Bed Mobility:   Sit to Lying  Assistance Needed: Independent  Comment: with rail use  CARE Score: 6  Discharge Goal: Independent  Roll Left and Right  Assistance Needed: Independent  Comment: with rail use  CARE Score: 6  Discharge Goal: Independent  Lying to Sitting on Side of Bed  Assistance Needed: Independent  Comment: with rail use  CARE Score: 6  Discharge Goal: Independent    Transfers: Sit to Stand  Assistance Needed: Supervision or touching assistance  Comment: vc for hand placement  CARE Score: 4  Discharge Goal: Supervision or touching assistance  Chair/Bed-to-Chair Transfer  Assistance Needed: Supervision or touching assistance  Comment: vc for hand placement  CARE Score: 4  Discharge Goal: Supervision or touching assistance  Toilet Transfer  Assistance Needed: Supervision or touching assistance  Comment: vc for hand placement and knee ext  CARE Score: 4  Car Transfer  Assistance Needed: Supervision or touching assistance  Comment: vc for hand placement  CARE Score: 4  Discharge Goal: Supervision or touching assistance    Ambulation:    Walking Ability  Does the Patient Walk?: Yes     Walk 10 Feet  Assistance Needed: Supervision or touching assistance  Comment: small steps and knees flexed intermittently  CARE Score: 4  Discharge Goal: Supervision or touching assistance     Walk 50 Feet with Two Turns  Reason if not Attempted: Not attempted due to medical condition or safety concerns  CARE Score: 88  Discharge Goal: Supervision or touching assistance     Walk 150 Feet  Comment: pt was not performing this at baseline and does not demonstrate potential to improve on this mobility task  Reason if not Attempted: Not attempted due to medical condition or safety concerns  CARE Score: 88  Discharge Goal: Not Applicable     Walking 10 Feet on Uneven Surfaces  Comment: Pt declined d/t weakness  Reason if not Attempted: Not attempted due to medical condition or safety concerns  CARE Score: 88  Discharge Goal: Supervision or touching assistance     1 Step (Curb)  Assistance Needed: Supervision or touching assistance  Comment: mod vc for  proper technique with fww  Reason if not Attempted: Not attempted due to medical condition or safety concerns  CARE Score: 4  Discharge Goal: Partial/moderate assistance     4 Steps  Assistance Needed: Supervision or touching assistance  Comment: vc for  knee 51.9 (H) 06/28/2021    MCV 93.2 06/28/2021     06/28/2021     Lab Results   Component Value Date    INR 1.14 06/23/2021    INR 1.19 10/06/2020    PROTIME 13.8 06/23/2021    PROTIME 14.4 10/06/2020     Lab Results   Component Value Date    CREATININE 0.9 06/28/2021    BUN 14 06/28/2021     06/28/2021    K 3.7 06/28/2021     06/28/2021    CO2 23 06/28/2021     Lab Results   Component Value Date    ALT 12 06/24/2021    AST 13 (L) 06/24/2021    ALKPHOS 110 06/24/2021    BILITOT 0.8 06/24/2021       Expected length of stay  prior to a supervised level of function for discharge home with a wheelchair and [facility] OT/PT is 7/9/2021. Recommendations:    1. Acute CVA with ataxia:    Generally, she is not fully engaging in the treatment and not responsive to retraining. The family is not interested in ongoing caregiver education. We will work to transition her home where she'll need to use a wheelchair and have assistance with all aspects of her personal care. Making arrangements to transition to home care therapies with family supervision by the end of this week. Continue the aspirin and Plavix.  Continue with Lipitor.  Benefiting from aggressive pulmonary hygiene, DVT prophylaxis and nutritional support.  Bowel and bladder retraining, but she is still incontinent.  Adaptive equipment training has not been very fruitful.  Caregiver education would be appropriate but no one is available.  Verbal cues and minimum physical assistance for transfers again today. 2. DVT prophylaxis: Lovenox 40 mg subcu daily.  I must monitor her hemoglobin and platelet count periodically while on this medication.  Weightbearing activities are tolerated in an inconsistent way.  GI prophylaxis offered.  No new bruising or swelling.    3. Urinary tract infection: The patient has completed IV antibiotic therapy for her UTI.    Monitoring her urinary control and quality.  She has incontinence which is her baseline but no dysuria or foul quality to the urine. 4. Uncontrolled diabetes type 2 with peripheral neuropathy: The patient requires a diet modified for carbohydrates.  She requires scheduled Lantus and Humalog with each meal as well as a Humalog sliding scale.  Endocrinology continues to monitor her recovery.  Blood sugars are checked at mealtime and bedtime. 5. Uncontrolled hypertension: Norvasc and Lopressor are required for blood pressure regulation.  Target systolic blood pressure is 120140.  Vital signs are checked at rest and with activity.  Encouraging oral hydration.  Blood pressure is within the target range today.  Monitoring closely  6. Atrial fibrillation: Cardiology has recently decided that she does not have any current active atrial fibrillation and does not require anticoagulation.  Lopressor is helping control her rate. Monitoring vital signs at rest and with activity.  Daily weights do not show any decompensation of CHF.

## 2021-07-08 NOTE — PLAN OF CARE
Skin Integrity:  Goal: Skin integrity will stabilize  Description: Skin integrity will stabilize  7/7/2021 2310 by Cammie Potts LPN  Outcome: Ongoing  7/7/2021 0943 by Lori Eubanks LPN  Outcome: Ongoing     Problem: Discharge Planning:  Goal: Patients continuum of care needs are met  Description: Patients continuum of care needs are met  7/7/2021 2310 by Cammie Potts LPN  Outcome: Ongoing  7/7/2021 0943 by Lori Eubanks LPN  Outcome: Ongoing

## 2021-07-08 NOTE — PROGRESS NOTES
Occupational Therapy  Physical Rehabilitation: OCCUPATIONAL THERAPY     [x] daily progress note       [] discharge       Patient Name:  Pablito Berrios   :  1950 MRN: [id number]  Room:  [address] [place], [place] [address] Date of Admission: 2021  Rehabilitation Diagnosis:   CVA (cerebral vascular accident) [facility]) [I63.9]  Acute CVA (cerebrovascular accident) ([facility] [address] [I63.9]       Date 2021       Day of ARU Week:  3   Time IN/OUT 30- 1042   Individual Tx Minutes 67   Group Tx Minutes    Co-Treat Minutes    Concurrent Tx Minutes    TOTAL Tx Time Mins 72   Variance Time -3 Yaz BAL notified of variance for PM session    Variance Time []   Refusal due to:     []   Medical hold/reason:    []   Illness   []   Off Unit for test/procedure  []   Extra time needed to complete task  []   Therapeutic need  []   Other (specify):   Restrictions Restrictions/Precautions  Restrictions/Precautions: General Precautions, Fall Risk  Position Activity Restriction  Other position/activity restrictions: , IV access RUE   Communication with other providers: [x]   OK to see per nursing:     [x]   Spoke with team member regarding: Ike Holloway notified of txmnt variance mins. Subjective observations and cognitive status: Pt in bed upon COTAs arrival. Pt agreeable to OT session, however Pt adamantly declines ADL tasks stating \"I took a shower yesterday\" and \"I am not changing out of this gown. \"   Pt perseverating on cell phone with repeated attempts to contact \"Fredrick\" in re to a ride home tomorrow. Pt required cues all throughout session to remain attentive to task at hand. Pt attempts to refuse PM session stating \"I have to get ready to go home, I can't do any more of this today\" Pt reminded of the importance of therapy and the need to participate at scheduled times. Pain level/location:    /10       Location: denies pain.     Discharge recommendations  Anticipated discharge date:    Destination: []?home alone   []?home alone w assist prn   [x]? home w/ family    []? Continuous supervision       []? SNF    []? Assisted living     []? Other:   Continued therapy: [x]? [facility] OT  []? OUTPATIENT  OT   []? No Further OT  Equipment needs: None    (HIT F2 to transition between stars)            Grooming:   Ind  from seated base   Oral Hygiene: Ind from seated base       Bathing:   Pt declined participation in sinkside bathing and full shower this date. Dressing:      Upper Body Dressing:  Declined participation   Lower Body Dressing:  Declined participation  Footwear: Ind from EOB     Toileting:   Sup for safety during CM        Toilet Transfers:   Sup   Device Used:    [x]   Standard Toilet         [x]   Grab Bars           []  Bedside Commode       []   Elevated Toilet          []   Other:             Bed Mobility:           []   Pt received out of bed   Rolling R/L:    Scooting: Mod I to scoot to [address] [place] [address] 200 with use of head rails for UE support   Supine --> Sit:  Ind  Sit --> Supine:      Transfers:    Sit--> Stand:  SUP  Stand --> Sit:   SUP  Stand-Pivot:   SUP  Other:    Assistive device required for transfer:   RW      Functional Mobility:    Assistance: In room SUP, w/c mob in hallway for very short distances with encouragement to cont. Pt would terminate propulsion for conversation. Device:   [x]   Rolling Walker     []   Standard [place] []   Wheelchair        []   Cinetraffic       []   4-Wheeled [place]         []   Cardiac [place]       []   Other:              Additional Therapeutic activities/exercises completed this date:     [x]   ADL Training   [x]   Balance/Postural training Static standing amanda with RW x2 mins over x3 attempts     [x]   Bed/Transfer Training   [x]   Endurance Training w/c mob throughout hallways in efforts to increase endurance and ax amanda.     []   Neuromuscular Re-ed   []   Nu-step:  Time:        Level:         #Steps:       []   Rebounder:    []  Seated     []  Standing        []   Supine Ther Ex (reps/sets):     [x]   Seated Ther Ex (reps/sets): UBE x6 mins/2 with max encouragement for continuation d/t frequent termination of task. Use of 2.5# dowel to B hansa, elbows 4 ways x10/2 with verbal and tactile cues for adequate follow through of ea exercise.    []   Standing Ther Ex (reps/sets):     []   Other:      Comments:      Patient/Caregiver Education and Training:   []   [employer] Equipment Use  [x]   Bed Mobility/Transfer Technique/Safety  [x]   Energy Conservation Tips  []   Family training  [x]   Postural Awareness  [x]   Safety During Functional Activities  []   Reinforced Patient's Precautions   []   Progress was updated and reviewed in Rehabtracker with patient and/or family this         date. Treatment Plan for Next Session: Address safety sequencing and HEP as needed. Assessment: This pt demonstrated a fair response to today's treatment as evidenced by Pt controlling all ax and tasks and Pt perseverating on details of d/c versus focusing on txtmnt session. The patient is making good progress toward established goals as evidenced by QI scores. Ongoing deficits are observed in the areas of safety sequencing, and continued focus on repetitive training for safe ADL trans and func mob is recommended.        Treatment/Activity Tolerance:   [x] Tolerated treatment with no adverse effects    [] Patient limited by fatigue  [] Patient limited by pain   [] Patient limited by medical complications:    [] Adverse reaction to Tx:   [] Significant change in status    Safety:       []  bed alarm set    [x]  chair alarm set    []  Pt refused alarms                []  Telesitter activated      [x]  Gait belt used during tx session      []other:  Pt handed off to Jonatan for PT session     Number of Minutes/Billable Intervention  Therapeutic Exercise 30   ADL Self-care 12   Neuro Re-Ed    Therapeutic Activity 30   Group    Other:    TOTAL 72       Social History  Social/Functional History  Lives With: Significant other (Duane)  Type of Home: Times per week: 5 days per week for a minimum of 60 minutes/day plus group as appropriate for 60 minutes.   Treatment to include Plan  Times per day: Daily  Current Treatment Recommendations: Strengthening, Functional Mobility Training, Patient/Caregiver Education & Training, Endurance Training, Equipment Evaluation, Education, & procurement, Balance Training, Safety Education & Training, Self-Care / ADL    Electronically signed by   DIANELYS Soto [address], [id number]  7/8/2021, 9:50 AM

## 2021-07-08 NOTE — PROGRESS NOTES
Physical Therapy    [x] daily progress note       [] discharge       Patient Name:  Marty Diamond   :  1950 MRN: [id number]  Room:  [address] [place] [address] Date of Admission: 2021  Rehabilitation Diagnosis:   CVA (cerebral vascular accident) [facility]) [I63.9]  Acute CVA (cerebrovascular accident) ([facility] [address] [I63.9]       Date 2021       Day of ARU Week:  3   Time IN/OUT [id number]   Individual Tx Minutes 68   Group Tx Minutes    Co-Treat Minutes    Concurrent Tx Minutes    TOTAL Tx Time Mins 76   Variance Time    Variance Time []   Refusal due to:     []   Medical hold/reason:    []   Illness   []   Off Unit for test/procedure  []   Extra time needed to complete task  []   Therapeutic need  []   Other (specify):   Restrictions Restrictions/Precautions  Restrictions/Precautions: General Precautions, Fall Risk  Position Activity Restriction  Other position/activity restrictions: LIZBET, IV access RUE   Communication with other providers: [x]   OK to see per nursing:     []   Spoke with team member regarding:      Subjective observations and cognitive status: Pt sitting up in w/c. Pt with c/o  \"my head feeling funny\". Sitting b/p taken and b/p =134/85 mmhg  HR = 61 b/m, sp02 = 92%.     Pt becomes impulsive and unsafe when boyfriend arrives, requires  Mod vc to remain on tasks and  Focus on safety       Pain level/location:  0  /10       Location:    Discharge recommendations  Anticipated discharge date:  21  Destination: []home alone   []home alone with assist PRN     [x] home w/ family      [x] Continuous supervision  []SNF    [] Assisted living     [] Other:   Continued therapy: [x]HHC PT  []OUTPATIENT  PT   [] No Further PT  Equipment needs:  Has manual w/c, will need verified status of RW that pt received during previous ARU stay     PT  QI  Bed Mobility:   Sit to Lying  Assistance Needed: Independent  Comment: in ADL bed  CARE Score: 6  Discharge Goal: Independent  Roll Left and Right  Assistance Needed: Independent  Comment: in ADL bed  CARE Score: 6  Discharge Goal: Independent  Lying to Sitting on Side of Bed  Assistance Needed: Independent  Comment: from ADL bed  CARE Score: 6  Discharge Goal: Independent    Transfers:    Sit to Stand  Assistance Needed: Supervision or touching assistance  Comment: vc for hand placement  CARE Score: 4  Discharge Goal: Supervision or touching assistance  Chair/Bed-to-Chair Transfer  Assistance Needed: Supervision or touching assistance  Comment: vc for safety  CARE Score: 4  Discharge Goal: Supervision or touching assistance  Toilet Transfer  Assistance Needed: Supervision or touching assistance  Comment: vc for hand placement and knee ext  CARE Score: 4  Car Transfer  Assistance Needed: Supervision or touching assistance  Comment: vc for safety and seq  CARE Score: 4  Discharge Goal: Supervision or touching assistance    Ambulation:    Walking Ability  Does the Patient Walk?: Yes     Walk 10 Feet  Assistance Needed: Supervision or touching assistance  Comment: vc for  safety and  knee ext  CARE Score: 4  Discharge Goal: Supervision or touching assistance     Walk 50 Feet with Two Turns  Assistance Needed: Supervision or touching assistance  Comment: vc for  knee ext and fww safety  Reason if not Attempted: Not attempted due to medical condition or safety concerns  CARE Score: 4  Discharge Goal: Supervision or touching assistance     Walk 150 Feet  Comment: pt was not performing this at baseline and does not demonstrate potential to improve on this mobility task  Reason if not Attempted: Patient refused  CARE Score: 7  Discharge Goal: Not Applicable     Walking 10 Feet on Uneven Surfaces  Assistance Needed: Supervision or touching assistance  Comment: vc for  posture and  staying close to fww  Reason if not Attempted: Not attempted due to medical condition or safety concerns  CARE Score: 4  Discharge Goal: Supervision or touching assistance     1 Step (Curb)  Assistance Needed: #Steps:       []   Rebounder:    []  Seated     []  Standing        []   Balance training         []   Postural training    []   Supine ther ex (reps/sets):     [x]   Seated ther ex (reps/sets): pf/df, marching laq x 10 reps each with sba and vc to remain on task    []   Standing ther ex (reps/sets):     []   Picking up object from floor (standing):                   []   Reacher used   []   Other:   []   Other:    Comments:      Patient/Caregiver Education and Training:   []   Bed Mobility/Transfer technique/safety  [x]   Gait technique/sequencing  [x]   Proper use of assistive device  []   Advanced mobility safety and technique  []   Reinforced patient's precautions/mobility while maintaining precautions  [x]   Postural awareness  []   Family training  []   Progress was updated and reviewed in Rehabtracker with patient and/or family this date. Treatment Plan for Next Session: d/c to  home    Assessment: This pt demonstrated a positive response to today's treatment as evidenced by gait distance. The patient is making progress toward established goals as evidenced by QI scores. Ongoing deficits are observed in the areas of strength and  confidence and continued focus on transfers and gait training is recommended.      Treatment/Activity Tolerance:   [] Tolerated treatment with no adverse effects    [x] Patient limited by fatigue  [] Patient limited by pain   [] Patient limited by medical complications:    [] Adverse reaction to Tx:   [] Significant change in status    Safety:       []  bed alarm set    [x]  chair alarm set    []  Pt refused alarms                []  Telesitter activated      [x]  Gait belt used during tx session      []other:         Number of Minutes/Billable Intervention  Gait Training 30   Therapeutic Exercise 15   Neuro Re-Ed    Therapeutic Activity 15   Wheelchair Propulsion 16   Group    Other:    TOTAL 76         Social History  Social/Functional History  Lives With: Significant other (Duane)  Type of Home: Apartment  Home Layout: One level  Home Access: Ramped entrance, Elevator  Entrance Stairs - Number of Steps: 3rd floor  Bathroom Shower/Tub: Tub only  Bathroom Toilet: Handicap height  Bathroom Equipment: Grab bars in shower, Tub transfer bench  Bathroom Accessibility: Accessible  Home Equipment: 4 wheeled walker, [address] Help From: Family  ADL Assistance: Independent  Homemaking Assistance: Needs assistance  Homemaking Responsibilities: No  Ambulation Assistance:  (was using w/c for mobility at baseline)  Transfer Assistance: Needs assistance  Active : No  Patient's  Info: Pt's significant other Duane  Mode of Transportation: Car  Education: Very low education level  Occupation: On disability  Leisure & Hobbies: Spouse manages meds and bills, 1 kitten, outings are limited to doctors visits, watches tv  Additional Comments: Pt appears to be a poor historian and inconsistent with some information provided; sleeps in regular, flat bed; 1 recent fall requring this hospitalization    Objective                                                                                    Goals:  (Update in navigator)  Short term goals  Time Frame for Short term goals: 10-12 tx days:  Short term goal 1: Pt will complete bed mobility tasks (rolling R/L, scooting, sup<->sit) Ind. Short term goal 2: Pt will complete OOB transfers using RW with SBA-Sup. Short term goal 3: Pt will ambulate 50 ft with turns overl level surface and 10 ft of uneven surface using RW with CGA. Short term goal 4: Pt will ascend/descend curb step using RW and 4-5 steps using railings with Min A. Short term goal 5: Pt will propel manual w/c 150 ft Mod Ind.   Short term goal 6: Pt will complete object retrieval from the floor with 1UE support on RW with SBA-Sup.:   :        Plan of Care                                                                              Times per week: 5 days per week for a minimum of 60 minutes/day plus group as appropriate for 60 minutes.   Treatment to include Current Treatment Recommendations: Strengthening, Gait Training, Patient/Caregiver Education & Training, Stair training, Equipment Evaluation, Education, & procurement, Balance Training, Cognitive/Perceptual Training, Functional Mobility Training, Endurance Training, Home Exercise Program, Transfer Training, Wheelchair Mobility Training, Safety Education & Training    Electronically signed by   NADEEM Charles,[id number]  7/8/2021, 8:19 AM

## 2021-07-09 VITALS
SYSTOLIC BLOOD PRESSURE: 165 MMHG | RESPIRATION RATE: 16 BRPM | BODY MASS INDEX: 42.29 KG/M2 | DIASTOLIC BLOOD PRESSURE: 80 MMHG | HEIGHT: 62 IN | OXYGEN SATURATION: 93 % | TEMPERATURE: 98.1 F | HEART RATE: 73 BPM | WEIGHT: 229.8 LBS

## 2021-07-09 LAB
GLUCOSE BLD-MCNC: 116 MG/DL (ref 70–99)
GLUCOSE BLD-MCNC: 139 MG/DL (ref 70–99)
GLUCOSE BLD-MCNC: 223 MG/DL (ref 70–99)

## 2021-07-09 PROCEDURE — 6360000002 HC RX W HCPCS: Performed by: INTERNAL MEDICINE

## 2021-07-09 PROCEDURE — 94150 VITAL CAPACITY TEST: CPT

## 2021-07-09 PROCEDURE — 6370000000 HC RX 637 (ALT 250 FOR IP): Performed by: PHYSICAL MEDICINE & REHABILITATION

## 2021-07-09 PROCEDURE — 94761 N-INVAS EAR/PLS OXIMETRY MLT: CPT

## 2021-07-09 PROCEDURE — 6370000000 HC RX 637 (ALT 250 FOR IP): Performed by: INTERNAL MEDICINE

## 2021-07-09 PROCEDURE — 99239 HOSP IP/OBS DSCHRG MGMT >30: CPT | Performed by: PHYSICAL MEDICINE & REHABILITATION

## 2021-07-09 PROCEDURE — 82962 GLUCOSE BLOOD TEST: CPT

## 2021-07-09 RX ORDER — ASPIRIN 81 MG/1
81 TABLET ORAL DAILY
Qty: 30 TABLET | Refills: 3 | COMMUNITY
Start: 2021-07-10

## 2021-07-09 RX ORDER — ATORVASTATIN CALCIUM 80 MG/1
80 TABLET, FILM COATED ORAL NIGHTLY
Qty: 30 TABLET | Refills: 0 | Status: SHIPPED | OUTPATIENT
Start: 2021-07-09 | End: 2021-08-18 | Stop reason: SDUPTHER

## 2021-07-09 RX ORDER — METOPROLOL TARTRATE 50 MG/1
50 TABLET, FILM COATED ORAL 2 TIMES DAILY
Qty: 60 TABLET | Refills: 0 | Status: SHIPPED | OUTPATIENT
Start: 2021-07-09 | End: 2021-08-18 | Stop reason: SDUPTHER

## 2021-07-09 RX ORDER — B12/LEVOMEFOLATE CALCIUM/B-6 2-1.13-25
1 TABLET ORAL DAILY
Qty: 30 TABLET | Refills: 0 | Status: SHIPPED | OUTPATIENT
Start: 2021-07-10 | End: 2021-08-18 | Stop reason: SDUPTHER

## 2021-07-09 RX ORDER — PANTOPRAZOLE SODIUM 40 MG/1
40 TABLET, DELAYED RELEASE ORAL
Qty: 30 TABLET | Refills: 0 | Status: SHIPPED | OUTPATIENT
Start: 2021-07-09 | End: 2021-08-18

## 2021-07-09 RX ORDER — INSULIN GLARGINE 100 [IU]/ML
20 INJECTION, SOLUTION SUBCUTANEOUS NIGHTLY
Qty: 1 VIAL | Refills: 0 | Status: SHIPPED | OUTPATIENT
Start: 2021-07-09 | End: 2021-08-18 | Stop reason: SDUPTHER

## 2021-07-09 RX ORDER — AMLODIPINE BESYLATE 5 MG/1
5 TABLET ORAL DAILY
Qty: 30 TABLET | Refills: 0 | Status: SHIPPED | OUTPATIENT
Start: 2021-07-09 | End: 2021-08-18 | Stop reason: SDUPTHER

## 2021-07-09 RX ORDER — CLOPIDOGREL BISULFATE 75 MG/1
75 TABLET ORAL DAILY
Qty: 30 TABLET | Refills: 0 | Status: SHIPPED | OUTPATIENT
Start: 2021-07-09 | End: 2021-08-18 | Stop reason: SDUPTHER

## 2021-07-09 RX ORDER — INSULIN GLARGINE 100 [IU]/ML
20 INJECTION, SOLUTION SUBCUTANEOUS NIGHTLY
Qty: 1 VIAL | Refills: 3
Start: 2021-07-09 | End: 2021-07-09 | Stop reason: SDUPTHER

## 2021-07-09 RX ADMIN — CLOPIDOGREL BISULFATE 75 MG: 75 TABLET ORAL at 09:43

## 2021-07-09 RX ADMIN — ACETAMINOPHEN 650 MG: 325 TABLET ORAL at 09:42

## 2021-07-09 RX ADMIN — METOPROLOL TARTRATE 50 MG: 50 TABLET, FILM COATED ORAL at 09:43

## 2021-07-09 RX ADMIN — Medication 1 TABLET: at 09:43

## 2021-07-09 RX ADMIN — ASPIRIN 81 MG: 81 TABLET, COATED ORAL at 09:43

## 2021-07-09 RX ADMIN — ENOXAPARIN SODIUM 40 MG: 40 INJECTION SUBCUTANEOUS at 09:43

## 2021-07-09 RX ADMIN — AMLODIPINE BESYLATE 5 MG: 5 TABLET ORAL at 09:43

## 2021-07-09 ASSESSMENT — PAIN SCALES - GENERAL
PAINLEVEL_OUTOF10: 1
PAINLEVEL_OUTOF10: 0

## 2021-07-09 NOTE — PROGRESS NOTES
6  Discharge Goal: Supervision or touching assistance         LB Dressing: Lower Body Dressing  Assistance Needed: Supervision or touching assistance  Comment: Sup in stance to manage over hips  CARE Score: 4  Discharge Goal: Supervision or touching assistance    Donning and [place] Footwear: Putting On/Taking Off Footwear  Assistance Needed: Independent  Comment: X  CARE Score: 6  Discharge Goal: Independent      Toileting: Toileting Hygiene  Assistance Needed: Supervision or touching assistance  Comment: Sup for clothing management and safety in stance  Reason if not Attempted: Patient refused  CARE Score: 4  Discharge Goal: Supervision or touching assistance      Toilet Transfers: Toilet Transfer  Assistance Needed: Supervision or touching assistance  Comment: Sup for safety  Reason if not Attempted: Patient refused  CARE Score: 4  Discharge Goal: Supervision or touching assistance    Physical Therapy:   Short term goals  Time Frame for Short term goals: 10-12 tx days:  Short term goal 1: Pt will complete bed mobility tasks (rolling R/L, scooting, sup<->sit) Ind. Short term goal 2: Pt will complete OOB transfers using RW with SBA-Sup. Short term goal 3: Pt will ambulate 50 ft with turns overl level surface and 10 ft of uneven surface using RW with CGA. Short term goal 4: Pt will ascend/descend curb step using RW and 4-5 steps using railings with Min A. Short term goal 5: Pt will propel manual w/c 150 ft Mod Ind. Short term goal 6: Pt will complete object retrieval from the floor with 1UE support on RW with SBA-Sup.             Bed Mobility:   Sit to Lying  Assistance Needed: Independent  Comment: in ADL bed  CARE Score: 6  Discharge Goal: Independent  Roll Left and Right  Assistance Needed: Independent  Comment: in ADL bed  CARE Score: 6  Discharge Goal: Independent  Lying to Sitting on Side of Bed  Assistance Needed: Independent  Comment: from ADL bed  CARE Score: 6  Discharge Goal: Independent    Transfers:    Sit to Stand  Assistance Needed: Supervision or touching assistance  Comment: vc for hand placement  CARE Score: 4  Discharge Goal: Supervision or touching assistance  Chair/Bed-to-Chair Transfer  Assistance Needed: Supervision or touching assistance  Comment: vc for safety  CARE Score: 4  Discharge Goal: Supervision or touching assistance  Toilet Transfer  Assistance Needed: Supervision or touching assistance  Comment: vc for hand placement and knee ext  CARE Score: 4  Car Transfer  Assistance Needed: Supervision or touching assistance  Comment: vc for safety and seq  CARE Score: 4  Discharge Goal: Supervision or touching assistance    Ambulation:    Walking Ability  Does the Patient Walk?: Yes     Walk 10 Feet  Assistance Needed: Supervision or touching assistance  Comment: vc for  safety and  knee ext  CARE Score: 4  Discharge Goal: Supervision or touching assistance     Walk 50 Feet with Two Turns  Assistance Needed: Supervision or touching assistance  Comment: CGA->SUP with RW  Reason if not Attempted: Not attempted due to medical condition or safety concerns  CARE Score: 4  Discharge Goal: Supervision or touching assistance     Walk 150 Feet  Comment: pt was not performing this at baseline and does not demonstrate potential to improve on this mobility task  Reason if not Attempted: Patient refused  CARE Score: 7  Discharge Goal: Not Applicable     Walking 10 Feet on Uneven Surfaces  Assistance Needed: Supervision or touching assistance  Comment: vc for  posture and  staying close to fww  Reason if not Attempted: Not attempted due to medical condition or safety concerns  CARE Score: 4  Discharge Goal: Supervision or touching assistance     1 Step (Curb)  Assistance Needed: Supervision or touching assistance  Comment: CGA with VCs for seq. and safety  Reason if not Attempted: Not attempted due to medical condition or safety concerns  CARE Score: 4  Discharge Goal: Partial/moderate assistance     4 Steps  Assistance Needed: Supervision or touching assistance  Comment: SBA using railings  Reason if not Attempted: Not attempted due to medical condition or safety concerns  CARE Score: 4  Discharge Goal: Partial/moderate assistance     12 Steps  Comment: pt was not performing this at baseline and does not demonstrate potential to improve on this mobility task  Reason if not Attempted: Patient refused  CARE Score: 7  Discharge Goal: Not Applicable       Wheelchair:  w/c Ability: Wheelchair Ability  Uses a Wheelchair and/or Scooter?: Yes  Wheel 50 Feet with Two Turns  Assistance Needed: Supervision or touching assistance  Comment: vc to remain on task  CARE Score: 4  Discharge Goal: Independent  Wheel 150 Feet  Assistance Needed: Supervision or touching assistance  Comment: vc to remain on task as pt  easily distracted  CARE Score: 4  Discharge Goal: Independent          Balance:        Object: Picking Up Object  Assistance Needed: Supervision or touching assistance  Comment: SBA-Sup with RW and reacher  Reason if not Attempted: Not attempted due to medical condition or safety concerns  CARE Score: 4  Discharge Goal: Supervision or touching assistance    I      Exam:    Blood pressure (!) 143/65, pulse 75, temperature 98.3 °F (36.8 °C), temperature source Oral, resp. rate 16, height 5' 2\" (1.575 m), weight 235 lb 12.8 oz (107 kg), SpO2 95 %. General: Reclined in bed. Quiet. Oriented x3. HEENT: Dysarthric. Neck supple. No signs of trauma to her head or neck. Pulmonary: A bit labored under her girth but no wheezes or rales. Cardiac: Regular rate and rhythm. Abdomen: Patient's abdomen is soft and nondistended. Bowel sounds were present throughout. There was no rebound, guarding or masses noted. Upper extremities: Able to bring both hands up to her chin. Clumsy movements bilaterally. Lower extremities: No signs of DVT. Sitting balance was fair.   Standing balance was poor.    Lab Results   Component Value Date    WBC 6.0 06/28/2021    HGB 16.6 (H) 06/28/2021    HCT 51.9 (H) 06/28/2021    MCV 93.2 06/28/2021     06/28/2021     Lab Results   Component Value Date    INR 1.14 06/23/2021    INR 1.19 10/06/2020    PROTIME 13.8 06/23/2021    PROTIME 14.4 10/06/2020     Lab Results   Component Value Date    CREATININE 0.9 06/28/2021    BUN 14 06/28/2021     06/28/2021    K 3.7 06/28/2021     06/28/2021    CO2 23 06/28/2021     Lab Results   Component Value Date    ALT 12 06/24/2021    AST 13 (L) 06/24/2021    ALKPHOS 110 06/24/2021    BILITOT 0.8 06/24/2021       Expected length of stay  prior to a minimum physical assist level of function for discharge [facility]9/[facility]. Recommendations:    1. Acute CVA with ataxia:    Generally, she refuses to fully engage in the treatment and is not responsive to our training. The family is not interested in ongoing caregiver education. We will work to transition her home where she'll need to use a wheelchair and have assistance with all aspects of her personal care. Making arrangements to transition to home care therapies with family supervision by the end of this week.  Continue the aspirin and Plavix.  Continue with Lipitor.  Benefiting from aggressive pulmonary hygiene, DVT prophylaxis and nutritional support.  Bowel and bladder retraining, but she is still incontinent.  Adaptive equipment training has not been very fruitful.  Caregiver education would be appropriate but no one is available.  Verbal cues and minimum physical assistance for transfers again today. 2. DVT prophylaxis: Lovenox 40 mg subcu daily.  I must monitor her hemoglobin and platelet count periodically while on this medication.  Weightbearing activities are tolerated in an inconsistent way.  GI prophylaxis offered.  No clinical signs of blood loss.  I will stop Lovenox at discharge.   3. Urinary tract infection: The patient has completed IV antibiotic therapy for her UTI.    Monitoring her urinary control and quality.  She has incontinence which is her baseline but no dysuria or foul quality to the urine. 4. Uncontrolled diabetes type 2 with peripheral neuropathy: The patient requires a diet modified for carbohydrates.  She requires scheduled Lantus and Humalog with each meal as well as a Humalog sliding scale.  Endocrinology continues to monitor her recovery.  Blood sugars are checked at mealtime and bedtime. 5. Uncontrolled hypertension: Norvasc and Lopressor are required for blood pressure regulation.  Target systolic blood pressure is 120140.  Vital signs are checked at rest and with activity.  Encouraging oral hydration.  Blood pressure is within the target range today.  Monitoring closely  6. Atrial fibrillation: Cardiology has recently decided that she does not have any current active atrial fibrillation and does not require anticoagulation.  Lopressor is helping control her rate. Monitoring vital signs at rest and with activity.  Daily weights do not show any decompensation of CHF.       Counseling and Coordination of Care: In care conference today I met with the patient's OT, PT, RN and . We discussed the patient's problems, progress and prognosis. Disposition issues were clarified and plans were established for ongoing rehabilitation efforts beyond the ARU stay. I reviewed this information with the patient during a second distinct visit with the patient. More than half of the total time of 34 minutes spent with the patient involved counseling and coordination of care.

## 2021-07-09 NOTE — PROGRESS NOTES
Progress Note( Dr. Martin Jorgensen)  7/9/2021  Subjective:   Admit Date: 6/29/2021  PCP: Idalia Marshall MD    Admitted For :On June 23, 2021 for slurred speech aphasia hallucination and confusion possibly UTI  Was transferred to acute rehab unit today 6/29/2021    Consulted For: Better control of blood glucose    Interval History: Feels okay  Denies any chest pains,   Denies SOB . Denies nausea or vomiting. No new bowel or bladder symptoms. Intake/Output Summary (Last 24 hours) at 7/9/2021 0715  Last data filed at 7/8/2021 1200  Gross per 24 hour   Intake 480 ml   Output    Net 480 ml       DATA    CBC:   No results for input(s): WBC, HGB, PLT in the last 72 hours. CMP:  No results for input(s): NA, K, CL, CO2, BUN, CREATININE, CALCIUM, PROT, LABALBU, BILITOT, ALKPHOS, AST, ALT in the last 72 hours.     Invalid input(s): GLU  Lipids:   Lab Results   Component Value Date    CHOL 175 06/24/2021    CHOL 188 12/15/2014    HDL 39 06/24/2021    TRIG 144 06/24/2021     Glucose:  Recent Labs     07/08/21  1626 07/08/21  2044 07/09/21  0218   POCGLU 178* 150* 116*     YcliuzzgrgG5O:  Lab Results   Component Value Date    LABA1C 11.5 06/23/2021     High Sensitivity TSH:   Lab Results   Component Value Date    TSHHS 2.010 06/25/2021     Free T3: No results found for: FT3  Free T4:  Lab Results   Component Value Date    T4FREE 0.93 05/09/2016       Echocardiogram transesophageal    Result Date: 6/28/2021  Transesophageal Echocardiography Report (SENA)   Demographics   Patient Name       Suzy Perry   Date of Study       06/28/2021   Date of Birth      1950         Gender              Female   Age                70 year(s)         Race                   Patient Number     [id number]         Room Number         2015   Visit Number       [id number]   Corporate ID       [id number]   Accession Number   [id number]         Zoey Goel TYRONE GAY   Ordering Physician Zackary Patel MD                 Physician           Bud Landa MD  Procedure Type of Study   SENA procedure:ECHOCARDIOGRAM TRANSESOPHAGEAL. Procedure Date Date: 06/28/2021 Start: 10:36 AM Study Location: [address] [place] [address] - Echo Lab Technical Quality: Good visualization Indications:CVA. Patient Status: Routine Height: 62 inches Weight: 230 pounds BSA: 2.03 m2 BMI: 42.07 kg/m2 HR: 65 bpm BP: 151/82 mmHg SENA Performed By: the attending and the sonographer  Type of Anesthesia: Moderate sedation   Conclusions   Summary  Left ventricular systolic function is normal.  Ejection fraction is visually estimated at 55-60%. No evidence of thrombus within the left atrial appendage. Negative bubble study; no ASD or PFO noted. Mild mitral regurgitation. Signature   ------------------------------------------------------------------  Electronically signed by Claudia Wills MD  (Interpreting physician) on 06/28/2021 at 02:23 PM      ECHO Complete 2D W Doppler W Color    Result Date: 6/24/2021  Transthoracic Echocardiography Report (TTE)  Demographics   Patient Name       Gregory Mckinney   Date of Study       06/24/2021   Date of Birth      1950         Gender              Female   Age                70 year(s)         Race                   Patient Number     [id number]         Room Number         2015   Visit Number       [id number]   Corporate ID       [id number]   Accession Number   [id number]         Karolyn Brooks RDMS   Ordering Physician Jose Luis Thacker MD           Physician           MD  Procedure Type of Study   TTE procedure:ECHOCARDIOGRAM COMPLETE 2D W DOPPLER W COLOR.   Procedure Date Date: 06/24/2021 Start: 08:44 AM Study Location: Portable Technical Quality: Technically difficult study Indications:CVA. Patient Status: Routine Height: 62 inches Weight: 230 pounds BSA: 2.03 m2 BMI: 42.07 kg/m2 HR: 55 bpm BP: 146/124 mmHg  Conclusions   Summary  Technically difficult examination. Left ventricular systolic function is normal.  Ejection fraction is visually estimated at 50-55%. Moderate left ventricular hypertrophy. Mildly dilated right ventricle. No evidence of right heart strain. Sclerotic, but non-stenotic aortic valve. No evidence of any pericardial effusion. Signature   ------------------------------------------------------------------  Electronically signed by Lida Robb MD (Interpreting  physician) on 06/24/2021 at 05:26 PM  -       CT HEAD WO CONTRAST    Result Date: 6/28/2021  EXAMINATION: CT OF THE HEAD WITHOUT CONTRAST  6/23/2021 2:40 pm     Moderate-sized low-attenuation in the right temporal lobe concerning for acute infarct. MRI brain recommended to confirm. No acute intracranial hemorrhage. CT CERVICAL SPINE WO CONTRAST    Result Date: 6/23/2021  EXAMINATION: CT OF THE CERVICAL SPINE WITHOUT CONTRAST 6/23/2021 3:40 pm     Multilevel degenerate changes. No acute fracture traumatic malalignment     XR CHEST PORTABLE    Result Date: 6/23/2021  EXAMINATION: ONE XRAY VIEW OF THE CHEST 6/23/2021 3:34 pm COMPARISON: None. HISTORY: ORDERING SYSTEM PROVIDED HISTORY: other, slurrred speech, hypertension TECHNOLOGIST PROVIDED HISTORY: Reason for exam:->other, slurrred speech, hypertension Reason for Exam: other, slurrred speech, hypertension Acuity: Acute Type of Exam: Initial Additional signs and symptoms: na Relevant Medical/Surgical History: diabetes, hypertension FINDINGS: The lungs are clear. The mediastinum and cardiac silhouette are unremarkable. No acute abnormality.      CTA HEAD NECK W CONTRAST    Result Date: 6/23/2021  EXAMINATION: CTA OF THE HEAD AND NECK WITH CONTRAST 6/23/2021 11:17 pm: T    Mild tapering versus mild to moderate areas stenosis involving the right MCA M2 branches. Otherwise no large vessel occlusion or hemodynamic stenosis involving remainder of the vessels intracranially. Stable appearance of the age-indeterminate occlusion proximal V2 segment left vertebral artery with retrograde opacification of the V4 segment. 75% narrowing right proximal ICA and 50% narrowing left proximal ICA per NASCET criteria The findings were sent to the Radiology Results [id number] at 11:44 pm on 6/23/2021to be communicated to a licensed caregiver. MRI brain without contrast    Result Date: 6/25/2021  EXAMINATION: MRI OF THE BRAIN WITHOUT CONTRAST  6/25/2021 6:18 pm     1. Patient motion limits evaluation. 2. There is a moderate to large acute infarct involving the right temporal lobe within the right MCA territory. 3. An acute infarct is also seen involving the left paramedian ian. 4. No significant mass effect or midline shift. 5. Loss of the normal signal void is again seen within the proximal left V4 segment, which can be seen with slow flow versus occlusion. 6. Mild global parenchymal volume loss with chronic microvascular ischemic changes. These results were sent to the Results [id number] ([address] [facility]) on 6/25/2021 at 7:00 pm to be communicated to the referring/covering health care provider/office.        Scheduled Medicines   Medications:    insulin glargine  20 Units Subcutaneous Nightly    insulin lispro  10 Units Subcutaneous TID     enoxaparin  40 mg Subcutaneous Daily    atorvastatin  80 mg Oral Nightly    aspirin  81 mg Oral Daily    amLODIPine  5 mg Oral Daily    metoprolol tartrate  50 mg Oral BID    clopidogrel  75 mg Oral Daily    folic acid-pyridoxine-cyancobalamin  1 tablet Oral Daily    insulin lispro  0-6 Units Subcutaneous TID     insulin lispro  0-3 Units Subcutaneous 2 times per day      Infusions:       Objective:   Vitals: BP (!) 141/67   Pulse 53   Temp 98.1 °F (36.7 °C) (Oral)   Resp 16   Ht 5' 2\" (1.575 m)   Wt 229 lb 12.8 oz (104.2 kg) Comment: without atmos air pump  SpO2 93%   BMI 42.03 kg/m²   General appearance: alert and cooperative with exam  Neck: no JVD or bruit  Thyroid : Normal lobes   Lungs: Has Vesicular Breath sounds   Heart:  regular rate and rhythm  Abdomen: soft, non-tender; bowel sounds normal; no masses,  no organomegaly  Musculoskeletal: Normal  Extremities: extremities normal, , no edema  Neurologic:  Awake, alert, oriented to name, place and time. Cranial nerves II-XII are grossly intact. Motor is  intact. Sensory paresthesia t.,  and gait is ab unstable. CVA acute    Assessment:     Patient Active Problem List:     Vitamin D deficiency     Hyperlipidemia     Back pain, chronic     Hemiparesis of right dominant side as late effect of cerebral infarction ([address]     Facial droop     Hypokalemia     Essential hypertension     Hyperglycemia     TIA (transient ischemic attack)     Class 3 severe obesity due to excess calories with body mass index (BMI) of 45.0 to 49.9 in adult [facility])     Cerebrovascular accident (CVA) ([address]     Dysarthria due to acute stroke ([address]     Dysphagia due to recent cerebral infarction     Uncontrolled type 2 diabetes mellitus with hyperglycemia ([address]     Morbid obesity with BMI of 45.0-49.9, adult ([address]     Incontinence in female     Symptomatic stenosis of right carotid artery     Acute CVA (cerebrovascular accident) ([address]      Plan:     1. Reviewed POC blood glucose . Labs and X ray results   2. Reviewed Current Medicines   3. On meal/ Correction bolus Humalog/ Basal Lantus Insulin regime s   4. Monitor Blood glucose frequently   5. Modified  the dose of Insulin/ other medicines as needed   6. Patient is participating in physical activities of rehab unit  7. Possible home today   8. Will follow     .      Tawanna Horton MD, MD

## 2021-07-09 NOTE — PROGRESS NOTES
Discharge instructions with prescriptions given to pt and her boyfriend and signed. All belongings packed up.  Pt will be propelled by w/c to boyfriends sister auto

## 2021-07-09 NOTE — PROGRESS NOTES
TYRONE GAY   Ordering Physician Jatinder Steward MD                 Physician           Jaylon Fritz MD  Procedure Type of Study   SENA procedure:ECHOCARDIOGRAM TRANSESOPHAGEAL. Procedure Date Date: 06/28/2021 Start: 10:36 AM Study Location: [facility] - Echo Lab Technical Quality: Good visualization Indications:CVA. Patient Status: Routine Height: 62 inches Weight: 230 pounds BSA: 2.03 m2 BMI: 42.07 kg/m2 HR: 65 bpm BP: 151/82 mmHg SENA Performed By: the attending and the sonographer  Type of Anesthesia: Moderate sedation   Conclusions   Summary  Left ventricular systolic function is normal.  Ejection fraction is visually estimated at 55-60%. No evidence of thrombus within the left atrial appendage. Negative bubble study; no ASD or PFO noted. Mild mitral regurgitation. Signature   ------------------------------------------------------------------  Electronically signed by Marcell Hernandez MD  (Interpreting physician) on 06/28/2021 at 02:23 PM      ECHO Complete 2D W Doppler W Color    Result Date: 6/24/2021  Transthoracic Echocardiography Report (TTE)  Demographics   Patient Name       Janey Wilks   Date of Study       06/24/2021   Date of Birth      1950         Gender              Female   Age                70 year(s)         Race                   Patient Number     [id number]         Room Number         2015   Visit Number       [id number]   Corporate ID       [id number]   Accession Number   [id number]         Verner Leather, RDMS   Ordering Physician Kelby Quiroga MD           Physician           MD  Procedure Type of Study   TTE procedure:ECHOCARDIOGRAM COMPLETE 2D W DOPPLER W COLOR.   Procedure Date Date: 06/24/2021 Start: 08:44 AM Study Location: Portable Technical Quality: areas stenosis involving the right MCA M2 branches. Otherwise no large vessel occlusion or hemodynamic stenosis involving remainder of the vessels intracranially. Stable appearance of the age-indeterminate occlusion proximal V2 segment left vertebral artery with retrograde opacification of the V4 segment. 75% narrowing right proximal ICA and 50% narrowing left proximal ICA per NASCET criteria The findings were sent to the Radiology Results [address] at 11:44 pm on 6/23/2021to be communicated to a licensed caregiver. MRI brain without contrast    Result Date: 6/25/2021  EXAMINATION: MRI OF THE BRAIN WITHOUT CONTRAST  6/25/2021 6:18 pm     1. Patient motion limits evaluation. 2. There is a moderate to large acute infarct involving the right temporal lobe within the right MCA territory. 3. An acute infarct is also seen involving the left paramedian ian. 4. No significant mass effect or midline shift. 5. Loss of the normal signal void is again seen within the proximal left V4 segment, which can be seen with slow flow versus occlusion. 6. Mild global parenchymal volume loss with chronic microvascular ischemic changes. These results were sent to the Results [address] ([facility]) on 6/25/2021 at 7:00 pm to be communicated to the referring/covering health care provider/office.        Scheduled Medicines   Medications:    insulin glargine  20 Units Subcutaneous Nightly    insulin lispro  10 Units Subcutaneous TID     enoxaparin  40 mg Subcutaneous Daily    atorvastatin  80 mg Oral Nightly    aspirin  81 mg Oral Daily    amLODIPine  5 mg Oral Daily    metoprolol tartrate  50 mg Oral BID    clopidogrel  75 mg Oral Daily    folic acid-pyridoxine-cyancobalamin  1 tablet Oral Daily    insulin lispro  0-6 Units Subcutaneous TID     insulin lispro  0-3 Units Subcutaneous 2 times per day      Infusions:       Objective:   Vitals: BP (!) 143/65   Pulse 75   Temp 98.3 °F (36.8 °C) (Oral)   Resp 16   Ht 5' 2\" (1.575 m)   Wt 235 lb 12.8 oz (107 kg)   SpO2 95%   BMI 43.13 kg/m²   General appearance: alert and cooperative with exam  Neck: no JVD or bruit  Thyroid : Normal lobes   Lungs: Has Vesicular Breath sounds   Heart:  regular rate and rhythm  Abdomen: soft, non-tender; bowel sounds normal; no masses,  no organomegaly  Musculoskeletal: Normal  Extremities: extremities normal, , no edema  Neurologic:  Awake, alert, oriented to name, place and time. Cranial nerves II-XII are grossly intact. Motor is  intact. Sensory paresthesia t.,  and gait is ab unstable. CVA acute    Assessment:     Patient Active Problem List:     Vitamin D deficiency     Hyperlipidemia     Back pain, chronic     Hemiparesis of right dominant side as late effect of cerebral infarction ([address]     Facial droop     Hypokalemia     Essential hypertension     Hyperglycemia     TIA (transient ischemic attack)     Class 3 severe obesity due to excess calories with body mass index (BMI) of 45.0 to 49.9 in adult [facility])     Cerebrovascular accident (CVA) ([address]     Dysarthria due to acute stroke ([address]     Dysphagia due to recent cerebral infarction     Uncontrolled type 2 diabetes mellitus with hyperglycemia ([address]     Morbid obesity with BMI of 45.0-49.9, adult ([address]     Incontinence in female     Symptomatic stenosis of right carotid artery     Acute CVA (cerebrovascular accident) ([address]      Plan:     1. Reviewed POC blood glucose . Labs and X ray results   2. Reviewed Current Medicines   3. On meal/ Correction bolus Humalog/ Basal Lantus Insulin regime s   4. Monitor Blood glucose frequently   5. Modified  the dose of Insulin/ other medicines as needed   6. Patient is participating in physical activities of rehab unit  7. Will follow     .      Ran Jimenes MD, MD

## 2021-07-09 NOTE — CARE COORDINATION
Left  for patient's daughter Josefina Rodriguez to schedule dc transport. Requested return call by 1000 or other arrangements will be made    1026:  Spoke with Mike Garcia. His sister will provide dc transport whenever the patient is ready for discharge. He is on his way to the hospital now.           ARU  Discharge Summary    D/C Date: 7/9/21    Patient discharged to: home    Transported by: family     Referrals made to: [facility]    Additional information:     Caregiver training: attempted to schedule patient family didn't accept the offer

## 2021-07-14 NOTE — ADT AUTH CERT
PLEASE SEE CONFIRMATION FAX BELOW. PATIENT ADMITTED TO [facility] ON 6/23/21 FOR A MEDICAL ADMISSION. NOTIFICATION WAS FAXED ON 6/25- CONFIRMATION PAGE ATTACHED.

## 2021-07-27 NOTE — DISCHARGE SUMMARY
Patient Name: Kyla Cabot  Patient :  1950  Patient MRN:   [id number]      Admission Date:  2021  Discharge Date: 2021    Admitting diagnosis: Acute cerebrovascular accident ( [address])     Comorbid diagnoses impacting rehabilitation: Ataxia, dysarthria, gait disturbance, uncontrolled diabetes type 2 with peripheral neuropathy, essential hypertension, history of atrial fibrillation, acute urinary tract infection, morbid obesity (BMI 44.5)    Discharging diagnosis: Acute cerebrovascular accident ( [address])     Comorbid diagnoses impacting rehabilitation: Ataxia, dysarthria, gait disturbance, uncontrolled diabetes type 2 with peripheral neuropathy, essential hypertension, history of atrial fibrillation, acute urinary tract infection, morbid obesity (BMI 44.5)    History of present illness: The patient is a 77-year-old right-hand-dominant female with multiple stroke risk factors including a prior stroke with chronic right hemiparesis. She was in her usual state of fair health until this past week. Her family reports an increased incidence of hallucinations, difficulty speaking and clumsiness. Eventually she suffered another fall and her family became concerned. On 2020 when she was brought to our ED where the patient was dysarthric with confusion and generalized clumsiness. She did not have localizing weakness but brain imaging did show a mature right temporal lobe infarction. Her primary manifestation was  clumsiness of both her arms and her legs. There was no documented seizure activities noted but she had a urinary tract infection that may have been contributing to her hallucinations. She was treated with IV antibiotics, fluid resuscitation and occupational physical therapy. Prior (baseline) level of function: Independent.     Current level of function:         Current  IRF-KRISTYN and Goals:   Occupational Therapy:      :   Long term goals  Time Frame for Long term goals : 7-10 days or until d/c  Long term goal 1: Pt will complete feeding/grooming/oral care task c MOD I by d/c  Long term goal 2: Pt will complete total body bathing c CGA c use of SBA by d/c  Long term goal 3: Pt will complete UB/LB dressing c SBA by d/c  Long term goal 4: Pt will complete toileting c SBA by d/c  Long term goal 5: Pt will complete functional transfer (toilet, tub, shower) c SBA by d/c. Long term goals 6: Pt will perform therex/therax to facilitate increased strength/endurance/ax tolerance (c emphasis on dynamic standing balance/tolerance >8 mins and BUE endurance) c SBA in order to complete ADLs. Long term goal 7: Pt will complete footwear dressing c MOD I by d/c :                                       Eating: Eating  Assistance Needed: Independent  Comment: X  CARE Score: 6  Discharge Goal: Independent       Oral Hygiene: Oral Hygiene  Assistance Needed: Independent  Comment: X  CARE Score: 6  Discharge Goal: Independent    UB/LB Bathing: Shower/Bathe Self  Assistance Needed: Supervision or touching assistance  Comment: Sup in stance to wash LB  CARE Score: 4  Discharge Goal: Supervision or touching assistance    UB Dressing: Upper Body Dressing  Assistance Needed: Independent  Comment: X  CARE Score: 6  Discharge Goal: Supervision or touching assistance         LB Dressing: Lower Body Dressing  Assistance Needed: Supervision or touching assistance  Comment: Sup in stance to manage over hips  CARE Score: 4  Discharge Goal: Supervision or touching assistance    Donning and Datto Footwear: Putting On/Taking Off Footwear  Assistance Needed: Independent  Comment: X  CARE Score: 6  Discharge Goal: Independent      Toileting: Toileting Hygiene  Assistance Needed: Supervision or touching assistance  Comment: Sup for clothing management and safety in stance  Reason if not Attempted: Patient refused  CARE Score: 4  Discharge Goal: Supervision or touching assistance      Toilet Transfers:   Toilet Transfer  Assistance Needed: Supervision or touching assistance  Comment: Sup for safety  Reason if not Attempted: Patient refused  CARE Score: 4  Discharge Goal: Supervision or touching assistance    Physical Therapy:   Short term goals  Time Frame for Short term goals: 10-12 tx days:  Short term goal 1: Pt will complete bed mobility tasks (rolling R/L, scooting, sup<->sit) Ind. Short term goal 2: Pt will complete OOB transfers using RW with SBA-Sup. Short term goal 3: Pt will ambulate 50 ft with turns overl level surface and 10 ft of uneven surface using RW with CGA. Short term goal 4: Pt will ascend/descend curb step using RW and 4-5 steps using railings with Min A. Short term goal 5: Pt will propel manual w/c 150 ft Mod Ind. Short term goal 6: Pt will complete object retrieval from the floor with 1UE support on RW with SBA-Sup.             Bed Mobility:   Sit to Lying  Assistance Needed: Independent  Comment: in ADL bed  CARE Score: 6  Discharge Goal: Independent  Roll Left and Right  Assistance Needed: Independent  Comment: in ADL bed  CARE Score: 6  Discharge Goal: Independent  Lying to Sitting on Side of Bed  Assistance Needed: Independent  Comment: from ADL bed  CARE Score: 6  Discharge Goal: Independent    Transfers:    Sit to Stand  Assistance Needed: Supervision or touching assistance  Comment: vc for hand placement  CARE Score: 4  Discharge Goal: Supervision or touching assistance  Chair/Bed-to-Chair Transfer  Assistance Needed: Supervision or touching assistance  Comment: vc for safety  CARE Score: 4  Discharge Goal: Supervision or touching assistance  Toilet Transfer  Assistance Needed: Supervision or touching assistance  Comment: vc for hand placement and knee ext  CARE Score: 4  Car Transfer  Assistance Needed: Supervision or touching assistance  Comment: vc for safety and seq  CARE Score: 4  Discharge Goal: Supervision or touching assistance    Ambulation:    Walking Ability  Does the Patient Walk?: Yes     Walk 10 Feet  Assistance Needed: Supervision or touching assistance  Comment: vc for  safety and  knee ext  CARE Score: 4  Discharge Goal: Supervision or touching assistance     Walk 50 Feet with Two Turns  Assistance Needed: Supervision or touching assistance  Comment: CGA->SUP with RW  Reason if not Attempted: Not attempted due to medical condition or safety concerns  CARE Score: 4  Discharge Goal: Supervision or touching assistance     Walk 150 Feet  Comment: pt was not performing this at baseline and does not demonstrate potential to improve on this mobility task  Reason if not Attempted: Patient refused  CARE Score: 7  Discharge Goal: Not Applicable     Walking 10 Feet on Uneven Surfaces  Assistance Needed: Supervision or touching assistance  Comment: vc for  posture and  staying close to fww  Reason if not Attempted: Not attempted due to medical condition or safety concerns  CARE Score: 4  Discharge Goal: Supervision or touching assistance     1 Step (Curb)  Assistance Needed: Supervision or touching assistance  Comment: CGA with VCs for seq. and safety  Reason if not Attempted: Not attempted due to medical condition or safety concerns  CARE Score: 4  Discharge Goal: Partial/moderate assistance     4 Steps  Assistance Needed: Supervision or touching assistance  Comment: SBA using railings  Reason if not Attempted: Not attempted due to medical condition or safety concerns  CARE Score: 4  Discharge Goal: Partial/moderate assistance     12 Steps  Comment: pt was not performing this at baseline and does not demonstrate potential to improve on this mobility task  Reason if not Attempted: Patient refused  CARE Score: 7  Discharge Goal: Not Applicable       Wheelchair:  w/c Ability: Wheelchair Ability  Uses a Wheelchair and/or Scooter?: Yes  Wheel 50 Feet with Two Turns  Assistance Needed: Supervision or touching assistance  Comment: vc to remain on task  CARE Score: 4  Discharge Goal: with the above history requiring a multidisciplinary treatment plan including close medical supervision by the Franci Hickman Director. The patient participated in the prescribed therapy treatment plan with inconsistent compliance and very limited tolerance. She avoided significant medical complications. By the time of discharge the patient had become safer with adaptive equipment to transfer and toilet with a wheelchair and the physical assistance of caregivers. The patient was tolerating an oral diet without choking/coughing and was back to their baseline with regards to bowel and bladder control. Discharge instructions were reviewed with the patient and family. The patient is to follow up with the PCP in 1 week. No driving. [facility] PT/OT/RN will be arranged. [facility] Medication Instructions FXV:[id number]    Printed on:07/27/21 9817   Medication Information                      amLODIPine (NORVASC) 5 MG tablet  Take 1 tablet by mouth daily             aspirin 81 MG EC tablet  Take 1 tablet by mouth daily             atorvastatin (LIPITOR) 80 MG tablet  Take 1 tablet by mouth nightly             clopidogrel (PLAVIX) 75 MG tablet  Take 1 tablet by mouth daily             folic acid-pyridoxine-cyancobalamin (FOLTX) 1.13-25-2 MG TABS  Take 1 tablet by mouth daily             insulin glargine (LANTUS) 100 UNIT/ML injection vial  Inject 20 Units into the skin nightly             insulin lispro (HUMALOG) 100 UNIT/ML injection vial  Inject 16 Units into the skin 3 times daily (with meals)             metoprolol tartrate (LOPRESSOR) 50 MG tablet  Take 1 tablet by mouth 2 times daily             pantoprazole (PROTONIX) 40 MG tablet  Take 1 tablet by mouth every morning (before breakfast)                 CONDITION ON DISCHARGE: Stable. The prognosis is guarded for further improvements in ADL's and safety with adapted gait/transfers.      Record review, patient exam, discharge instructions, medication reconciliation and summary for this discharge visit took more than 30 minutes.

## 2021-07-30 NOTE — CONSULTS
Shakila Davila MD   Physician   Neurosurgery   Progress Notes       Addendum   Date of Service:  6/24/2021  2:37 PM               Astrid Thrasher MD.  Section of General Neurology - Adult  Consult Note           Reason for Consult:    Requesting Physician:  No referring provider defined for this encounter. Thank you for your kind referral.     CHIEF COMPLAINT:  weakness          HISTORY OF PRESENT ILLNESS:               The patient is a 70 y.o. female with a history of  female with history of osteoarthritis, history of CVA with residual right hemiparesis (on Plavix and Lipitor prior to admission), uncontrolled diabetes type 2 with hyperglycemia, hyperlipidemia, essential hypertension, history of atrial fibrillation (not on chronic anticoagulation), morbid obesity with BMI of 42 kg/m², known right internal carotid artery stenosis (see CTAhead/neck from October 2020), who was brought to the emergency room with report of confusion, visual hallucination, generalized weakness, and expressive aphasia, ongoing for the past week.  It was also reported that patient has had multiple falls at home.  On presentation to the emergency room, patient was noted to have foul-smelling urine in the emergency room and urinalysis was sent, concerning for infectious process.  Patient is confused and unable to provide reliable HPI.     CTcervical spine with no acute pathology; reveals multilevel degenerative changes with no acute fracture or traumatic malalignment.  CThead without contrast with moderate sized low-attenuation in the right temporal lobe concerning for acute infarct,      Pt has A fib. Pt has right carotid stenosis 75 %, left vertebral artery occlusion. Pt states she has not been taking her plavix for a few months since last 12/2020. pt denies side effects. Pt states she is weak in her legs from previous cva and does not walk most of the time.   pts SBP was 208.     Past Medical History:    Past Medical History             Diagnosis Date    Arthritis      Cerebral artery occlusion with [facility] infarction [address] [address]      Diabetes mellitus [address] [address]      Hyperlipidemia      Hypertension           Past Surgical History:    Past Surgical History       Procedure Laterality Date    BLADDER SUSPENSION        COLONOSCOPY   3/23/15     polyps, int hem    DILATION AND CURETTAGE OF UTERUS        HYSTERECTOMY             Current Medications:     Current Hospital Medications   Current Facility-Administered Medications: atorvastatin (LIPITOR) tablet 80 mg, 80 mg, Oral, Nightly  aspirin EC tablet 81 mg, 81 mg, Oral, Daily **OR** aspirin suppository 300 mg, 300 mg, Rectal, Daily  insulin lispro (HUMALOG) injection vial 0-6 Units, 0-6 Units, Subcutaneous, 4x Daily AC & HS  0.9% NaCl with KCl 40 mEq infusion, , Intravenous, Continuous  amLODIPine (NORVASC) tablet 5 mg, 5 mg, Oral, Daily  clopidogrel (PLAVIX) tablet 75 mg, 75 mg, Oral, Daily  insulin glargine (LANTUS) injection vial 50 Units, 50 Units, Subcutaneous, Nightly  insulin lispro (HUMALOG) injection vial 16 Units, 16 Units, Subcutaneous, TID WC  metoprolol tartrate (LOPRESSOR) tablet 50 mg, 50 mg, Oral, BID  pantoprazole (PROTONIX) tablet 40 mg, 40 mg, Oral, QAM AC  glucose (GLUTOSE) 40 % oral gel 15 g, 15 g, Oral, PRN  dextrose 50 % IV solution, 12.5 g, Intravenous, PRN  glucagon (rDNA) injection 1 mg, 1 mg, Intramuscular, PRN  dextrose 5 % solution, 100 mL/hr, Intravenous, PRN  labetalol (NORMODYNE;TRANDATE) injection 10 mg, 10 mg, Intravenous, Q4H PRN  sodium chloride flush 0.9 % injection 5-40 mL, 5-40 mL, Intravenous, BID  sodium chloride flush 0.9 % injection 5-40 mL, 5-40 mL, Intravenous, PRN  0.9 % sodium chloride infusion, 25 mL, Intravenous, PRN  ondansetron (ZOFRAN-ODT) disintegrating tablet 4 mg, 4 mg, Oral, Q8H PRN **OR** ondansetron (ZOFRAN) injection 4 mg, 4 mg, Intravenous, Q6H PRN  polyethylene glycol (GLYCOLAX) packet 17 g, 17 g, Oral, Daily PRN  enoxaparin (LOVENOX) injection 40 mg, 40 mg, Subcutaneous, Daily  cefTRIAXone (ROCEPHIN) 1000 mg IVPB in 50 mL D5W minibag, 1,000 mg, Intravenous, Q24H  trospium (SANCTURA) tablet 20 mg, 20 mg, Oral, BID AC     Allergies:  Patient has no known allergies.     Social History:  TOBACCO:   reports that she quit smoking about 4 years ago. Her smoking use included cigarettes. She started smoking about 50 years ago. She has a 20.00 pack-year smoking history. She has never used smokeless tobacco.  ETOH:   reports no history of alcohol use. DRUGS:   reports no history of drug use. Family History:   Family History             Problem Relation Age of Onset    No Known Problems Mother      No Known Problems Father              REVIEW OF SYSTEMS:  CONSTITUTIONAL:  negative  HEENT:  negative  RESPIRATORY:  negative  CARDIOVASCULAR:  negative  GASTROINTESTINAL:  negative  GENITOURINARY:  negative  MUSCULOSKELETAL:  negative  BEHAVIOR/PSYCH:  Negative     ROS neg     Family hx neg     PHYSICAL EXAM  ------------------------  Vitals:  /67   Pulse 54   Temp 98.1 °F (36.7 °C) (Oral)   Resp 15   Ht 5' 2\" (1.575 m)   Wt 230 lb (104.3 kg)   SpO2 98%   BMI 42.07 kg/m²       General:  Awake, alert, oriented X 3. Well developed, well nourished, well groomed. No apparent distress. HEENT:  Normocephalic, atraumatic. Pupils equal, round, reactive to light. No scleral icterus. No conjunctival injection. Normal lips, teeth, and gums. No nasal discharge. Neck:  Supple  Heart:  RRR, no murmurs, gallops, rubs  Lungs:  CTA bilaterally, bilat symmetrical expansion, no wheeze, rales, or rhonchi  Abdomen:   Bowel sounds present, soft, nontender, no masses, no organomegaly, no peritoneal signs  Extremities:  No clubbing, cyanosis, or edema  Skin:  Warm and dry, no open lesions or rash  Breast: deferred  Rectal: deferred  Genitalia:  deferred     NEUROLOGICAL EXAM  ---------------------------------     Mental Status Exam:             Alert and oriented times three,follows commands,speech and language intact     Cranial Ctfaam-BO-PEM Intact.         Cranial nerve II           Visual acuity:  normal                 Cranial nerve III           Pupils:  equal, round, reactive to light      Cranial nerves III, IV, VI           Extraocular Movements: intact      Cranial nerve V           Facial sensation:  intact      Cranial nerve VII           Facial strength: intact      Cranial nerve VIII           Hearing:  intact      Cranial nerve IX           Palate:  intact      Cranial nerve XI         Shoulder shrug:  intact      Cranial nerve XII          Tongue movement:  normal     Motor:    Drift:  absent  Motor exam is symmetrical 5- out of 5 rigth arm- 5/5 left arm--4-/5 joe LE which pt states has been weak before.   Tone:  normal  Abnormal Movements:  Absent     DTRs-2+ biceps,triceps,brachioradialis,knee jerks and ankle jerks bilaterally symmetrical.  Toes-downgoing bilaterally            Sensory:normal sensation               CBC with Differential:          Lab Results   Component Value Date     WBC 6.7 06/24/2021     RBC 4.93 06/24/2021     HGB 15.3 06/24/2021     HCT 44.5 06/24/2021      06/24/2021     MCV 90.3 06/24/2021     MCH 31.0 06/24/2021     MCHC 34.4 06/24/2021     RDW 13.1 06/24/2021     SEGSPCT 55.4 06/24/2021     LYMPHOPCT 25.5 06/24/2021     MONOPCT 8.3 06/24/2021     BASOPCT 1.3 06/24/2021     MONOSABS 0.6 06/24/2021     LYMPHSABS 1.7 06/24/2021     EOSABS 0.6 06/24/2021     BASOSABS 0.1 06/24/2021     DIFFTYPE AUTOMATED DIFFERENTIAL 06/24/2021      CMP:          Lab Results   Component Value Date      06/24/2021     K 3.3 06/24/2021      06/24/2021     CO2 28 06/24/2021     BUN 13 06/24/2021     CREATININE 0.9 06/24/2021     GFRAA >60 06/24/2021     AGRATIO 1.7 12/15/2014     LABGLOM >60 06/24/2021     GLUCOSE 241 06/24/2021     PROT 6.1 06/24/2021     LABALBU 3.5 06/24/2021     CALCIUM 9.3 06/24/2021     BILITOT 0.8 06/24/2021     ALKPHOS 110 06/24/2021     AST 13 06/24/2021     ALT 12 06/24/2021      BMP:          Lab Results   Component Value Date      06/24/2021     K 3.3 06/24/2021      06/24/2021     CO2 28 06/24/2021     BUN 13 06/24/2021     LABALBU 3.5 06/24/2021     CREATININE 0.9 06/24/2021     CALCIUM 9.3 06/24/2021     GFRAA >60 06/24/2021     LABGLOM >60 06/24/2021     GLUCOSE 241 06/24/2021      PT/INR:          Lab Results   Component Value Date     PROTIME 13.8 06/23/2021     INR 1.14 06/23/2021      PTT:  No results found for: APTT, PTT[APTT  U/A:          Lab Results   Component Value Date     COLORU YELLOW 06/23/2021     WBCUA 1185 06/23/2021     RBCUA 3,956 06/23/2021     MUCUS RARE 10/20/2020     TRICHOMONAS NONE SEEN 06/23/2021     BACTERIA MODERATE 06/23/2021     CLARITYU SLIGHTLY CLOUDY 06/23/2021     SPECGRAV 1.027 06/23/2021     LEUKOCYTESUR SMALL 06/23/2021     UROBILINOGEN NEGATIVE 06/23/2021     BILIRUBINUR NEGATIVE 06/23/2021     BLOODU LARGE 06/23/2021      TSH:          Lab Results   Component Value Date     TSH 2.58 06/06/2014      VITAMIN B12: No components found for: B12  FOLATE:  No results found for: FOLATE  RPR:  No results found for: RPR  DIANA:  No results found for: ANATITER, DIANA  Urine Toxicology:  No components found for: IAMMENTA, IBARBIT, IBENZO, ICOCAINE, IMARTHC, IOPIATES, IPHENCYC      IMPRESSION:     Right temporal acute infarct     Right carotid stenosis 75 % consult vascular surgery     Left vertebral artery occlusion     Hypertensive encephalopathy     Mild to moderate right MCA stenosis     A fib     PLAN:     CT brain CTA head  Neck as above     Mri brain     Echo     B 12 folate TSH Homocysteine level     Consult cardiology for ?  AC for A fib     Consult vascular surgery     plavix ( pt states she was not taking p[lavix at home for a few months )     Discussed dx prognosis meds side effects and above with pt and answered all questions.     Hernan Chowdhury MD MD  BOARD CERTIFIED-NEUROLOGY. ED to Hosp-Admission (Discharged) on 6/23/2021     ED to Hosp-Admission (Discharged) on 6/23/2021        Revision History        Detailed Report      Note shared with patient  Progress Notes Info    Author Note Status Last Update User   Arnold Jeronimo MD Addendum Arnold Jeronimo MD   Last Update Date/Time: 6/24/2021  2:45 PM   Chart Review Routing History    No routing history on file.

## 2021-08-18 ENCOUNTER — OFFICE VISIT (OUTPATIENT)
Dept: FAMILY MEDICINE CLINIC | Age: 71
End: 2021-08-18
Payer: MEDICAID

## 2021-08-18 VITALS
DIASTOLIC BLOOD PRESSURE: 78 MMHG | WEIGHT: 224.2 LBS | HEIGHT: 62 IN | BODY MASS INDEX: 41.26 KG/M2 | SYSTOLIC BLOOD PRESSURE: 142 MMHG | HEART RATE: 106 BPM | OXYGEN SATURATION: 95 %

## 2021-08-18 DIAGNOSIS — E55.9 VITAMIN D DEFICIENCY: ICD-10-CM

## 2021-08-18 DIAGNOSIS — I69.351 HEMIPARESIS OF RIGHT DOMINANT SIDE AS LATE EFFECT OF CEREBRAL INFARCTION (HCC): ICD-10-CM

## 2021-08-18 DIAGNOSIS — R47.1 DYSARTHRIA DUE TO ACUTE STROKE (HCC): ICD-10-CM

## 2021-08-18 DIAGNOSIS — Z12.11 SCREEN FOR COLON CANCER: ICD-10-CM

## 2021-08-18 DIAGNOSIS — E66.01 MORBID OBESITY WITH BMI OF 40.0-44.9, ADULT (HCC): ICD-10-CM

## 2021-08-18 DIAGNOSIS — E83.42 HYPOMAGNESEMIA: ICD-10-CM

## 2021-08-18 DIAGNOSIS — I65.21 SYMPTOMATIC STENOSIS OF RIGHT CAROTID ARTERY: ICD-10-CM

## 2021-08-18 DIAGNOSIS — Z79.4 TYPE 2 DIABETES MELLITUS WITH OTHER SPECIFIED COMPLICATION, WITH LONG-TERM CURRENT USE OF INSULIN (HCC): ICD-10-CM

## 2021-08-18 DIAGNOSIS — I10 ESSENTIAL HYPERTENSION: Primary | ICD-10-CM

## 2021-08-18 DIAGNOSIS — I63.9 CEREBROVASCULAR ACCIDENT (CVA), UNSPECIFIED MECHANISM (HCC): ICD-10-CM

## 2021-08-18 DIAGNOSIS — E11.69 TYPE 2 DIABETES MELLITUS WITH OTHER SPECIFIED COMPLICATION, WITH LONG-TERM CURRENT USE OF INSULIN (HCC): ICD-10-CM

## 2021-08-18 DIAGNOSIS — Z12.31 ENCOUNTER FOR SCREENING MAMMOGRAM FOR BREAST CANCER: ICD-10-CM

## 2021-08-18 DIAGNOSIS — E78.2 MIXED HYPERLIPIDEMIA: ICD-10-CM

## 2021-08-18 DIAGNOSIS — Z91.81 AT HIGH RISK FOR FALLS: ICD-10-CM

## 2021-08-18 DIAGNOSIS — I69.351 HEMIPLEGIA AND HEMIPARESIS FOLLOWING CEREBRAL INFARCTION AFFECTING RIGHT DOMINANT SIDE (HCC): ICD-10-CM

## 2021-08-18 DIAGNOSIS — I63.9 DYSARTHRIA DUE TO ACUTE STROKE (HCC): ICD-10-CM

## 2021-08-18 DIAGNOSIS — I48.91 ATRIAL FIBRILLATION, UNSPECIFIED TYPE (HCC): ICD-10-CM

## 2021-08-18 PROCEDURE — G8427 DOCREV CUR MEDS BY ELIG CLIN: HCPCS | Performed by: NURSE PRACTITIONER

## 2021-08-18 PROCEDURE — 1090F PRES/ABSN URINE INCON ASSESS: CPT | Performed by: NURSE PRACTITIONER

## 2021-08-18 PROCEDURE — G8399 PT W/DXA RESULTS DOCUMENT: HCPCS | Performed by: NURSE PRACTITIONER

## 2021-08-18 PROCEDURE — 3046F HEMOGLOBIN A1C LEVEL >9.0%: CPT | Performed by: NURSE PRACTITIONER

## 2021-08-18 PROCEDURE — 2022F DILAT RTA XM EVC RTNOPTHY: CPT | Performed by: NURSE PRACTITIONER

## 2021-08-18 PROCEDURE — 3017F COLORECTAL CA SCREEN DOC REV: CPT | Performed by: NURSE PRACTITIONER

## 2021-08-18 PROCEDURE — 99204 OFFICE O/P NEW MOD 45 MIN: CPT | Performed by: NURSE PRACTITIONER

## 2021-08-18 PROCEDURE — 1036F TOBACCO NON-USER: CPT | Performed by: NURSE PRACTITIONER

## 2021-08-18 PROCEDURE — 4040F PNEUMOC VAC/ADMIN/RCVD: CPT | Performed by: NURSE PRACTITIONER

## 2021-08-18 PROCEDURE — G8417 CALC BMI ABV UP PARAM F/U: HCPCS | Performed by: NURSE PRACTITIONER

## 2021-08-18 PROCEDURE — 1123F ACP DISCUSS/DSCN MKR DOCD: CPT | Performed by: NURSE PRACTITIONER

## 2021-08-18 RX ORDER — PANTOPRAZOLE SODIUM 40 MG/1
40 TABLET, DELAYED RELEASE ORAL
Qty: 30 TABLET | Refills: 0 | Status: CANCELLED | OUTPATIENT
Start: 2021-08-18

## 2021-08-18 RX ORDER — ASPIRIN 81 MG/1
81 TABLET ORAL DAILY
Qty: 90 TABLET | Refills: 0 | Status: CANCELLED | OUTPATIENT
Start: 2021-08-18

## 2021-08-18 RX ORDER — B12/LEVOMEFOLATE CALCIUM/B-6 2-1.13-25
1 TABLET ORAL DAILY
Qty: 90 TABLET | Refills: 0 | Status: SHIPPED | OUTPATIENT
Start: 2021-08-18 | End: 2021-10-07

## 2021-08-18 RX ORDER — ATORVASTATIN CALCIUM 80 MG/1
80 TABLET, FILM COATED ORAL NIGHTLY
Qty: 90 TABLET | Refills: 0 | Status: SHIPPED | OUTPATIENT
Start: 2021-08-18

## 2021-08-18 RX ORDER — METOPROLOL TARTRATE 50 MG/1
50 TABLET, FILM COATED ORAL 2 TIMES DAILY
Qty: 180 TABLET | Refills: 0 | Status: SHIPPED | OUTPATIENT
Start: 2021-08-18 | End: 2021-11-16

## 2021-08-18 RX ORDER — LANCETS 30 GAUGE
1 EACH MISCELLANEOUS 2 TIMES DAILY
Qty: 300 EACH | Refills: 1 | Status: SHIPPED | OUTPATIENT
Start: 2021-08-18

## 2021-08-18 RX ORDER — AMLODIPINE BESYLATE 5 MG/1
5 TABLET ORAL DAILY
Qty: 90 TABLET | Refills: 0 | Status: SHIPPED | OUTPATIENT
Start: 2021-08-18

## 2021-08-18 RX ORDER — FAMOTIDINE 20 MG/1
20 TABLET, FILM COATED ORAL DAILY
COMMUNITY

## 2021-08-18 RX ORDER — CLOPIDOGREL BISULFATE 75 MG/1
75 TABLET ORAL DAILY
Qty: 90 TABLET | Refills: 0 | Status: SHIPPED | OUTPATIENT
Start: 2021-08-18

## 2021-08-18 RX ORDER — INSULIN GLARGINE 100 [IU]/ML
20 INJECTION, SOLUTION SUBCUTANEOUS NIGHTLY
Qty: 2 VIAL | Refills: 0 | Status: SHIPPED | OUTPATIENT
Start: 2021-08-18

## 2021-08-18 ASSESSMENT — PATIENT HEALTH QUESTIONNAIRE - PHQ9
SUM OF ALL RESPONSES TO PHQ9 QUESTIONS 1 & 2: 0
SUM OF ALL RESPONSES TO PHQ QUESTIONS 1-9: 0
2. FEELING DOWN, DEPRESSED OR HOPELESS: 0
SUM OF ALL RESPONSES TO PHQ QUESTIONS 1-9: 0
SUM OF ALL RESPONSES TO PHQ QUESTIONS 1-9: 0
1. LITTLE INTEREST OR PLEASURE IN DOING THINGS: 0

## 2021-08-18 NOTE — PROGRESS NOTES
2021     Sarai Kirby (:  1950) is a 70 y.o. female, here for evaluation of the following medical concerns:    Presents to [facility] care   No previous PCP   Medications given by hospital - 30 day supply- has been out for two weeks. Type 2 diabetes   Lantus 20 units nightly  Humalog 16 units 3 times daily with meals  Dr. Patrica Oconnor  A1C 11.2021  States glucose is average 200 at home fasting and non fasting . .. Hypertensiontaking Norvasc 5 mg, Lopressor 50 mg twice daily    Hyperlipidemiataking Lipitor 80 mg daily  Lipids mild elevation 2021       Mag low 2021 ---1.7not taking replacement    Vitamin D deficiencynot taking replacement    Smoker - quit 8 yrs ago. GERDtaking pepcid otc - controlled    Right temporal stroke with acute infarction  Also atrial fibrillation--- noted 21 EKG while in hospital  Denies chest pain or palpitations  On aspirin and Plavix  Has not seen cardiology outpt. [place] [facility]  Carotid stenosisplanning for right carotid endarterectomy  Has failed to follow-up outpatientmultiple fungal attempts by cardiovascular officeDr. Alejandrina [facility]  Admission for stroke on 2021  Transferred to ARU at [address] on 2021  Discharged home with [facility] and sig other 2021    Right sided weakness post CVA  PT anf OT at home. Using wheelchair today  Has fallen a couple times since being home. No injuries   Loses her balance sometimes. Denies dizziness headache numbness. Denies new or worsening stroke side effects or signs              Review of Systems    Prior to Visit Medications    Medication Sig Taking?  Authorizing Provider   metoprolol tartrate (LOPRESSOR) 50 MG tablet Take 1 tablet by mouth 2 times daily Yes Eduard Mustafa APRN - NP   insulin lispro (HUMALOG) 100 UNIT/ML injection vial Inject 16 Units into the skin 3 times daily (with meals) Yes Eduard Mustafa, APRN - NP   insulin glargine (LANTUS) 100 UNIT/ML injection vial Inject 20 Units into the skin nightly Yes MELANI Umanzor - NP   folic acid-pyridoxine-cyancobalamin (FOLTX) 1.13-25-2 MG TABS Take 1 tablet by mouth daily Yes Elba Kinney APRN - NP   clopidogrel (PLAVIX) 75 MG tablet Take 1 tablet by mouth daily Yes Elba Kinney APRN - NP   atorvastatin (LIPITOR) 80 MG tablet Take 1 tablet by mouth nightly Yes Elba Kinney APRN - NP   amLODIPine (NORVASC) 5 MG tablet Take 1 tablet by mouth daily Yes MELANI Umanzor - NP   blood glucose monitor kit and supplies Dispense sufficient amount for indicated testing frequency plus additional to accommodate PRN testing needs. Dispense all needed supplies to include: monitor, strips, lancing device, lancets, control solutions, alcohol swabs. Yes MELANI Umanzor NP   Lancets MISC 1 each by Does not apply route 2 times daily Yes MELANI Umanzor NP   famotidine (PEPCID) 20 MG tablet Take 20 mg by mouth daily Yes Marleni Good MD   aspirin 81 MG EC tablet Take 1 tablet by mouth daily Yes Iris Nash MD   oxybutynin (DITROPAN) 5 MG tablet Take 1 tablet by mouth 3 times daily. Adriano Martines MD        Social History     Tobacco Use    Smoking status: Former Smoker     Packs/day: 1.00     Years: 30.00     Pack years: 30.00     Types: Cigarettes     Quit date: 10/8/2016     Years since quittin.8    Smokeless tobacco: Never Used   Substance Use Topics    Alcohol use: No     Alcohol/week: 0.0 standard drinks        Vitals:    21 1358   BP: (!) 142/78   Site: Left Lower Arm   Position: Sitting   Cuff Size: Medium Adult   Pulse: 106   SpO2: 95%   Weight: 224 lb 3.2 oz (101.7 kg)   Height: 5' 2\" (1.575 m)     Estimated body mass index is 41.01 kg/m² as calculated from the following:    Height as of this encounter: 5' 2\" (1.575 m). Weight as of this encounter: 224 lb 3.2 oz (101.7 kg). Physical Exam  Vitals reviewed. Education - [facility])    3. Hypomagnesemia  - MAGNESIUM; Future    4. Atrial fibrillation, unspecified type ([facility]  Aspirin and Plavix and beta-blocker  Needs to follow with cardiology outpatient  - Eri Moser MD, Cardiology, [facility]    5. Hemiplegia and hemiparesis following cerebral infarction affecting right dominant side (HCC)  Continue PT OT and home health care    6. Vitamin D deficiency  - Vitamin D 25 Hydroxy; Future    7. Symptomatic stenosis of right carotid artery  Needs to call and schedule with vascularphone number provided    8. Morbid obesity with BMI of 40.0-44.9, adult (HCC)  Low-carb, low-fat, low-cholesterol, low-saltportion control    9. Mixed hyperlipidemia  Continue statin    10. Hemiparesis of right dominant side as late effect of cerebral infarction ([facility] [address]      11. Dysarthria due to acute stroke ([facility] [address]      12. Cerebrovascular accident (CVA), unspecified mechanism ([facility] [address]  Continue Plavix aspirin Lipitor    13. At high risk for falls  Patient does have grab bars in bathroom and areas of the house. No rugs or throw rugs. Does have nonslip mats in the bathtub. Does have railings on all stairs and steps. 15. Screen for colon cancer  - Alonso James CNP, Gastroenterology, [facility]    15. Encounter for screening mammogram for breast cancer  - TANISHA DIGITAL SCREEN W OR WO CAD BILATERAL; Future    Fasting labs  All provider phone numbers given to patient to call and schedule follow-ups  Diabetic eye exam  Mammogram  Needs colonoscopyreferred to GI  Refer to cardiology  Refills given  Declined LDCTquit smoking in 2008        All care gaps addressed     All questions answered    Discussed use, benefit, and side effects of prescribed medications. Barriers to compliance discussed. All patient questions answered. Pt voiced understanding. Present to the ER for any emergent or acute symptoms not managed at home or in office.     Please note that this chart was generated using dragon dictation software. Although every effort was made to ensure the accuracy of this automated transcription, some errors in transcription may have occurred. Return in about 6 weeks (around 9/29/2021) for HTN DM cholesterol check. An electronic signature was used to authenticate this note. --MELANI Kwong - NP on 8/19/2021 at 1:07 PM  On the basis of positive falls risk screening, assessment and plan is as follows: home safety tips provided.

## 2021-08-19 ENCOUNTER — TELEPHONE (OUTPATIENT)
Dept: GASTROENTEROLOGY | Age: 71
End: 2021-08-19

## 2021-08-19 PROBLEM — I48.91 ATRIAL FIBRILLATION (HCC): Status: ACTIVE | Noted: 2021-08-19

## 2021-08-19 PROBLEM — R29.810 FACIAL DROOP: Status: RESOLVED | Noted: 2020-10-06 | Resolved: 2021-08-19

## 2021-08-19 PROBLEM — E11.9 DIABETES MELLITUS (HCC): Status: ACTIVE | Noted: 2021-08-19

## 2021-08-19 PROBLEM — E83.42 HYPOMAGNESEMIA: Status: ACTIVE | Noted: 2021-08-19

## 2021-08-19 PROBLEM — I63.9 ACUTE CVA (CEREBROVASCULAR ACCIDENT) (HCC): Status: RESOLVED | Noted: 2021-06-29 | Resolved: 2021-08-19

## 2021-08-19 PROBLEM — R73.9 HYPERGLYCEMIA: Status: RESOLVED | Noted: 2020-10-06 | Resolved: 2021-08-19

## 2021-08-19 NOTE — TELEPHONE ENCOUNTER
Attempted to call pt. And get the scheduled for Ov in regards to a referral. LM for pt. To call back.

## 2021-08-20 ENCOUNTER — TELEPHONE (OUTPATIENT)
Dept: FAMILY MEDICINE CLINIC | Age: 71
End: 2021-08-20

## 2021-08-20 DIAGNOSIS — E72.11 HYPERHOMOCYSTINEMIA (HCC): Primary | ICD-10-CM

## 2021-08-20 NOTE — TELEPHONE ENCOUNTER
Patient caretaker called in wondering why they could not get this script. Advised that PA needed and will be sent once diagnosis confirmed.  I will

## 2021-08-26 ENCOUNTER — TELEPHONE (OUTPATIENT)
Dept: FAMILY MEDICINE CLINIC | Age: 71
End: 2021-08-26

## 2021-09-14 ENCOUNTER — TELEPHONE (OUTPATIENT)
Dept: FAMILY MEDICINE CLINIC | Age: 71
End: 2021-09-14

## 2021-09-14 NOTE — TELEPHONE ENCOUNTER
Kendall Rivas called and stated that a blood glucose monitor was sent to pharmacy, but no test strips where sent. He stated that they are True Metric test strips and will need to be sent to [address] [place] [address] [address] [facility] [address]. Please call Kendall Rivas when called in.   Thank you,

## 2021-09-15 RX ORDER — CALCIUM CITRATE/VITAMIN D3 200MG-6.25
1 TABLET ORAL DAILY
Qty: 100 EACH | Refills: 3 | Status: SHIPPED | OUTPATIENT
Start: 2021-09-15

## 2021-09-16 NOTE — TELEPHONE ENCOUNTER
I have been trying to complete PA for Folbic RF for a few weeks and am getting a back and forth from insurance. Finally received a form stating that they cant process this medication without j-codes something we have never used. I can not complete this PA. Patient can either try to take script to a DME supply store to see if they stock and can complete PA or they will just need to pay out of pocket.

## 2021-10-07 ENCOUNTER — TELEPHONE (OUTPATIENT)
Dept: FAMILY MEDICINE CLINIC | Age: 71
End: 2021-10-07

## 2021-10-07 ENCOUNTER — VIRTUAL VISIT (OUTPATIENT)
Dept: FAMILY MEDICINE CLINIC | Age: 71
End: 2021-10-07
Payer: MEDICAID

## 2021-10-07 DIAGNOSIS — E72.11 HYPERHOMOCYSTINEMIA (HCC): ICD-10-CM

## 2021-10-07 DIAGNOSIS — I48.91 ATRIAL FIBRILLATION, UNSPECIFIED TYPE (HCC): ICD-10-CM

## 2021-10-07 DIAGNOSIS — E78.2 MIXED HYPERLIPIDEMIA: ICD-10-CM

## 2021-10-07 DIAGNOSIS — I10 ESSENTIAL HYPERTENSION: ICD-10-CM

## 2021-10-07 DIAGNOSIS — E11.69 TYPE 2 DIABETES MELLITUS WITH OTHER SPECIFIED COMPLICATION, WITH LONG-TERM CURRENT USE OF INSULIN (HCC): Primary | ICD-10-CM

## 2021-10-07 DIAGNOSIS — I65.21 SYMPTOMATIC STENOSIS OF RIGHT CAROTID ARTERY: ICD-10-CM

## 2021-10-07 DIAGNOSIS — E66.01 MORBID OBESITY WITH BMI OF 40.0-44.9, ADULT (HCC): ICD-10-CM

## 2021-10-07 DIAGNOSIS — I63.9 DYSARTHRIA DUE TO ACUTE STROKE (HCC): ICD-10-CM

## 2021-10-07 DIAGNOSIS — I69.351 HEMIPLEGIA AND HEMIPARESIS FOLLOWING CEREBRAL INFARCTION AFFECTING RIGHT DOMINANT SIDE (HCC): ICD-10-CM

## 2021-10-07 DIAGNOSIS — E55.9 VITAMIN D DEFICIENCY: ICD-10-CM

## 2021-10-07 DIAGNOSIS — R47.1 DYSARTHRIA DUE TO ACUTE STROKE (HCC): ICD-10-CM

## 2021-10-07 DIAGNOSIS — E83.42 HYPOMAGNESEMIA: ICD-10-CM

## 2021-10-07 DIAGNOSIS — Z79.4 TYPE 2 DIABETES MELLITUS WITH OTHER SPECIFIED COMPLICATION, WITH LONG-TERM CURRENT USE OF INSULIN (HCC): Primary | ICD-10-CM

## 2021-10-07 DIAGNOSIS — I63.9 CEREBROVASCULAR ACCIDENT (CVA), UNSPECIFIED MECHANISM (HCC): ICD-10-CM

## 2021-10-07 PROCEDURE — G8399 PT W/DXA RESULTS DOCUMENT: HCPCS | Performed by: NURSE PRACTITIONER

## 2021-10-07 PROCEDURE — 3046F HEMOGLOBIN A1C LEVEL >9.0%: CPT | Performed by: NURSE PRACTITIONER

## 2021-10-07 PROCEDURE — 2022F DILAT RTA XM EVC RTNOPTHY: CPT | Performed by: NURSE PRACTITIONER

## 2021-10-07 PROCEDURE — 4040F PNEUMOC VAC/ADMIN/RCVD: CPT | Performed by: NURSE PRACTITIONER

## 2021-10-07 PROCEDURE — 1090F PRES/ABSN URINE INCON ASSESS: CPT | Performed by: NURSE PRACTITIONER

## 2021-10-07 PROCEDURE — 3017F COLORECTAL CA SCREEN DOC REV: CPT | Performed by: NURSE PRACTITIONER

## 2021-10-07 PROCEDURE — 99214 OFFICE O/P EST MOD 30 MIN: CPT | Performed by: NURSE PRACTITIONER

## 2021-10-07 PROCEDURE — 1123F ACP DISCUSS/DSCN MKR DOCD: CPT | Performed by: NURSE PRACTITIONER

## 2021-10-07 PROCEDURE — G8427 DOCREV CUR MEDS BY ELIG CLIN: HCPCS | Performed by: NURSE PRACTITIONER

## 2021-10-07 NOTE — TELEPHONE ENCOUNTER
Per [place] schedule patient for 10/14/21 at 3:30 PM.   Attempted to call to schedule received no answer left message to return call

## 2021-10-07 NOTE — Clinical Note
Pt needs appointment next week for blood pressure check, 30-minute appointment, 10/14/2021 at 3:30 PM agreed with spouse

## 2021-10-07 NOTE — PROGRESS NOTES
10/7/2021    TELEHEALTH EVALUATION -- Audio/Visual (During WCXAU-68 public health emergency)    HPI:    Arleen Forrest (:  1950) has requested an audio/video evaluation for the following concern(s):      Follow up on chronics:    Type 2 diabetes   Lantus 20 units nightly  Humalog 16 units 3 times daily with meals  Dr. Taylor Bosch- has not seen yet. A1C 11.2021  Glucose has not been over 300. Was 137 in the AM today  Does have supplies she needs      Hypertensiontaking Norvasc 5 mg, Lopressor 50 mg twice daily     Hyperlipidemiataking Lipitor 80 mg daily  Lipids mild elevation 2021         Mag low 2021 ---1.7not taking replacement     Vitamin D deficiencynot taking replacement     Smoker - quit 8 yrs ago. LDCT-- never done. No chest CTs in chart. Pt declined. GERDtaking pepcid otc - controlled     Right temporal stroke with acute infarction 2021  atrial fibrillation--- noted 21 EKG while in hospital  Denies chest pain or palpitations  On aspirin and Plavix  Has not seen cardiology outpt as directed        [place] [facility]  Carotid stenosisplanning for right carotid endarterectomy  Has failed to follow-up outpatientmultiple attempts to schedule by cardiovascular officeDr. [address] [facility]  Admission for stroke on 2021  Transferred to ARU at [facility] on 2021  Discharged home with Venancio Campos and significant other 2021  Right sided weakness post CVA  PT and OT at home. Using wheelchair to get around and has not left her home  No further falls since last visit  Loses her balance sometimes. Denies dizziness headache numbness. Denies new or worsening stroke side effects or signs        ----- was referred to cardiology in 2021 - never scheduled. Est care with this PCP in 2021- fasting labs were ordered. They have not been done.      BP cuff at home  22-42cm cuff   Has not been checking regular due to trouble with cuff.     Taking meds as ordered. Medications reviewed    Denies feeling bad or any concerns today   Unable to leave the home unless transport from insurance comes to get her. Phone number given to call and schedule with cardiology   Spouse reports he does have dr Derrick Holbrook number to call and schedule. Patient to come in office next week for blood pressure check and labs        Review of Systems    Prior to Visit Medications    Medication Sig Taking? Authorizing Provider   blood glucose test strips (TRUE METRIX BLOOD GLUCOSE TEST) strip 1 each by In Vitro route daily As needed. Yes MELANI Joe NP   metoprolol tartrate (LOPRESSOR) 50 MG tablet Take 1 tablet by mouth 2 times daily Yes MELANI Joe NP   clopidogrel (PLAVIX) 75 MG tablet Take 1 tablet by mouth daily Yes MELANI Joe NP   atorvastatin (LIPITOR) 80 MG tablet Take 1 tablet by mouth nightly Yes MELANI Joe NP   amLODIPine (NORVASC) 5 MG tablet Take 1 tablet by mouth daily Yes MELANI Joe NP   blood glucose monitor kit and supplies Dispense sufficient amount for indicated testing frequency plus additional to accommodate PRN testing needs. Dispense all needed supplies to include: monitor, strips, lancing device, lancets, control solutions, alcohol swabs. Yes MELANI Joe NP   Lancets MISC 1 each by Does not apply route 2 times daily Yes MELANI Joe NP   aspirin 81 MG EC tablet Take 1 tablet by mouth daily Yes Lahcelle Albert MD   insulin lispro (HUMALOG) 100 UNIT/ML injection vial Inject 16 Units into the skin 3 times daily (with meals)  MELANI Joe NP   insulin glargine (LANTUS) 100 UNIT/ML injection vial Inject 20 Units into the skin nightly  MELANI Joe NP   famotidine (PEPCID) 20 MG tablet Take 20 mg by mouth daily  Historical Provider, MD   oxybutynin (DITROPAN) 5 MG tablet Take 1 tablet by mouth 3 times daily.   Paulina Del Valle MD Social History     Tobacco Use    Smoking status: Former Smoker     Packs/day: 1.00     Years: 30.00     Pack years: 30.00     Types: Cigarettes     Quit date: 10/8/2016     Years since quittin.0    Smokeless tobacco: Never Used   Vaping Use    Vaping Use: Never used   Substance Use Topics    Alcohol use: No     Alcohol/week: 0.0 standard drinks    Drug use: No        PHYSICAL EXAMINATION:  Vital Signs: (As obtained by patient/caregiver or practitioner observation)    Blood pressure-  Heart rate-    Respiratory rate-    Temperature-  Pulse oximetry-     Physical Exam  Vitals reviewed. Constitutional:       General: She is not in acute distress. Appearance: She is obese. She is not ill-appearing, toxic-appearing or diaphoretic. HENT:      Head: Normocephalic and atraumatic. Pulmonary:      Effort: Pulmonary effort is normal. No respiratory distress. Musculoskeletal:      Cervical back: Normal range of motion. Comments: Sitting on the couch. Limited exam on musculoskeletal   Skin:     Coloration: Skin is not pale. Neurological:      Mental Status: She is alert and oriented to person, place, and time. Mental status is at baseline. Psychiatric:         Mood and Affect: Mood normal.         Thought Content: Thought content normal.         ASSESSMENT/PLAN:  1. Type 2 diabetes mellitus with other specified complication, with long-term current use of insulin ([facility])  Needs to call Dr. Korey Dejesus to schedule. Continue current Lantus dose and Humalog dose. Continue to monitor blood sugar at home. States she has all of the supplies that she needs. 2. Essential hypertension  Unsure whether this is controlled. Patient needs to come in next week for blood pressure check. Taking Toprol 50 mg twice daily and Norvasc 5 mg daily. 3. Atrial fibrillation, unspecified type (HCC)  Aspirin and Plavix    4.  Hemiplegia and hemiparesis following cerebral infarction affecting right dominant side [facility])  Home physical therapy  Fall precautions  Gets around by wheelchair      5. Vitamin D deficiency  Lab ordered    6. Symptomatic stenosis of right carotid artery  Needs to follow-up with Dr. Pavan Vazquez were planning to do intervention. Patient failed to follow-up multiple times    7. Morbid obesity with BMI of 40.0-44.9, adult ([address]      8. Mixed hyperlipidemia  Continue Lipitor 80 mg daily    9. Hyperhomocystinemia ([facility] [address]      10. Hypomagnesemia  Check maglab ordered    11. Dysarthria due to acute stroke ([facility] [address]      12. Cerebrovascular accident (CVA), unspecified mechanism ([facility] [address]        Stressed the importance of having labs done. Scheduled next week to come in office for blood pressure check appointment. Needs to have labs done before that appointment while she is in the building. Spouse to schedule insurance transport since he cannot transport the patient via personal vehicle. Patient and spouse state their understanding. Denies additional needs today. Phone numbers given to spouse to call and schedule with cardiology. He is also to make an appointment with Dr. Juliana Fischer        No follow-ups on file. Abe Tan is a 70 y.o. female being evaluated by a Virtual Visit (video visit) encounter to address concerns as mentioned above. A caregiver was present when appropriate. Due to this being a TeleHealth encounter (During LRDNO-58 public health emergency), evaluation of the following organ systems was limited: Vitals/Constitutional/EENT/Resp/CV/GI//MS/Neuro/Skin/Heme-Lymph-Imm. Pursuant to the emergency declaration under the [facility], [address] [place] [address] authority and the [employer] Act, this Virtual Visit was conducted with patient's (and/or legal guardian's) consent, to reduce the patient's risk of exposure to COVID-19 and provide necessary medical care.   The patient (and/or legal guardian) has also been advised to contact this office for worsening conditions or problems, and seek emergency medical treatment and/or call 911 if deemed necessary. Patient identification was verified at the start of the visit: Yes    Total time spent on this encounter: 15 min    Services were provided through a video synchronous discussion virtually to substitute for in-person clinic visit. Patient and provider were located at their individual homes. --MELANI Almazan NP on 10/7/2021 at 5:20 PM    An electronic signature was used to authenticate this note.

## 2022-03-02 ENCOUNTER — TELEPHONE (OUTPATIENT)
Dept: FAMILY MEDICINE CLINIC | Age: 72
End: 2022-03-02

## 2022-08-24 ENCOUNTER — TELEPHONE (OUTPATIENT)
Dept: FAMILY MEDICINE CLINIC | Age: 72
End: 2022-08-24

## 2022-08-24 NOTE — TELEPHONE ENCOUNTER
Please call patient and leave a voicemail. If unable to reach her. Send a letter. Patient needs to schedule an appointment. She has not been seen in office since aug 2021, virtually October 2021.

## 2023-05-02 ENCOUNTER — APPOINTMENT (OUTPATIENT)
Dept: CT IMAGING | Age: 73
DRG: 463 | End: 2023-05-02
Payer: MEDICAID

## 2023-05-02 ENCOUNTER — HOSPITAL ENCOUNTER (INPATIENT)
Age: 73
LOS: 6 days | Discharge: NURSING, REHAB OR LONG-TERM CARE | DRG: 463 | End: 2023-05-08
Attending: EMERGENCY MEDICINE | Admitting: INTERNAL MEDICINE
Payer: MEDICAID

## 2023-05-02 ENCOUNTER — APPOINTMENT (OUTPATIENT)
Dept: GENERAL RADIOLOGY | Age: 73
DRG: 463 | End: 2023-05-02
Payer: MEDICAID

## 2023-05-02 DIAGNOSIS — N30.01 ACUTE CYSTITIS WITH HEMATURIA: Primary | ICD-10-CM

## 2023-05-02 DIAGNOSIS — E83.42 HYPOMAGNESEMIA: ICD-10-CM

## 2023-05-02 DIAGNOSIS — R53.83 OTHER FATIGUE: ICD-10-CM

## 2023-05-02 DIAGNOSIS — E83.39 HYPOPHOSPHATASIA: ICD-10-CM

## 2023-05-02 DIAGNOSIS — K26.9 DUODENAL ULCER: ICD-10-CM

## 2023-05-02 DIAGNOSIS — E83.39 HYPOPHOSPHATEMIA: ICD-10-CM

## 2023-05-02 DIAGNOSIS — N17.9 AKI (ACUTE KIDNEY INJURY) (HCC): ICD-10-CM

## 2023-05-02 DIAGNOSIS — E87.6 HYPOKALEMIA: ICD-10-CM

## 2023-05-02 PROBLEM — N39.0 UTI (URINARY TRACT INFECTION): Status: ACTIVE | Noted: 2023-05-02

## 2023-05-02 LAB
ALBUMIN SERPL-MCNC: 3.6 GM/DL (ref 3.4–5)
ALP BLD-CCNC: 100 IU/L (ref 40–129)
ALT SERPL-CCNC: 10 U/L (ref 10–40)
AMYLASE: 32 U/L (ref 25–115)
ANION GAP SERPL CALCULATED.3IONS-SCNC: 18 MMOL/L (ref 4–16)
AST SERPL-CCNC: 14 IU/L (ref 15–37)
BACTERIA: NORMAL /HPF
BASOPHILS ABSOLUTE: 0 K/CU MM
BASOPHILS RELATIVE PERCENT: 0.6 % (ref 0–1)
BILIRUB SERPL-MCNC: 1.3 MG/DL (ref 0–1)
BILIRUBIN URINE: NORMAL
BLOOD, URINE: NORMAL
BUN SERPL-MCNC: 29 MG/DL (ref 6–23)
CALCIUM SERPL-MCNC: 8.9 MG/DL (ref 8.3–10.6)
CHLORIDE BLD-SCNC: 101 MMOL/L (ref 99–110)
CLARITY: NORMAL
CO2: 21 MMOL/L (ref 21–32)
COLOR: NORMAL
CREAT SERPL-MCNC: 1.8 MG/DL (ref 0.6–1.1)
DIFFERENTIAL TYPE: ABNORMAL
EOSINOPHILS ABSOLUTE: 0 K/CU MM
EOSINOPHILS RELATIVE PERCENT: 0.2 % (ref 0–3)
GFR SERPL CREATININE-BSD FRML MDRD: 30 ML/MIN/1.73M2
GLUCOSE BLD-MCNC: 74 MG/DL (ref 70–99)
GLUCOSE SERPL-MCNC: 122 MG/DL (ref 70–99)
GLUCOSE, URINE: NEGATIVE MG/DL
HCT VFR BLD CALC: 40.7 % (ref 37–47)
HEMOGLOBIN: 13.5 GM/DL (ref 12.5–16)
IMMATURE NEUTROPHIL %: 0.7 % (ref 0–0.43)
KETONES, URINE: 15 MG/DL
LACTIC ACID, SEPSIS: 1.8 MMOL/L (ref 0.5–1.9)
LEUKOCYTE ESTERASE, URINE: NORMAL
LIPASE: 12 IU/L (ref 13–60)
LYMPHOCYTES ABSOLUTE: 1 K/CU MM
LYMPHOCYTES RELATIVE PERCENT: 18.9 % (ref 24–44)
MCH RBC QN AUTO: 34.2 PG (ref 27–31)
MCHC RBC AUTO-ENTMCNC: 33.2 % (ref 32–36)
MCV RBC AUTO: 103 FL (ref 78–100)
MONOCYTES ABSOLUTE: 0.4 K/CU MM
MONOCYTES RELATIVE PERCENT: 6.6 % (ref 0–4)
NITRITE URINE, QUANTITATIVE: NEGATIVE
NUCLEATED RBC %: 0 %
PDW BLD-RTO: 13.9 % (ref 11.7–14.9)
PH, URINE: 6.5
PLATELET # BLD: 173 K/CU MM (ref 140–440)
PMV BLD AUTO: 10.5 FL (ref 7.5–11.1)
POTASSIUM SERPL-SCNC: 3.2 MMOL/L (ref 3.5–5.1)
PROTEIN UA: >300 MG/DL
RBC # BLD: 3.95 M/CU MM (ref 4.2–5.4)
RBC URINE: 508 /HPF
SEGMENTED NEUTROPHILS ABSOLUTE COUNT: 3.9 K/CU MM
SEGMENTED NEUTROPHILS RELATIVE PERCENT: 73 % (ref 36–66)
SODIUM BLD-SCNC: 140 MMOL/L (ref 135–145)
SPECIFIC GRAVITY UA: >1.03
SQUAMOUS EPITHELIAL: 8 /HPF
TOTAL IMMATURE NEUTOROPHIL: 0.04 K/CU MM
TOTAL NUCLEATED RBC: 0 K/CU MM
TOTAL PROTEIN: 6.5 GM/DL (ref 6.4–8.2)
TRICHOMONAS: NORMAL /HPF
TROPONIN T: <0.01 NG/ML
UROBILINOGEN, URINE: 1 MG/DL
WBC # BLD: 5.3 K/CU MM (ref 4–10.5)
WBC CLUMP: NORMAL /HPF
WBC UA: 5152 /HPF

## 2023-05-02 PROCEDURE — 2580000003 HC RX 258: Performed by: EMERGENCY MEDICINE

## 2023-05-02 PROCEDURE — 83605 ASSAY OF LACTIC ACID: CPT

## 2023-05-02 PROCEDURE — 82150 ASSAY OF AMYLASE: CPT

## 2023-05-02 PROCEDURE — 1200000000 HC SEMI PRIVATE

## 2023-05-02 PROCEDURE — 93005 ELECTROCARDIOGRAM TRACING: CPT | Performed by: EMERGENCY MEDICINE

## 2023-05-02 PROCEDURE — 74176 CT ABD & PELVIS W/O CONTRAST: CPT

## 2023-05-02 PROCEDURE — 71045 X-RAY EXAM CHEST 1 VIEW: CPT

## 2023-05-02 PROCEDURE — 99285 EMERGENCY DEPT VISIT HI MDM: CPT

## 2023-05-02 PROCEDURE — 85025 COMPLETE CBC W/AUTO DIFF WBC: CPT

## 2023-05-02 PROCEDURE — 80053 COMPREHEN METABOLIC PANEL: CPT

## 2023-05-02 PROCEDURE — 83690 ASSAY OF LIPASE: CPT

## 2023-05-02 PROCEDURE — 96375 TX/PRO/DX INJ NEW DRUG ADDON: CPT

## 2023-05-02 PROCEDURE — 70450 CT HEAD/BRAIN W/O DYE: CPT

## 2023-05-02 PROCEDURE — 6370000000 HC RX 637 (ALT 250 FOR IP): Performed by: INTERNAL MEDICINE

## 2023-05-02 PROCEDURE — 82962 GLUCOSE BLOOD TEST: CPT

## 2023-05-02 PROCEDURE — 6370000000 HC RX 637 (ALT 250 FOR IP): Performed by: EMERGENCY MEDICINE

## 2023-05-02 PROCEDURE — 84484 ASSAY OF TROPONIN QUANT: CPT

## 2023-05-02 PROCEDURE — 96374 THER/PROPH/DIAG INJ IV PUSH: CPT

## 2023-05-02 PROCEDURE — 81001 URINALYSIS AUTO W/SCOPE: CPT

## 2023-05-02 PROCEDURE — 6360000002 HC RX W HCPCS: Performed by: INTERNAL MEDICINE

## 2023-05-02 PROCEDURE — 6360000002 HC RX W HCPCS: Performed by: EMERGENCY MEDICINE

## 2023-05-02 RX ORDER — KETOROLAC TROMETHAMINE 30 MG/ML
15 INJECTION, SOLUTION INTRAMUSCULAR; INTRAVENOUS ONCE
Status: COMPLETED | OUTPATIENT
Start: 2023-05-02 | End: 2023-05-02

## 2023-05-02 RX ORDER — SODIUM CHLORIDE 0.9 % (FLUSH) 0.9 %
5-40 SYRINGE (ML) INJECTION PRN
Status: DISCONTINUED | OUTPATIENT
Start: 2023-05-02 | End: 2023-05-08 | Stop reason: HOSPADM

## 2023-05-02 RX ORDER — ONDANSETRON 2 MG/ML
4 INJECTION INTRAMUSCULAR; INTRAVENOUS EVERY 6 HOURS PRN
Status: DISCONTINUED | OUTPATIENT
Start: 2023-05-02 | End: 2023-05-08 | Stop reason: HOSPADM

## 2023-05-02 RX ORDER — FAMOTIDINE 20 MG/1
20 TABLET, FILM COATED ORAL DAILY
Status: DISCONTINUED | OUTPATIENT
Start: 2023-05-03 | End: 2023-05-03

## 2023-05-02 RX ORDER — SODIUM CHLORIDE 9 MG/ML
INJECTION, SOLUTION INTRAVENOUS PRN
Status: DISCONTINUED | OUTPATIENT
Start: 2023-05-02 | End: 2023-05-08 | Stop reason: HOSPADM

## 2023-05-02 RX ORDER — 0.9 % SODIUM CHLORIDE 0.9 %
1000 INTRAVENOUS SOLUTION INTRAVENOUS ONCE
Status: COMPLETED | OUTPATIENT
Start: 2023-05-02 | End: 2023-05-02

## 2023-05-02 RX ORDER — INSULIN LISPRO 100 [IU]/ML
0-8 INJECTION, SOLUTION INTRAVENOUS; SUBCUTANEOUS
Status: DISCONTINUED | OUTPATIENT
Start: 2023-05-03 | End: 2023-05-04

## 2023-05-02 RX ORDER — ONDANSETRON 4 MG/1
4 TABLET, ORALLY DISINTEGRATING ORAL EVERY 8 HOURS PRN
Status: DISCONTINUED | OUTPATIENT
Start: 2023-05-02 | End: 2023-05-08 | Stop reason: HOSPADM

## 2023-05-02 RX ORDER — ACETAMINOPHEN 325 MG/1
650 TABLET ORAL EVERY 6 HOURS PRN
Status: DISCONTINUED | OUTPATIENT
Start: 2023-05-02 | End: 2023-05-08 | Stop reason: HOSPADM

## 2023-05-02 RX ORDER — SODIUM CHLORIDE 0.9 % (FLUSH) 0.9 %
5-40 SYRINGE (ML) INJECTION EVERY 12 HOURS SCHEDULED
Status: DISCONTINUED | OUTPATIENT
Start: 2023-05-02 | End: 2023-05-08 | Stop reason: HOSPADM

## 2023-05-02 RX ORDER — INSULIN LISPRO 100 [IU]/ML
0-4 INJECTION, SOLUTION INTRAVENOUS; SUBCUTANEOUS NIGHTLY
Status: DISCONTINUED | OUTPATIENT
Start: 2023-05-02 | End: 2023-05-04

## 2023-05-02 RX ORDER — ASPIRIN 81 MG/1
81 TABLET, CHEWABLE ORAL DAILY
Status: DISCONTINUED | OUTPATIENT
Start: 2023-05-03 | End: 2023-05-08 | Stop reason: HOSPADM

## 2023-05-02 RX ORDER — POLYETHYLENE GLYCOL 3350 17 G/17G
17 POWDER, FOR SOLUTION ORAL DAILY PRN
Status: DISCONTINUED | OUTPATIENT
Start: 2023-05-02 | End: 2023-05-08 | Stop reason: HOSPADM

## 2023-05-02 RX ORDER — ONDANSETRON 2 MG/ML
4 INJECTION INTRAMUSCULAR; INTRAVENOUS EVERY 6 HOURS PRN
Status: DISCONTINUED | OUTPATIENT
Start: 2023-05-02 | End: 2023-05-02

## 2023-05-02 RX ORDER — CLOPIDOGREL BISULFATE 75 MG/1
75 TABLET ORAL DAILY
Status: DISCONTINUED | OUTPATIENT
Start: 2023-05-03 | End: 2023-05-08 | Stop reason: HOSPADM

## 2023-05-02 RX ORDER — SODIUM CHLORIDE 9 MG/ML
INJECTION, SOLUTION INTRAVENOUS CONTINUOUS
Status: ACTIVE | OUTPATIENT
Start: 2023-05-02 | End: 2023-05-03

## 2023-05-02 RX ORDER — GLUCAGON 1 MG/ML
1 KIT INJECTION PRN
Status: DISCONTINUED | OUTPATIENT
Start: 2023-05-02 | End: 2023-05-08 | Stop reason: HOSPADM

## 2023-05-02 RX ORDER — DEXTROSE MONOHYDRATE 100 MG/ML
INJECTION, SOLUTION INTRAVENOUS CONTINUOUS PRN
Status: DISCONTINUED | OUTPATIENT
Start: 2023-05-02 | End: 2023-05-08 | Stop reason: HOSPADM

## 2023-05-02 RX ORDER — ACETAMINOPHEN 650 MG/1
650 SUPPOSITORY RECTAL EVERY 6 HOURS PRN
Status: DISCONTINUED | OUTPATIENT
Start: 2023-05-02 | End: 2023-05-08 | Stop reason: HOSPADM

## 2023-05-02 RX ORDER — ATORVASTATIN CALCIUM 40 MG/1
40 TABLET, FILM COATED ORAL NIGHTLY
Status: DISCONTINUED | OUTPATIENT
Start: 2023-05-02 | End: 2023-05-08 | Stop reason: HOSPADM

## 2023-05-02 RX ORDER — ENOXAPARIN SODIUM 100 MG/ML
30 INJECTION SUBCUTANEOUS 2 TIMES DAILY
Status: DISCONTINUED | OUTPATIENT
Start: 2023-05-02 | End: 2023-05-03

## 2023-05-02 RX ADMIN — ENOXAPARIN SODIUM 30 MG: 100 INJECTION SUBCUTANEOUS at 20:33

## 2023-05-02 RX ADMIN — METOPROLOL TARTRATE 25 MG: 25 TABLET, FILM COATED ORAL at 20:27

## 2023-05-02 RX ADMIN — ATORVASTATIN CALCIUM 40 MG: 40 TABLET, FILM COATED ORAL at 20:27

## 2023-05-02 RX ADMIN — SODIUM CHLORIDE 1000 ML: 9 INJECTION, SOLUTION INTRAVENOUS at 15:31

## 2023-05-02 RX ADMIN — KETOROLAC TROMETHAMINE 15 MG: 30 INJECTION, SOLUTION INTRAMUSCULAR; INTRAVENOUS at 15:32

## 2023-05-02 RX ADMIN — ONDANSETRON 4 MG: 2 INJECTION INTRAMUSCULAR; INTRAVENOUS at 15:31

## 2023-05-02 RX ADMIN — CEFTRIAXONE SODIUM 1000 MG: 1 INJECTION, POWDER, FOR SOLUTION INTRAMUSCULAR; INTRAVENOUS at 18:25

## 2023-05-02 RX ADMIN — POTASSIUM BICARBONATE 20 MEQ: 782 TABLET, EFFERVESCENT ORAL at 18:25

## 2023-05-02 ASSESSMENT — PAIN SCALES - GENERAL
PAINLEVEL_OUTOF10: 5
PAINLEVEL_OUTOF10: 0
PAINLEVEL_OUTOF10: 5

## 2023-05-02 ASSESSMENT — PAIN DESCRIPTION - LOCATION
LOCATION: ABDOMEN
LOCATION: ABDOMEN

## 2023-05-02 ASSESSMENT — PAIN - FUNCTIONAL ASSESSMENT: PAIN_FUNCTIONAL_ASSESSMENT: 0-10

## 2023-05-03 ENCOUNTER — ANESTHESIA (OUTPATIENT)
Dept: ENDOSCOPY | Age: 73
End: 2023-05-03
Payer: MEDICAID

## 2023-05-03 ENCOUNTER — ANESTHESIA EVENT (OUTPATIENT)
Dept: ENDOSCOPY | Age: 73
End: 2023-05-03
Payer: MEDICAID

## 2023-05-03 LAB
ALBUMIN SERPL-MCNC: 3 GM/DL (ref 3.4–5)
ALP BLD-CCNC: 83 IU/L (ref 40–129)
ALT SERPL-CCNC: 7 U/L (ref 10–40)
ANION GAP SERPL CALCULATED.3IONS-SCNC: 10 MMOL/L (ref 4–16)
AST SERPL-CCNC: 10 IU/L (ref 15–37)
BASOPHILS ABSOLUTE: 0 K/CU MM
BASOPHILS RELATIVE PERCENT: 0.5 % (ref 0–1)
BILIRUB SERPL-MCNC: 1 MG/DL (ref 0–1)
BILIRUBIN DIRECT: 0.4 MG/DL (ref 0–0.3)
BILIRUBIN, INDIRECT: 0.6 MG/DL (ref 0–0.7)
BUN SERPL-MCNC: 31 MG/DL (ref 6–23)
CALCIUM SERPL-MCNC: 8.2 MG/DL (ref 8.3–10.6)
CHLORIDE BLD-SCNC: 101 MMOL/L (ref 99–110)
CO2: 24 MMOL/L (ref 21–32)
CREAT SERPL-MCNC: 1.5 MG/DL (ref 0.6–1.1)
DIFFERENTIAL TYPE: ABNORMAL
EOSINOPHILS ABSOLUTE: 0.1 K/CU MM
EOSINOPHILS RELATIVE PERCENT: 0.9 % (ref 0–3)
ESTIMATED AVERAGE GLUCOSE: 71 MG/DL
FERRITIN: 588 NG/ML (ref 15–150)
FOLATE SERPL-MCNC: 4.3 NG/ML (ref 3.1–17.5)
GFR SERPL CREATININE-BSD FRML MDRD: 37 ML/MIN/1.73M2
GLUCOSE BLD-MCNC: 102 MG/DL (ref 70–99)
GLUCOSE BLD-MCNC: 75 MG/DL (ref 70–99)
GLUCOSE BLD-MCNC: 79 MG/DL (ref 70–99)
GLUCOSE BLD-MCNC: 91 MG/DL (ref 70–99)
GLUCOSE SERPL-MCNC: 85 MG/DL (ref 70–99)
HBA1C MFR BLD: 4.1 % (ref 4.2–6.3)
HCT VFR BLD CALC: 37.3 % (ref 37–47)
HEMOCCULT SP1 STL QL: POSITIVE
HEMOGLOBIN: 12.2 GM/DL (ref 12.5–16)
IMMATURE NEUTROPHIL %: 1.1 % (ref 0–0.43)
LACTATE DEHYDROGENASE: 154 IU/L (ref 120–246)
LYMPHOCYTES ABSOLUTE: 1.2 K/CU MM
LYMPHOCYTES RELATIVE PERCENT: 20.9 % (ref 24–44)
MAGNESIUM: 1.9 MG/DL (ref 1.8–2.4)
MCH RBC QN AUTO: 33.9 PG (ref 27–31)
MCHC RBC AUTO-ENTMCNC: 32.7 % (ref 32–36)
MCV RBC AUTO: 103.6 FL (ref 78–100)
MONOCYTES ABSOLUTE: 0.4 K/CU MM
MONOCYTES RELATIVE PERCENT: 6.5 % (ref 0–4)
NUCLEATED RBC %: 0 %
PDW BLD-RTO: 13.8 % (ref 11.7–14.9)
PLATELET # BLD: 141 K/CU MM (ref 140–440)
PMV BLD AUTO: 10.4 FL (ref 7.5–11.1)
POTASSIUM SERPL-SCNC: 3.4 MMOL/L (ref 3.5–5.1)
RBC # BLD: 3.6 M/CU MM (ref 4.2–5.4)
RETICULOCYTE COUNT PCT: 2 % (ref 0.2–2.2)
SEGMENTED NEUTROPHILS ABSOLUTE COUNT: 3.9 K/CU MM
SEGMENTED NEUTROPHILS RELATIVE PERCENT: 70.1 % (ref 36–66)
SODIUM BLD-SCNC: 135 MMOL/L (ref 135–145)
TOTAL IMMATURE NEUTOROPHIL: 0.06 K/CU MM
TOTAL NUCLEATED RBC: 0 K/CU MM
TOTAL PROTEIN: 5.2 GM/DL (ref 6.4–8.2)
TSH SERPL DL<=0.005 MIU/L-ACNC: 1.4 UIU/ML (ref 0.27–4.2)
VITAMIN B-12: 1266 PG/ML (ref 211–911)
WBC # BLD: 5.5 K/CU MM (ref 4–10.5)

## 2023-05-03 PROCEDURE — 83010 ASSAY OF HAPTOGLOBIN QUANT: CPT

## 2023-05-03 PROCEDURE — 2500000003 HC RX 250 WO HCPCS

## 2023-05-03 PROCEDURE — 6360000002 HC RX W HCPCS: Performed by: INTERNAL MEDICINE

## 2023-05-03 PROCEDURE — 3700000001 HC ADD 15 MINUTES (ANESTHESIA): Performed by: INTERNAL MEDICINE

## 2023-05-03 PROCEDURE — 80048 BASIC METABOLIC PNL TOTAL CA: CPT

## 2023-05-03 PROCEDURE — 85045 AUTOMATED RETICULOCYTE COUNT: CPT

## 2023-05-03 PROCEDURE — 6370000000 HC RX 637 (ALT 250 FOR IP): Performed by: INTERNAL MEDICINE

## 2023-05-03 PROCEDURE — 1200000000 HC SEMI PRIVATE

## 2023-05-03 PROCEDURE — 83615 LACTATE (LD) (LDH) ENZYME: CPT

## 2023-05-03 PROCEDURE — 3700000000 HC ANESTHESIA ATTENDED CARE: Performed by: INTERNAL MEDICINE

## 2023-05-03 PROCEDURE — 83036 HEMOGLOBIN GLYCOSYLATED A1C: CPT

## 2023-05-03 PROCEDURE — 97162 PT EVAL MOD COMPLEX 30 MIN: CPT

## 2023-05-03 PROCEDURE — 83550 IRON BINDING TEST: CPT

## 2023-05-03 PROCEDURE — 6360000002 HC RX W HCPCS

## 2023-05-03 PROCEDURE — 2580000003 HC RX 258: Performed by: FAMILY MEDICINE

## 2023-05-03 PROCEDURE — 94761 N-INVAS EAR/PLS OXIMETRY MLT: CPT

## 2023-05-03 PROCEDURE — 36415 COLL VENOUS BLD VENIPUNCTURE: CPT

## 2023-05-03 PROCEDURE — 2709999900 HC NON-CHARGEABLE SUPPLY: Performed by: INTERNAL MEDICINE

## 2023-05-03 PROCEDURE — 3609017100 HC EGD: Performed by: INTERNAL MEDICINE

## 2023-05-03 PROCEDURE — 82728 ASSAY OF FERRITIN: CPT

## 2023-05-03 PROCEDURE — 84443 ASSAY THYROID STIM HORMONE: CPT

## 2023-05-03 PROCEDURE — 2580000003 HC RX 258: Performed by: INTERNAL MEDICINE

## 2023-05-03 PROCEDURE — 83735 ASSAY OF MAGNESIUM: CPT

## 2023-05-03 PROCEDURE — 85025 COMPLETE CBC W/AUTO DIFF WBC: CPT

## 2023-05-03 PROCEDURE — 0DJ08ZZ INSPECTION OF UPPER INTESTINAL TRACT, VIA NATURAL OR ARTIFICIAL OPENING ENDOSCOPIC: ICD-10-PCS | Performed by: INTERNAL MEDICINE

## 2023-05-03 PROCEDURE — 97166 OT EVAL MOD COMPLEX 45 MIN: CPT

## 2023-05-03 PROCEDURE — 82270 OCCULT BLOOD FECES: CPT

## 2023-05-03 PROCEDURE — 97530 THERAPEUTIC ACTIVITIES: CPT

## 2023-05-03 PROCEDURE — 2580000003 HC RX 258

## 2023-05-03 PROCEDURE — 82962 GLUCOSE BLOOD TEST: CPT

## 2023-05-03 PROCEDURE — 80076 HEPATIC FUNCTION PANEL: CPT

## 2023-05-03 PROCEDURE — 82607 VITAMIN B-12: CPT

## 2023-05-03 PROCEDURE — 97535 SELF CARE MNGMENT TRAINING: CPT

## 2023-05-03 PROCEDURE — 83540 ASSAY OF IRON: CPT

## 2023-05-03 PROCEDURE — 82746 ASSAY OF FOLIC ACID SERUM: CPT

## 2023-05-03 RX ORDER — SODIUM CHLORIDE 9 MG/ML
INJECTION, SOLUTION INTRAVENOUS CONTINUOUS PRN
Status: DISCONTINUED | OUTPATIENT
Start: 2023-05-03 | End: 2023-05-03 | Stop reason: SDUPTHER

## 2023-05-03 RX ORDER — SODIUM CHLORIDE 9 MG/ML
INJECTION, SOLUTION INTRAVENOUS CONTINUOUS
Status: ACTIVE | OUTPATIENT
Start: 2023-05-03 | End: 2023-05-04

## 2023-05-03 RX ORDER — HEPARIN SODIUM 5000 [USP'U]/ML
5000 INJECTION, SOLUTION INTRAVENOUS; SUBCUTANEOUS EVERY 8 HOURS SCHEDULED
Status: DISCONTINUED | OUTPATIENT
Start: 2023-05-03 | End: 2023-05-03

## 2023-05-03 RX ORDER — PROPOFOL 10 MG/ML
INJECTION, EMULSION INTRAVENOUS PRN
Status: DISCONTINUED | OUTPATIENT
Start: 2023-05-03 | End: 2023-05-03 | Stop reason: SDUPTHER

## 2023-05-03 RX ORDER — LIDOCAINE HYDROCHLORIDE 20 MG/ML
INJECTION, SOLUTION EPIDURAL; INFILTRATION; INTRACAUDAL; PERINEURAL PRN
Status: DISCONTINUED | OUTPATIENT
Start: 2023-05-03 | End: 2023-05-03 | Stop reason: SDUPTHER

## 2023-05-03 RX ORDER — PANTOPRAZOLE SODIUM 40 MG/1
40 TABLET, DELAYED RELEASE ORAL
Status: DISCONTINUED | OUTPATIENT
Start: 2023-05-03 | End: 2023-05-08 | Stop reason: HOSPADM

## 2023-05-03 RX ORDER — SODIUM CHLORIDE 9 MG/ML
INJECTION, SOLUTION INTRAVENOUS ONCE
Status: CANCELLED | OUTPATIENT
Start: 2023-05-03

## 2023-05-03 RX ORDER — SUCRALFATE 1 G/1
1 TABLET ORAL EVERY 6 HOURS SCHEDULED
Status: DISCONTINUED | OUTPATIENT
Start: 2023-05-03 | End: 2023-05-08 | Stop reason: HOSPADM

## 2023-05-03 RX ADMIN — LIDOCAINE HYDROCHLORIDE 100 MG: 20 INJECTION, SOLUTION EPIDURAL; INFILTRATION; INTRACAUDAL; PERINEURAL at 10:40

## 2023-05-03 RX ADMIN — SODIUM CHLORIDE, PRESERVATIVE FREE 10 ML: 5 INJECTION INTRAVENOUS at 09:42

## 2023-05-03 RX ADMIN — ATORVASTATIN CALCIUM 40 MG: 40 TABLET, FILM COATED ORAL at 20:29

## 2023-05-03 RX ADMIN — CLOPIDOGREL BISULFATE 75 MG: 75 TABLET ORAL at 09:42

## 2023-05-03 RX ADMIN — FAMOTIDINE 20 MG: 20 TABLET ORAL at 09:42

## 2023-05-03 RX ADMIN — SODIUM CHLORIDE: 9 INJECTION, SOLUTION INTRAVENOUS at 10:32

## 2023-05-03 RX ADMIN — SODIUM CHLORIDE: 9 INJECTION, SOLUTION INTRAVENOUS at 22:12

## 2023-05-03 RX ADMIN — SODIUM CHLORIDE, PRESERVATIVE FREE 10 ML: 5 INJECTION INTRAVENOUS at 20:29

## 2023-05-03 RX ADMIN — METOPROLOL TARTRATE 25 MG: 25 TABLET, FILM COATED ORAL at 09:42

## 2023-05-03 RX ADMIN — ASPIRIN 81 MG CHEWABLE TABLET 81 MG: 81 TABLET CHEWABLE at 09:42

## 2023-05-03 RX ADMIN — PANTOPRAZOLE SODIUM 40 MG: 40 TABLET, DELAYED RELEASE ORAL at 12:34

## 2023-05-03 RX ADMIN — ENOXAPARIN SODIUM 30 MG: 100 INJECTION SUBCUTANEOUS at 09:42

## 2023-05-03 RX ADMIN — PROPOFOL 100 MG: 10 INJECTION, EMULSION INTRAVENOUS at 10:40

## 2023-05-03 RX ADMIN — SUCRALFATE 1 G: 1 TABLET ORAL at 17:49

## 2023-05-03 RX ADMIN — SUCRALFATE 1 G: 1 TABLET ORAL at 12:34

## 2023-05-03 RX ADMIN — POTASSIUM BICARBONATE 40 MEQ: 782 TABLET, EFFERVESCENT ORAL at 09:42

## 2023-05-03 RX ADMIN — CEFTRIAXONE SODIUM 1000 MG: 1 INJECTION, POWDER, FOR SOLUTION INTRAMUSCULAR; INTRAVENOUS at 17:51

## 2023-05-03 RX ADMIN — PANTOPRAZOLE SODIUM 40 MG: 40 TABLET, DELAYED RELEASE ORAL at 17:49

## 2023-05-03 ASSESSMENT — ENCOUNTER SYMPTOMS
SHORTNESS OF BREATH: 0
DIARRHEA: 0
ABDOMINAL DISTENTION: 0
ABDOMINAL PAIN: 0
VOMITING: 0
EYE PAIN: 0
STRIDOR: 0
CONSTIPATION: 0
BACK PAIN: 0
WHEEZING: 0
NAUSEA: 0
COUGH: 0
BLOOD IN STOOL: 0
CHEST TIGHTNESS: 0
CHOKING: 0

## 2023-05-03 NOTE — ANESTHESIA PRE PROCEDURE
03:24 AM       CMP:   Lab Results   Component Value Date/Time     05/03/2023 03:24 AM    K 3.4 05/03/2023 03:24 AM     05/03/2023 03:24 AM    CO2 24 05/03/2023 03:24 AM    BUN 31 05/03/2023 03:24 AM    CREATININE 1.5 05/03/2023 03:24 AM    GFRAA >60 06/28/2021 01:49 PM    AGRATIO 1.7 12/15/2014 05:43 PM    LABGLOM 37 05/03/2023 03:24 AM    GLUCOSE 85 05/03/2023 03:24 AM    PROT 6.5 05/02/2023 03:25 PM    CALCIUM 8.2 05/03/2023 03:24 AM    BILITOT 1.3 05/02/2023 03:25 PM    ALKPHOS 100 05/02/2023 03:25 PM    AST 14 05/02/2023 03:25 PM    ALT 10 05/02/2023 03:25 PM       POC Tests:   Recent Labs     05/03/23  0757   POCGLU 79       Coags:   Lab Results   Component Value Date/Time    PROTIME 13.8 06/23/2021 04:57 PM    INR 1.14 06/23/2021 04:57 PM       HCG (If Applicable): No results found for: PREGTESTUR, PREGSERUM, HCG, HCGQUANT     ABGs: No results found for: PHART, PO2ART, VBM5CRH, WOA2LYC, BEART, W9QDAUXM     Type & Screen (If Applicable):  No results found for: LABABO, LABRH    Drug/Infectious Status (If Applicable):  No results found for: HIV, HEPCAB    COVID-19 Screening (If Applicable):   Lab Results   Component Value Date/Time    COVID19 NOT DETECTED 06/28/2021 07:30 AM           Anesthesia Evaluation  Patient summary reviewed  Airway: Mallampati: II  TM distance: >3 FB   Neck ROM: full  Mouth opening: > = 3 FB   Dental:          Pulmonary: breath sounds clear to auscultation                             Cardiovascular:    (+) hypertension:, dysrhythmias: atrial fibrillation, hyperlipidemia        Rhythm: irregular  Rate: abnormal                 ROS comment: TTE procedure:ECHOCARDIOGRAM COMPLETE 2D W DOPPLER W COLOR. Procedure Date  Date: 06/24/2021 Start: 08:44 AM     Summary   Technically difficult examination. Left ventricular systolic function is normal.   Ejection fraction is visually estimated at 50-55%. Moderate left ventricular hypertrophy. Mildly dilated right ventricle.  No

## 2023-05-03 NOTE — ANESTHESIA POSTPROCEDURE EVALUATION
Department of Anesthesiology  Postprocedure Note    Patient:  Angel Salmeron  MRN: [id number]  YOB: 1950  Date of evaluation: 5/3/2023      Procedure Summary     Date: 05/03/23 Room / Location: [address]    Anesthesia Start: 1032 Anesthesia Stop: 1050    Procedure: EGD DIAGNOSTIC ONLY Diagnosis:       Gastrointestinal hemorrhage, unspecified gastrointestinal hemorrhage type      (Gastrointestinal hemorrhage, unspecified gastrointestinal hemorrhage type [K92.2])    Surgeons: Ning Vences MD Responsible Provider: Apollo Moya MD    Anesthesia Type: MAC ASA Status: 3          Anesthesia Type: MAC    Kristan Phase I:      Kristan Phase II:        Anesthesia Post Evaluation    Patient location during evaluation: floor  Patient participation: complete - patient participated  Level of consciousness: awake and alert  Airway patency: patent  Nausea & Vomiting: no nausea and no vomiting  Complications: no  Cardiovascular status: hemodynamically stable  Respiratory status: spontaneous ventilation and room air  Hydration status: stable

## 2023-05-04 LAB
ANION GAP SERPL CALCULATED.3IONS-SCNC: 10 MMOL/L (ref 4–16)
BASOPHILS ABSOLUTE: 0 K/CU MM
BASOPHILS RELATIVE PERCENT: 0.7 % (ref 0–1)
BUN SERPL-MCNC: 33 MG/DL (ref 6–23)
CALCIUM SERPL-MCNC: 7.9 MG/DL (ref 8.3–10.6)
CHLORIDE BLD-SCNC: 106 MMOL/L (ref 99–110)
CO2: 22 MMOL/L (ref 21–32)
CREAT SERPL-MCNC: 1.4 MG/DL (ref 0.6–1.1)
DIFFERENTIAL TYPE: ABNORMAL
EOSINOPHILS ABSOLUTE: 0.1 K/CU MM
EOSINOPHILS RELATIVE PERCENT: 1.6 % (ref 0–3)
GFR SERPL CREATININE-BSD FRML MDRD: 40 ML/MIN/1.73M2
GLUCOSE BLD-MCNC: 107 MG/DL (ref 70–99)
GLUCOSE BLD-MCNC: 140 MG/DL (ref 70–99)
GLUCOSE BLD-MCNC: 69 MG/DL (ref 70–99)
GLUCOSE BLD-MCNC: 95 MG/DL (ref 70–99)
GLUCOSE SERPL-MCNC: 78 MG/DL (ref 70–99)
HCT VFR BLD CALC: 36.7 % (ref 37–47)
HEMOGLOBIN: 11.7 GM/DL (ref 12.5–16)
IMMATURE NEUTROPHIL %: 1.3 % (ref 0–0.43)
LYMPHOCYTES ABSOLUTE: 0.7 K/CU MM
LYMPHOCYTES RELATIVE PERCENT: 21.9 % (ref 24–44)
MAGNESIUM: 1.7 MG/DL (ref 1.8–2.4)
MCH RBC QN AUTO: 33.6 PG (ref 27–31)
MCHC RBC AUTO-ENTMCNC: 31.9 % (ref 32–36)
MCV RBC AUTO: 105.5 FL (ref 78–100)
MONOCYTES ABSOLUTE: 0.2 K/CU MM
MONOCYTES RELATIVE PERCENT: 7.2 % (ref 0–4)
NUCLEATED RBC %: 0 %
PDW BLD-RTO: 14.1 % (ref 11.7–14.9)
PHOSPHORUS: 2.2 MG/DL (ref 2.5–4.9)
PLATELET # BLD: 106 K/CU MM (ref 140–440)
PMV BLD AUTO: 10.5 FL (ref 7.5–11.1)
POTASSIUM SERPL-SCNC: 3.5 MMOL/L (ref 3.5–5.1)
RBC # BLD: 3.48 M/CU MM (ref 4.2–5.4)
SEGMENTED NEUTROPHILS ABSOLUTE COUNT: 2.1 K/CU MM
SEGMENTED NEUTROPHILS RELATIVE PERCENT: 67.3 % (ref 36–66)
SODIUM BLD-SCNC: 138 MMOL/L (ref 135–145)
TOTAL IMMATURE NEUTOROPHIL: 0.04 K/CU MM
TOTAL NUCLEATED RBC: 0 K/CU MM
WBC # BLD: 3.1 K/CU MM (ref 4–10.5)

## 2023-05-04 PROCEDURE — 80048 BASIC METABOLIC PNL TOTAL CA: CPT

## 2023-05-04 PROCEDURE — 2580000003 HC RX 258: Performed by: INTERNAL MEDICINE

## 2023-05-04 PROCEDURE — 6360000002 HC RX W HCPCS: Performed by: INTERNAL MEDICINE

## 2023-05-04 PROCEDURE — 83735 ASSAY OF MAGNESIUM: CPT

## 2023-05-04 PROCEDURE — 6370000000 HC RX 637 (ALT 250 FOR IP): Performed by: INTERNAL MEDICINE

## 2023-05-04 PROCEDURE — 94761 N-INVAS EAR/PLS OXIMETRY MLT: CPT

## 2023-05-04 PROCEDURE — 84100 ASSAY OF PHOSPHORUS: CPT

## 2023-05-04 PROCEDURE — 85025 COMPLETE CBC W/AUTO DIFF WBC: CPT

## 2023-05-04 PROCEDURE — 76937 US GUIDE VASCULAR ACCESS: CPT

## 2023-05-04 PROCEDURE — 2500000003 HC RX 250 WO HCPCS: Performed by: INTERNAL MEDICINE

## 2023-05-04 PROCEDURE — 1200000000 HC SEMI PRIVATE

## 2023-05-04 PROCEDURE — 82962 GLUCOSE BLOOD TEST: CPT

## 2023-05-04 PROCEDURE — 36415 COLL VENOUS BLD VENIPUNCTURE: CPT

## 2023-05-04 RX ORDER — MAGNESIUM SULFATE 4 G/50ML
4000 INJECTION INTRAVENOUS ONCE
Status: COMPLETED | OUTPATIENT
Start: 2023-05-04 | End: 2023-05-04

## 2023-05-04 RX ORDER — INSULIN LISPRO 100 [IU]/ML
0-4 INJECTION, SOLUTION INTRAVENOUS; SUBCUTANEOUS NIGHTLY
Status: DISCONTINUED | OUTPATIENT
Start: 2023-05-04 | End: 2023-05-08 | Stop reason: HOSPADM

## 2023-05-04 RX ORDER — INSULIN LISPRO 100 [IU]/ML
0-4 INJECTION, SOLUTION INTRAVENOUS; SUBCUTANEOUS
Status: DISCONTINUED | OUTPATIENT
Start: 2023-05-04 | End: 2023-05-08 | Stop reason: HOSPADM

## 2023-05-04 RX ADMIN — MAGNESIUM SULFATE HEPTAHYDRATE 4000 MG: 80 INJECTION, SOLUTION INTRAVENOUS at 10:18

## 2023-05-04 RX ADMIN — CLOPIDOGREL BISULFATE 75 MG: 75 TABLET ORAL at 10:08

## 2023-05-04 RX ADMIN — SUCRALFATE 1 G: 1 TABLET ORAL at 17:29

## 2023-05-04 RX ADMIN — ATORVASTATIN CALCIUM 40 MG: 40 TABLET, FILM COATED ORAL at 21:45

## 2023-05-04 RX ADMIN — PANTOPRAZOLE SODIUM 40 MG: 40 TABLET, DELAYED RELEASE ORAL at 06:30

## 2023-05-04 RX ADMIN — POTASSIUM PHOSPHATE, MONOBASIC POTASSIUM PHOSPHATE, DIBASIC 20 MMOL: 224; 236 INJECTION, SOLUTION, CONCENTRATE INTRAVENOUS at 10:18

## 2023-05-04 RX ADMIN — SUCRALFATE 1 G: 1 TABLET ORAL at 13:17

## 2023-05-04 RX ADMIN — SODIUM CHLORIDE, PRESERVATIVE FREE 10 ML: 5 INJECTION INTRAVENOUS at 10:08

## 2023-05-04 RX ADMIN — METOPROLOL TARTRATE 25 MG: 25 TABLET, FILM COATED ORAL at 21:45

## 2023-05-04 RX ADMIN — CEFTRIAXONE SODIUM 1000 MG: 1 INJECTION, POWDER, FOR SOLUTION INTRAMUSCULAR; INTRAVENOUS at 17:24

## 2023-05-04 RX ADMIN — SUCRALFATE 1 G: 1 TABLET ORAL at 06:30

## 2023-05-04 RX ADMIN — PANTOPRAZOLE SODIUM 40 MG: 40 TABLET, DELAYED RELEASE ORAL at 17:29

## 2023-05-04 RX ADMIN — SUCRALFATE 1 G: 1 TABLET ORAL at 23:09

## 2023-05-04 RX ADMIN — METOPROLOL TARTRATE 25 MG: 25 TABLET, FILM COATED ORAL at 10:08

## 2023-05-04 RX ADMIN — ASPIRIN 81 MG CHEWABLE TABLET 81 MG: 81 TABLET CHEWABLE at 10:08

## 2023-05-04 RX ADMIN — SUCRALFATE 1 G: 1 TABLET ORAL at 00:16

## 2023-05-04 ASSESSMENT — ENCOUNTER SYMPTOMS
VOMITING: 0
COUGH: 0
NAUSEA: 0
CHEST TIGHTNESS: 0
EYE PAIN: 0
BLOOD IN STOOL: 0
CHOKING: 0
BACK PAIN: 0
DIARRHEA: 0
ABDOMINAL PAIN: 0
WHEEZING: 0
ABDOMINAL DISTENTION: 0
CONSTIPATION: 0
SHORTNESS OF BREATH: 0
STRIDOR: 0

## 2023-05-04 ASSESSMENT — PAIN SCALES - GENERAL: PAINLEVEL_OUTOF10: 0

## 2023-05-05 LAB
ANION GAP SERPL CALCULATED.3IONS-SCNC: 11 MMOL/L (ref 4–16)
BASOPHILS ABSOLUTE: 0 K/CU MM
BASOPHILS ABSOLUTE: 0 K/CU MM
BASOPHILS RELATIVE PERCENT: 0.5 % (ref 0–1)
BASOPHILS RELATIVE PERCENT: 0.6 % (ref 0–1)
BUN SERPL-MCNC: 34 MG/DL (ref 6–23)
CALCIUM SERPL-MCNC: 7.9 MG/DL (ref 8.3–10.6)
CHLORIDE BLD-SCNC: 105 MMOL/L (ref 99–110)
CO2: 22 MMOL/L (ref 21–32)
CREAT SERPL-MCNC: 1.4 MG/DL (ref 0.6–1.1)
DIFFERENTIAL TYPE: ABNORMAL
DIFFERENTIAL TYPE: ABNORMAL
EKG ATRIAL RATE: 258 BPM
EKG DIAGNOSIS: NORMAL
EKG Q-T INTERVAL: 364 MS
EKG QRS DURATION: 72 MS
EKG QTC CALCULATION (BAZETT): 457 MS
EKG R AXIS: -12 DEGREES
EKG T AXIS: 185 DEGREES
EKG VENTRICULAR RATE: 95 BPM
EOSINOPHILS ABSOLUTE: 0.1 K/CU MM
EOSINOPHILS ABSOLUTE: 0.1 K/CU MM
EOSINOPHILS RELATIVE PERCENT: 1.8 % (ref 0–3)
EOSINOPHILS RELATIVE PERCENT: 2.1 % (ref 0–3)
GFR SERPL CREATININE-BSD FRML MDRD: 40 ML/MIN/1.73M2
GLUCOSE BLD-MCNC: 125 MG/DL (ref 70–99)
GLUCOSE BLD-MCNC: 139 MG/DL (ref 70–99)
GLUCOSE BLD-MCNC: 162 MG/DL (ref 70–99)
GLUCOSE BLD-MCNC: 95 MG/DL (ref 70–99)
GLUCOSE SERPL-MCNC: 116 MG/DL (ref 70–99)
HAPTOGLOB SERPL-MCNC: 47 MG/DL (ref 30–200)
HCT VFR BLD CALC: 33.7 % (ref 37–47)
HCT VFR BLD CALC: 36.1 % (ref 37–47)
HEMOGLOBIN: 11 GM/DL (ref 12.5–16)
HEMOGLOBIN: 11.3 GM/DL (ref 12.5–16)
IMMATURE NEUTROPHIL %: 1 % (ref 0–0.43)
IMMATURE NEUTROPHIL %: 1.2 % (ref 0–0.43)
IRON: 65 UG/DL (ref 37–145)
LYMPHOCYTES ABSOLUTE: 0.6 K/CU MM
LYMPHOCYTES ABSOLUTE: 0.7 K/CU MM
LYMPHOCYTES RELATIVE PERCENT: 18.3 % (ref 24–44)
LYMPHOCYTES RELATIVE PERCENT: 18.8 % (ref 24–44)
MAGNESIUM: 2.7 MG/DL (ref 1.8–2.4)
MCH RBC QN AUTO: 34 PG (ref 27–31)
MCH RBC QN AUTO: 34.3 PG (ref 27–31)
MCHC RBC AUTO-ENTMCNC: 31.3 % (ref 32–36)
MCHC RBC AUTO-ENTMCNC: 32.6 % (ref 32–36)
MCV RBC AUTO: 104 FL (ref 78–100)
MCV RBC AUTO: 109.7 FL (ref 78–100)
MONOCYTES ABSOLUTE: 0.3 K/CU MM
MONOCYTES ABSOLUTE: 0.3 K/CU MM
MONOCYTES RELATIVE PERCENT: 8.5 % (ref 0–4)
MONOCYTES RELATIVE PERCENT: 8.6 % (ref 0–4)
NUCLEATED RBC %: 0 %
NUCLEATED RBC %: 0 %
PCT TRANSFERRIN: 78 % (ref 10–44)
PDW BLD-RTO: 14 % (ref 11.7–14.9)
PDW BLD-RTO: 14.1 % (ref 11.7–14.9)
PHOSPHORUS: 2.4 MG/DL (ref 2.5–4.9)
PLATELET # BLD: 98 K/CU MM (ref 140–440)
PLATELET # BLD: 98 K/CU MM (ref 140–440)
PMV BLD AUTO: 10.7 FL (ref 7.5–11.1)
PMV BLD AUTO: 10.9 FL (ref 7.5–11.1)
POTASSIUM SERPL-SCNC: 4.4 MMOL/L (ref 3.5–5.1)
RBC # BLD: 3.24 M/CU MM (ref 4.2–5.4)
RBC # BLD: 3.29 M/CU MM (ref 4.2–5.4)
SEGMENTED NEUTROPHILS ABSOLUTE COUNT: 2.3 K/CU MM
SEGMENTED NEUTROPHILS ABSOLUTE COUNT: 2.7 K/CU MM
SEGMENTED NEUTROPHILS RELATIVE PERCENT: 68.8 % (ref 36–66)
SEGMENTED NEUTROPHILS RELATIVE PERCENT: 69.8 % (ref 36–66)
SODIUM BLD-SCNC: 138 MMOL/L (ref 135–145)
TOTAL IMMATURE NEUTOROPHIL: 0.04 K/CU MM
TOTAL IMMATURE NEUTOROPHIL: 0.04 K/CU MM
TOTAL IRON BINDING CAPACITY: 83 UG/DL (ref 250–450)
TOTAL NUCLEATED RBC: 0 K/CU MM
TOTAL NUCLEATED RBC: 0 K/CU MM
UNSATURATED IRON BINDING CAPACITY: 18 UG/DL (ref 110–370)
WBC # BLD: 3.3 K/CU MM (ref 4–10.5)
WBC # BLD: 3.8 K/CU MM (ref 4–10.5)

## 2023-05-05 PROCEDURE — 93010 ELECTROCARDIOGRAM REPORT: CPT | Performed by: INTERNAL MEDICINE

## 2023-05-05 PROCEDURE — 1200000000 HC SEMI PRIVATE

## 2023-05-05 PROCEDURE — 2500000003 HC RX 250 WO HCPCS: Performed by: INTERNAL MEDICINE

## 2023-05-05 PROCEDURE — 82962 GLUCOSE BLOOD TEST: CPT

## 2023-05-05 PROCEDURE — 6370000000 HC RX 637 (ALT 250 FOR IP): Performed by: INTERNAL MEDICINE

## 2023-05-05 PROCEDURE — 85025 COMPLETE CBC W/AUTO DIFF WBC: CPT

## 2023-05-05 PROCEDURE — 80048 BASIC METABOLIC PNL TOTAL CA: CPT

## 2023-05-05 PROCEDURE — 83735 ASSAY OF MAGNESIUM: CPT

## 2023-05-05 PROCEDURE — 97535 SELF CARE MNGMENT TRAINING: CPT

## 2023-05-05 PROCEDURE — 85049 AUTOMATED PLATELET COUNT: CPT

## 2023-05-05 PROCEDURE — 2580000003 HC RX 258: Performed by: INTERNAL MEDICINE

## 2023-05-05 PROCEDURE — 84100 ASSAY OF PHOSPHORUS: CPT

## 2023-05-05 PROCEDURE — 36415 COLL VENOUS BLD VENIPUNCTURE: CPT

## 2023-05-05 PROCEDURE — 94761 N-INVAS EAR/PLS OXIMETRY MLT: CPT

## 2023-05-05 PROCEDURE — 6360000002 HC RX W HCPCS: Performed by: INTERNAL MEDICINE

## 2023-05-05 RX ADMIN — SODIUM CHLORIDE, PRESERVATIVE FREE 10 ML: 5 INJECTION INTRAVENOUS at 09:00

## 2023-05-05 RX ADMIN — SUCRALFATE 1 G: 1 TABLET ORAL at 06:37

## 2023-05-05 RX ADMIN — SUCRALFATE 1 G: 1 TABLET ORAL at 12:58

## 2023-05-05 RX ADMIN — METOPROLOL TARTRATE 25 MG: 25 TABLET, FILM COATED ORAL at 09:00

## 2023-05-05 RX ADMIN — SODIUM PHOSPHATE, MONOBASIC, MONOHYDRATE AND SODIUM PHOSPHATE, DIBASIC, ANHYDROUS 10 MMOL: 142; 276 INJECTION, SOLUTION INTRAVENOUS at 10:39

## 2023-05-05 RX ADMIN — SUCRALFATE 1 G: 1 TABLET ORAL at 17:21

## 2023-05-05 RX ADMIN — CLOPIDOGREL BISULFATE 75 MG: 75 TABLET ORAL at 09:00

## 2023-05-05 RX ADMIN — PANTOPRAZOLE SODIUM 40 MG: 40 TABLET, DELAYED RELEASE ORAL at 06:37

## 2023-05-05 RX ADMIN — CEFTRIAXONE SODIUM 1000 MG: 1 INJECTION, POWDER, FOR SOLUTION INTRAMUSCULAR; INTRAVENOUS at 17:23

## 2023-05-05 RX ADMIN — ATORVASTATIN CALCIUM 40 MG: 40 TABLET, FILM COATED ORAL at 21:18

## 2023-05-05 RX ADMIN — PANTOPRAZOLE SODIUM 40 MG: 40 TABLET, DELAYED RELEASE ORAL at 17:21

## 2023-05-05 RX ADMIN — ASPIRIN 81 MG CHEWABLE TABLET 81 MG: 81 TABLET CHEWABLE at 09:00

## 2023-05-05 ASSESSMENT — ENCOUNTER SYMPTOMS
BLOOD IN STOOL: 0
BACK PAIN: 0
ABDOMINAL DISTENTION: 0
ABDOMINAL PAIN: 0
STRIDOR: 0
CHEST TIGHTNESS: 0
VOMITING: 0
EYE PAIN: 0
CONSTIPATION: 0
CHOKING: 0
NAUSEA: 0
SHORTNESS OF BREATH: 0
DIARRHEA: 0
COUGH: 0
WHEEZING: 0

## 2023-05-05 ASSESSMENT — PAIN DESCRIPTION - LOCATION: LOCATION: ABDOMEN

## 2023-05-05 ASSESSMENT — PAIN SCALES - GENERAL: PAINLEVEL_OUTOF10: 0

## 2023-05-06 LAB
ANION GAP SERPL CALCULATED.3IONS-SCNC: 7 MMOL/L (ref 4–16)
BASOPHILS ABSOLUTE: 0 K/CU MM
BASOPHILS RELATIVE PERCENT: 0.8 % (ref 0–1)
BUN SERPL-MCNC: 25 MG/DL (ref 6–23)
CALCIUM SERPL-MCNC: 8 MG/DL (ref 8.3–10.6)
CHLORIDE BLD-SCNC: 103 MMOL/L (ref 99–110)
CO2: 23 MMOL/L (ref 21–32)
CREAT SERPL-MCNC: 1.1 MG/DL (ref 0.6–1.1)
DIFFERENTIAL TYPE: ABNORMAL
EOSINOPHILS ABSOLUTE: 0 K/CU MM
EOSINOPHILS RELATIVE PERCENT: 1.6 % (ref 0–3)
GFR SERPL CREATININE-BSD FRML MDRD: 53 ML/MIN/1.73M2
GLUCOSE BLD-MCNC: 111 MG/DL (ref 70–99)
GLUCOSE BLD-MCNC: 119 MG/DL (ref 70–99)
GLUCOSE BLD-MCNC: 127 MG/DL (ref 70–99)
GLUCOSE BLD-MCNC: 97 MG/DL (ref 70–99)
GLUCOSE SERPL-MCNC: 100 MG/DL (ref 70–99)
HCT VFR BLD CALC: 32.3 % (ref 37–47)
HEMOGLOBIN: 10.5 GM/DL (ref 12.5–16)
IMMATURE NEUTROPHIL %: 0.8 % (ref 0–0.43)
LYMPHOCYTES ABSOLUTE: 0.5 K/CU MM
LYMPHOCYTES RELATIVE PERCENT: 21.3 % (ref 24–44)
MAGNESIUM: 2.1 MG/DL (ref 1.8–2.4)
MCH RBC QN AUTO: 33.4 PG (ref 27–31)
MCHC RBC AUTO-ENTMCNC: 32.5 % (ref 32–36)
MCV RBC AUTO: 102.9 FL (ref 78–100)
MONOCYTES ABSOLUTE: 0.3 K/CU MM
MONOCYTES RELATIVE PERCENT: 9.8 % (ref 0–4)
NUCLEATED RBC %: 0 %
PDW BLD-RTO: 14.2 % (ref 11.7–14.9)
PHOSPHORUS: 2.3 MG/DL (ref 2.5–4.9)
PLATELET # BLD: 68 K/CU MM (ref 140–440)
PMV BLD AUTO: 11.5 FL (ref 7.5–11.1)
POTASSIUM SERPL-SCNC: 4 MMOL/L (ref 3.5–5.1)
RBC # BLD: 3.14 M/CU MM (ref 4.2–5.4)
SEGMENTED NEUTROPHILS ABSOLUTE COUNT: 1.7 K/CU MM
SEGMENTED NEUTROPHILS RELATIVE PERCENT: 65.7 % (ref 36–66)
SODIUM BLD-SCNC: 133 MMOL/L (ref 135–145)
TOTAL IMMATURE NEUTOROPHIL: 0.02 K/CU MM
TOTAL NUCLEATED RBC: 0 K/CU MM
WBC # BLD: 2.5 K/CU MM (ref 4–10.5)

## 2023-05-06 PROCEDURE — 36415 COLL VENOUS BLD VENIPUNCTURE: CPT

## 2023-05-06 PROCEDURE — 6370000000 HC RX 637 (ALT 250 FOR IP): Performed by: INTERNAL MEDICINE

## 2023-05-06 PROCEDURE — 83735 ASSAY OF MAGNESIUM: CPT

## 2023-05-06 PROCEDURE — 87086 URINE CULTURE/COLONY COUNT: CPT

## 2023-05-06 PROCEDURE — 97530 THERAPEUTIC ACTIVITIES: CPT

## 2023-05-06 PROCEDURE — 2580000003 HC RX 258: Performed by: INTERNAL MEDICINE

## 2023-05-06 PROCEDURE — 76937 US GUIDE VASCULAR ACCESS: CPT

## 2023-05-06 PROCEDURE — 94761 N-INVAS EAR/PLS OXIMETRY MLT: CPT

## 2023-05-06 PROCEDURE — 84100 ASSAY OF PHOSPHORUS: CPT

## 2023-05-06 PROCEDURE — 2500000003 HC RX 250 WO HCPCS: Performed by: INTERNAL MEDICINE

## 2023-05-06 PROCEDURE — 6360000002 HC RX W HCPCS: Performed by: INTERNAL MEDICINE

## 2023-05-06 PROCEDURE — 82962 GLUCOSE BLOOD TEST: CPT

## 2023-05-06 PROCEDURE — 1200000000 HC SEMI PRIVATE

## 2023-05-06 PROCEDURE — 85025 COMPLETE CBC W/AUTO DIFF WBC: CPT

## 2023-05-06 PROCEDURE — 80048 BASIC METABOLIC PNL TOTAL CA: CPT

## 2023-05-06 RX ADMIN — SODIUM CHLORIDE, PRESERVATIVE FREE 10 ML: 5 INJECTION INTRAVENOUS at 21:21

## 2023-05-06 RX ADMIN — CEFEPIME HYDROCHLORIDE 1000 MG: 1 INJECTION, POWDER, FOR SOLUTION INTRAMUSCULAR; INTRAVENOUS at 21:21

## 2023-05-06 RX ADMIN — SODIUM CHLORIDE, PRESERVATIVE FREE 10 ML: 5 INJECTION INTRAVENOUS at 11:24

## 2023-05-06 RX ADMIN — METOPROLOL TARTRATE 25 MG: 25 TABLET, FILM COATED ORAL at 11:22

## 2023-05-06 RX ADMIN — SUCRALFATE 1 G: 1 TABLET ORAL at 17:36

## 2023-05-06 RX ADMIN — METOPROLOL TARTRATE 25 MG: 25 TABLET, FILM COATED ORAL at 21:29

## 2023-05-06 RX ADMIN — SODIUM PHOSPHATE, MONOBASIC, MONOHYDRATE AND SODIUM PHOSPHATE, DIBASIC, ANHYDROUS 20 MMOL: 142; 276 INJECTION, SOLUTION INTRAVENOUS at 15:40

## 2023-05-06 RX ADMIN — PANTOPRAZOLE SODIUM 40 MG: 40 TABLET, DELAYED RELEASE ORAL at 06:55

## 2023-05-06 RX ADMIN — SUCRALFATE 1 G: 1 TABLET ORAL at 11:21

## 2023-05-06 RX ADMIN — SUCRALFATE 1 G: 1 TABLET ORAL at 00:55

## 2023-05-06 RX ADMIN — PANTOPRAZOLE SODIUM 40 MG: 40 TABLET, DELAYED RELEASE ORAL at 17:36

## 2023-05-06 RX ADMIN — SUCRALFATE 1 G: 1 TABLET ORAL at 06:55

## 2023-05-06 RX ADMIN — ASPIRIN 81 MG CHEWABLE TABLET 81 MG: 81 TABLET CHEWABLE at 11:21

## 2023-05-06 RX ADMIN — ATORVASTATIN CALCIUM 40 MG: 40 TABLET, FILM COATED ORAL at 21:29

## 2023-05-06 RX ADMIN — CLOPIDOGREL BISULFATE 75 MG: 75 TABLET ORAL at 11:21

## 2023-05-06 RX ADMIN — POLYETHYLENE GLYCOL 3350 17 G: 17 POWDER, FOR SOLUTION ORAL at 17:36

## 2023-05-06 ASSESSMENT — ENCOUNTER SYMPTOMS
SHORTNESS OF BREATH: 0
CONSTIPATION: 0
DIARRHEA: 0
STRIDOR: 0
BLOOD IN STOOL: 0
ABDOMINAL PAIN: 1
CHOKING: 0
WHEEZING: 0
ABDOMINAL DISTENTION: 0
COUGH: 0
BACK PAIN: 0
CHEST TIGHTNESS: 0
VOMITING: 0
EYE PAIN: 0
NAUSEA: 0

## 2023-05-07 LAB
ANION GAP SERPL CALCULATED.3IONS-SCNC: 10 MMOL/L (ref 4–16)
BASOPHILS ABSOLUTE: 0 K/CU MM
BASOPHILS RELATIVE PERCENT: 0.3 % (ref 0–1)
BUN SERPL-MCNC: 21 MG/DL (ref 6–23)
CALCIUM SERPL-MCNC: 7.8 MG/DL (ref 8.3–10.6)
CHLORIDE BLD-SCNC: 106 MMOL/L (ref 99–110)
CO2: 20 MMOL/L (ref 21–32)
CREAT SERPL-MCNC: 1 MG/DL (ref 0.6–1.1)
DIFFERENTIAL TYPE: ABNORMAL
EOSINOPHILS ABSOLUTE: 0.1 K/CU MM
EOSINOPHILS RELATIVE PERCENT: 1.8 % (ref 0–3)
GFR SERPL CREATININE-BSD FRML MDRD: 60 ML/MIN/1.73M2
GLUCOSE BLD-MCNC: 100 MG/DL (ref 70–99)
GLUCOSE BLD-MCNC: 117 MG/DL (ref 70–99)
GLUCOSE BLD-MCNC: 124 MG/DL (ref 70–99)
GLUCOSE BLD-MCNC: 130 MG/DL (ref 70–99)
GLUCOSE SERPL-MCNC: 100 MG/DL (ref 70–99)
HCT VFR BLD CALC: 31.7 % (ref 37–47)
HEMOGLOBIN: 10.2 GM/DL (ref 12.5–16)
IMMATURE NEUTROPHIL %: 0.9 % (ref 0–0.43)
LYMPHOCYTES ABSOLUTE: 0.7 K/CU MM
LYMPHOCYTES RELATIVE PERCENT: 20.4 % (ref 24–44)
MAGNESIUM: 2 MG/DL (ref 1.8–2.4)
MCH RBC QN AUTO: 33.8 PG (ref 27–31)
MCHC RBC AUTO-ENTMCNC: 32.2 % (ref 32–36)
MCV RBC AUTO: 105 FL (ref 78–100)
MONOCYTES ABSOLUTE: 0.4 K/CU MM
MONOCYTES RELATIVE PERCENT: 12.3 % (ref 0–4)
NUCLEATED RBC %: 0 %
PDW BLD-RTO: 14.2 % (ref 11.7–14.9)
PHOSPHORUS: 2.9 MG/DL (ref 2.5–4.9)
PLATELET # BLD: 66 K/CU MM (ref 140–440)
PMV BLD AUTO: 10.6 FL (ref 7.5–11.1)
POTASSIUM SERPL-SCNC: 3.9 MMOL/L (ref 3.5–5.1)
RBC # BLD: 3.02 M/CU MM (ref 4.2–5.4)
SEGMENTED NEUTROPHILS ABSOLUTE COUNT: 2.2 K/CU MM
SEGMENTED NEUTROPHILS RELATIVE PERCENT: 64.3 % (ref 36–66)
SODIUM BLD-SCNC: 136 MMOL/L (ref 135–145)
TOTAL IMMATURE NEUTOROPHIL: 0.03 K/CU MM
TOTAL NUCLEATED RBC: 0 K/CU MM
WBC # BLD: 3.3 K/CU MM (ref 4–10.5)

## 2023-05-07 PROCEDURE — 6370000000 HC RX 637 (ALT 250 FOR IP): Performed by: INTERNAL MEDICINE

## 2023-05-07 PROCEDURE — 80048 BASIC METABOLIC PNL TOTAL CA: CPT

## 2023-05-07 PROCEDURE — 6360000002 HC RX W HCPCS: Performed by: INTERNAL MEDICINE

## 2023-05-07 PROCEDURE — 84100 ASSAY OF PHOSPHORUS: CPT

## 2023-05-07 PROCEDURE — 94761 N-INVAS EAR/PLS OXIMETRY MLT: CPT

## 2023-05-07 PROCEDURE — 36415 COLL VENOUS BLD VENIPUNCTURE: CPT

## 2023-05-07 PROCEDURE — 2580000003 HC RX 258: Performed by: INTERNAL MEDICINE

## 2023-05-07 PROCEDURE — 82962 GLUCOSE BLOOD TEST: CPT

## 2023-05-07 PROCEDURE — 1200000000 HC SEMI PRIVATE

## 2023-05-07 PROCEDURE — 83735 ASSAY OF MAGNESIUM: CPT

## 2023-05-07 PROCEDURE — 85025 COMPLETE CBC W/AUTO DIFF WBC: CPT

## 2023-05-07 RX ADMIN — SUCRALFATE 1 G: 1 TABLET ORAL at 17:47

## 2023-05-07 RX ADMIN — METOPROLOL TARTRATE 25 MG: 25 TABLET, FILM COATED ORAL at 09:17

## 2023-05-07 RX ADMIN — ASPIRIN 81 MG CHEWABLE TABLET 81 MG: 81 TABLET CHEWABLE at 09:17

## 2023-05-07 RX ADMIN — SODIUM CHLORIDE, PRESERVATIVE FREE 10 ML: 5 INJECTION INTRAVENOUS at 09:18

## 2023-05-07 RX ADMIN — SUCRALFATE 1 G: 1 TABLET ORAL at 13:24

## 2023-05-07 RX ADMIN — SODIUM CHLORIDE, PRESERVATIVE FREE 10 ML: 5 INJECTION INTRAVENOUS at 20:12

## 2023-05-07 RX ADMIN — SUCRALFATE 1 G: 1 TABLET ORAL at 00:15

## 2023-05-07 RX ADMIN — SUCRALFATE 1 G: 1 TABLET ORAL at 06:03

## 2023-05-07 RX ADMIN — CEFEPIME HYDROCHLORIDE 1000 MG: 1 INJECTION, POWDER, FOR SOLUTION INTRAMUSCULAR; INTRAVENOUS at 04:17

## 2023-05-07 RX ADMIN — CEFEPIME HYDROCHLORIDE 1000 MG: 1 INJECTION, POWDER, FOR SOLUTION INTRAMUSCULAR; INTRAVENOUS at 17:55

## 2023-05-07 RX ADMIN — PANTOPRAZOLE SODIUM 40 MG: 40 TABLET, DELAYED RELEASE ORAL at 06:03

## 2023-05-07 RX ADMIN — CLOPIDOGREL BISULFATE 75 MG: 75 TABLET ORAL at 09:17

## 2023-05-07 RX ADMIN — PANTOPRAZOLE SODIUM 40 MG: 40 TABLET, DELAYED RELEASE ORAL at 17:47

## 2023-05-07 RX ADMIN — ATORVASTATIN CALCIUM 40 MG: 40 TABLET, FILM COATED ORAL at 20:11

## 2023-05-07 RX ADMIN — METOPROLOL TARTRATE 25 MG: 25 TABLET, FILM COATED ORAL at 20:11

## 2023-05-07 ASSESSMENT — ENCOUNTER SYMPTOMS
SHORTNESS OF BREATH: 0
STRIDOR: 0
NAUSEA: 0
CHOKING: 0
WHEEZING: 0
CONSTIPATION: 0
ABDOMINAL PAIN: 1
DIARRHEA: 0
CHEST TIGHTNESS: 0
EYE PAIN: 0
BACK PAIN: 0
COUGH: 0
VOMITING: 0
ABDOMINAL DISTENTION: 0
BLOOD IN STOOL: 0

## 2023-05-08 VITALS
WEIGHT: 169.1 LBS | OXYGEN SATURATION: 98 % | DIASTOLIC BLOOD PRESSURE: 57 MMHG | HEART RATE: 75 BPM | BODY MASS INDEX: 28.17 KG/M2 | RESPIRATION RATE: 14 BRPM | SYSTOLIC BLOOD PRESSURE: 113 MMHG | TEMPERATURE: 98.6 F | HEIGHT: 65 IN

## 2023-05-08 LAB
ANION GAP SERPL CALCULATED.3IONS-SCNC: 6 MMOL/L (ref 4–16)
BASOPHILS ABSOLUTE: 0 K/CU MM
BASOPHILS RELATIVE PERCENT: 0.3 % (ref 0–1)
BUN SERPL-MCNC: 15 MG/DL (ref 6–23)
CALCIUM SERPL-MCNC: 8.1 MG/DL (ref 8.3–10.6)
CHLORIDE BLD-SCNC: 106 MMOL/L (ref 99–110)
CO2: 25 MMOL/L (ref 21–32)
CREAT SERPL-MCNC: 0.9 MG/DL (ref 0.6–1.1)
CULTURE: NORMAL
DIFFERENTIAL TYPE: ABNORMAL
EOSINOPHILS ABSOLUTE: 0.1 K/CU MM
EOSINOPHILS RELATIVE PERCENT: 1.7 % (ref 0–3)
GFR SERPL CREATININE-BSD FRML MDRD: >60 ML/MIN/1.73M2
GLUCOSE BLD-MCNC: 112 MG/DL (ref 70–99)
GLUCOSE BLD-MCNC: 184 MG/DL (ref 70–99)
GLUCOSE SERPL-MCNC: 109 MG/DL (ref 70–99)
HCT VFR BLD CALC: 31.6 % (ref 37–47)
HEMOGLOBIN: 10.2 GM/DL (ref 12.5–16)
IMMATURE NEUTROPHIL %: 1 % (ref 0–0.43)
LYMPHOCYTES ABSOLUTE: 0.6 K/CU MM
LYMPHOCYTES RELATIVE PERCENT: 19.7 % (ref 24–44)
Lab: NORMAL
MAGNESIUM: 1.8 MG/DL (ref 1.8–2.4)
MCH RBC QN AUTO: 33.7 PG (ref 27–31)
MCHC RBC AUTO-ENTMCNC: 32.3 % (ref 32–36)
MCV RBC AUTO: 104.3 FL (ref 78–100)
MONOCYTES ABSOLUTE: 0.3 K/CU MM
MONOCYTES RELATIVE PERCENT: 11.2 % (ref 0–4)
NUCLEATED RBC %: 0 %
PDW BLD-RTO: 14.4 % (ref 11.7–14.9)
PHOSPHORUS: 2.1 MG/DL (ref 2.5–4.9)
PLATELET # BLD: 71 K/CU MM (ref 140–440)
PMV BLD AUTO: 11 FL (ref 7.5–11.1)
POTASSIUM SERPL-SCNC: 4.2 MMOL/L (ref 3.5–5.1)
RBC # BLD: 3.03 M/CU MM (ref 4.2–5.4)
SARS-COV-2 RDRP RESP QL NAA+PROBE: NOT DETECTED
SEGMENTED NEUTROPHILS ABSOLUTE COUNT: 1.9 K/CU MM
SEGMENTED NEUTROPHILS RELATIVE PERCENT: 66.1 % (ref 36–66)
SODIUM BLD-SCNC: 137 MMOL/L (ref 135–145)
SOURCE: NORMAL
SPECIMEN: NORMAL
TOTAL IMMATURE NEUTOROPHIL: 0.03 K/CU MM
TOTAL NUCLEATED RBC: 0 K/CU MM
WBC # BLD: 2.9 K/CU MM (ref 4–10.5)

## 2023-05-08 PROCEDURE — 94761 N-INVAS EAR/PLS OXIMETRY MLT: CPT

## 2023-05-08 PROCEDURE — 2500000003 HC RX 250 WO HCPCS: Performed by: INTERNAL MEDICINE

## 2023-05-08 PROCEDURE — 84100 ASSAY OF PHOSPHORUS: CPT

## 2023-05-08 PROCEDURE — 2580000003 HC RX 258: Performed by: INTERNAL MEDICINE

## 2023-05-08 PROCEDURE — 6370000000 HC RX 637 (ALT 250 FOR IP): Performed by: INTERNAL MEDICINE

## 2023-05-08 PROCEDURE — 87635 SARS-COV-2 COVID-19 AMP PRB: CPT

## 2023-05-08 PROCEDURE — 97530 THERAPEUTIC ACTIVITIES: CPT

## 2023-05-08 PROCEDURE — 82962 GLUCOSE BLOOD TEST: CPT

## 2023-05-08 PROCEDURE — 85025 COMPLETE CBC W/AUTO DIFF WBC: CPT

## 2023-05-08 PROCEDURE — 6360000002 HC RX W HCPCS: Performed by: INTERNAL MEDICINE

## 2023-05-08 PROCEDURE — 80048 BASIC METABOLIC PNL TOTAL CA: CPT

## 2023-05-08 PROCEDURE — 36415 COLL VENOUS BLD VENIPUNCTURE: CPT

## 2023-05-08 PROCEDURE — 83735 ASSAY OF MAGNESIUM: CPT

## 2023-05-08 PROCEDURE — 97535 SELF CARE MNGMENT TRAINING: CPT

## 2023-05-08 RX ORDER — CEFDINIR 300 MG/1
300 CAPSULE ORAL 2 TIMES DAILY
Qty: 10 CAPSULE | Refills: 0
Start: 2023-05-08 | End: 2023-05-13

## 2023-05-08 RX ORDER — SUCRALFATE 1 G/1
1 TABLET ORAL 4 TIMES DAILY
Qty: 56 TABLET | Refills: 0
Start: 2023-05-08 | End: 2023-05-22

## 2023-05-08 RX ORDER — PANTOPRAZOLE SODIUM 40 MG/1
40 TABLET, DELAYED RELEASE ORAL
Qty: 60 TABLET | Refills: 1
Start: 2023-05-08 | End: 2023-07-07

## 2023-05-08 RX ADMIN — SUCRALFATE 1 G: 1 TABLET ORAL at 00:57

## 2023-05-08 RX ADMIN — PANTOPRAZOLE SODIUM 40 MG: 40 TABLET, DELAYED RELEASE ORAL at 16:29

## 2023-05-08 RX ADMIN — CEFEPIME HYDROCHLORIDE 1000 MG: 1 INJECTION, POWDER, FOR SOLUTION INTRAMUSCULAR; INTRAVENOUS at 05:09

## 2023-05-08 RX ADMIN — SODIUM PHOSPHATE, MONOBASIC, MONOHYDRATE AND SODIUM PHOSPHATE, DIBASIC, ANHYDROUS 20 MMOL: 142; 276 INJECTION, SOLUTION INTRAVENOUS at 09:44

## 2023-05-08 RX ADMIN — SUCRALFATE 1 G: 1 TABLET ORAL at 12:57

## 2023-05-08 RX ADMIN — SUCRALFATE 1 G: 1 TABLET ORAL at 05:09

## 2023-05-08 RX ADMIN — CLOPIDOGREL BISULFATE 75 MG: 75 TABLET ORAL at 09:36

## 2023-05-08 RX ADMIN — PANTOPRAZOLE SODIUM 40 MG: 40 TABLET, DELAYED RELEASE ORAL at 05:09

## 2023-05-08 RX ADMIN — ASPIRIN 81 MG CHEWABLE TABLET 81 MG: 81 TABLET CHEWABLE at 09:36

## 2023-05-08 ASSESSMENT — PAIN SCALES - GENERAL: PAINLEVEL_OUTOF10: 0

## 2023-05-08 NOTE — PROGRESS NOTES
Physical Therapy    Physical Therapy Treatment Note  Name: Arya Zepeda MRN: [id number] :   1950   Date:  2023   Admission Date: 2023 Room:  [address] [place], [place] [address]A   Restrictions/Precautions:           Fall risk, tele, BP, confusion, general  Communication with other providers:  Partial co-tx with Prerna DEMARCO for pt compliance  Subjective:  Patient states: \"Oh no, I've already done that\", \"No I think we're done\"  Pain:   Location, Type, Intensity (0/10 to 10/10):  pt without c/o  Objective:    Observation:  Pt is awake up in recliner upon entry  Objective Measures:  WFL vitals  Treatment, including education/measures:  **Pt required overall increased time and encouragement to participate in mobility. Pt is limited by fear of falling. STS: Pt performed from recliner>RW x2 reps with Mod A x2 required d/t decreased LE engagement, poor anterior progression. PT provided max cues for sequencing and improved performance with poor carryover. Pt is reluctant to perform ea trial requiring increased time. Standing balance: With RW and Max cues, Max x2 to support and prevent premature RTS: Trial 1 x15 sec, trial 2 x10 sec. Pt self-limiting of standing balance and returns self to seated despite PT encouragement. Pt inconsistent in level of assist d/t fear. PT Max encouragement to AMB this session, pt is refusing despite education. End of session pt left in recliner with meal tray, BAL present, with lines managed, call light, phone, exit alarm, tray, all needs, RN aware.     Assessment / Impression:      Patient's tolerance of treatment:  fair-   Adverse Reaction: none  Significant change in status and impact:  none  Barriers to improvement:  Self-limiting   Plan for Next Session:    AMB  Time in:  10:18  Time out:  10:35  Timed treatment minutes:  17  Total treatment time:  17    Previously filed items:           Short Term Goals  Time Frame for Short Term Goals: 1 week  Short Term Goal 1: Pt will

## 2023-05-08 NOTE — DISCHARGE INSTR - COC
Continuity of Care Form    Patient Name: Xuan Wilson   :  1950  MRN:  [id number]    Admit date:  2023  Discharge date:  23    Code Status Order: Full Code   Advance Directives:   [facility] Documentation       Date/Time Healthcare Directive Type of Healthcare Directive Copy in [address] Agent's Name Healthcare Agent's Phone Number    23 1020 No, patient does not have an advance directive for healthcare treatment -- -- -- -- --            Admitting Physician:  Tez Zapata MD  PCP: MELANI Gotti NP    Discharging Nurse: [employer] Unit/Room#: 2473/3000-J  Discharging Unit Phone Number: [phone]    Emergency Contact:   Extended Emergency Contact Information  Primary Emergency Contact: Tim [address], [address] Phone: [phone]  Relation: Child  Secondary Emergency Contact: [facility] [address] Phone: [phone]  Relation: Other    Past Surgical History:  Past Surgical History:   Procedure Laterality Date    BLADDER SUSPENSION      COLONOSCOPY  3/23/15    polyps, int hem    DILATION AND CURETTAGE OF UTERUS      HYSTERECTOMY (CERVIX STATUS UNKNOWN)      UPPER GASTROINTESTINAL ENDOSCOPY N/A 5/3/2023    EGD DIAGNOSTIC ONLY performed by Lani Turner MD at [facility] ENDOSCOPY       Immunization History:   Immunization History   Administered Date(s) Administered    COVID-19, MODERNA BLUE border, Primary or Immunocompromised, (age 12y+), IM, 100 mcg/0.5mL 2021, 2021       Active Problems:  Patient Active Problem List   Diagnosis Code    Vitamin D deficiency E55.9    Mixed hyperlipidemia E78.2    Back pain, chronic M54.9, G89.29    Hemiplegia and hemiparesis following cerebral infarction affecting right dominant side (HCC) I69.351    Hypokalemia E87.6    Essential hypertension I10    TIA (transient ischemic attack) G45.9    Class 3 severe obesity due to excess calories with

## 2023-05-08 NOTE — DISCHARGE SUMMARY
collection. The gray-white differentiation is maintained without evidence of an acute infarct. There is no evidence of hydrocephalus. ORBITS: The visualized portion of the orbits demonstrate no acute abnormality. SINUSES: The visualized paranasal sinuses and mastoid air cells demonstrate no acute abnormality. SOFT TISSUES/SKULL:  No acute abnormality of the visualized skull or soft tissues. No acute intracranial abnormality. Cerebral and cerebral atrophy. 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 XR CHEST PORTABLE    Result Date: 2023  EXAMINATION: ONE XRAY VIEW OF THE CHEST 2023 3:41 pm COMPARISON: 2021 HISTORY: ORDERING SYSTEM PROVIDED HISTORY: fatigue TECHNOLOGIST PROVIDED HISTORY: Reason for exam:->fatigue Reason for Exam: fatigue FINDINGS: The lungs are without acute focal process. There is no effusion or pneumothorax. The cardiomediastinal silhouette is stable. The osseous structures are stable. No acute process. EGD    Result Date: 5/3/2023  [facility] Patient: Alissa Thomas MRN: [id number] : 1950 Gender: Female Age: 67 Years Procedure: Upper GI endoscopy Date: 5/3/2023 Attending Physician: Pilo Gonzalez MD Indications:        -  Nausea with vomiting        -  Active gastrointestinal bleeding        -  Hematemesis        -  Melena Medications:        -  Monitored Anesthesia Care        -  See the Anesthesia note for documentation of the administered medications Complications:        -  No immediate complications. Estimated Blood Loss:        -  Estimated blood loss: none. Procedure:        - The scope was introduced through the mouth and advanced to the second part           of the duodenum.        -  The upper GI endoscopy was accomplished without difficulty. -  The patient tolerated the procedure well.  Findings:        -  LA Grade D (one or more mucosal breaks involving

## 2023-05-08 NOTE — PLAN OF CARE
Adequate for Discharge  Goal: Glucose maintained within prescribed range  Outcome: Adequate for Discharge     Problem: Hematologic - Adult  Goal: Maintains hematologic stability  Outcome: Adequate for Discharge

## 2023-05-08 NOTE — CARE COORDINATION
Precert pending for [facility] in [facility]. LSW requested updated PT/OT notes. WB note placed.

## 2023-05-08 NOTE — CARE COORDINATION
Pt is approved to go to [email or web address] in [facility]. Doctor is aware. Pt will need a RAPID COVID test on day of discharge. CM will need doctor and RN to complete NEVILLE. If pt is discharged after hours please complete the following. ... Call report to [phone]  Fax completed AVS with both NEVILLE on the AVS and any written Rx [phone]. Set up transportation [facility] and call family. Pt is on discharge. [place] to  pt at 5:00. LSW called tp dg and informed her of the  time. LSW also left a message with Pamela with the SNF of  time as well. LSW will fax AVS with NEVILLE once RN completes her part of the [address]. RN is also aware of  time.

## 2023-05-08 NOTE — PROGRESS NOTES
Occupational Therapy    Occupational Therapy Treatment Note    Name: Valarie Sommer MRN: [id number] :   1950   Date:  2023   Admission Date: 2023 Room:  [address] [place], [place] [address]-A     Primary Problem:    The primary encounter diagnosis was Acute cystitis with hematuria. Diagnoses of SEMAJ (acute kidney injury) ([facility] [address] and Other fatigue were also pertinent to this visit. Restrictions/Precautions:          general precautions, fall risk, cog/safety    Communication with other providers: PT Jennifer Hernandez, PT Student     Subjective:  Patient states:  \"I feel like I have to go and then nothing. \"   Pain:   Location, Type, Intensity (0/10 to 10/10):  denies     Objective:    Observation: Pt presented seated in recliner with PT and PT student. Objective Measures:  Tele, IV     Treatment, including education:  Therapeutic Activity Training:   Therapeutic activity training was instructed today. Cues were given for safety, sequence, UE/LE placement, awareness, and balance. Activities performed today included sit-stand, SPT. Sit to Stand transfer from recliner set up at window for X1 trial with Min A X2 and max encouragement to initiate. SPT from recliner <> BSC with Mod-Max A with max cues for UE placement. Step pivot with FWW from [facility] <> recliner with Min A. Self Care Training:   Cues were given for safety, sequence, UE/LE placement, visual cues, and balance. Activities performed today included UB/LB dressing tasks, toileting, hand hygiene at sink    Dressing:  Pt doffed/donned socks while seated in recliner with SBA utilizing figure four technique. Max A to doff/don depend while sitting/standing from [facility]. Hygiene/grooming:  SBA to wash face/hands while seated on BSC with wash cloth. SBA to brush hair while seated in recliner. Toileting:  Min A for [facility] t/f with use of FWW with cues for UE placement. Max A for other toileting tasks with use of FWW in standing with Min-Mod A for balance.

## 2023-05-09 ENCOUNTER — TELEPHONE (OUTPATIENT)
Dept: FAMILY MEDICINE CLINIC | Age: 73
End: 2023-05-09

## 2023-05-09 NOTE — TELEPHONE ENCOUNTER
Care Transitions Initial Follow Up Call    Outreach made within 2 business days of discharge: Yes    Patient: [address] Patient : 1950   MRN: [id number]  Reason for Admission: There are no discharge diagnoses documented for the most recent discharge. Discharge Date: 23       Spoke with: left a vm to call when she is released     Discharge department/facility: [facility] Interactive Patient Contact:  Was patient able to fill all prescriptions: Yes  Was patient instructed to bring all medications to the follow-up visit: Yes  Is patient taking all medications as directed in the discharge summary? Yes  Does patient understand their discharge instructions: Yes  Does patient have questions or concerns that need addressed prior to 7-14 day follow up office visit: no    Scheduled appointment with PCP within 7-14 days    Follow Up  No future appointments.     Jacob Tomas MA

## 2023-05-10 ENCOUNTER — TELEPHONE (OUTPATIENT)
Dept: FAMILY MEDICINE CLINIC | Age: 73
End: 2023-05-10

## 2023-05-10 NOTE — TELEPHONE ENCOUNTER
Care Transitions Initial Follow Up Call    Outreach made within 2 business days of discharge: Yes    Patient: [address] Patient : 1950   MRN: [id number]  Reason for Admission: There are no discharge diagnoses documented for the most recent discharge. Discharge Date: 23       Spoke with: left message for pt to call once released from SNF    Discharge department/facility: [facility] Interactive Patient Contact:      Scheduled appointment with PCP within 7-14 days    Follow Up  No future appointments.     Michael Joy MA
